# Patient Record
Sex: MALE | Race: WHITE | NOT HISPANIC OR LATINO | Employment: OTHER | URBAN - METROPOLITAN AREA
[De-identification: names, ages, dates, MRNs, and addresses within clinical notes are randomized per-mention and may not be internally consistent; named-entity substitution may affect disease eponyms.]

---

## 2020-09-28 ENCOUNTER — TRANSCRIBE ORDERS (OUTPATIENT)
Dept: ADMINISTRATIVE | Facility: HOSPITAL | Age: 75
End: 2020-09-28

## 2020-09-28 DIAGNOSIS — D64.9 ANEMIA, UNSPECIFIED: ICD-10-CM

## 2020-09-28 DIAGNOSIS — J91.0 MALIGNANT PLEURAL EFFUSION: ICD-10-CM

## 2020-09-28 DIAGNOSIS — R11.0 NAUSEA: ICD-10-CM

## 2020-09-28 DIAGNOSIS — C34.11 MALIGNANT NEOPLASM OF UPPER LOBE, RIGHT BRONCHUS OR LUNG (HCC): Primary | ICD-10-CM

## 2020-10-07 ENCOUNTER — HOSPITAL ENCOUNTER (OUTPATIENT)
Dept: RADIOLOGY | Facility: MEDICAL CENTER | Age: 75
Discharge: HOME | End: 2020-10-07
Payer: COMMERCIAL

## 2020-10-07 DIAGNOSIS — R11.0 NAUSEA: ICD-10-CM

## 2020-10-07 DIAGNOSIS — D64.9 ANEMIA, UNSPECIFIED: ICD-10-CM

## 2020-10-07 DIAGNOSIS — C34.11 MALIGNANT NEOPLASM OF UPPER LOBE, RIGHT BRONCHUS OR LUNG (HCC): ICD-10-CM

## 2020-10-07 DIAGNOSIS — J91.0 MALIGNANT PLEURAL EFFUSION: ICD-10-CM

## 2020-10-07 PROCEDURE — 74177 CT ABD & PELVIS W/CONTRAST: CPT

## 2020-10-07 PROCEDURE — 71260 CT THORAX DX C+: CPT

## 2020-10-07 PROCEDURE — G1004 CDSM NDSC: HCPCS

## 2020-10-07 RX ADMIN — IOHEXOL 100 ML: 350 INJECTION, SOLUTION INTRAVENOUS at 08:59

## 2020-10-21 ENCOUNTER — OFFICE VISIT (OUTPATIENT)
Dept: PULMONOLOGY | Facility: CLINIC | Age: 75
End: 2020-10-21
Payer: COMMERCIAL

## 2020-10-21 VITALS
DIASTOLIC BLOOD PRESSURE: 80 MMHG | TEMPERATURE: 97.8 F | HEART RATE: 110 BPM | HEIGHT: 67 IN | SYSTOLIC BLOOD PRESSURE: 128 MMHG | WEIGHT: 213.5 LBS | OXYGEN SATURATION: 93 % | BODY MASS INDEX: 33.51 KG/M2

## 2020-10-21 DIAGNOSIS — C34.91 CARCINOMA OF LUNG, RIGHT (HCC): Primary | ICD-10-CM

## 2020-10-21 DIAGNOSIS — J90 PLEURAL EFFUSION: ICD-10-CM

## 2020-10-21 DIAGNOSIS — Z86.711 HISTORY OF PULMONARY EMBOLISM: ICD-10-CM

## 2020-10-21 DIAGNOSIS — J44.9 CHRONIC OBSTRUCTIVE PULMONARY DISEASE, UNSPECIFIED COPD TYPE (HCC): ICD-10-CM

## 2020-10-21 PROBLEM — I82.432 ACUTE DEEP VEIN THROMBOSIS (DVT) OF POPLITEAL VEIN OF LEFT LOWER EXTREMITY (HCC): Status: ACTIVE | Noted: 2020-02-29

## 2020-10-21 PROCEDURE — 99214 OFFICE O/P EST MOD 30 MIN: CPT | Performed by: INTERNAL MEDICINE

## 2020-10-21 RX ORDER — LOVASTATIN 20 MG/1
20 TABLET ORAL
COMMUNITY
End: 2021-07-08 | Stop reason: HOSPADM

## 2020-10-21 RX ORDER — ONDANSETRON 4 MG/1
4 TABLET, FILM COATED ORAL EVERY 8 HOURS PRN
COMMUNITY
End: 2021-07-08 | Stop reason: HOSPADM

## 2020-10-21 RX ORDER — LANOLIN ALCOHOL/MO/W.PET/CERES
1 CREAM (GRAM) TOPICAL 2 TIMES DAILY
COMMUNITY
End: 2021-06-28 | Stop reason: HOSPADM

## 2020-10-21 RX ORDER — MELATONIN
1000 DAILY
COMMUNITY
End: 2021-07-08 | Stop reason: HOSPADM

## 2020-10-21 RX ORDER — SENNOSIDES 8.6 MG
1 TABLET ORAL 2 TIMES DAILY
COMMUNITY
End: 2021-06-13

## 2020-10-21 RX ORDER — FUROSEMIDE 40 MG/1
40 TABLET ORAL DAILY
COMMUNITY
End: 2021-07-08 | Stop reason: HOSPADM

## 2020-10-21 RX ORDER — LOSARTAN POTASSIUM 50 MG/1
50 TABLET ORAL DAILY
COMMUNITY
End: 2021-06-28 | Stop reason: HOSPADM

## 2020-10-21 RX ORDER — METOPROLOL SUCCINATE 25 MG/1
25 TABLET, EXTENDED RELEASE ORAL
Status: ON HOLD | COMMUNITY
End: 2021-06-18

## 2020-10-21 RX ORDER — MIRTAZAPINE 15 MG/1
15 TABLET, FILM COATED ORAL DAILY
COMMUNITY
Start: 2020-10-12 | End: 2021-07-08 | Stop reason: HOSPADM

## 2020-10-21 RX ORDER — PANTOPRAZOLE SODIUM 40 MG/1
40 TABLET, DELAYED RELEASE ORAL EVERY MORNING
COMMUNITY
Start: 2020-08-06 | End: 2021-07-08 | Stop reason: HOSPADM

## 2020-10-21 RX ORDER — GLIMEPIRIDE 1 MG/1
1 TABLET ORAL
COMMUNITY
End: 2021-07-08 | Stop reason: HOSPADM

## 2020-10-21 RX ORDER — ATENOLOL 25 MG/1
25 TABLET ORAL DAILY
COMMUNITY
End: 2021-06-28 | Stop reason: HOSPADM

## 2021-03-29 ENCOUNTER — TRANSCRIBE ORDERS (OUTPATIENT)
Dept: ADMINISTRATIVE | Facility: HOSPITAL | Age: 76
End: 2021-03-29

## 2021-03-29 DIAGNOSIS — C34.11 MALIGNANT NEOPLASM OF UPPER LOBE OF RIGHT LUNG (HCC): Primary | ICD-10-CM

## 2021-04-01 ENCOUNTER — HOSPITAL ENCOUNTER (OUTPATIENT)
Dept: RADIOLOGY | Facility: MEDICAL CENTER | Age: 76
Discharge: HOME | End: 2021-04-01

## 2021-04-01 DIAGNOSIS — C34.11 MALIGNANT NEOPLASM OF UPPER LOBE OF RIGHT LUNG (HCC): ICD-10-CM

## 2021-04-02 ENCOUNTER — HOSPITAL ENCOUNTER (OUTPATIENT)
Dept: RADIOLOGY | Facility: MEDICAL CENTER | Age: 76
Discharge: HOME | End: 2021-04-02
Payer: COMMERCIAL

## 2021-04-02 PROCEDURE — 74177 CT ABD & PELVIS W/CONTRAST: CPT

## 2021-04-02 PROCEDURE — 71260 CT THORAX DX C+: CPT

## 2021-04-02 RX ADMIN — IOHEXOL 100 ML: 350 INJECTION, SOLUTION INTRAVENOUS at 13:15

## 2021-04-14 ENCOUNTER — TRANSCRIBE ORDERS (OUTPATIENT)
Dept: ADMINISTRATIVE | Facility: HOSPITAL | Age: 76
End: 2021-04-14

## 2021-04-14 DIAGNOSIS — C34.11 MALIGNANT NEOPLASM OF UPPER LOBE, RIGHT BRONCHUS OR LUNG (HCC): ICD-10-CM

## 2021-04-14 DIAGNOSIS — Z80.1 FH: LUNG CANCER: Primary | ICD-10-CM

## 2021-05-04 ENCOUNTER — HOSPITAL ENCOUNTER (OUTPATIENT)
Dept: RADIOLOGY | Age: 76
Discharge: HOME | End: 2021-05-04
Payer: COMMERCIAL

## 2021-05-04 DIAGNOSIS — C34.11 MALIGNANT NEOPLASM OF UPPER LOBE OF RIGHT LUNG (HCC): ICD-10-CM

## 2021-05-04 LAB — GLUCOSE SERPL-MCNC: 138 MG/DL (ref 65–140)

## 2021-05-04 PROCEDURE — G1004 CDSM NDSC: HCPCS

## 2021-05-04 PROCEDURE — 82948 REAGENT STRIP/BLOOD GLUCOSE: CPT

## 2021-05-04 PROCEDURE — A9552 F18 FDG: HCPCS

## 2021-05-04 PROCEDURE — 78815 PET IMAGE W/CT SKULL-THIGH: CPT

## 2021-05-12 ENCOUNTER — OFFICE VISIT (OUTPATIENT)
Dept: PULMONOLOGY | Facility: CLINIC | Age: 76
End: 2021-05-12
Payer: COMMERCIAL

## 2021-05-12 VITALS
TEMPERATURE: 98.7 F | SYSTOLIC BLOOD PRESSURE: 128 MMHG | WEIGHT: 212.8 LBS | DIASTOLIC BLOOD PRESSURE: 78 MMHG | BODY MASS INDEX: 33.4 KG/M2 | HEART RATE: 106 BPM | HEIGHT: 67 IN | OXYGEN SATURATION: 96 %

## 2021-05-12 DIAGNOSIS — J44.9 CHRONIC OBSTRUCTIVE PULMONARY DISEASE, UNSPECIFIED COPD TYPE (HCC): Primary | ICD-10-CM

## 2021-05-12 DIAGNOSIS — J90 PLEURAL EFFUSION: ICD-10-CM

## 2021-05-12 DIAGNOSIS — C34.91 CARCINOMA OF LUNG, RIGHT (HCC): ICD-10-CM

## 2021-05-12 DIAGNOSIS — E11.69 TYPE 2 DIABETES MELLITUS WITH OTHER SPECIFIED COMPLICATION, WITHOUT LONG-TERM CURRENT USE OF INSULIN (HCC): ICD-10-CM

## 2021-05-12 DIAGNOSIS — I44.30 AV BLOCK: ICD-10-CM

## 2021-05-12 DIAGNOSIS — Z86.711 HISTORY OF PULMONARY EMBOLISM: ICD-10-CM

## 2021-05-12 PROBLEM — E11.9 TYPE 2 DIABETES MELLITUS (HCC): Status: ACTIVE | Noted: 2021-05-12

## 2021-05-12 PROCEDURE — 99214 OFFICE O/P EST MOD 30 MIN: CPT | Performed by: INTERNAL MEDICINE

## 2021-05-12 NOTE — PROGRESS NOTES
Assessment/Plan:    Carcinoma of lung, right ([facility] )  History of NSSLC/recurrent malignant effusion s/p pleural decortication and pleurodesis in July 2020  Most recent PET scan on 5/4/2021    1  Overall findings concerning for disease progression  2  More extensive FDG avid pleural disease at the right lung base mainly around the remaining loculated debris-filled collection here  This is suspicious for malignant pleural disease  3   More extensive FDG avid rowena disease in the mediastinum and right perihilar region suspicious for metastasis  4   New mild focus of FDG uptake within the left intraperitoneal fat would raise suspicion for intraperitoneal metastasis  5  New mild pelvic ascites with mild FDG uptake, malignant ascites is not excluded  6  New focus of FDG uptake at the right scapula superiorly suspicious for osseous metastasis  Patient reports he regained his strength slowly  Initially he lost more than 35 lb and now he gained a few lb back  Patient is following up with Dr Hawk Castellanos for chemotherapy  Pleural effusion  Status post pleural decortication and pleurodesis in July 2020    COPD (chronic obstructive pulmonary disease) ([facility] )  He is a former smoker, quit smoking 50 years ago  He reports his breathing is significantly improved with inhaler  He is running out of his inhaler for few months  Not in acute exacerbation currently, not using oxygen at home  He does have shortness of breath on exertion, his exercise tolerance is very limited he can walk only 15-20 feet without having shortness of breath  He denies any cough, wheezing  Gave Spiriva sample and refill for 90 day supply  History of pulmonary embolism  History of PE on Eliquis    AV block  Status post pacemaker    Type 2 diabetes mellitus ([facility] )    Lab Results   Component Value Date    HGBA1C 9 3 (H) 12/29/2020     Not on insulin  He reports his blood sugar level fluctuates     He reports of dizziness and disoriented on some morning when his blood sugars found to have low at 60s  Management as per primary care physician       Diagnoses and all orders for this visit:    Chronic obstructive pulmonary disease, unspecified COPD type ([facility] )  -     Discontinue: tiotropium (SPIRIVA RESPIMAT) 2 5 MCG/ACT AERS inhaler; Inhale 2 puffs daily  -     tiotropium (SPIRIVA RESPIMAT) 2 5 MCG/ACT AERS inhaler; Inhale 2 puffs daily  -     tiotropium (SPIRIVA RESPIMAT) 2 5 MCG/ACT AERS inhaler; Inhale 2 puffs daily    Carcinoma of lung, right (HCC)  -     Discontinue: tiotropium (SPIRIVA RESPIMAT) 2 5 MCG/ACT AERS inhaler; Inhale 2 puffs daily  -     tiotropium (SPIRIVA RESPIMAT) 2 5 MCG/ACT AERS inhaler; Inhale 2 puffs daily  -     tiotropium (SPIRIVA RESPIMAT) 2 5 MCG/ACT AERS inhaler; Inhale 2 puffs daily    Pleural effusion    History of pulmonary embolism    AV block    Type 2 diabetes mellitus with other specified complication, without long-term current use of insulin ([facility])          Subjective:      Patient ID: Marvin Morales is a 77 y o  male  Patient is here for follow-up for COPD, non-small-cell lung cancer on chemotherapy, malignant pleural effusion status post pleural decortication and pleurodesis, history of PE on Eliquis  He reports his breathing is improved significantly with inhalers  He can walk only 15-20 steps without having shortness of breath  He reports he lost more than 35 lb previously however he gained a few lb back  He denies any cough, chest pain, palpitation, leg swelling, loss of appetite, any GI or  complaint  He reports he sleeps more than usual   He reports of fatigue and tiredness which he attributes to chemotherapy  He has skin rash with previous chemotherapy and he reports his skin rash are improving        The following portions of the patient's history were reviewed and updated as appropriate: allergies, current medications, past family history, past medical history, past social history, past surgical history and problem list     Review of Systems   Constitutional: Positive for activity change and fatigue  Negative for appetite change, chills, fever and unexpected weight change  HENT: Negative for ear pain, sinus pressure, sinus pain and sore throat  Eyes: Negative for pain and visual disturbance  Respiratory: Positive for shortness of breath  Negative for cough, chest tightness and wheezing  Cardiovascular: Negative for chest pain, palpitations and leg swelling  Gastrointestinal: Negative for abdominal pain, constipation, diarrhea (occasional diarrhea which he attributes to chemotherapy), nausea and vomiting  Genitourinary: Negative for difficulty urinating, dysuria, flank pain, frequency and hematuria  Musculoskeletal: Negative for arthralgias, back pain, joint swelling and myalgias  Skin: Positive for rash  Negative for color change  Neurological: Positive for dizziness ( occasional)  Negative for seizures, syncope and numbness  Psychiatric/Behavioral: Negative for decreased concentration and sleep disturbance  All other systems reviewed and are negative  Objective:      /78 (BP Location: Right arm, Patient Position: Sitting, Cuff Size: Standard)   Pulse (!) 106   Temp 98 7 °F (37 1 °C) (Tympanic)   Ht 5' 7" (1 702 m)   Wt 96 5 kg (212 lb 12 8 oz)   SpO2 96%   BMI 33 33 kg/m²          Physical Exam  Vitals signs and nursing note reviewed  Constitutional:       General: He is not in acute distress  Appearance: Normal appearance  He is ill-appearing  He is not toxic-appearing or diaphoretic  HENT:      Head: Normocephalic and atraumatic  Nose: Nose normal    Eyes:      General: No scleral icterus  Extraocular Movements: Extraocular movements intact  Neck:      Musculoskeletal: Normal range of motion  Cardiovascular:      Rate and Rhythm: Normal rate and regular rhythm  Pulses: Normal pulses  Heart sounds: Normal heart sounds  No murmur   No friction rub  No gallop  Pulmonary:      Effort: Pulmonary effort is normal  No bradypnea, accessory muscle usage or respiratory distress  Breath sounds: Decreased air movement (Right lung especially lung base) present  No stridor  No wheezing, rhonchi or rales  Abdominal:      General: Abdomen is flat  Bowel sounds are normal  There is no distension  Palpations: Abdomen is soft  Tenderness: There is no abdominal tenderness  There is no guarding  Musculoskeletal: Normal range of motion  General: No swelling or tenderness  Right lower leg: No edema  Left lower leg: No edema  Skin:     General: Skin is warm and dry  Capillary Refill: Capillary refill takes less than 2 seconds  Coloration: Skin is not pale  Findings: Rash present  Neurological:      General: No focal deficit present  Mental Status: He is alert     Psychiatric:         Mood and Affect: Mood normal

## 2021-05-12 NOTE — ASSESSMENT & PLAN NOTE
History of NSSLC/recurrent malignant effusion s/p pleural decortication and pleurodesis in July 2020  Most recent PET scan on 5/4/2021    1  Overall findings concerning for disease progression  2  More extensive FDG avid pleural disease at the right lung base mainly around the remaining loculated debris-filled collection here  This is suspicious for malignant pleural disease  3   More extensive FDG avid rowena disease in the mediastinum and right perihilar region suspicious for metastasis  4   New mild focus of FDG uptake within the left intraperitoneal fat would raise suspicion for intraperitoneal metastasis  5  New mild pelvic ascites with mild FDG uptake, malignant ascites is not excluded  6  New focus of FDG uptake at the right scapula superiorly suspicious for osseous metastasis  Patient reports he regained his strength slowly  Initially he lost more than 35 lb and now he gained a few lb back  Patient is following up with Dr Mini Kincaid for chemotherapy

## 2021-05-12 NOTE — ASSESSMENT & PLAN NOTE
Lab Results   Component Value Date    HGBA1C 9 3 (H) 12/29/2020     Not on insulin  He reports his blood sugar level fluctuates  He reports of dizziness and disoriented on some morning when his blood sugars found to have low at 60s    Management as per primary care physician

## 2021-05-12 NOTE — ASSESSMENT & PLAN NOTE
He is a former smoker, quit smoking 50 years ago  He reports his breathing is significantly improved with inhaler  He is running out of his inhaler for few months  Not in acute exacerbation currently, not using oxygen at home  He does have shortness of breath on exertion, his exercise tolerance is very limited he can walk only 15-20 feet without having shortness of breath  He denies any cough, wheezing  Gave Spiriva sample and refill for 90 day supply

## 2021-06-13 ENCOUNTER — APPOINTMENT (EMERGENCY)
Dept: RADIOLOGY | Facility: HOSPITAL | Age: 76
DRG: 871 | End: 2021-06-13
Payer: COMMERCIAL

## 2021-06-13 ENCOUNTER — HOSPITAL ENCOUNTER (INPATIENT)
Facility: HOSPITAL | Age: 76
LOS: 1 days | DRG: 871 | End: 2021-06-14
Attending: EMERGENCY MEDICINE | Admitting: INTERNAL MEDICINE
Payer: COMMERCIAL

## 2021-06-13 ENCOUNTER — APPOINTMENT (EMERGENCY)
Dept: CT IMAGING | Facility: HOSPITAL | Age: 76
DRG: 871 | End: 2021-06-13
Payer: COMMERCIAL

## 2021-06-13 DIAGNOSIS — C34.90 METASTATIC PRIMARY LUNG CANCER (HCC): ICD-10-CM

## 2021-06-13 DIAGNOSIS — N17.9 AKI (ACUTE KIDNEY INJURY) (HCC): ICD-10-CM

## 2021-06-13 DIAGNOSIS — E86.0 DEHYDRATION: ICD-10-CM

## 2021-06-13 DIAGNOSIS — J18.9 HCAP (HEALTHCARE-ASSOCIATED PNEUMONIA): Primary | ICD-10-CM

## 2021-06-13 DIAGNOSIS — R09.02 HYPOXIA: ICD-10-CM

## 2021-06-13 PROBLEM — J96.01 ACUTE RESPIRATORY FAILURE WITH HYPOXIA (HCC): Status: ACTIVE | Noted: 2021-06-13

## 2021-06-13 PROBLEM — E87.1 HYPONATREMIA: Status: ACTIVE | Noted: 2021-06-13

## 2021-06-13 LAB
ALBUMIN SERPL BCP-MCNC: 2.9 G/DL (ref 3.4–4.8)
ALP SERPL-CCNC: 56.8 U/L (ref 10–129)
ALT SERPL W P-5'-P-CCNC: 7 U/L (ref 5–63)
ANION GAP SERPL CALCULATED.3IONS-SCNC: 9 MMOL/L (ref 4–13)
AST SERPL W P-5'-P-CCNC: 15 U/L (ref 15–41)
BASE EX.OXY STD BLDV CALC-SCNC: 44 % (ref 60–80)
BASE EXCESS BLDV CALC-SCNC: -4.3 MMOL/L
BASOPHILS # BLD AUTO: 0.04 THOUSANDS/ΜL (ref 0–0.1)
BASOPHILS NFR BLD AUTO: 1 % (ref 0–1)
BILIRUB SERPL-MCNC: 0.51 MG/DL (ref 0.3–1.2)
BNP SERPL-MCNC: 16.2 PG/ML (ref 1–100)
BUN SERPL-MCNC: 31 MG/DL (ref 6–20)
CALCIUM ALBUM COR SERPL-MCNC: 8.9 MG/DL (ref 8.3–10.1)
CALCIUM SERPL-MCNC: 8 MG/DL (ref 8.4–10.2)
CHLORIDE SERPL-SCNC: 96 MMOL/L (ref 96–108)
CO2 SERPL-SCNC: 24 MMOL/L (ref 22–33)
CREAT SERPL-MCNC: 1.59 MG/DL (ref 0.5–1.2)
CRP SERPL QL: 14.3 MG/L (ref 0–1)
EOSINOPHIL # BLD AUTO: 0.23 THOUSAND/ΜL (ref 0–0.61)
EOSINOPHIL NFR BLD AUTO: 4 % (ref 0–6)
ERYTHROCYTE [DISTWIDTH] IN BLOOD BY AUTOMATED COUNT: 18.1 % (ref 11.6–15.1)
GFR SERPL CREATININE-BSD FRML MDRD: 42 ML/MIN/1.73SQ M
GLUCOSE SERPL-MCNC: 140 MG/DL (ref 65–140)
GLUCOSE SERPL-MCNC: 156 MG/DL (ref 65–140)
HCO3 BLDV-SCNC: 21.7 MMOL/L (ref 24–30)
HCT VFR BLD AUTO: 34.7 % (ref 36.5–49.3)
HGB BLD-MCNC: 11.2 G/DL (ref 12–17)
IMM GRANULOCYTES # BLD AUTO: 0.01 THOUSAND/UL (ref 0–0.2)
IMM GRANULOCYTES NFR BLD AUTO: 0 % (ref 0–2)
LACTATE SERPL-SCNC: 1.4 MMOL/L (ref 0–2)
LYMPHOCYTES # BLD AUTO: 0.9 THOUSANDS/ΜL (ref 0.6–4.47)
LYMPHOCYTES NFR BLD AUTO: 14 % (ref 14–44)
MCH RBC QN AUTO: 28.4 PG (ref 26.8–34.3)
MCHC RBC AUTO-ENTMCNC: 32.3 G/DL (ref 31.4–37.4)
MCV RBC AUTO: 88 FL (ref 82–98)
MONOCYTES # BLD AUTO: 0.92 THOUSAND/ΜL (ref 0.17–1.22)
MONOCYTES NFR BLD AUTO: 15 % (ref 4–12)
NEUTROPHILS # BLD AUTO: 4.15 THOUSANDS/ΜL (ref 1.85–7.62)
NEUTS SEG NFR BLD AUTO: 66 % (ref 43–75)
O2 CT BLDV-SCNC: 6.9 ML/DL
PCO2 BLDV: 43.7 MM HG (ref 42–50)
PH BLDV: 7.31 [PH] (ref 7.3–7.4)
PLATELET # BLD AUTO: 218 THOUSANDS/UL (ref 149–390)
PMV BLD AUTO: 9.6 FL (ref 8.9–12.7)
PO2 BLDV: 29.2 MM HG (ref 35–45)
POTASSIUM SERPL-SCNC: 4.2 MMOL/L (ref 3.5–5)
PROT SERPL-MCNC: 5.4 G/DL (ref 6.4–8.3)
RBC # BLD AUTO: 3.94 MILLION/UL (ref 3.88–5.62)
SARS-COV-2 RNA RESP QL NAA+PROBE: NEGATIVE
SODIUM SERPL-SCNC: 129 MMOL/L (ref 133–145)
TROPONIN I SERPL-MCNC: <0.03 NG/ML (ref 0–0.07)
TROPONIN I SERPL-MCNC: <0.03 NG/ML (ref 0–0.07)
WBC # BLD AUTO: 6.25 THOUSAND/UL (ref 4.31–10.16)

## 2021-06-13 PROCEDURE — 71275 CT ANGIOGRAPHY CHEST: CPT

## 2021-06-13 PROCEDURE — 83605 ASSAY OF LACTIC ACID: CPT | Performed by: EMERGENCY MEDICINE

## 2021-06-13 PROCEDURE — 93005 ELECTROCARDIOGRAM TRACING: CPT

## 2021-06-13 PROCEDURE — 83880 ASSAY OF NATRIURETIC PEPTIDE: CPT | Performed by: EMERGENCY MEDICINE

## 2021-06-13 PROCEDURE — 86140 C-REACTIVE PROTEIN: CPT | Performed by: EMERGENCY MEDICINE

## 2021-06-13 PROCEDURE — 71045 X-RAY EXAM CHEST 1 VIEW: CPT

## 2021-06-13 PROCEDURE — 87635 SARS-COV-2 COVID-19 AMP PRB: CPT | Performed by: EMERGENCY MEDICINE

## 2021-06-13 PROCEDURE — 84484 ASSAY OF TROPONIN QUANT: CPT | Performed by: INTERNAL MEDICINE

## 2021-06-13 PROCEDURE — 80053 COMPREHEN METABOLIC PANEL: CPT | Performed by: EMERGENCY MEDICINE

## 2021-06-13 PROCEDURE — 82948 REAGENT STRIP/BLOOD GLUCOSE: CPT

## 2021-06-13 PROCEDURE — 99285 EMERGENCY DEPT VISIT HI MDM: CPT

## 2021-06-13 PROCEDURE — 96361 HYDRATE IV INFUSION ADD-ON: CPT

## 2021-06-13 PROCEDURE — 99223 1ST HOSP IP/OBS HIGH 75: CPT | Performed by: INTERNAL MEDICINE

## 2021-06-13 PROCEDURE — 84145 PROCALCITONIN (PCT): CPT | Performed by: INTERNAL MEDICINE

## 2021-06-13 PROCEDURE — 96365 THER/PROPH/DIAG IV INF INIT: CPT

## 2021-06-13 PROCEDURE — 82805 BLOOD GASES W/O2 SATURATION: CPT | Performed by: INTERNAL MEDICINE

## 2021-06-13 PROCEDURE — 36415 COLL VENOUS BLD VENIPUNCTURE: CPT | Performed by: EMERGENCY MEDICINE

## 2021-06-13 PROCEDURE — 87040 BLOOD CULTURE FOR BACTERIA: CPT | Performed by: EMERGENCY MEDICINE

## 2021-06-13 PROCEDURE — 87449 NOS EACH ORGANISM AG IA: CPT | Performed by: INTERNAL MEDICINE

## 2021-06-13 PROCEDURE — 99285 EMERGENCY DEPT VISIT HI MDM: CPT | Performed by: EMERGENCY MEDICINE

## 2021-06-13 PROCEDURE — 84484 ASSAY OF TROPONIN QUANT: CPT | Performed by: EMERGENCY MEDICINE

## 2021-06-13 PROCEDURE — 85025 COMPLETE CBC W/AUTO DIFF WBC: CPT | Performed by: EMERGENCY MEDICINE

## 2021-06-13 RX ORDER — PRAVASTATIN SODIUM 20 MG
10 TABLET ORAL
Status: DISCONTINUED | OUTPATIENT
Start: 2021-06-13 | End: 2021-06-14 | Stop reason: HOSPADM

## 2021-06-13 RX ORDER — DOCUSATE SODIUM 100 MG/1
100 CAPSULE, LIQUID FILLED ORAL 2 TIMES DAILY
Status: DISCONTINUED | OUTPATIENT
Start: 2021-06-13 | End: 2021-06-14 | Stop reason: HOSPADM

## 2021-06-13 RX ORDER — MIRTAZAPINE 15 MG/1
15 TABLET, FILM COATED ORAL DAILY
Status: DISCONTINUED | OUTPATIENT
Start: 2021-06-14 | End: 2021-06-14 | Stop reason: HOSPADM

## 2021-06-13 RX ORDER — LEVOFLOXACIN 5 MG/ML
250 INJECTION, SOLUTION INTRAVENOUS EVERY 24 HOURS
Status: DISCONTINUED | OUTPATIENT
Start: 2021-06-14 | End: 2021-06-14 | Stop reason: HOSPADM

## 2021-06-13 RX ORDER — LEVOFLOXACIN 5 MG/ML
750 INJECTION, SOLUTION INTRAVENOUS ONCE
Status: COMPLETED | OUTPATIENT
Start: 2021-06-13 | End: 2021-06-13

## 2021-06-13 RX ORDER — METOPROLOL SUCCINATE 25 MG/1
25 TABLET, EXTENDED RELEASE ORAL DAILY
Status: DISCONTINUED | OUTPATIENT
Start: 2021-06-14 | End: 2021-06-14 | Stop reason: HOSPADM

## 2021-06-13 RX ORDER — ACETAMINOPHEN 325 MG/1
650 TABLET ORAL EVERY 6 HOURS PRN
Status: DISCONTINUED | OUTPATIENT
Start: 2021-06-13 | End: 2021-06-14 | Stop reason: HOSPADM

## 2021-06-13 RX ORDER — CALCIUM CARBONATE 500(1250)
1 TABLET ORAL 2 TIMES DAILY WITH MEALS
Status: DISCONTINUED | OUTPATIENT
Start: 2021-06-14 | End: 2021-06-14 | Stop reason: HOSPADM

## 2021-06-13 RX ORDER — FOLIC ACID 1 MG/1
1000 TABLET ORAL DAILY
Status: DISCONTINUED | OUTPATIENT
Start: 2021-06-14 | End: 2021-06-14 | Stop reason: HOSPADM

## 2021-06-13 RX ORDER — PANTOPRAZOLE SODIUM 40 MG/1
40 TABLET, DELAYED RELEASE ORAL
Status: DISCONTINUED | OUTPATIENT
Start: 2021-06-14 | End: 2021-06-14 | Stop reason: HOSPADM

## 2021-06-13 RX ORDER — FUROSEMIDE 10 MG/ML
20 INJECTION INTRAMUSCULAR; INTRAVENOUS ONCE
Status: COMPLETED | OUTPATIENT
Start: 2021-06-13 | End: 2021-06-13

## 2021-06-13 RX ORDER — SODIUM CHLORIDE, SODIUM LACTATE, POTASSIUM CHLORIDE, CALCIUM CHLORIDE 600; 310; 30; 20 MG/100ML; MG/100ML; MG/100ML; MG/100ML
125 INJECTION, SOLUTION INTRAVENOUS CONTINUOUS
Status: DISCONTINUED | OUTPATIENT
Start: 2021-06-13 | End: 2021-06-14 | Stop reason: HOSPADM

## 2021-06-13 RX ORDER — ONDANSETRON 2 MG/ML
4 INJECTION INTRAMUSCULAR; INTRAVENOUS EVERY 6 HOURS PRN
Status: DISCONTINUED | OUTPATIENT
Start: 2021-06-13 | End: 2021-06-14 | Stop reason: HOSPADM

## 2021-06-13 RX ORDER — MAGNESIUM HYDROXIDE/ALUMINUM HYDROXICE/SIMETHICONE 120; 1200; 1200 MG/30ML; MG/30ML; MG/30ML
30 SUSPENSION ORAL EVERY 6 HOURS PRN
Status: DISCONTINUED | OUTPATIENT
Start: 2021-06-13 | End: 2021-06-14 | Stop reason: HOSPADM

## 2021-06-13 RX ORDER — SODIUM CHLORIDE 9 MG/ML
75 INJECTION, SOLUTION INTRAVENOUS CONTINUOUS
Status: DISCONTINUED | OUTPATIENT
Start: 2021-06-13 | End: 2021-06-13

## 2021-06-13 RX ORDER — LANOLIN ALCOHOL/MO/W.PET/CERES
1 CREAM (GRAM) TOPICAL 2 TIMES DAILY
Status: DISCONTINUED | OUTPATIENT
Start: 2021-06-13 | End: 2021-06-14 | Stop reason: HOSPADM

## 2021-06-13 RX ORDER — MELATONIN
1000 DAILY
Status: DISCONTINUED | OUTPATIENT
Start: 2021-06-14 | End: 2021-06-14 | Stop reason: HOSPADM

## 2021-06-13 RX ADMIN — APIXABAN 5 MG: 5 TABLET, FILM COATED ORAL at 21:00

## 2021-06-13 RX ADMIN — SODIUM CHLORIDE, SODIUM LACTATE, POTASSIUM CHLORIDE, AND CALCIUM CHLORIDE 75 ML/HR: .6; .31; .03; .02 INJECTION, SOLUTION INTRAVENOUS at 18:55

## 2021-06-13 RX ADMIN — LEVOFLOXACIN 750 MG: 5 INJECTION, SOLUTION INTRAVENOUS at 17:15

## 2021-06-13 RX ADMIN — IOHEXOL 65 ML: 350 INJECTION, SOLUTION INTRAVENOUS at 16:46

## 2021-06-13 RX ADMIN — GUAIFENESIN 200 MG: 100 SOLUTION ORAL at 21:00

## 2021-06-13 RX ADMIN — ONDANSETRON 4 MG: 2 INJECTION INTRAMUSCULAR; INTRAVENOUS at 21:55

## 2021-06-13 RX ADMIN — PRAVASTATIN SODIUM 10 MG: 20 TABLET ORAL at 20:59

## 2021-06-13 RX ADMIN — FUROSEMIDE 20 MG: 10 INJECTION, SOLUTION INTRAMUSCULAR; INTRAVENOUS at 21:00

## 2021-06-13 RX ADMIN — SODIUM CHLORIDE 1000 ML: 0.9 INJECTION, SOLUTION INTRAVENOUS at 17:18

## 2021-06-13 RX ADMIN — SODIUM CHLORIDE 1000 ML: 0.9 INJECTION, SOLUTION INTRAVENOUS at 16:17

## 2021-06-13 NOTE — ASSESSMENT & PLAN NOTE
With pneumonia and sepsis -CTA shows no evidence of PE ,Increasing opacification in the right lung base suggesting worsening atelectasis or pneumonia  Complex right-sided loculated pleural effusion, the lateral inferior component containing bubbles of air as on numerous prior studies  Patient presents with increasing generalized weakness and cough, shortness of breath with minimal exertion needing 4-5 L of oxygen by nasal cannula in the ER  Patient also noted to be tachycardic with heart rate of 120s  Patient has sepsis and given IV fluid hydration and IV antibiotic with vancomycin and Levaquin and is being admitted  In July 2020- admitted  to the hospital on 7/31/20 for elective VATS procedure with pleural decortication and pleurodesis 2/2 hx of non small cell lung CA/recurrent malignant effusions    Will give IV antibiotic with vancomycin and Levaquin and monitor QT interval h/o anaphylaxis to pcn,  Renal adjusted dose  Will consult pharmacy   Will give gentle IV hydration as patient has right-sided pleural effusion and ascites and will need IR guided pleural tap and ascites which is likely malignant effusion  Will give a dose of lasix and diurese and monitor I/O ,no recent in epic    consult pulmonology  Will give nebulizer treatment  Will check VBG    Continue oxygen supplement and monitor O2 saturation  Cardiac monitoring

## 2021-06-13 NOTE — ASSESSMENT & PLAN NOTE
Patient had a recent CT scan which showed progression of the metastatic lung cancer  Extensive rowena disease in mediastinum right perihilar region with metastatic masses which is also new pelvic ascites noted  Patient follows up oncologist dr Lanny Hdez  andplanned for  Chemotherapy tomorrow and brigatinib which will be held

## 2021-06-13 NOTE — H&P
Valery U  67   H&Deirdre Lubin 1945, 77 y o  male MRN: [id number]  Unit/Bed#: -[facility] Encounter: [id number]  Primary Care Provider: Eusebia Kearns MD   Date and time admitted to hospital: 6/13/2021  3:30 PM    Acute respiratory failure with hypoxia [facility])  Assessment & Plan  With pneumonia and sepsis -CTA shows no evidence of PE ,Increasing opacification in the right lung base suggesting worsening atelectasis or pneumonia  Complex right-sided loculated pleural effusion, the lateral inferior component containing bubbles of air as on numerous prior studies  Patient presents with increasing generalized weakness and cough, shortness of breath with minimal exertion needing 4-5 L of oxygen by nasal cannula in the ER  Patient also noted to be tachycardic with heart rate of 120s  Patient has sepsis and given IV fluid hydration and IV antibiotic with vancomycin and Levaquin and is being admitted  In July 2020- admitted  to the hospital on 7/31/20 for elective VATS procedure with pleural decortication and pleurodesis 2/2 hx of non small cell lung CA/recurrent malignant effusions    Will give IV antibiotic with vancomycin and Levaquin and monitor QT interval h/o anaphylaxis to pcn,  Renal adjusted dose  Will consult pharmacy   Will give gentle IV hydration as patient has right-sided pleural effusion and ascites and will need IR guided pleural tap and ascites which is likely malignant effusion  Will give a dose of lasix and diurese and monitor I/O ,no recent in epic    consult pulmonology  Will give nebulizer treatment  Will check VBG  Continue oxygen supplement and monitor O2 saturation  Cardiac monitoring    Type 2 diabetes mellitus (HCC)  Assessment & Plan  Lab Results   Component Value Date    HGBA1C 9 3 (H) 12/29/2020       No results for input(s): POCGLU in the last 72 hours      Blood Sugar Average: Last 72 hrs:  will hold amaryl   Cover with low dose insulin Sliding scale History of pulmonary embolism  Assessment & Plan  Cont eliquis   CTA chest shows no new PE       COPD (chronic obstructive pulmonary disease) (HCC)  Assessment & Plan  Will cont O2 supp and also neb treatment       Carcinoma of lung, right [facility])  Assessment & Plan  Patient had a recent CT scan which showed progression of the metastatic lung cancer  Extensive rowena disease in mediastinum right perihilar region with metastatic masses which is also new pelvic ascites noted  Patient follows up oncologist dr Mahesh Mejia  andplanned for  Chemotherapy tomorrow and brigatinib which will be held  prognosis was reviewed with patient and wife at bedside , understand the disease progression and pt aware of the prognosis , wife wants him to be full code status   VTE Prophylaxis: Apixaban (Eliquis)  / sequential compression device   Code Status: full code  POLST: There is no POLST form on file for this patient (pre-hospital)  Discussion with family: with pt wife     Anticipated Length of Stay:  Patient will be admitted on an Inpatient basis with an anticipated length of stay of   2 midnights  Justification for Hospital Stay: pneumonia and sepsis     Total Time for Visit, including Counseling / Coordination of Care: 60 minutes  Greater than 50% of this total time spent on direct patient counseling and coordination of care      Chief Complaint:   Sob , cough and generalized weakness    History of Present Illness:    Brady Alvarez is a 77 y o  male who presents with  multiple comorbidities with CAD diabetes mellitus type 2, hypertension, 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, DVT in the left leg on anticoagulation, morbid obesity, metastatic lung cancer, non-small cell lung cancer diagnosed 2020, March, CHF,(9/1/2019)LVEF: 78%, dilated RV   , history of carotid endarterectomy , elective VATS procedure and pleural decortication and pleurodesis in July 2020 for recurrent malignant effusions, presents with complaints of increasing shortness of breath generalized weakness and cough for the past 1 week  Patient upon arrival to ED was noted to have tachycardia with heart rate of 120s  Patient needing 4-5 L of oxygen by nasal cannula  Patient appears tachypneic and tachycardic  Patient has been having brown expectoration  Patient denies of any headache or fever or chills  COVID 19 test negative  Patient has elevated CRP with sodium of 129  Patient was noted to have a CT which showed no evidence of PE but right-sided pleural effusion with right-sided pneumonia  Low and also large ascites  Patient was given IV antibiotic with vancomycin and Levaquin in the ED and IV fluid bolus and is being admitted for further evaluation and management  Review of Systems:    Review of Systems   Constitutional: Positive for activity change, appetite change, fatigue and unexpected weight change  HENT: Negative  Eyes: Negative  Respiratory: Positive for cough and shortness of breath  Cardiovascular: Negative  Gastrointestinal: Negative  Endocrine: Negative  Genitourinary: Negative  Musculoskeletal: Negative  Skin: Negative  Allergic/Immunologic: Negative  Neurological: Negative  Hematological: Negative  Psychiatric/Behavioral: Negative  Past Medical and Surgical History:     Past Medical History:   Diagnosis Date    Cancer ([facility] )     RT Lung    Diabetes mellitus ([facility] )     Hypertension        Past Surgical History:   Procedure Laterality Date    CARDIAC PACEMAKER PLACEMENT  09/2019    CHOLECYSTECTOMY      kidney damage from gallbladder removal     FOOT SURGERY      tendon    PORTACATH PLACEMENT      PROSTATE SURGERY         Meds/Allergies:    Prior to Admission medications    Medication Sig Start Date End Date Taking?  Authorizing Provider   apixaban (Eliquis) 5 mg Take 5 mg by mouth 2 (two) times a day   Yes Historical Provider, MD   atenolol (TENORMIN) 25 mg tablet Take 25 mg by mouth   Yes Historical Provider, MD   BRIGATINIB PO Take 1 tablet by mouth   Yes Historical Provider, MD   Calcium Carbonate-Vit D-Min (CALCIUM 1200 PO) Take 1 tablet by mouth   Yes Historical Provider, MD   cholecalciferol (VITAMIN D3) 1,000 units tablet Take 1,000 Units by mouth daily   Yes Historical Provider, MD   [facility] Vitamin B-12 1000 MCG tablet Take 1,000 mcg by mouth daily 1/22/21  Yes Historical Provider, MD   folic acid (FOLVITE) 1 mg tablet Take 1,000 mcg by mouth daily 1/22/21  Yes Historical Provider, MD   furosemide (LASIX) 40 mg tablet Take 40 mg by mouth   Yes Historical Provider, MD   glimepiride (AMARYL) 1 mg tablet Take 1 mg by mouth 2 (two) times a day   Yes Historical Provider, MD   glucosamine-chondroitin 500-400 MG tablet Take 1 tablet by mouth 2 (two) times a day   Yes Historical Provider, MD   losartan (COZAAR) 50 mg tablet Take 50 mg by mouth   Yes Historical Provider, MD   lovastatin (MEVACOR) 20 mg tablet Take 20 mg by mouth daily at bedtime   Yes Historical Provider, MD   acyclovir (ZOVIRAX) 200 mg capsule Take 200 mg by mouth 2 (two) times a day 3/1/21   Historical Provider, MD   metoprolol succinate (TOPROL-XL) 25 mg 24 hr tablet Take 25 mg by mouth    Historical Provider, MD   mirtazapine (REMERON) 15 mg tablet Take 15 mg by mouth daily 10/12/20   Historical Provider, MD   ondansetron (ZOFRAN) 4 mg tablet Take 4 mg by mouth every 8 (eight) hours as needed    Historical Provider, MD   oxyCODONE-acetaminophen (PERCOCET) 5-325 mg per tablet Take 1 tablet by mouth every 6 (six) hours as needed 2/15/21   Historical Provider, MD   pantoprazole (PROTONIX) 40 mg tablet Take 40 mg by mouth every morning 8/6/20   Historical Provider, MD   Prevalite 4 g packet  3/29/21   Historical Provider, MD   metFORMIN (GLUCOPHAGE) 500 mg tablet Take 500 mg by mouth  6/13/21  Historical Provider, MD   OLANZapine (ZyPREXA) 5 mg tablet Take 5 mg by mouth daily 3/5/21 6/13/21  Historical Provider, MD   senna (SENOKOT) 8 6 mg Take 1 tablet by mouth 2 (two) times a day  6/13/21  Historical Provider, MD   tiotropium (SPIRIVA RESPIMAT) 2 5 MCG/ACT AERS inhaler Inhale 2 puffs daily 5/12/21 6/13/21  Jc Mosqueda MD   tiotropium (SPIRIVA RESPIMAT) 2 5 MCG/ACT AERS inhaler Inhale 2 puffs daily 5/12/21 6/13/21  Jc Mosqueda MD     I have reviewed home medications with patient personally  Allergies:    Allergies   Allergen Reactions    Diovan [Valsartan] Angioedema    Penicillins Anaphylaxis    Lisinopril     Oxytocin        Social History:     Marital Status: /Civil Union   Occupation: retired  Patient Pre-hospital Living Situation: independent   Patient Pre-hospital Level of Mobility: independent  Patient Pre-hospital Diet Restrictions: carb controlled  Substance Use History:   Social History     Substance and Sexual Activity   Alcohol Use Not Currently     Social History     Tobacco Use   Smoking Status Former Smoker    Packs/day: 2 00    Years: 9 00    Pack years: 18 00    Types: Cigarettes   Smokeless Tobacco Never Used     Social History     Substance and Sexual Activity   Drug Use Never       Family History:    non-contributory    Physical Exam:     Vitals:   Blood Pressure: 103/73 (06/13/21 1833)  Pulse: (!) 15 (06/13/21 1833)  Temperature: (!) 97 1 °F (36 2 °C) (06/13/21 1833)  Temp Source: Tympanic (06/13/21 1833)  Respirations: (!) 32 (06/13/21 1833)  Weight - Scale: 99 kg (218 lb 4 1 oz) (06/13/21 1532)  SpO2: 96 % (06/13/21 1833)    Physical Exam      HEENT-PERRLA, moist oral mucosa  Neck-supple, no JVD elevation   Respiratory-b/l ronchi heard , decreased air entry at bases ,more so on rt side   Cardiovascular system-S1, S2 heard, no murmur or gallops or rubs, tachy  Abdomen-soft, nontender, no guarding or rigidity, bowel sounds heard,distended  Extremities-no pedal edema  Peripheral pulses palpable  Musculoskeletal-no contractures  Central nervous system-no acute focal neurological deficit ,no sensory or motor deficit noted  Skin-no rash noted        Additional Data:     Lab Results: I have personally reviewed pertinent reports  Results from last 7 days   Lab Units 06/13/21  1540   WBC Thousand/uL 6 25   HEMOGLOBIN g/dL 11 2*   HEMATOCRIT % 34 7*   PLATELETS Thousands/uL 218   NEUTROS PCT % 66   LYMPHS PCT % 14   MONOS PCT % 15*   EOS PCT % 4     Results from last 7 days   Lab Units 06/13/21  1540   SODIUM mmol/L 129*   POTASSIUM mmol/L 4 2   CHLORIDE mmol/L 96   CO2 mmol/L 24   BUN mg/dL 31*   CREATININE mg/dL 1 59*   ANION GAP mmol/L 9   CALCIUM mg/dL 8 0*   ALBUMIN g/dL 2 9*   TOTAL BILIRUBIN mg/dL 0 51   ALK PHOS U/L 56 8   ALT U/L 7   AST U/L 15   GLUCOSE RANDOM mg/dL 156*                 Results from last 7 days   Lab Units 06/13/21  1602   LACTIC ACID mmol/L 1 4       Imaging: I have personally reviewed pertinent reports  CTA ED chest PE study   Final Result by Jerzy Sanford MD (06/13 1719)      1  No evidence of acute pulmonary embolism  Several thin linear defects within the pulmonary arterial system, may represent fibrin strands from a previous episode of pulmonary embolism  2  Increasing opacification in the right lung base suggesting worsening atelectasis or pneumonia  3  Complex right-sided loculated pleural effusion, the lateral inferior component containing bubbles of air as on numerous prior studies  4  New the lingula nodule suspicious for metastatic disease   5  Development of a large amount of ascites within the upper abdomen  This is concerning for malignant ascites   6  In conjunction with prior PET/CT, evidence of malignant mediastinal adenopathy, and right scapular metastatic lesion                       Workstation performed: [id number]         XR chest 1 view portable    (Results Pending)   IR IN-Patient Thoracentesis    (Results Pending)       EKG, Pathology, and Other Studies Reviewed on Admission:   · EKG: sinus tachy    [employer] / [address] Records Reviewed: Yes     ** Please Note: This note has been constructed using a voice recognition system   **

## 2021-06-13 NOTE — ED PROVIDER NOTES
History  Chief Complaint   Patient presents with    Shortness of Breath     Pt c/o SOB and weakness x few days  Also abd disteneded  This is a 80-year-old male with a history of COPD, DVT on eliquis, and lung cancer presenting ED for evaluation of generalized weakness, no appetite, poor eating and shortness of breath with cough ongoing for about the past 1 week  He states the cough is productive of brownish dark sputum  He does have a history of advanced lung cancer with metastases and follows with oncology Dr Natty Luna  He is due to start a new chemotherapy treatment tomorrow  He states he does not think he has a may get to the appointment  He has not been vaccinated for COVID-19  History provided by:  Patient   used: No    Shortness of Breath  Severity:  Moderate  Onset quality:  Gradual  Associated symptoms: cough        Prior to Admission Medications   Prescriptions Last Dose Informant Patient Reported? Taking?    BRIGATINIB PO  Self Yes Yes   Sig: Take 1 tablet by mouth   [employer] Vitamin B-12 1000 MCG tablet  Self Yes Yes   Sig: Take 1,000 mcg by mouth daily   Calcium Carbonate-Vit D-Min (CALCIUM 1200 PO)  Self Yes Yes   Sig: Take 1 tablet by mouth   Prevalite 4 g packet  Self Yes No   acyclovir (ZOVIRAX) 200 mg capsule  Self Yes No   Sig: Take 200 mg by mouth 2 (two) times a day   apixaban (Eliquis) 5 mg  Self Yes Yes   Sig: Take 5 mg by mouth 2 (two) times a day   atenolol (TENORMIN) 25 mg tablet  Self Yes Yes   Sig: Take 25 mg by mouth   cholecalciferol (VITAMIN D3) 1,000 units tablet  Self Yes Yes   Sig: Take 1,000 Units by mouth daily   folic acid (FOLVITE) 1 mg tablet  Self Yes Yes   Sig: Take 1,000 mcg by mouth daily   furosemide (LASIX) 40 mg tablet  Self Yes Yes   Sig: Take 40 mg by mouth   glimepiride (AMARYL) 1 mg tablet  Self Yes Yes   Sig: Take 1 mg by mouth 2 (two) times a day   glucosamine-chondroitin 500-400 MG tablet  Self Yes Yes   Sig: Take 1 tablet by mouth 2 (two) times a day   losartan (COZAAR) 50 mg tablet  Self Yes Yes   Sig: Take 50 mg by mouth   lovastatin (MEVACOR) 20 mg tablet  Self Yes Yes   Sig: Take 20 mg by mouth daily at bedtime   metoprolol succinate (TOPROL-XL) 25 mg 24 hr tablet  Self Yes No   Sig: Take 25 mg by mouth   mirtazapine (REMERON) 15 mg tablet  Self Yes No   Sig: Take 15 mg by mouth daily   ondansetron (ZOFRAN) 4 mg tablet  Self Yes No   Sig: Take 4 mg by mouth every 8 (eight) hours as needed   oxyCODONE-acetaminophen (PERCOCET) 5-325 mg per tablet  Self Yes No   Sig: Take 1 tablet by mouth every 6 (six) hours as needed   pantoprazole (PROTONIX) 40 mg tablet  Self Yes No   Sig: Take 40 mg by mouth every morning      Facility-Administered Medications: None       Past Medical History:   Diagnosis Date    Cancer ([facility] )     RT Lung    Diabetes mellitus ([facility] )     Hypertension        Past Surgical History:   Procedure Laterality Date    CARDIAC PACEMAKER PLACEMENT  09/2019    CHOLECYSTECTOMY      kidney damage from gallbladder removal     FOOT SURGERY      tendon    PORTACATH PLACEMENT      PROSTATE SURGERY         Family History   Family history unknown: Yes     I have reviewed and agree with the history as documented  E-Cigarette/Vaping    E-Cigarette Use Current Some Day User      E-Cigarette/Vaping Substances     Social History     Tobacco Use    Smoking status: Former Smoker     Packs/day: 2 00     Years: 9 00     Pack years: 18 00     Types: Cigarettes    Smokeless tobacco: Never Used   Vaping Use    Vaping Use: Some days   Substance Use Topics    Alcohol use: Not Currently    Drug use: Never       Review of Systems   Constitutional: Positive for appetite change and fatigue  Respiratory: Positive for cough and shortness of breath  All other systems reviewed and are negative  Physical Exam  Physical Exam  Vitals and nursing note reviewed  Constitutional:       General: He is not in acute distress       Appearance: Normal appearance  He is well-developed and normal weight  He is ill-appearing  HENT:      Head: Normocephalic and atraumatic  Right Ear: External ear normal       Left Ear: External ear normal       Nose: Nose normal  No congestion or rhinorrhea  Mouth/Throat:      Mouth: Mucous membranes are moist       Pharynx: Oropharynx is clear  Eyes:      General: No scleral icterus  Right eye: No discharge  Left eye: No discharge  Conjunctiva/sclera: Conjunctivae normal    Cardiovascular:      Rate and Rhythm: Regular rhythm  Tachycardia present  Pulses: Normal pulses  Heart sounds: Normal heart sounds  Pulmonary:      Effort: Pulmonary effort is normal  Tachypnea present  No accessory muscle usage or respiratory distress  Breath sounds: Examination of the right-middle field reveals decreased breath sounds  Examination of the right-lower field reveals decreased breath sounds  Decreased breath sounds and wheezing present  Abdominal:      General: Abdomen is flat  Palpations: Abdomen is soft  Tenderness: There is no abdominal tenderness  Musculoskeletal:         General: No swelling  Normal range of motion  Cervical back: Normal range of motion and neck supple  Right lower leg: No edema  Left lower leg: No edema  Skin:     General: Skin is warm and dry  Capillary Refill: Capillary refill takes less than 2 seconds  Neurological:      General: No focal deficit present  Mental Status: He is alert and oriented to person, place, and time  Mental status is at baseline     Psychiatric:         Mood and Affect: Mood normal          Behavior: Behavior normal          Vital Signs  ED Triage Vitals   Temperature Pulse Respirations Blood Pressure SpO2   06/13/21 1535 06/13/21 1535 06/13/21 1535 06/13/21 1535 06/13/21 1535   98 1 °F (36 7 °C) (!) 121 (!) 24 98/70 99 %      Temp Source Heart Rate Source Patient Position - Orthostatic VS BP Location FiO2 (%)   06/13/21 1535 06/13/21 1535 -- -- --   Axillary Monitor         Pain Score       06/13/21 1532       5           Vitals:    06/13/21 1535 06/13/21 1659 06/13/21 1738   BP: 98/70 131/76 107/75   Pulse: (!) 121 (!) 107 (!) 114         Visual Acuity      ED Medications  Medications   sodium chloride 0 9 % bolus 1,000 mL (1,000 mL Intravenous New Bag 6/13/21 1718)   vancomycin (VANCOCIN) 1500 mg in sodium chloride 0 9% 250 mL IVPB (has no administration in time range)   levofloxacin (LEVAQUIN) IVPB (premix in dextrose) 750 mg 150 mL (750 mg Intravenous New Bag 6/13/21 1715)   sodium chloride 0 9 % bolus 1,000 mL (0 mL Intravenous Stopped 6/13/21 1718)   iohexol (OMNIPAQUE) 350 MG/ML injection (SINGLE-DOSE) 65 mL (65 mL Intravenous Given 6/13/21 1646)       Diagnostic Studies  Results Reviewed     Procedure Component Value Units Date/Time    Novel Coronavirus (Covid-19),PCR [facility] - 2 Hour Stat [[id number]]  (Normal) Collected: 06/13/21 1602    Lab Status: Final result Specimen: Nares from Nasopharyngeal Swab Updated: 06/13/21 1702     SARS-CoV-2 Negative    Narrative: The specimen collection materials, transport medium, and/or testing methodology utilized in the production of these test results have been proven to be reliable in a limited validation with an abbreviated program under the Emergency Utilization Authorization provided by the FDA  Testing reported as "Presumptive positive" will be confirmed with secondary testing to ensure result accuracy  Clinical caution and judgement should be used with the interpretation of these results with consideration of the clinical impression and other laboratory testing  Testing reported as "Positive" or "Negative" has been proven to be accurate according to standard laboratory validation requirements  All testing is performed with control materials showing appropriate reactivity at standard intervals        Blood culture #2 [[id number]] Collected: 06/13/21 1621    Lab Status: In process Specimen: Blood from Arm, Right Updated: 06/13/21 1633    Lactic acid [[id number]]  (Normal) Collected: 06/13/21 1602    Lab Status: Final result Specimen: Blood from Arm, Left Updated: 06/13/21 1623     LACTIC ACID 1 4 mmol/L     Narrative:      Result may be elevated if tourniquet was used during collection  Blood culture #1 [[id number]] Collected: 06/13/21 1616    Lab Status:  In process Specimen: Blood from Arm, Right Updated: 06/13/21 1621    B-Type Natriuretic Peptide ([address]) [[id number]]  (Normal) Collected: 06/13/21 1540    Lab Status: Final result Specimen: Blood from Arm, Left Updated: 06/13/21 1615     BNP 16 2 pg/mL     C-reactive protein [[id number]]  (Abnormal) Collected: 06/13/21 1540    Lab Status: Final result Specimen: Blood from Arm, Left Updated: 06/13/21 1611     CRP 14 3 mg/L     Troponin I [[id number]]  (Normal) Collected: 06/13/21 1540    Lab Status: Final result Specimen: Blood from Arm, Left Updated: 06/13/21 1608     Troponin I <0 03 ng/mL     Comprehensive metabolic panel [[id number]]  (Abnormal) Collected: 06/13/21 1540    Lab Status: Final result Specimen: Blood from Arm, Left Updated: 06/13/21 1605     Sodium 129 mmol/L      Potassium 4 2 mmol/L      Chloride 96 mmol/L      CO2 24 mmol/L      ANION GAP 9 mmol/L      BUN 31 mg/dL      Creatinine 1 59 mg/dL      Glucose 156 mg/dL      Calcium 8 0 mg/dL      Corrected Calcium 8 9 mg/dL      AST 15 U/L      ALT 7 U/L      Alkaline Phosphatase 56 8 U/L      Total Protein 5 4 g/dL      Albumin 2 9 g/dL      Total Bilirubin 0 51 mg/dL      eGFR 42 ml/min/1 73sq m     Narrative:      [place] guidelines for Chronic Kidney Disease (CKD):     Stage 1 with normal or high GFR (GFR > 90 mL/min/1 73 square meters)    Stage 2 Mild CKD (GFR = 60-89 mL/min/1 73 square meters)    Stage 3A Moderate CKD (GFR = 45-59 mL/min/1 73 square meters)    Stage 3B Moderate CKD (GFR = 30-44 mL/min/1 73 square meters)    Stage 4 Severe CKD (GFR = 15-29 mL/min/1 73 square meters)    Stage 5 End Stage CKD (GFR <15 mL/min/1 73 square meters)  Note: GFR calculation is accurate only with a steady state creatinine    CBC and differential [[id number]]  (Abnormal) Collected: 06/13/21 1540    Lab Status: Final result Specimen: Blood from Arm, Left Updated: 06/13/21 1548     WBC 6 25 Thousand/uL      RBC 3 94 Million/uL      Hemoglobin 11 2 g/dL      Hematocrit 34 7 %      MCV 88 fL      MCH 28 4 pg      MCHC 32 3 g/dL      RDW 18 1 %      MPV 9 6 fL      Platelets 532 Thousands/uL      Neutrophils Relative 66 %      Immat GRANS % 0 %      Lymphocytes Relative 14 %      Monocytes Relative 15 %      Eosinophils Relative 4 %      Basophils Relative 1 %      Neutrophils Absolute 4 15 Thousands/µL      Immature Grans Absolute 0 01 Thousand/uL      Lymphocytes Absolute 0 90 Thousands/µL      Monocytes Absolute 0 92 Thousand/µL      Eosinophils Absolute 0 23 Thousand/µL      Basophils Absolute 0 04 Thousands/µL                  CTA ED chest PE study   Final Result by Virgilio Tripp MD (06/13 1719)      1  No evidence of acute pulmonary embolism  Several thin linear defects within the pulmonary arterial system, may represent fibrin strands from a previous episode of pulmonary embolism  2  Increasing opacification in the right lung base suggesting worsening atelectasis or pneumonia  3  Complex right-sided loculated pleural effusion, the lateral inferior component containing bubbles of air as on numerous prior studies  4  New the lingula nodule suspicious for metastatic disease   5  Development of a large amount of ascites within the upper abdomen  This is concerning for malignant ascites   6  In conjunction with prior PET/CT, evidence of malignant mediastinal adenopathy, and right scapular metastatic lesion                       Workstation performed: [id number]         XR chest 1 view portable    (Results Pending) Procedures  ECG 12 Lead Documentation Only    Date/Time: 6/13/2021 4:06 PM  Performed by: Myranda Mccormick DO  Authorized by: Myranda Mccormick DO     Indications / Diagnosis:  Sob  ECG reviewed by me, the ED Provider: yes    Patient location:  ED  Previous ECG:     Previous ECG:  Compared to current  Interpretation:     Interpretation: non-specific    Rate:     ECG rate:  121    ECG rate assessment: tachycardic    Rhythm:     Rhythm: paced    Pacing:     Capture:  Complete    Type of pacing:  Ventricular  Ectopy:     Ectopy: none    QRS:     QRS intervals: Wide  ST segments:     ST segments:  Normal  T waves:     T waves: normal               ED Course  ED Course as of Jun 13 1812   Sun Jun 13, 2021   1731 D/w dr Cierra Oliver, accepts to service  Pneumonia, hypoxia with dehydration, CONCEPCIÓN  Normal lactic acid and wbc  Broad spectrum abx given                                      Duy Adorno Criteria for PE      Most Recent Value   Taj' Criteria for PE   Clinical signs and symptoms of DVT  0 Filed at: 06/13/2021 1601   PE is primary diagnosis or equally likely  3 Filed at: 06/13/2021 1601   HR >100  1 5 Filed at: 06/13/2021 1601   Immobilization at least 3 days or Surgery in the previous 4 weeks  0 Filed at: 06/13/2021 1601   Previous, objectively diagnosed PE or DVT  0 Filed at: 06/13/2021 1601   Hemoptysis  0 Filed at: 06/13/2021 1601   Malignancy with treatment within 6 months or palliative  1 Filed at: 06/13/2021 1601   Wells' Criteria Total  5 5 Filed at: 06/13/2021 1601                MDM  Number of Diagnoses or Management Options  CONCEPCIÓN (acute kidney injury) ([facility] ): new and requires workup  Dehydration: new and requires workup  HCAP (healthcare-associated pneumonia): new and requires workup  Hypoxia: new and requires workup  Metastatic primary lung cancer ([facility] ): established and worsening  Diagnosis management comments: 71-year-old male with metastatic lung cancer, presenting with increasing cough, shortness of breath over the past week  COVID-19 test is negative, however CT shows developing pneumonia, will treat for healthcare associated pneumonia  Discussed with hospitalist agrees with admitting patient, inpatient tele  Amount and/or Complexity of Data Reviewed  Clinical lab tests: ordered and reviewed  Tests in the radiology section of CPT®: ordered and reviewed  Decide to obtain previous medical records or to obtain history from someone other than the patient: yes  Discuss the patient with other providers: yes    Risk of Complications, Morbidity, and/or Mortality  Presenting problems: high  Diagnostic procedures: high  Management options: high    Patient Progress  Patient progress: improved      Disposition  Final diagnoses:   HCAP (healthcare-associated pneumonia)   Metastatic primary lung cancer ([facility] )   Hypoxia   CONCEPCIÓN (acute kidney injury) ([place] [address] )   Dehydration     Time reflects when diagnosis was documented in both MDM as applicable and the Disposition within this note     Time User Action Codes Description Comment    6/13/2021  5:32 PM Barry Stager Add [J18 9] HCAP (healthcare-associated pneumonia)     6/13/2021  5:32 PM Earl Shire, Colletta Able Add [C34 90] Metastatic primary lung cancer ([place] [address] )     6/13/2021  5:32 PM Barry Stager Add [R09 02] Hypoxia     6/13/2021  5:32 PM Barry Stager Add [N17 9] CONCEPCIÓN (acute kidney injury) ([place] [address] )     6/13/2021  5:32 PM Barry Stager Add [E86 0] Dehydration       ED Disposition     ED Disposition Condition Date/Time Comment    Admit Stable Sun Jun 13, 2021  5:32 PM Case was discussed with Dr Kristin Bradshaw and the patient's admission status was agreed to be Admission Status: inpatient status to the service of Dr Kristin Bradshaw           Follow-up Information    None         Current Discharge Medication List      CONTINUE these medications which have NOT CHANGED    Details   apixaban (Eliquis) 5 mg Take 5 mg by mouth 2 (two) times a day      atenolol (TENORMIN) 25 mg tablet Take 25 mg by mouth BRIGATINIB PO Take 1 tablet by mouth      Calcium Carbonate-Vit D-Min (CALCIUM 1200 PO) Take 1 tablet by mouth      cholecalciferol (VITAMIN D3) 1,000 units tablet Take 1,000 Units by mouth daily      [employer] Vitamin B-12 1000 MCG tablet Take 1,000 mcg by mouth daily      folic acid (FOLVITE) 1 mg tablet Take 1,000 mcg by mouth daily      furosemide (LASIX) 40 mg tablet Take 40 mg by mouth      glimepiride (AMARYL) 1 mg tablet Take 1 mg by mouth 2 (two) times a day      glucosamine-chondroitin 500-400 MG tablet Take 1 tablet by mouth 2 (two) times a day      losartan (COZAAR) 50 mg tablet Take 50 mg by mouth      lovastatin (MEVACOR) 20 mg tablet Take 20 mg by mouth daily at bedtime      acyclovir (ZOVIRAX) 200 mg capsule Take 200 mg by mouth 2 (two) times a day      metoprolol succinate (TOPROL-XL) 25 mg 24 hr tablet Take 25 mg by mouth      mirtazapine (REMERON) 15 mg tablet Take 15 mg by mouth daily      ondansetron (ZOFRAN) 4 mg tablet Take 4 mg by mouth every 8 (eight) hours as needed      oxyCODONE-acetaminophen (PERCOCET) 5-325 mg per tablet Take 1 tablet by mouth every 6 (six) hours as needed      pantoprazole (PROTONIX) 40 mg tablet Take 40 mg by mouth every morning      Prevalite 4 g packet            No discharge procedures on file      PDMP Review     None          ED Provider  Electronically Signed by           Myranda Mccormick DO  06/13/21 5073

## 2021-06-13 NOTE — ASSESSMENT & PLAN NOTE
Lab Results   Component Value Date    HGBA1C 9 3 (H) 12/29/2020       No results for input(s): POCGLU in the last 72 hours      Blood Sugar Average: Last 72 hrs:  will hold amaryl   Cover with low dose insulin Sliding scale

## 2021-06-13 NOTE — PROGRESS NOTES
Vancomycin Assessment    Claudetta Masse is a 77 y o  male who is currently ordered vancomycin 1500 mg IV q12h for Pneumonia  Relevant clinical data and objective history reviewed:  Creatinine   Date Value Ref Range Status   06/13/2021 1 59 (H) 0 50 - 1 20 mg/dL Final     Comment:     Standardized to IDMS reference method     /73 (BP Location: Right arm)   Pulse (!) 15   Temp (!) 97 1 °F (36 2 °C) (Tympanic)   Resp (!) 32   Wt 99 kg (218 lb 4 1 oz)   SpO2 96%   BMI 34 18 kg/m²   I/O last 3 completed shifts: In: 1000 [IV Piggyback:1000]  Out: -   Lab Results   Component Value Date/Time    BUN 31 (H) 06/13/2021 03:40 PM    WBC 6 25 06/13/2021 03:40 PM    HGB 11 2 (L) 06/13/2021 03:40 PM    HCT 34 7 (L) 06/13/2021 03:40 PM    MCV 88 06/13/2021 03:40 PM     06/13/2021 03:40 PM     Temp Readings from Last 3 Encounters:   06/13/21 (!) 97 1 °F (36 2 °C) (Tympanic)   05/12/21 98 7 °F (37 1 °C) (Tympanic)   10/21/20 97 8 °F (36 6 °C) (Tympanic)     Vancomycin Days of Therapy: 1    Assessment/Plan  The patient is currently on vancomycin utilizing scheduled dosing based on adjusted body weight (due to obesity)  Baseline risks associated with therapy include: pre-existing renal impairment and advanced age  The patient is currently receiving 1500 mg IV q12h and after clinical evaluation will be changed to 750 mg IVq12h after a 1500 mg IV loading dose  Pharmacy will also follow closely for s/sx of nephrotoxicity, infusion reactions, and appropriateness of therapy  BMP and CBC will be ordered per protocol  Plan for trough as patient approaches steady state, prior to the 4th  dose at approximately 7:30 on 6/15/2021  Due to infection severity, will target a trough of 15-20 (appropriate for most indications)  Pharmacy will continue to follow the patients culture results and clinical progress daily      Agueda Ramon, Pharmacist

## 2021-06-14 ENCOUNTER — APPOINTMENT (INPATIENT)
Dept: NON INVASIVE DIAGNOSTICS | Facility: HOSPITAL | Age: 76
DRG: 180 | End: 2021-06-14
Payer: COMMERCIAL

## 2021-06-14 ENCOUNTER — HOSPITAL ENCOUNTER (INPATIENT)
Facility: HOSPITAL | Age: 76
LOS: 14 days | Discharge: HOME HEALTH | DRG: 180 | End: 2021-06-28
Attending: ANESTHESIOLOGY | Admitting: ANESTHESIOLOGY
Payer: COMMERCIAL

## 2021-06-14 VITALS
SYSTOLIC BLOOD PRESSURE: 103 MMHG | RESPIRATION RATE: 28 BRPM | TEMPERATURE: 99.6 F | HEIGHT: 67 IN | OXYGEN SATURATION: 96 % | DIASTOLIC BLOOD PRESSURE: 67 MMHG | HEART RATE: 117 BPM | BODY MASS INDEX: 34.67 KG/M2 | WEIGHT: 220.9 LBS

## 2021-06-14 DIAGNOSIS — N18.30 ACUTE RENAL FAILURE SUPERIMPOSED ON STAGE 3 CHRONIC KIDNEY DISEASE (HCC): ICD-10-CM

## 2021-06-14 DIAGNOSIS — E87.1 HYPONATREMIA: ICD-10-CM

## 2021-06-14 DIAGNOSIS — I95.9 HYPOTENSION: ICD-10-CM

## 2021-06-14 DIAGNOSIS — K59.00 CONSTIPATION: ICD-10-CM

## 2021-06-14 DIAGNOSIS — R00.0 TACHYCARDIA: ICD-10-CM

## 2021-06-14 DIAGNOSIS — C34.90 METASTATIC LUNG CANCER (METASTASIS FROM LUNG TO OTHER SITE) (HCC): ICD-10-CM

## 2021-06-14 DIAGNOSIS — N17.9 ACUTE RENAL FAILURE SUPERIMPOSED ON STAGE 3 CHRONIC KIDNEY DISEASE (HCC): ICD-10-CM

## 2021-06-14 DIAGNOSIS — R18.8 ASCITES: ICD-10-CM

## 2021-06-14 DIAGNOSIS — J44.9 CHRONIC OBSTRUCTIVE PULMONARY DISEASE, UNSPECIFIED COPD TYPE (HCC): ICD-10-CM

## 2021-06-14 DIAGNOSIS — R13.10 DYSPHAGIA: Primary | ICD-10-CM

## 2021-06-14 PROBLEM — A41.9 SEPSIS (HCC): Status: ACTIVE | Noted: 2021-06-14

## 2021-06-14 LAB
ALBUMIN FLD-MCNC: 1.5 G/DL
ANION GAP SERPL CALCULATED.3IONS-SCNC: 11 MMOL/L (ref 4–13)
ANION GAP SERPL CALCULATED.3IONS-SCNC: 9 MMOL/L (ref 4–13)
APPEARANCE FLD: NORMAL
ATRIAL RATE: 0 BPM
BUN SERPL-MCNC: 28 MG/DL (ref 6–20)
BUN SERPL-MCNC: 29 MG/DL (ref 6–20)
CALCIUM SERPL-MCNC: 7.4 MG/DL (ref 8.4–10.2)
CALCIUM SERPL-MCNC: 7.5 MG/DL (ref 8.4–10.2)
CHLORIDE SERPL-SCNC: 99 MMOL/L (ref 96–108)
CHLORIDE SERPL-SCNC: 99 MMOL/L (ref 96–108)
CO2 SERPL-SCNC: 20 MMOL/L (ref 22–33)
CO2 SERPL-SCNC: 22 MMOL/L (ref 22–33)
COLOR FLD: NORMAL
CREAT SERPL-MCNC: 1.5 MG/DL (ref 0.5–1.2)
CREAT SERPL-MCNC: 1.55 MG/DL (ref 0.5–1.2)
ERYTHROCYTE [DISTWIDTH] IN BLOOD BY AUTOMATED COUNT: 18.3 % (ref 11.6–15.1)
GFR SERPL CREATININE-BSD FRML MDRD: 43 ML/MIN/1.73SQ M
GFR SERPL CREATININE-BSD FRML MDRD: 45 ML/MIN/1.73SQ M
GLUCOSE FLD-MCNC: 107 MG/DL
GLUCOSE SERPL-MCNC: 103 MG/DL (ref 65–140)
GLUCOSE SERPL-MCNC: 110 MG/DL (ref 65–140)
GLUCOSE SERPL-MCNC: 135 MG/DL (ref 65–140)
GLUCOSE SERPL-MCNC: 135 MG/DL (ref 65–140)
GLUCOSE SERPL-MCNC: 149 MG/DL (ref 65–140)
HCT VFR BLD AUTO: 29.6 % (ref 36.5–49.3)
HGB BLD-MCNC: 9.6 G/DL (ref 12–17)
HISTIOCYTES NFR FLD: 22 %
L PNEUMO1 AG UR QL IA.RAPID: NEGATIVE
LACTATE SERPL-SCNC: 2 MMOL/L (ref 0–2)
LDH FLD L TO P-CCNC: 355 U/L
LYMPHOCYTES NFR BLD AUTO: 7 %
MCH RBC QN AUTO: 28.5 PG (ref 26.8–34.3)
MCHC RBC AUTO-ENTMCNC: 32.4 G/DL (ref 31.4–37.4)
MCV RBC AUTO: 88 FL (ref 82–98)
MONOCYTES NFR BLD AUTO: 8 %
NEUTS SEG NFR BLD AUTO: 63 %
P AXIS: 0 DEGREES
PLATELET # BLD AUTO: 179 THOUSANDS/UL (ref 149–390)
PMV BLD AUTO: 10 FL (ref 8.9–12.7)
POTASSIUM SERPL-SCNC: 4.4 MMOL/L (ref 3.5–5)
POTASSIUM SERPL-SCNC: 4.4 MMOL/L (ref 3.5–5)
PROCALCITONIN SERPL-MCNC: 0.18 NG/ML
PROCALCITONIN SERPL-MCNC: 0.36 NG/ML
PROT FLD-MCNC: 2.8 G/DL
QRS AXIS: 10 DEGREES
QRSD INTERVAL: 132 MS
QT INTERVAL: 367 MS
QTC INTERVAL: 521 MS
RBC # BLD AUTO: 3.37 MILLION/UL (ref 3.88–5.62)
S PNEUM AG UR QL: NEGATIVE
SITE: NORMAL
SODIUM SERPL-SCNC: 130 MMOL/L (ref 133–145)
SODIUM SERPL-SCNC: 130 MMOL/L (ref 133–145)
T WAVE AXIS: 185 DEGREES
TOTAL CELLS COUNTED SPEC: 100
TROPONIN I SERPL-MCNC: <0.03 NG/ML (ref 0–0.07)
VENTRICULAR RATE: 121 BPM
WBC # BLD AUTO: 4.71 THOUSAND/UL (ref 4.31–10.16)
WBC # FLD MANUAL: 3387 /UL

## 2021-06-14 PROCEDURE — 99223 1ST HOSP IP/OBS HIGH 75: CPT | Performed by: ANESTHESIOLOGY

## 2021-06-14 PROCEDURE — 84484 ASSAY OF TROPONIN QUANT: CPT | Performed by: INTERNAL MEDICINE

## 2021-06-14 PROCEDURE — 82948 REAGENT STRIP/BLOOD GLUCOSE: CPT

## 2021-06-14 PROCEDURE — 83615 LACTATE (LD) (LDH) ENZYME: CPT | Performed by: NURSE PRACTITIONER

## 2021-06-14 PROCEDURE — 88305 TISSUE EXAM BY PATHOLOGIST: CPT | Performed by: PATHOLOGY

## 2021-06-14 PROCEDURE — 80048 BASIC METABOLIC PNL TOTAL CA: CPT | Performed by: INTERNAL MEDICINE

## 2021-06-14 PROCEDURE — 82042 OTHER SOURCE ALBUMIN QUAN EA: CPT | Performed by: NURSE PRACTITIONER

## 2021-06-14 PROCEDURE — 85027 COMPLETE CBC AUTOMATED: CPT | Performed by: INTERNAL MEDICINE

## 2021-06-14 PROCEDURE — 87205 SMEAR GRAM STAIN: CPT | Performed by: NURSE PRACTITIONER

## 2021-06-14 PROCEDURE — 88112 CYTOPATH CELL ENHANCE TECH: CPT | Performed by: PATHOLOGY

## 2021-06-14 PROCEDURE — 0W9G3ZZ DRAINAGE OF PERITONEAL CAVITY, PERCUTANEOUS APPROACH: ICD-10-PCS | Performed by: INTERNAL MEDICINE

## 2021-06-14 PROCEDURE — 83605 ASSAY OF LACTIC ACID: CPT | Performed by: INTERNAL MEDICINE

## 2021-06-14 PROCEDURE — 88342 IMHCHEM/IMCYTCHM 1ST ANTB: CPT | Performed by: PATHOLOGY

## 2021-06-14 PROCEDURE — 89051 BODY FLUID CELL COUNT: CPT | Performed by: NURSE PRACTITIONER

## 2021-06-14 PROCEDURE — 87081 CULTURE SCREEN ONLY: CPT | Performed by: ANESTHESIOLOGY

## 2021-06-14 PROCEDURE — 99239 HOSP IP/OBS DSCHRG MGMT >30: CPT | Performed by: INTERNAL MEDICINE

## 2021-06-14 PROCEDURE — 87070 CULTURE OTHR SPECIMN AEROBIC: CPT | Performed by: NURSE PRACTITIONER

## 2021-06-14 PROCEDURE — 93010 ELECTROCARDIOGRAM REPORT: CPT | Performed by: INTERNAL MEDICINE

## 2021-06-14 PROCEDURE — 82945 GLUCOSE OTHER FLUID: CPT | Performed by: NURSE PRACTITIONER

## 2021-06-14 PROCEDURE — 84157 ASSAY OF PROTEIN OTHER: CPT | Performed by: NURSE PRACTITIONER

## 2021-06-14 PROCEDURE — 49083 ABD PARACENTESIS W/IMAGING: CPT

## 2021-06-14 PROCEDURE — 88341 IMHCHEM/IMCYTCHM EA ADD ANTB: CPT | Performed by: PATHOLOGY

## 2021-06-14 PROCEDURE — 84145 PROCALCITONIN (PCT): CPT | Performed by: INTERNAL MEDICINE

## 2021-06-14 PROCEDURE — NC001 PR NO CHARGE: Performed by: RADIOLOGY

## 2021-06-14 PROCEDURE — 49083 ABD PARACENTESIS W/IMAGING: CPT | Performed by: RADIOLOGY

## 2021-06-14 RX ORDER — CALCIUM CARBONATE 500(1250)
1 TABLET ORAL 2 TIMES DAILY WITH MEALS
Status: CANCELLED | OUTPATIENT
Start: 2021-06-14

## 2021-06-14 RX ORDER — MAGNESIUM HYDROXIDE/ALUMINUM HYDROXICE/SIMETHICONE 120; 1200; 1200 MG/30ML; MG/30ML; MG/30ML
30 SUSPENSION ORAL EVERY 6 HOURS PRN
Status: CANCELLED | OUTPATIENT
Start: 2021-06-14

## 2021-06-14 RX ORDER — SODIUM CHLORIDE, SODIUM LACTATE, POTASSIUM CHLORIDE, CALCIUM CHLORIDE 600; 310; 30; 20 MG/100ML; MG/100ML; MG/100ML; MG/100ML
125 INJECTION, SOLUTION INTRAVENOUS CONTINUOUS
Status: CANCELLED | OUTPATIENT
Start: 2021-06-14

## 2021-06-14 RX ORDER — DOCUSATE SODIUM 100 MG/1
100 CAPSULE, LIQUID FILLED ORAL 2 TIMES DAILY
Status: CANCELLED | OUTPATIENT
Start: 2021-06-14

## 2021-06-14 RX ORDER — ACYCLOVIR 200 MG/1
200 CAPSULE ORAL 2 TIMES DAILY
Status: DISCONTINUED | OUTPATIENT
Start: 2021-06-14 | End: 2021-06-18

## 2021-06-14 RX ORDER — OXYCODONE HYDROCHLORIDE AND ACETAMINOPHEN 5; 325 MG/1; MG/1
1 TABLET ORAL EVERY 6 HOURS PRN
Status: DISCONTINUED | OUTPATIENT
Start: 2021-06-14 | End: 2021-06-28 | Stop reason: HOSPADM

## 2021-06-14 RX ORDER — LANOLIN ALCOHOL/MO/W.PET/CERES
1 CREAM (GRAM) TOPICAL 2 TIMES DAILY
Status: DISCONTINUED | OUTPATIENT
Start: 2021-06-14 | End: 2021-06-14

## 2021-06-14 RX ORDER — PANTOPRAZOLE SODIUM 40 MG/1
40 TABLET, DELAYED RELEASE ORAL
Status: CANCELLED | OUTPATIENT
Start: 2021-06-15

## 2021-06-14 RX ORDER — MELATONIN
1000 DAILY
Status: DISCONTINUED | OUTPATIENT
Start: 2021-06-14 | End: 2021-06-28 | Stop reason: HOSPADM

## 2021-06-14 RX ORDER — ONDANSETRON 2 MG/ML
4 INJECTION INTRAMUSCULAR; INTRAVENOUS EVERY 6 HOURS PRN
Status: CANCELLED | OUTPATIENT
Start: 2021-06-14

## 2021-06-14 RX ORDER — LANOLIN ALCOHOL/MO/W.PET/CERES
1 CREAM (GRAM) TOPICAL 2 TIMES DAILY
Status: CANCELLED | OUTPATIENT
Start: 2021-06-14

## 2021-06-14 RX ORDER — PRAVASTATIN SODIUM 20 MG
10 TABLET ORAL
Status: CANCELLED | OUTPATIENT
Start: 2021-06-14

## 2021-06-14 RX ORDER — PANTOPRAZOLE SODIUM 40 MG/1
40 TABLET, DELAYED RELEASE ORAL EVERY MORNING
Status: DISCONTINUED | OUTPATIENT
Start: 2021-06-15 | End: 2021-06-28 | Stop reason: HOSPADM

## 2021-06-14 RX ORDER — METOPROLOL SUCCINATE 25 MG/1
25 TABLET, EXTENDED RELEASE ORAL DAILY
Status: DISCONTINUED | OUTPATIENT
Start: 2021-06-14 | End: 2021-06-14

## 2021-06-14 RX ORDER — PRAVASTATIN SODIUM 20 MG
20 TABLET ORAL
Status: DISCONTINUED | OUTPATIENT
Start: 2021-06-14 | End: 2021-06-28 | Stop reason: HOSPADM

## 2021-06-14 RX ORDER — CHOLESTYRAMINE LIGHT 4 G/5.7G
4 POWDER, FOR SUSPENSION ORAL 2 TIMES DAILY
Status: DISCONTINUED | OUTPATIENT
Start: 2021-06-14 | End: 2021-06-18

## 2021-06-14 RX ORDER — MELATONIN
1000 DAILY
Status: CANCELLED | OUTPATIENT
Start: 2021-06-14

## 2021-06-14 RX ORDER — LEVOFLOXACIN 5 MG/ML
250 INJECTION, SOLUTION INTRAVENOUS EVERY 24 HOURS
Status: CANCELLED | OUTPATIENT
Start: 2021-06-14

## 2021-06-14 RX ORDER — FOLIC ACID 1 MG/1
1000 TABLET ORAL DAILY
Status: CANCELLED | OUTPATIENT
Start: 2021-06-14

## 2021-06-14 RX ORDER — SENNA PLUS 8.6 MG/1
1 TABLET ORAL 2 TIMES DAILY
Status: ON HOLD | COMMUNITY
End: 2021-06-28 | Stop reason: SDUPTHER

## 2021-06-14 RX ORDER — MIRTAZAPINE 15 MG/1
15 TABLET, FILM COATED ORAL DAILY
Status: DISCONTINUED | OUTPATIENT
Start: 2021-06-14 | End: 2021-06-28 | Stop reason: HOSPADM

## 2021-06-14 RX ORDER — B-COMPLEX WITH VITAMIN C
1 TABLET ORAL
Status: DISCONTINUED | OUTPATIENT
Start: 2021-06-15 | End: 2021-06-28 | Stop reason: HOSPADM

## 2021-06-14 RX ORDER — MIRTAZAPINE 15 MG/1
15 TABLET, FILM COATED ORAL DAILY
Status: CANCELLED | OUTPATIENT
Start: 2021-06-14

## 2021-06-14 RX ORDER — METOPROLOL SUCCINATE 25 MG/1
25 TABLET, EXTENDED RELEASE ORAL DAILY
Status: CANCELLED | OUTPATIENT
Start: 2021-06-14

## 2021-06-14 RX ORDER — ATENOLOL 25 MG/1
25 TABLET ORAL DAILY
Status: DISCONTINUED | OUTPATIENT
Start: 2021-06-14 | End: 2021-06-20

## 2021-06-14 RX ORDER — LEVOFLOXACIN 5 MG/ML
750 INJECTION, SOLUTION INTRAVENOUS
Status: DISCONTINUED | OUTPATIENT
Start: 2021-06-14 | End: 2021-06-15

## 2021-06-14 RX ORDER — ALBUMIN (HUMAN) 12.5 G/50ML
50 SOLUTION INTRAVENOUS ONCE
Status: COMPLETED | OUTPATIENT
Start: 2021-06-14 | End: 2021-06-15

## 2021-06-14 RX ORDER — LIDOCAINE WITH 8.4% SOD BICARB 0.9%(10ML)
SYRINGE (ML) INJECTION CODE/TRAUMA/SEDATION MEDICATION
Status: COMPLETED | OUTPATIENT
Start: 2021-06-14 | End: 2021-06-14

## 2021-06-14 RX ORDER — ACETAMINOPHEN 325 MG/1
650 TABLET ORAL EVERY 6 HOURS PRN
Status: CANCELLED | OUTPATIENT
Start: 2021-06-14

## 2021-06-14 RX ORDER — FOLIC ACID 1 MG/1
1000 TABLET ORAL DAILY
Status: DISCONTINUED | OUTPATIENT
Start: 2021-06-14 | End: 2021-06-28 | Stop reason: HOSPADM

## 2021-06-14 RX ADMIN — VANCOMYCIN HYDROCHLORIDE 750 MG: 750 INJECTION, SOLUTION INTRAVENOUS at 07:57

## 2021-06-14 RX ADMIN — LEVOFLOXACIN 750 MG: 5 INJECTION, SOLUTION INTRAVENOUS at 15:17

## 2021-06-14 RX ADMIN — Medication 10 ML: at 15:08

## 2021-06-14 RX ADMIN — ACYCLOVIR 200 MG: 200 CAPSULE ORAL at 20:56

## 2021-06-14 RX ADMIN — ALBUMIN (HUMAN) 50 G: 0.25 INJECTION, SOLUTION INTRAVENOUS at 18:42

## 2021-06-14 RX ADMIN — SODIUM CHLORIDE 250 ML: 0.9 INJECTION, SOLUTION INTRAVENOUS at 08:04

## 2021-06-14 NOTE — PROGRESS NOTES
Evaluated patient for both thoracentesis and paracentesis  Patient has new ascites, unsure of the volume since only the upper abdomen was imaged on the PE study    The patient's loculated right pleural effusion has been present since his vats in the summer of 2020  It has had multiple air bubbles in it for the last 6 months  It also is slowly getting smaller  This is not the cause of his respiratory issues, but more likely his new lung consolidation  Plan to perform paracentesis later today  This was discussed with Dr Pablo Staff of critical care medicine

## 2021-06-14 NOTE — PROGRESS NOTES
Pastoral Care Progress Note    2021  Patient: Gabbi Garcia : 1945  Admission Date & Time: 2021 1031  MRN: [id number] [facility]: [id number]                     Chaplaincy Interventions Utilized:   Chaplaincy Outcomes Achieved:  Declined  support       21 1300   Clinical Encounter Type   Visited With Patient and family together   Routine Visit Introduction   Crisis Visit Critical Care   Referral To    Consult   (census/rounds)

## 2021-06-14 NOTE — ASSESSMENT & PLAN NOTE
Patient meets SIRS/sepsis criteria on initial presentation to [facility] ED with tachypnea, tachycradia and pulmonary findings, however he is afebrile and without leukocytosis  Initial procalcitonin was within normal limits  Received IV fluid hydration and IV antibiotics initiated in ED  Still tachycardia and tachypenic on initial ICU assessment     - Continue levaquin   - Follow-up repeat procalcitonin  - Follow-up blood cultures   - S  pneumoniae and Legionella spp  urinary antigens were negative

## 2021-06-14 NOTE — PLAN OF CARE
Problem: METABOLIC, FLUID AND ELECTROLYTES - ADULT  Goal: Electrolytes maintained within normal limits  Description: INTERVENTIONS:  - Monitor labs and assess patient for signs and symptoms of electrolyte imbalances  - Administer electrolyte replacement as ordered  - Monitor response to electrolyte replacements, including repeat lab results as appropriate  - Instruct patient on fluid and nutrition as appropriate  6/14/2021 1110 by Teresa Merida RN  Outcome: Progressing  6/14/2021 1108 by Teresa Merida RN  Outcome: Progressing  Goal: Fluid balance maintained  Description: INTERVENTIONS:  - Monitor labs   - Monitor I/O and WT  - Instruct patient on fluid and nutrition as appropriate  - Assess for signs & symptoms of volume excess or deficit  6/14/2021 1110 by Teresa Merida RN  Outcome: Progressing  6/14/2021 1108 by Teresa Merida RN  Outcome: Progressing  Goal: Glucose maintained within target range  Description: INTERVENTIONS:  - Monitor Blood Glucose as ordered  - Assess for signs and symptoms of hyperglycemia and hypoglycemia  - Administer ordered medications to maintain glucose within target range  - Assess nutritional intake and initiate nutrition service referral as needed  6/14/2021 1110 by Teresa Merida RN  Outcome: Progressing  6/14/2021 1108 by Teresa Merida RN  Outcome: Progressing     Problem: RESPIRATORY - ADULT  Goal: Achieves optimal ventilation and oxygenation  Description: INTERVENTIONS:  - Assess for changes in respiratory status  - Assess for changes in mentation and behavior  - Position to facilitate oxygenation and minimize respiratory effort  - Oxygen administered by appropriate delivery if ordered  - Initiate smoking cessation education as indicated  - Encourage broncho-pulmonary hygiene including cough, deep breathe, Incentive Spirometry  - Assess the need for suctioning and aspirate as needed  - Assess and instruct to report SOB or any respiratory difficulty  - Respiratory Therapy support as indicated  6/14/2021 1110 by Gianluca Mohamud RN  Outcome: Progressing  6/14/2021 1108 by Gianluca Mohamud RN  Outcome: Progressing     Problem: CARDIOVASCULAR - ADULT  Goal: Maintains optimal cardiac output and hemodynamic stability  Description: INTERVENTIONS:  - Monitor I/O, vital signs and rhythm  - Monitor for S/S and trends of decreased cardiac output  - Administer and titrate ordered vasoactive medications to optimize hemodynamic stability  - Assess quality of pulses, skin color and temperature  - Assess for signs of decreased coronary artery perfusion  - Instruct patient to report change in severity of symptoms  6/14/2021 1110 by Gianluca Mohamud RN  Outcome: Progressing  6/14/2021 1108 by Gianluca Mohamud RN  Outcome: Progressing     Problem: Potential for Falls  Goal: Patient will remain free of falls  Description: INTERVENTIONS:  - Educate patient/family on patient safety including physical limitations  - Instruct patient to call for assistance with activity   - Consult OT/PT to assist with strengthening/mobility   - Keep Call bell within reach  - Keep bed low and locked with side rails adjusted as appropriate  - Keep care items and personal belongings within reach  - Initiate and maintain comfort rounds  - Make Fall Risk Sign visible to staff  - Offer Toileting every 2 Hours, in advance of need  - Initiate/Maintain bed/chair alarm  - Obtain necessary fall risk management equipment: chair alarm  - Apply yellow socks and bracelet for high fall risk patients  - Consider moving patient to room near nurses station  6/14/2021 1110 by Gianluca Mohamud RN  Outcome: Progressing  6/14/2021 1108 by Gianluca Mohamud RN  Outcome: Progressing     Problem: MOBILITY - ADULT  Goal: Maintain or return to baseline ADL function  Description: INTERVENTIONS:  -  Assess patient's ability to carry out ADLs; assess patient's baseline for ADL function and identify physical deficits which impact ability to perform ADLs (bathing, care of mouth/teeth, toileting, grooming, dressing, etc )  - Assess/evaluate cause of self-care deficits   - Assess range of motion  - Assess patient's mobility; develop plan if impaired  - Assess patient's need for assistive devices and provide as appropriate  - Encourage maximum independence but intervene and supervise when necessary  - Involve family in performance of ADLs  - Assess for home care needs following discharge   - Consider OT consult to assist with ADL evaluation and planning for discharge  - Provide patient education as appropriate  6/14/2021 1110 by Millicent Wagner RN  Outcome: Progressing  6/14/2021 1108 by Millicent Wagner RN  Outcome: Progressing  Goal: Maintains/Returns to pre admission functional level  Description: INTERVENTIONS:  - Perform BMAT or MOVE assessment daily    - Set and communicate daily mobility goal to care team and patient/family/caregiver  - Collaborate with rehabilitation services on mobility goals if consulted  - Perform Range of Motion 4-6 times a day  - Reposition patient every 2 hours    - Dangle patient 2 times a day  - Stand patient 2 times a day  - Ambulate patient 2 times a day  - Out of bed to chair 2 times a day   - Out of bed for meals 2 times a day  - Out of bed for toileting  - Record patient progress and toleration of activity level   6/14/2021 1110 by Millicent Wagner RN  Outcome: Progressing  6/14/2021 1108 by Millicent Wagner RN  Outcome: Progressing     Problem: Prexisting or High Potential for Compromised Skin Integrity  Goal: Skin integrity is maintained or improved  Description: INTERVENTIONS:  - Identify patients at risk for skin breakdown  - Assess and monitor skin integrity  - Assess and monitor nutrition and hydration status  - Monitor labs   - Assess for incontinence   - Turn and reposition patient  - Assist with mobility/ambulation  - Relieve pressure over bony prominences  - Avoid friction and shearing  - Provide appropriate hygiene as needed including keeping skin clean and dry  - Evaluate need for skin moisturizer/barrier cream  - Collaborate with interdisciplinary team   - Patient/family teaching  - Consider wound care consult   6/14/2021 1110 by Natanael Bishop RN  Outcome: Progressing  6/14/2021 1108 by Natanael Bishop RN  Outcome: Progressing

## 2021-06-14 NOTE — ASSESSMENT & PLAN NOTE
History of DVT in 9/2020 and history of PE and is on eliquis as outpatient  No acute PE noted on CTA  - Will hold eliquis due to plan to undergo IR procedure

## 2021-06-14 NOTE — ASSESSMENT & PLAN NOTE
History of NSCLC diagnosed in 12/2019  Underwent VATS with pleural decortication and pleurodesis in 7/2020  Currently managed with Brigatinib  CT- PE study conducted at [facility] just prior to his ICU admission, suggested possible advancement of metastatic disease, with involvement of the lingula, in addition to right-sided pleural effusion and some ascites  - Will obtain diagnostic thoracentesis and paracentesis; follow-up cytology and other investigative studies  - Will hold Brigatinib

## 2021-06-14 NOTE — ASSESSMENT & PLAN NOTE
Likely secondary to a combination of NSCLC, right-sided pleural effusion and extrathoracic reduction in diaphragmatic excursion secondary to pleural effusion  Currently receiving 2-3 L/min of supplemental oxygen with no pre-admission oxygen requirements  Appears in no acute respiratory distress  - Continue to titrate supplemental oxygen to maintain a saturation of greater than 92%  - May derive some functional improvement in respiratory dynamics status post thoracentesis and paracentesis

## 2021-06-14 NOTE — ASSESSMENT & PLAN NOTE
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

## 2021-06-14 NOTE — ASSESSMENT & PLAN NOTE
Lab Results   Component Value Date    HGBA1C 9 3 (H) 12/29/2020       Recent Labs     06/13/21  2146 06/14/21  0643   POCGLU 140 135       Blood Sugar Average: Last 72 hrs:  (P) 137 5will hold amaryl   Cover with low dose insulin Sliding scale

## 2021-06-14 NOTE — SEDATION DOCUMENTATION
Procedure ended, pt tolerated without incident  2000 ml yellow fluid removed, sent to lab  Vss  Wife at bedside with patient  bandaid to para site clean dry and intact

## 2021-06-14 NOTE — ASSESSMENT & PLAN NOTE
Likely prerenal with sepsis  Will give IV fluid hydration  Patient also hypotensive and will give bolus fluids  Patient received a dose of Lasix yesterday  Urine output of 550 mL overnight  Monitor renal function

## 2021-06-14 NOTE — ASSESSMENT & PLAN NOTE
With pneumonia and sepsis -CTA shows no evidence of PE ,Increasing opacification in the right lung base suggesting worsening atelectasis or pneumonia  Complex right-sided loculated pleural effusion, the lateral inferior component containing bubbles of air as on numerous prior studies  Patient presents with increasing generalized weakness and cough, shortness of breath with minimal exertion needing 4-5 L of oxygen by nasal cannula in the ER  Patient also noted to be tachycardic with heart rate of 120s  Patient has sepsis and given IV fluid hydration and IV antibiotic with vancomycin and Levaquin and is being admitted  In July 2020- admitted  to the hospital on 7/31/20 for elective VATS procedure with pleural decortication and pleurodesis 2/2 hx of non small cell lung CA/recurrent malignant effusions    Will give IV antibiotic with vancomycin and Levaquin and monitor QT interval h/o anaphylaxis to pcn,  Renal adjusted dose  Will consult pharmacy   Will give gentle IV hydration as patient has right-sided pleural effusion and ascites and will need IR guided pleural tap and ascites which is likely malignant effusion  Will give a dose of lasix and diurese and monitor I/O ,no recent in epic    consult pulmonology  Will give nebulizer treatment  Will check VBG  Continue oxygen supplement and monitor O2 saturation  Cardiac monitoring    6/14-patient still appears in respiratory distress tachypneic, tachycardic and needing 3-4 L of oxygen by nasal cannula and restless  Patient able to maintain airway  Patient will need right-sided IR guided pleural tap    Patient will be transferred to level 1 step down at [facility] and plan was discussed with ICU attending Dr Adenike Villalobos

## 2021-06-14 NOTE — ASSESSMENT & PLAN NOTE
Patient has a history of complete heart block and has a ventricular pacemaker     - Continue telemetry

## 2021-06-14 NOTE — ASSESSMENT & PLAN NOTE
Lab Results   Component Value Date    EGFR 43 06/14/2021    EGFR 45 06/14/2021    EGFR 42 06/13/2021    CREATININE 1 55 (H) 06/14/2021    CREATININE 1 50 (H) 06/14/2021    CREATININE 1 59 (H) 06/13/2021     Baseline creatinine seems to be around 0 9-1  20  Presents with a creatinine of 1 55  Patient did receive contrast on 6/13 and is was IV diuresed for evidence of pulmonary infiltrates  He does use lasix PO as outpatient  - Hold diuretics and losartan  - Will avoid aggressive IV hydration given hyponatremia, presumably in the setting of SIADH  - Avoid nephrotoxic agents  - Trend creatinine

## 2021-06-14 NOTE — UTILIZATION REVIEW
Initial Clinical Review    Admission: Date/Time/Statement:   Admission Orders (From admission, onward)     Ordered        06/13/21 1733  Inpatient Admission  Once                   Orders Placed This Encounter   Procedures    Inpatient Admission     Standing Status:   Standing     Number of Occurrences:   1     Order Specific Question:   Level of Care     Answer:   Med Surg [16]     Order Specific Question:   Bed request comments     Answer:   tele     Order Specific Question:   Estimated length of stay     Answer:   More than 2 Midnights     Order Specific Question:   Certification     Answer:   I certify that inpatient services are medically necessary for this patient for a duration of greater than two midnights  See H&P and MD Progress Notes for additional information about the patient's course of treatment  ED Arrival Information     Expected Arrival Acuity    - 6/13/2021 15:30 Urgent         Means of arrival Escorted by Service Admission type    [facility]         Arrival complaint    sob        Chief Complaint   Patient presents with    Shortness of Breath     Pt c/o SOB and weakness x few days  Also abd disteneded  Initial Presentation: 77 y o  male who presents with  multiple comorbidities with CAD diabetes mellitus type 2, hypertension, 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, DVT in the left leg on anticoagulation, morbid obesity, metastatic lung cancer, non-small cell lung cancer diagnosed 2020, March, CHF,(9/1/2019)LVEF: 78%, dilated RV,history of carotid endarterectomy , elective VATS procedure and pleural decortication and pleurodesis in July 2020 for recurrent malignant effusions, presents to ED via EMS from home with complaints of increasing shortness of breath generalized weakness and cough for the past 1 week  On exam, pt tachycardic, tachypneic with brown expectoration  Pt requiruing 4-5 L O2 in ED Labs show elevated CRP, sodium =129  CT shows no PE, pt has right-sided pleural effusion with right-sided pneumonia, New the lingula nodule suspicious for metastatic disease and large amt ascites suspicious for malignant ascites  Pt given IVF and IV abx in RD  Pt admitted as Inpatient to telemetry with acute respiratory failure with hypoxia, pneumonia and sepsis  Plan -telemetry, IV abx-vanco and Levaquin, monitor QT interval, gentle IVF, IR consult for pleural tap and ascites, IV Lasix, consult pulmonology, neb tx, check VBG, continue Os ,       Date: 6/14   Day 2:   Patient still appears in respiratory distress tachypneic, tachycardic and needing 3-4 L of oxygen by nasal cannula and restless  Patient able to maintain airway  Has decreased air entry bilaterally  Patient will need right-sided IR guided pleural tap  Patient will be transferred to level 1 step down at [facility] for higher level of care- and plan was discussed with ICU attending   Pt has CONCEPCIÓN and is hypotensive, will continue IVF, continue to monitor renal function, JHO=353 ml overnight after IV lasix yesterday  Continue to monitor sodium levels , get urine studies has SIADH   Eliquis on Hold for IR guided thoracentesis  ED Triage Vitals   Temperature Pulse Respirations Blood Pressure SpO2   06/13/21 1535 06/13/21 1535 06/13/21 1535 06/13/21 1535 06/13/21 1535   98 1 °F (36 7 °C) (!) 121 (!) 24 98/70 99 %      Temp Source Heart Rate Source Patient Position - Orthostatic VS BP Location FiO2 (%)   06/13/21 1535 06/13/21 1535 06/13/21 1833 06/13/21 1833 --   Axillary Monitor Sitting Right arm       Pain Score       06/13/21 1532       5          Wt Readings from Last 1 Encounters:   06/13/21 100 kg (220 lb 14 4 oz)     Additional Vital Signs:   Date/Time  Temp  Pulse  Resp  BP  MAP (mmHg)  SpO2  Calculated FIO2 (%) - Nasal Cannula  Nasal Cannula O2 Flow Rate (L/min)  O2 Device    06/14/21 0800  --  117Abnormal   --  103/67  --  96 %  32  3 L/min  Nasal cannula    06/14/21 0733  --  109Abnormal   28Abnormal   --  --  --  --  --  --    06/14/21 0722  99 6 °F (37 6 °C)  98  20  88/57Abnormal   --  95 %  --  --  --    06/14/21 0548  --  120Abnormal   27Abnormal   101/66  76  --  --  --  --    06/14/21 0513  99 5 °F (37 5 °C)  119Abnormal   25Abnormal   83/49Abnormal    61  96 %  32  3 L/min  Nasal cannula    06/14/21 0300  98 7 °F (37 1 °C)  69  27Abnormal   92/39Abnormal   65  97 %  32  3 L/min  --    06/13/21 2354  100 2 °F (37 9 °C)  70  36Abnormal    95/52  70  97 %  32  3 L/min  Nasal cannula    06/13/21 2203  --  86  32Abnormal   --  --  --  --  --  --    06/13/21 2036  98 8 °F (37 1 °C)  120Abnormal   28Abnormal   107/71  89  100 %  32  3 L/min  Nasal cannula    06/13/21 1833  97 1 °F (36 2 °C)Abnormal   125Abnormal   32Abnormal   103/73  92  96 %  36  4 L/min  Nasal cannula    06/13/21 1738  --  114Abnormal   24Abnormal   107/75  --  100 %  36  4 L/min  Nasal cannula    06/13/21 1659  --  107Abnormal   24Abnormal   131/76  --  100 %  36  4 L/min  Nasal cannula        Pertinent Labs/Diagnostic Test Results:   6/13   CXR-Moderate to large loculated right pleural effusion with underlying opacification of the right lung, suggestive of atelectasis or consolidation  CTA chest PE study  No evidence of acute pulmonary embolism  Several thin linear defects within the pulmonary arterial system, may represent fibrin strands from a previous episode of pulmonary embolism  2  Increasing opacification in the right lung base suggesting worsening atelectasis or pneumonia  3  Complex right-sided loculated pleural effusion, the lateral inferior component containing bubbles of air as on numerous prior studies  4  New the lingula nodule suspicious for metastatic disease  5  Development of a large amount of ascites within the upper abdomen  This is concerning for malignant ascites  6   In conjunction with prior PET/CT, evidence of malignant mediastinal adenopathy, and right scapular metastatic lesion     GQK-Eefshdgnxvs-vzmli rhythm      Results from last 7 days   Lab Units 06/13/21  1602   SARS-COV-2  Negative     Results from last 7 days   Lab Units 06/14/21  0547 06/13/21  1540   WBC Thousand/uL 4 71 6 25   HEMOGLOBIN g/dL 9 6* 11 2*   HEMATOCRIT % 29 6* 34 7*   PLATELETS Thousands/uL 179 218   NEUTROS ABS Thousands/µL  --  4 15         Results from last 7 days   Lab Units 06/14/21  0547 06/14/21  0009 06/13/21  1540   SODIUM mmol/L 130* 130* 129*   POTASSIUM mmol/L 4 4 4 4 4 2   CHLORIDE mmol/L 99 99 96   CO2 mmol/L 22 20* 24   ANION GAP mmol/L 9 11 9   BUN mg/dL 28* 29* 31*   CREATININE mg/dL 1 55* 1 50* 1 59*   EGFR ml/min/1 73sq m 43 45 42   CALCIUM mg/dL 7 4* 7 5* 8 0*     Results from last 7 days   Lab Units 06/13/21  1540   AST U/L 15   ALT U/L 7   ALK PHOS U/L 56 8   TOTAL PROTEIN g/dL 5 4*   ALBUMIN g/dL 2 9*   TOTAL BILIRUBIN mg/dL 0 51     Results from last 7 days   Lab Units 06/14/21  0643 06/13/21  2146   POC GLUCOSE mg/dl 135 140     Results from last 7 days   Lab Units 06/14/21  0547 06/14/21  0009 06/13/21  1540   GLUCOSE RANDOM mg/dL 135 149* 156*             No results found for: BETA-HYDROXYBUTYRATE       Results from last 7 days   Lab Units 06/13/21  1907   PH VIJAY  7 314   PCO2 VIJAY mm Hg 43 7   PO2 VIJAY mm Hg 29 2*   HCO3 VIJAY mmol/L 21 7*   BASE EXC VIJAY mmol/L -4 3   O2 CONTENT VIJAY ml/dL 6 9   O2 HGB, VENOUS % 44 0*             Results from last 7 days   Lab Units 06/14/21  0009 06/13/21  1907 06/13/21  1540   TROPONIN I ng/mL <0 03 <0 03 <0 03                 Results from last 7 days   Lab Units 06/13/21  1907   PROCALCITONIN ng/ml 0 18     Results from last 7 days   Lab Units 06/14/21  0009 06/13/21  1602   LACTIC ACID mmol/L 2 0 1 4             Results from last 7 days   Lab Units 06/13/21  1540   BNP pg/mL 16 2                 Results from last 7 days   Lab Units 06/13/21  1540   CRP mg/L 14 3*             Results from last 7 days   Lab Units 06/13/21  1916 STREP PNEUMONIAE ANTIGEN, URINE  Negative   LEGIONELLA URINARY ANTIGEN  Negative               Results from last 7 days   Lab Units 06/13/21  1616   BLOOD CULTURE  Received in Microbiology Lab  Culture in Progress                 ED Treatment:   Medication Administration from 06/13/2021 1530 to 06/13/2021 1807       Date/Time Order Dose Route Action     06/13/2021 1718 sodium chloride 0 9 % bolus 1,000 mL 1,000 mL Intravenous New Bag     06/13/2021 1617 sodium chloride 0 9 % bolus 1,000 mL 1,000 mL Intravenous New Bag     06/13/2021 1646 iohexol (OMNIPAQUE) 350 MG/ML injection (SINGLE-DOSE) 65 mL 65 mL Intravenous Given     06/13/2021 1715 levofloxacin (LEVAQUIN) IVPB (premix in dextrose) 750 mg 150 mL 750 mg Intravenous New Bag        Past Medical History:   Diagnosis Date    Cancer ([facility] )     RT Lung    Diabetes mellitus ([place] [address] )     Hypertension      Present on Admission:   Carcinoma of lung, right ([place] [address] )   COPD (chronic obstructive pulmonary disease) (HCC)   History of pulmonary embolism   Type 2 diabetes mellitus (HCC)      Admitting Diagnosis: Dehydration [E86 0]  SOB (shortness of breath) [R06 02]  Metastatic primary lung cancer (HCC) [C34 90]  Hypoxia [R09 02]  CONCEPCIÓN (acute kidney injury) ([facility] ) [N17 9]  HCAP (healthcare-associated pneumonia) [J18 9]  Age/Sex: 77 y o  male  Admission Orders:  Scheduled Medications:  calcium carbonate, 1 tablet, Oral, BID With Meals  cholecalciferol, 1,000 Units, Oral, Daily  cyanocobalamin, 1,000 mcg, Oral, Daily  docusate sodium, 100 mg, Oral, BID  folic acid, 7,026 mcg, Oral, Daily  glucosamine-chondroitin, 1 tablet, Oral, BID  insulin lispro, 1-5 Units, Subcutaneous, TID AC  insulin lispro, 1-5 Units, Subcutaneous, HS  levofloxacin, 250 mg, Intravenous, Q24H  metoprolol succinate, 25 mg, Oral, Daily  mirtazapine, 15 mg, Oral, Daily  pantoprazole, 40 mg, Oral, Early Morning  pravastatin, 10 mg, Oral, Daily With Dinner  sodium chloride, 500 mL, Intravenous, Once  vancomycin, 10 mg/kg (Adjusted), Intravenous, Q12H  furosemide (LASIX) injection 20 mg   Dose: 20 mg  Freq: Once Route: IV x1 6/13  sodium chloride 0 9 % bolus 500 mL   Dose: 500 mL  Freq: Once Route: IV  Last Dose: 250 mL (06/14/21 0804) x1 6/14  vancomycin (VANCOCIN) 1500 mg in sodium chloride 0 9% 250 mL IVPB   Dose: 15 mg/kg  Weight Dosing Info: 99 kg  Freq: Once Route: IV x1 6/13    Continuous IV Infusions:  lactated ringers, 125 mL/hr, Intravenous, Continuous      PRN Meds:  acetaminophen, 650 mg, Oral, Q6H PRN  aluminum-magnesium hydroxide-simethicone, 30 mL, Oral, Q6H PRN  guaiFENesin, 200 mg, Oral, Q4H PRN x1 6/13  ondansetron, 4 mg, Intravenous, Q6H PRN x1 6/13    poct glucose TID   Telemetry   urinary cath care   aspiration monitoring, HOB elevated 30 degrees or >  SCD's  Mechanical soft diet, nectar thick liquid    IP CONSULT TO PULMONOLOGY  IP CONSULT TO PHARMACY    Network Utilization Review Department  ATTENTION: Please call with any questions or concerns to [phone] and carefully listen to the prompts so that you are directed to the right person  All voicemails are confidential   Harlene Pair all requests for admission clinical reviews, approved or denied determinations and any other requests to dedicated fax number below belonging to the campus where the patient is receiving treatment   List of dedicated fax numbers for the Facilities:  [address] DENIALS (Administrative/Medical Necessity) [phone]   [address] (Maternity/NICU/Pediatrics) 261 [facility] Demetria Angeles [address] [id number] [address] [facility] Ronald Steen 1481 [address] [phone]

## 2021-06-14 NOTE — CASE MANAGEMENT
Patient transferring to W  CMN in chart  Transport at [facility] with [facility]

## 2021-06-14 NOTE — ASSESSMENT & PLAN NOTE
Lab Results   Component Value Date    HGBA1C 9 3 (H) 12/29/2020       Recent Labs     06/13/21  2146 06/14/21  0643   POCGLU 140 135       Blood Sugar Average: Last 72 hrs:     Suboptimal glycemic control given HbA1c of 9 3     - Will hold pre-admission oral glycemic agents  - Will initiate insulin regimen and sliding scale t i d  AC   - Patient will be NPO until post IR procedure  Will initiate diabetic diet once complete  - Maintain hypoglycemia protocol

## 2021-06-14 NOTE — DISCHARGE SUMMARY
Valery U  67   Discharge- Neavitt Cue 1945, 77 y o  male MRN: [id number]  Unit/Bed#: -01 Encounter: [id number]  Primary Care Provider: Damien Noble MD   Date and time admitted to hospital: 6/13/2021  3:30 PM    * Acute respiratory failure with hypoxia [facility])  Assessment & Plan  With pneumonia and sepsis -CTA shows no evidence of PE ,Increasing opacification in the right lung base suggesting worsening atelectasis or pneumonia  Complex right-sided loculated pleural effusion, the lateral inferior component containing bubbles of air as on numerous prior studies  Patient presents with increasing generalized weakness and cough, shortness of breath with minimal exertion needing 4-5 L of oxygen by nasal cannula in the ER  Patient also noted to be tachycardic with heart rate of 120s  Patient has sepsis and given IV fluid hydration and IV antibiotic with vancomycin and Levaquin and is being admitted  In July 2020- admitted  to the hospital on 7/31/20 for elective VATS procedure with pleural decortication and pleurodesis 2/2 hx of non small cell lung CA/recurrent malignant effusions    Will give IV antibiotic with vancomycin and Levaquin and monitor QT interval h/o anaphylaxis to pcn,  Renal adjusted dose  Will consult pharmacy   Will give gentle IV hydration as patient has right-sided pleural effusion and ascites and will need IR guided pleural tap and ascites which is likely malignant effusion  Will give a dose of lasix and diurese and monitor I/O ,no recent in epic    consult pulmonology  Will give nebulizer treatment  Will check VBG  Continue oxygen supplement and monitor O2 saturation  Cardiac monitoring    6/14-patient still appears in respiratory distress tachypneic, tachycardic and needing 3-4 L of oxygen by nasal cannula and restless  Patient able to maintain airway  Patient will need right-sided IR guided pleural tap    Patient will be transferred to level 1 step down at [facility] and plan was discussed with ICU attending Dr Jamir Hernandez    CONCEPCIÓN (acute kidney injury) [facility])  Assessment & Plan  Likely prerenal with sepsis  Will give IV fluid hydration  Patient also hypotensive and will give bolus fluids  Patient received a dose of Lasix yesterday  Urine output of 550 mL overnight  Monitor renal function  Hyponatremia  Assessment & Plan  With metastatic lung ca , with SIADH -will monitor sodium lvel , and get urine studies    Metastatic lung cancer (metastasis from lung to other site) [facility])  Assessment & Plan  Plan as above    Type 2 diabetes mellitus [facility])  Assessment & Plan  Lab Results   Component Value Date    HGBA1C 9 3 (H) 12/29/2020       Recent Labs     06/13/21  2146 06/14/21  0643   POCGLU 140 135       Blood Sugar Average: Last 72 hrs:  (P) 137 5will hold amaryl   Cover with low dose insulin Sliding scale     History of pulmonary embolism  Assessment & Plan  Hold Eliquis as patient will be going to IR guided thoracentesis  CTA chest shows no new PE       COPD (chronic obstructive pulmonary disease) (HCC)  Assessment & Plan  Will cont O2 supp and also neb treatment       Carcinoma of lung, right [facility])  Assessment & Plan  Patient had a recent CT scan which showed progression of the metastatic lung cancer  Extensive rowena disease in mediastinum right perihilar region with metastatic masses which is also new pelvic ascites noted  Patient follows up oncologist dr Nazario Lim  andplanned for  Chemotherapy tomorrow and brigatinib which will be held                    Medical Problems     Resolved Problems  Date Reviewed: 5/12/2021    None                Admission Date:   Admission Orders (From admission, onward)     Ordered        06/13/21 1733  Inpatient Admission  Once                     Admitting Diagnosis: Dehydration [E86 0]  SOB (shortness of breath) [R06 02]  Metastatic primary lung cancer ([address] ) [C34 90]  Hypoxia [R09 02]  CONCEPCÓIN (acute kidney injury) ([address] ) [N17 9]  HCAP (healthcare-associated pneumonia) [J18 9]    HPI:   Shaun Otoole is a 77 y o  male who presents with  multiple comorbidities with CAD diabetes mellitus type 2, hypertension, 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, DVT in the left leg on anticoagulation, morbid obesity, metastatic lung cancer, non-small cell lung cancer diagnosed 2020, March, CHF,(9/1/2019)LVEF: 78%, dilated RV   , history of carotid endarterectomy , elective VATS procedure and pleural decortication and pleurodesis in July 2020 for recurrent malignant effusions, presents with complaints of increasing shortness of breath generalized weakness and cough for the past 1 week  Patient upon arrival to ED was noted to have tachycardia with heart rate of 120s  Patient needing 4-5 L of oxygen by nasal cannula  Patient appears tachypneic and tachycardic  Patient has been having brown expectoration  Patient denies of any headache or fever or chills  COVID 19 test negative  Patient has elevated CRP with sodium of 129  Patient was noted to have a CT which showed no evidence of PE but right-sided pleural effusion with right-sided pneumonia  Low and also large ascites  Patient was given IV antibiotic with vancomycin and Levaquin in the ED and IV fluid bolus and is being admitted for further evaluation and management  Procedures Performed:   Orders Placed This Encounter   Procedures    ED ECG Documentation Only       Summary of Hospital Course:  Overall prognosis is guarded  Patient is a 78-year-old male with multiple comorbidities with stage IV lung cancer with malignant effusion presents with complaints of shortness of breath and generalized weakness for the past 1 week increasing for the past 3 days  Patient complained of cough and generalized weakness    In the ED was hypoxic needing 5 L of oxygen by nasal cannula tachypneic and tachycardic noted to have sepsis with pneumonia right-sided, with loculated pleural effusion and no evidence of PE  Also noted to have large amount of ascites  Patient received IV antibiotic with vancomycin and Levaquin and IV fluid bolus as per sepsis protocol  Patient has been hypotensive and tachypneic and tachycardic through the night and will need to be transferred to higher level of care for urgent IR guided thoracentesis and possible paracentesis  Plan was discussed in length with the patient and also ICU attending Dr Minal Mcdonnell and family/wife  was called and left message  Eliquis on hold for IR guided pleural tap  HEENT-PERRLA,dry oral mucosa  Neck-supple, no JVD elevation   Respiratory-decreased air entry bilaterally,  Cardiovascular system-S1, S2 heard, tachycardic  Abdomen-soft, nontender, no guarding or rigidity, bowel sounds heard  Extremities- pedal edema present  Peripheral pulses palpable  Musculoskeletal-no contractures  Central nervous system-no acute focal neurological deficit ,no sensory or motor deficit noted  Skin-no rash noted        Significant Findings, Care, Treatment and Services Provided: ct chest , iv fluid , iv abx , O2 supp    Complications: nil    Condition at Discharge: poor         Discharge instructions/Information to patient and family:   See after visit summary for information provided to patient and family  Provisions for Follow-Up Care:  See after visit summary for information related to follow-up care and any pertinent home health orders  PCP: Irina Rodríguez MD    Disposition: See After Visit Summary for discharge disposition information  Planned Readmission: No    Discharge Statement   I spent 45 minutes discharging the patient  This time was spent on the day of discharge  I had direct contact with the patient on the day of discharge  Additional documentation is required if more than 30 minutes were spent on discharge       Discharge Medications:  See after visit summary for reconciled discharge medications provided to patient and family

## 2021-06-14 NOTE — ASSESSMENT & PLAN NOTE
Patient had a recent CT scan which showed progression of the metastatic lung cancer  Extensive rowena disease in mediastinum right perihilar region with metastatic masses which is also new pelvic ascites noted  Patient follows up oncologist dr Andrew Villanueva  andplanned for  Chemotherapy tomorrow and brigatinib which will be held

## 2021-06-14 NOTE — H&P
Sadia 46  H&PBelen Guyton 1945, 77 y o  male MRN: [id number]  Unit/Bed#: ICU 03 Encounter: [id number]  Primary Care Provider: Kay Williamson MD   Date and time admitted to hospital: 6/14/2021 10:31 AM    * Acute respiratory failure with hypoxia [facility])  Assessment & Plan  Likely secondary to a combination of NSCLC, right-sided pleural effusion and extrathoracic reduction in diaphragmatic excursion secondary to pleural effusion  Currently receiving 2-3 L/min of supplemental oxygen with no pre-admission oxygen requirements  Appears in no acute respiratory distress  - Continue to titrate supplemental oxygen to maintain a saturation of greater than 92%  - May derive some functional improvement in respiratory dynamics status post thoracentesis and paracentesis  Sepsis [facility])  Assessment & Plan  Patient meets SIRS/sepsis criteria on initial presentation to [facility] ED with tachypnea, tachycradia and pulmonary findings, however he is afebrile and without leukocytosis  Initial procalcitonin was within normal limits  Received IV fluid hydration and IV antibiotics initiated in ED  Still tachycardia and tachypenic on initial ICU assessment     - Continue levaquin   - Follow-up repeat procalcitonin  - Follow-up blood cultures   - S  pneumoniae and Legionella spp  urinary antigens were negative  Carcinoma of lung, right [facility])  Assessment & Plan  History of NSCLC diagnosed in 12/2019  Underwent VATS with pleural decortication and pleurodesis in 7/2020  Currently managed with Brigatinib  CT- PE study conducted at [facility] just prior to his ICU admission, suggested possible advancement of metastatic disease, with involvement of the lingula, in addition to right-sided pleural effusion and some ascites  - Will obtain diagnostic thoracentesis and paracentesis; follow-up cytology and other investigative studies  - Will hold Brigatinib            Pleural effusion  Assessment & Plan  See assessment and plan for "carcinoma of right lung"  - Will obtain diagnostic thoracentesis  Ascites  Assessment & Plan  CT reported for "large amount of ascites in upper abdomen suspicious for malignant ascites" at left upper abdomen  - Will follow-up IR recommendations for paracentesis  History of pulmonary embolism  Assessment & Plan  History of DVT in 9/2020 and history of PE and is on eliquis as outpatient  No acute PE noted on CTA  - Will hold eliquis due to plan to undergo IR procedure  Type 2 diabetes mellitus [facility])  Assessment & Plan  Lab Results   Component Value Date    HGBA1C 9 3 (H) 12/29/2020       Recent Labs     06/13/21  2146 06/14/21  0643   POCGLU 140 135       Blood Sugar Average: Last 72 hrs:     Suboptimal glycemic control given HbA1c of 9 3     - Will hold pre-admission oral glycemic agents  - Will initiate insulin regimen and sliding scale t i d  AC   - Patient will be NPO until post IR procedure  Will initiate diabetic diet once complete  - Maintain hypoglycemia protocol  AV block  Assessment & Plan  Patient has a history of complete heart block and has a ventricular pacemaker     - Continue telemetry  Hyponatremia  Assessment & Plan  Possibly secondary to SIADH in the setting of in NSCLC  Presents with a [Na+] of 130  - Will implement fluid restriction   - Monitor [Na+]  Acute renal failure superimposed on stage 3 chronic kidney disease [facility])  Assessment & Plan  Lab Results   Component Value Date    EGFR 43 06/14/2021    EGFR 45 06/14/2021    EGFR 42 06/13/2021    CREATININE 1 55 (H) 06/14/2021    CREATININE 1 50 (H) 06/14/2021    CREATININE 1 59 (H) 06/13/2021     Baseline creatinine seems to be around 0 9-1  20  Presents with a creatinine of 1 55  Patient did receive contrast on 6/13 and is was IV diuresed for evidence of pulmonary infiltrates  He does use lasix PO as outpatient  - Hold diuretics and losartan    - Will avoid aggressive IV hydration given hyponatremia, presumably in the setting of SIADH  - Avoid nephrotoxic agents  - Trend creatinine     -------------------------------------------------------------------------------------------------------------  Chief Complaint: Shortness of breath  History of Present Illness   HX and PE limited by: No limitations  Claude Smith is a 77 y o  male who presents with acute respiratory failure with hypoxia  Patient has a history significant for, but not limited to, non-small cell lung cancer, with previous VATS, pleural decortication and pleurodesis in (7/2020) and initially presented to [place] ED with history of generalized weakness, anorexia and shortness of breath with a cough productive of brownish dark sputum  At the time of his presentation to [facility] ED, patient was requiring up to 4-5 L/min of supplemental oxygen  Sepsis criteria was met as patient was tachycardic and tachypeneic, subsequent to which he was given IV fluid hydration and antibiotics initiated with vancomycin and levaquin, as per sepsis protocol  Patient also underwent a CTA-PE study as part of initial diagnostic work-up, which was negative for acute PE, but did reveal evidence of potential metastatic progression with involvement of the lingula, in addition to large amounts of ascitic fluid in the upper abdomen  However, after patient was admitted to [facility], he continued to experience hypotension, tachypnea and tachycardia, and it was determined that patient would benefit from thoracentesis/paracentesis and a higher level of care, resulting in his transfer to our services for ICU admission with  Level 1 stepdown care  History obtained from chart review    -------------------------------------------------------------------------------------------------------------  Dispo: Continue Critical Care     Code Status: Level 2 - DNAR: but accepts endotracheal intubation  --------------------------------------------------------------------------------------------------------------  Review of Systems   Constitutional: Positive for appetite change  Negative for chills and fever  HENT: Negative for ear pain and sore throat  Eyes: Negative for pain and visual disturbance  Respiratory: Positive for cough and shortness of breath  Cardiovascular: Negative for chest pain and palpitations  Gastrointestinal: Negative for abdominal pain and vomiting  Genitourinary: Negative for dysuria and hematuria  Musculoskeletal: Negative for arthralgias and back pain  Skin: Negative for color change and rash  Neurological: Positive for weakness  Negative for seizures and syncope  Psychiatric/Behavioral: Negative for agitation and behavioral problems  All other systems reviewed and are negative  A 12-point, complete review of systems was reviewed and negative except as stated above     Physical Exam  Vitals and nursing note reviewed  Constitutional:       Appearance: He is well-developed  Interventions: Nasal cannula in place  HENT:      Head: Normocephalic and atraumatic  Mouth/Throat:      Mouth: Mucous membranes are moist    Eyes:      Conjunctiva/sclera: Conjunctivae normal    Cardiovascular:      Rate and Rhythm: Normal rate and regular rhythm  Heart sounds: S1 normal and S2 normal  No murmur heard  Pulmonary:      Effort: Pulmonary effort is normal  No respiratory distress  Breath sounds: Normal breath sounds  Abdominal:      Palpations: Abdomen is soft  Tenderness: There is no abdominal tenderness  Musculoskeletal:      Cervical back: Neck supple  Right lower leg: No edema  Left lower leg: No edema  Skin:     General: Skin is warm and dry  Neurological:      Mental Status: He is alert and oriented to person, place, and time     Psychiatric:         Mood and Affect: Mood normal          Behavior: Behavior normal        --------------------------------------------------------------------------------------------------------------  Vitals:   Vitals:    06/14/21 1043 06/14/21 1100 06/14/21 1200 06/14/21 1300   BP: 109/56 104/66 99/64 110/69   BP Location: Right arm      Pulse: (!) 111 (!) 108 104 105   Resp: (!) 34 (!) 27 18 (!) 31   Temp: (!) 97 °F (36 1 °C) 99 4 °F (37 4 °C)     TempSrc: Temporal Temporal     SpO2: 98% 98% 100% 99%   Weight: 99 4 kg (219 lb 2 2 oz)      Height: 5' 7" (1 702 m)        Temp  Min: 97 °F (36 1 °C)  Max: 100 2 °F (37 9 °C)  IBW (Ideal Body Weight): 66 1 kg  Height: 5' 7" (170 2 cm)  Body mass index is 34 32 kg/m²      Laboratory and Diagnostics:  Results from last 7 days   Lab Units 06/14/21  0547 06/13/21  1540   WBC Thousand/uL 4 71 6 25   HEMOGLOBIN g/dL 9 6* 11 2*   HEMATOCRIT % 29 6* 34 7*   PLATELETS Thousands/uL 179 218   NEUTROS PCT %  --  66   MONOS PCT %  --  15*     Results from last 7 days   Lab Units 06/14/21  0547 06/14/21  0009 06/13/21  1540   SODIUM mmol/L 130* 130* 129*   POTASSIUM mmol/L 4 4 4 4 4 2   CHLORIDE mmol/L 99 99 96   CO2 mmol/L 22 20* 24   ANION GAP mmol/L 9 11 9   BUN mg/dL 28* 29* 31*   CREATININE mg/dL 1 55* 1 50* 1 59*   CALCIUM mg/dL 7 4* 7 5* 8 0*   GLUCOSE RANDOM mg/dL 135 149* 156*   ALT U/L  --   --  7   AST U/L  --   --  15   ALK PHOS U/L  --   --  56 8   ALBUMIN g/dL  --   --  2 9*   TOTAL BILIRUBIN mg/dL  --   --  0 51               Results from last 7 days   Lab Units 06/14/21  0009 06/13/21  1907 06/13/21  1540   TROPONIN I ng/mL <0 03 <0 03 <0 03     Results from last 7 days   Lab Units 06/14/21  0009 06/13/21  1602   LACTIC ACID mmol/L 2 0 1 4     ABG:    VBG:  Results from last 7 days   Lab Units 06/13/21 1907   PH VIJAY  7 314   PCO2 VIJAY mm Hg 43 7   PO2 VIJAY mm Hg 29 2*   HCO3 VIJAY mmol/L 21 7*   BASE EXC VJIAY mmol/L -4 3     Results from last 7 days   Lab Units 06/13/21  1907   PROCALCITONIN ng/ml 0 18       Micro:  Results from last 7 days   Lab Units 06/13/21  1916 06/13/21  1616   BLOOD CULTURE   --  Received in Microbiology Lab  Culture in Progress  LEGIONELLA URINARY ANTIGEN  Negative  --    STREP PNEUMONIAE ANTIGEN, URINE  Negative  --        EKG: Ventricular-paced rhythm  Imaging: I have personally reviewed pertinent reports          Historical Information   Past Medical History:   Diagnosis Date    Cancer ([facility] )     RT Lung    Diabetes mellitus ([facility] )     Hypertension      Past Surgical History:   Procedure Laterality Date    CARDIAC PACEMAKER PLACEMENT  09/2019    CHOLECYSTECTOMY      kidney damage from gallbladder removal     FOOT SURGERY      tendon    PORTACATH PLACEMENT      PROSTATE SURGERY       Social History   Social History     Substance and Sexual Activity   Alcohol Use Not Currently     Social History     Substance and Sexual Activity   Drug Use Never     Social History     Tobacco Use   Smoking Status Former Smoker    Packs/day: 2 00    Years: 9 00    Pack years: 18 00    Types: Cigarettes   Smokeless Tobacco Never Used     Exercise History:   Family History:   Family History   Family history unknown: Yes     I have reviewed this patient's family history and commented on sigificant items within the HPI      Medications:  Current Facility-Administered Medications   Medication Dose Route Frequency    acyclovir (ZOVIRAX) capsule 200 mg  200 mg Oral BID    atenolol (TENORMIN) tablet 25 mg  25 mg Oral Daily    [START ON 6/15/2021] calcium carbonate-vitamin D (OSCAL-D) 500 mg-200 units per tablet 1 tablet  1 tablet Oral Daily With Breakfast    cholecalciferol (VITAMIN D3) tablet 1,000 Units  1,000 Units Oral Daily    cholestyramine sugar free (QUESTRAN LIGHT) packet 4 g  4 g Oral BID    cyanocobalamin (VITAMIN B-12) tablet 1,000 mcg  1,000 mcg Oral Daily    folic acid (FOLVITE) tablet 1,000 mcg  1,000 mcg Oral Daily    insulin lispro (HumaLOG) 100 units/mL subcutaneous injection 1-6 Units  1-6 Units Subcutaneous TID AC    metoprolol succinate (TOPROL-XL) 24 hr tablet 25 mg  25 mg Oral Daily    mirtazapine (REMERON) tablet 15 mg  15 mg Oral Daily    oxyCODONE-acetaminophen (PERCOCET) 5-325 mg per tablet 1 tablet  1 tablet Oral Q6H PRN    [START ON 6/15/2021] pantoprazole (PROTONIX) EC tablet 40 mg  40 mg Oral QAM    pravastatin (PRAVACHOL) tablet 20 mg  20 mg Oral Daily With Dinner     Home medications:  Prior to Admission Medications   Prescriptions Last Dose Informant Patient Reported? Taking?    BRIGATINIB PO  Self Yes No   Sig: Take 1 tablet by mouth   [employer] Vitamin B-12 1000 MCG tablet  Self Yes No   Sig: Take 1,000 mcg by mouth daily   Calcium Carbonate-Vit D-Min (CALCIUM 1200 PO)  Self Yes No   Sig: Take 1 tablet by mouth   Prevalite 4 g packet  Self Yes No   acyclovir (ZOVIRAX) 200 mg capsule  Self Yes No   Sig: Take 200 mg by mouth 2 (two) times a day   apixaban (Eliquis) 5 mg  Self Yes No   Sig: Take 5 mg by mouth 2 (two) times a day   atenolol (TENORMIN) 25 mg tablet  Self Yes No   Sig: Take 25 mg by mouth   cholecalciferol (VITAMIN D3) 1,000 units tablet  Self Yes No   Sig: Take 1,000 Units by mouth daily   folic acid (FOLVITE) 1 mg tablet  Self Yes No   Sig: Take 1,000 mcg by mouth daily   furosemide (LASIX) 40 mg tablet  Self Yes No   Sig: Take 40 mg by mouth   glimepiride (AMARYL) 1 mg tablet  Self Yes No   Sig: Take 1 mg by mouth 2 (two) times a day   glucosamine-chondroitin 500-400 MG tablet  Self Yes No   Sig: Take 1 tablet by mouth 2 (two) times a day   losartan (COZAAR) 50 mg tablet  Self Yes No   Sig: Take 50 mg by mouth   lovastatin (MEVACOR) 20 mg tablet  Self Yes No   Sig: Take 20 mg by mouth daily at bedtime   metoprolol succinate (TOPROL-XL) 25 mg 24 hr tablet  Self Yes No   Sig: Take 25 mg by mouth   mirtazapine (REMERON) 15 mg tablet  Self Yes No   Sig: Take 15 mg by mouth daily   ondansetron (ZOFRAN) 4 mg tablet  Self Yes No   Sig: Take 4 mg by mouth every 8 (eight) hours as needed oxyCODONE-acetaminophen (PERCOCET) 5-325 mg per tablet  Self Yes No   Sig: Take 1 tablet by mouth every 6 (six) hours as needed   pantoprazole (PROTONIX) 40 mg tablet  Self Yes No   Sig: Take 40 mg by mouth every morning      Facility-Administered Medications: None     Allergies: Allergies   Allergen Reactions    Diovan [Valsartan] Angioedema    Penicillins Anaphylaxis    Lisinopril     Oxytocin        ------------------------------------------------------------------------------------------------------------  Advance Directive and Living Will:      Power of :    POLST:    ------------------------------------------------------------------------------------------------------------  Anticipated Length of Stay is > 2 midnights    Care Time Delivered:   Upon my evaluation, this patient had a high probability of imminent or life-threatening deterioration due to acute respiratory failure with hypoxia  , which required my direct attention, intervention, and personal management  I have personally provided 45 minutes (11:00 to 11:45) of critical care time, exclusive of procedures, teaching, family meetings, and any prior time recorded by providers other than myself  Maren Peña MD        Portions of the record may have been created with voice recognition software  Occasional wrong word or "sound a like" substitutions may have occurred due to the inherent limitations of voice recognition software    Read the chart carefully and recognize, using context, where substitutions have occurred

## 2021-06-14 NOTE — ASSESSMENT & PLAN NOTE
CT reported for "large amount of ascites in upper abdomen suspicious for malignant ascites" at left upper abdomen  - Will follow-up IR recommendations for paracentesis

## 2021-06-15 ENCOUNTER — APPOINTMENT (INPATIENT)
Dept: NON INVASIVE DIAGNOSTICS | Facility: HOSPITAL | Age: 76
DRG: 180 | End: 2021-06-15
Payer: COMMERCIAL

## 2021-06-15 PROBLEM — R26.2 AMBULATORY DYSFUNCTION: Status: ACTIVE | Noted: 2021-06-15

## 2021-06-15 LAB
ANION GAP SERPL CALCULATED.3IONS-SCNC: 11 MMOL/L (ref 4–13)
BASOPHILS # BLD AUTO: 0.03 THOUSANDS/ΜL (ref 0–0.1)
BASOPHILS NFR BLD AUTO: 1 % (ref 0–1)
BUN SERPL-MCNC: 27 MG/DL (ref 5–25)
CALCIUM SERPL-MCNC: 7.7 MG/DL (ref 8.3–10.1)
CHLORIDE SERPL-SCNC: 100 MMOL/L (ref 100–108)
CO2 SERPL-SCNC: 23 MMOL/L (ref 21–32)
CREAT SERPL-MCNC: 1.55 MG/DL (ref 0.6–1.3)
EOSINOPHIL # BLD AUTO: 0.26 THOUSAND/ΜL (ref 0–0.61)
EOSINOPHIL NFR BLD AUTO: 5 % (ref 0–6)
ERYTHROCYTE [DISTWIDTH] IN BLOOD BY AUTOMATED COUNT: 18.3 % (ref 11.6–15.1)
GFR SERPL CREATININE-BSD FRML MDRD: 43 ML/MIN/1.73SQ M
GLUCOSE SERPL-MCNC: 77 MG/DL (ref 65–140)
GLUCOSE SERPL-MCNC: 83 MG/DL (ref 65–140)
GLUCOSE SERPL-MCNC: 84 MG/DL (ref 65–140)
GLUCOSE SERPL-MCNC: 85 MG/DL (ref 65–140)
GLUCOSE SERPL-MCNC: 88 MG/DL (ref 65–140)
HCT VFR BLD AUTO: 30 % (ref 36.5–49.3)
HGB BLD-MCNC: 9.1 G/DL (ref 12–17)
IMM GRANULOCYTES # BLD AUTO: 0.02 THOUSAND/UL (ref 0–0.2)
IMM GRANULOCYTES NFR BLD AUTO: 0 % (ref 0–2)
LYMPHOCYTES # BLD AUTO: 0.59 THOUSANDS/ΜL (ref 0.6–4.47)
LYMPHOCYTES NFR BLD AUTO: 12 % (ref 14–44)
MCH RBC QN AUTO: 27.6 PG (ref 26.8–34.3)
MCHC RBC AUTO-ENTMCNC: 30.3 G/DL (ref 31.4–37.4)
MCV RBC AUTO: 91 FL (ref 82–98)
MONOCYTES # BLD AUTO: 0.63 THOUSAND/ΜL (ref 0.17–1.22)
MONOCYTES NFR BLD AUTO: 13 % (ref 4–12)
MRSA NOSE QL CULT: NORMAL
NEUTROPHILS # BLD AUTO: 3.3 THOUSANDS/ΜL (ref 1.85–7.62)
NEUTS SEG NFR BLD AUTO: 69 % (ref 43–75)
NRBC BLD AUTO-RTO: 0 /100 WBCS
PLATELET # BLD AUTO: 164 THOUSANDS/UL (ref 149–390)
PMV BLD AUTO: 9.4 FL (ref 8.9–12.7)
POTASSIUM SERPL-SCNC: 4.2 MMOL/L (ref 3.5–5.3)
RBC # BLD AUTO: 3.3 MILLION/UL (ref 3.88–5.62)
SODIUM SERPL-SCNC: 134 MMOL/L (ref 136–145)
WBC # BLD AUTO: 4.83 THOUSAND/UL (ref 4.31–10.16)

## 2021-06-15 PROCEDURE — 93306 TTE W/DOPPLER COMPLETE: CPT | Performed by: INTERNAL MEDICINE

## 2021-06-15 PROCEDURE — 80048 BASIC METABOLIC PNL TOTAL CA: CPT | Performed by: INTERNAL MEDICINE

## 2021-06-15 PROCEDURE — 85025 COMPLETE CBC W/AUTO DIFF WBC: CPT | Performed by: INTERNAL MEDICINE

## 2021-06-15 PROCEDURE — 93306 TTE W/DOPPLER COMPLETE: CPT

## 2021-06-15 PROCEDURE — 82948 REAGENT STRIP/BLOOD GLUCOSE: CPT

## 2021-06-15 PROCEDURE — 97163 PT EVAL HIGH COMPLEX 45 MIN: CPT

## 2021-06-15 PROCEDURE — 99232 SBSQ HOSP IP/OBS MODERATE 35: CPT | Performed by: INTERNAL MEDICINE

## 2021-06-15 RX ORDER — LANOLIN ALCOHOL/MO/W.PET/CERES
3 CREAM (GRAM) TOPICAL
Status: DISCONTINUED | OUTPATIENT
Start: 2021-06-15 | End: 2021-06-28 | Stop reason: HOSPADM

## 2021-06-15 RX ADMIN — ACYCLOVIR 200 MG: 200 CAPSULE ORAL at 20:59

## 2021-06-15 RX ADMIN — ACYCLOVIR 200 MG: 200 CAPSULE ORAL at 10:41

## 2021-06-15 RX ADMIN — APIXABAN 5 MG: 5 TABLET, FILM COATED ORAL at 16:34

## 2021-06-15 RX ADMIN — PANTOPRAZOLE SODIUM 40 MG: 40 TABLET, DELAYED RELEASE ORAL at 05:24

## 2021-06-15 RX ADMIN — Medication 3 MG: at 21:40

## 2021-06-15 RX ADMIN — MIRTAZAPINE 15 MG: 15 TABLET, FILM COATED ORAL at 08:27

## 2021-06-15 RX ADMIN — APIXABAN 5 MG: 5 TABLET, FILM COATED ORAL at 08:27

## 2021-06-15 NOTE — ASSESSMENT & PLAN NOTE
Patient has exudative ascites suspected malignant ascites  However cell count showed polymorphous predominance but culture data negative  Patient did not have any abdominal pain also  Patient is status post IR drainage of ascites fluid which was 2 L  And received albumin IV as well after that

## 2021-06-15 NOTE — PLAN OF CARE
Problem: Potential for Falls  Goal: Patient will remain free of falls  Description: INTERVENTIONS:  - Educate patient/family on patient safety including physical limitations  - Instruct patient to call for assistance with activity   - Consult OT/PT to assist with strengthening/mobility   - Keep Call bell within reach  - Keep bed low and locked with side rails adjusted as appropriate  - Keep care items and personal belongings within reach  - Initiate and maintain comfort rounds  - Make Fall Risk Sign visible to staff  - Offer Toileting every 2 Hours, in advance of need  - Initiate/Maintain bed/chair alarm  - Obtain necessary fall risk management equipment: chair alarm  - Apply yellow socks and bracelet for high fall risk patients  - Consider moving patient to room near nurses station  Outcome: Progressing     Problem: MOBILITY - ADULT  Goal: Maintain or return to baseline ADL function  Description: INTERVENTIONS:  -  Assess patient's ability to carry out ADLs; assess patient's baseline for ADL function and identify physical deficits which impact ability to perform ADLs (bathing, care of mouth/teeth, toileting, grooming, dressing, etc )  - Assess/evaluate cause of self-care deficits   - Assess range of motion  - Assess patient's mobility; develop plan if impaired  - Assess patient's need for assistive devices and provide as appropriate  - Encourage maximum independence but intervene and supervise when necessary  - Involve family in performance of ADLs  - Assess for home care needs following discharge   - Consider OT consult to assist with ADL evaluation and planning for discharge  - Provide patient education as appropriate  Outcome: Progressing     Problem: RESPIRATORY - ADULT  Goal: Achieves optimal ventilation and oxygenation  Description: INTERVENTIONS:  - Assess for changes in respiratory status  - Assess for changes in mentation and behavior  - Position to facilitate oxygenation and minimize respiratory effort  - Oxygen administered by appropriate delivery if ordered  - Initiate smoking cessation education as indicated  - Encourage broncho-pulmonary hygiene including cough, deep breathe, Incentive Spirometry  - Assess the need for suctioning and aspirate as needed  - Assess and instruct to report SOB or any respiratory difficulty  - Respiratory Therapy support as indicated  Outcome: Progressing     Problem: METABOLIC, FLUID AND ELECTROLYTES - ADULT  Goal: Electrolytes maintained within normal limits  Description: INTERVENTIONS:  - Monitor labs and assess patient for signs and symptoms of electrolyte imbalances  - Administer electrolyte replacement as ordered  - Monitor response to electrolyte replacements, including repeat lab results as appropriate  - Instruct patient on fluid and nutrition as appropriate  Outcome: Progressing     Problem: METABOLIC, FLUID AND ELECTROLYTES - ADULT  Goal: Glucose maintained within target range  Description: INTERVENTIONS:  - Monitor Blood Glucose as ordered  - Assess for signs and symptoms of hyperglycemia and hypoglycemia  - Administer ordered medications to maintain glucose within target range  - Assess nutritional intake and initiate nutrition service referral as needed  Outcome: Progressing

## 2021-06-15 NOTE — ASSESSMENT & PLAN NOTE
Patient would benefit from short-term rehab  PT OT evaluated the patient and recommended short-term rehab  Will discuss with  regarding the same

## 2021-06-15 NOTE — QUICK NOTE
QUICK NOTE - Deterioration Index  Autumn Kim 77 y o  male MRN: [id number]  Unit/Bed#: [address] [place] [address] Encounter: [id number]      Time Paged: [address] #: 80  Primary RN: Artemio Obregon RN  Deterioration index score at time of page: 58    PROBLEMS:    Acute hypoxic respiratory failure    Right pleural effusion   Ascites   SIRS   CONCEPCIÓN   Type 2 diabetic   Stage IV non-small cell cancer   COPD    PLAN:     Plan as advised by primary care team    HPI Statement (Background): Autumn Kim is a 77y o  year old male who presents with past medical history of metastatic non-small cell cancer with recurrent pleural effusion status post VATS and right-sided decortication and pleurodesis  Initially he presented with shortness of breath to [facility]  He was transferred on 6/14 for interventional radiology/thoracentesis and was admitted to the step-down unit  As per IR, patient noted to have a loculated right effusion present since last summer  However IR did perform a paracentesis with 2 L of fluid removal   Patient was transferred out of step-down yesterday afternoon to the medical-surgical floor  His DI score was high this a m  Secondary to a blood pressure of 80/53  Patient was asymptomatic with no changes in mental status  As per nursing, patient's blood pressure improved on its own without any intervention  Spoke with Artemio Obregon RN who does not have any concerns at this time        Historical Information   Past Medical History:   Diagnosis Date    Cancer ([facility] [address] )     RT Lung    Diabetes mellitus ([facility] [address] )     Hypertension      Past Surgical History:   Procedure Laterality Date    CARDIAC PACEMAKER PLACEMENT  09/2019    CHOLECYSTECTOMY      kidney damage from gallbladder removal     FOOT SURGERY      tendon    IR PARACENTESIS  6/14/2021    PORTACATH PLACEMENT      PROSTATE SURGERY         Vitals:   Vitals:    06/15/21 0516 06/15/21 0532 06/15/21 0820 06/15/21 1010   BP: 96/66  (!) 80/53 120/81   BP Location: Left arm    Pulse: (!) 109  104 (!) 112   Resp:   (!) 24    Temp:   97 7 °F (36 5 °C)    TempSrc:   Oral    SpO2: 94%  97% 99%   Weight:  92 4 kg (203 lb 11 3 oz)     Height:           Temperature: Temp (24hrs), Av °F (36 7 °C), Min:97 °F (36 1 °C), Max:99 5 °F (37 5 °C)  Current: Temperature: 97 7 °F (36 5 °C)    DIAGNOSTIC DATA:    Labs:   Results from last 7 days   Lab Units 06/15/21  0521  0547 21  1540   WBC Thousand/uL 4 83 4 71 6 25   HEMOGLOBIN g/dL 9 1* 9 6* 11 2*   HEMATOCRIT % 30 0* 29 6* 34 7*   PLATELETS Thousands/uL 164 179 218   NEUTROS PCT % 69  --  66   MONOS PCT % 13*  --  15*     Results from last 7 days   Lab Units 06/15/21  0521  0547 21  1540   SODIUM mmol/L 134* 130* 130* 129*   POTASSIUM mmol/L 4 2 4 4 4 4 4 2   CHLORIDE mmol/L 100 99 99 96   CO2 mmol/L 23 22 20* 24   BUN mg/dL 27* 28* 29* 31*   CREATININE mg/dL 1 55* 1 55* 1 50* 1 59*   CALCIUM mg/dL 7 7* 7 4* 7 5* 8 0*   ALK PHOS U/L  --   --   --  56 8   ALT U/L  --   --   --  7   AST U/L  --   --   --  15                  Results from last 7 days   Lab Units 21  0009 21  1602   LACTIC ACID mmol/L 2 0 1 4     Results from last 7 days   Lab Units 21  0009 21  1907 21  1540   TROPONIN I ng/mL <0 03 <0 03 <0 03         Results from last 7 days   Lab Units 21  0547 21  190   PROCALCITONIN ng/ml 0 36* 0 18     No results found for: [facility] [place] [place]             Applicable Imaging Studies: IR INPATIENT Paracentesis    Result Date: 2021  Impression: Impression: Successful ultrasound-guided paracentesis yielding 2000 mL clear yellow ascites    Workstation performed: [id number]     Code Status: Level 2 - DNAR: but accepts endotracheal intubation

## 2021-06-15 NOTE — ASSESSMENT & PLAN NOTE
Multifactorial most likely secondary to significant ascites causing hypoventilation, right-sided loculated pleural effusion, and lung cancer along with suspected pneumonia  Initially patient was hypoxic requiring 2-4 L of oxygen via nasal cannula which worsened to 5 L and hence required to be in the ICU  Currently patient is saturating 92-94% on room air at rest   Patient possibly desaturates on exertion however has not exerted too much she had  Continue patient on oxygen and nebulizers

## 2021-06-15 NOTE — ASSESSMENT & PLAN NOTE
Lab Results   Component Value Date    HGBA1C 9 3 (H) 12/29/2020       Recent Labs     06/14/21  1629 06/15/21  0731 06/15/21  1107 06/15/21  1601   POCGLU 103 77 85 88       Blood Sugar Average: Last 72 hrs:  (P) 92 6     Continue patient on low-dose sliding scale insulin with Accu-Chek q i d     Check hemoglobin A1c in the morning

## 2021-06-15 NOTE — ASSESSMENT & PLAN NOTE
Possible secondary to pneumonia  Patient is on Levaquin, continue the same  Continue patient on nebulizers and oxygen as needed  Gradually resolving    Will finish a 5-7 day course of oral antibiotic

## 2021-06-15 NOTE — PROGRESS NOTES
Sadia 46  Progress Note Amaury Lino 1945, 77 y o  male MRN: [id number]  Unit/Bed#: 2 [place] [address] Encounter: [id number]  Primary Care Provider: Marcus Laboy MD   Date and time admitted to hospital: 6/14/2021 10:31 AM    * Acute respiratory failure with hypoxia [facility])  Assessment & Plan  Multifactorial most likely secondary to significant ascites causing hypoventilation, right-sided loculated pleural effusion, and lung cancer along with suspected pneumonia  Initially patient was hypoxic requiring 2-4 L of oxygen via nasal cannula which worsened to 5 L and hence required to be in the ICU  Currently patient is saturating 92-94% on room air at rest   Patient possibly desaturates on exertion however has not exerted too much she had  Continue patient on oxygen and nebulizers  Sepsis [facility])  Assessment & Plan  Possible secondary to pneumonia  Patient is on Levaquin, continue the same  Continue patient on nebulizers and oxygen as needed  Gradually resolving  Will finish a 5-7 day course of oral antibiotic      Ascites  Assessment & Plan  Patient has exudative ascites suspected malignant ascites  However cell count showed polymorphous predominance but culture data negative  Patient did not have any abdominal pain also  Patient is status post IR drainage of ascites fluid which was 2 L  And received albumin IV as well after that  Acute renal failure superimposed on stage 3 chronic kidney disease [facility])  Assessment & Plan  Lab Results   Component Value Date    EGFR 43 06/15/2021    EGFR 43 06/14/2021    EGFR 45 06/14/2021    CREATININE 1 55 (H) 06/15/2021    CREATININE 1 55 (H) 06/14/2021    CREATININE 1 50 (H) 06/14/2021     Monitor BMP  Patient is on Lasix 40 mg daily at home, currently on hold  Will consider resuming this, once patient's appetite is improved  Currently patient had a poor appetite      Carcinoma of lung, right [facility])  Assessment & Plan  Patient follows up with Dr Jayna Banda as an outpatient  Pleural effusion  Assessment & Plan  Loculated right-sided pleural effusion, IR evaluated the patient and not enough fluid for drainage  Continue supportive care  Ambulatory dysfunction  Assessment & Plan  Patient would benefit from short-term rehab  PT OT evaluated the patient and recommended short-term rehab  Will discuss with  regarding the same  Hyponatremia  Assessment & Plan  Combination of SIADH and pre renal   Currently sodium stable  Continue to monitor BMP    Type 2 diabetes mellitus [facility])  Assessment & Plan  Lab Results   Component Value Date    HGBA1C 9 3 (H) 2020       Recent Labs     21  1629 06/15/21  0731 06/15/21  1107 06/15/21  1601   POCGLU 103 77 85 88       Blood Sugar Average: Last 72 hrs:  (P) 92 6     Continue patient on low-dose sliding scale insulin with Accu-Chek q i d     Check hemoglobin A1c in the morning  AV block  Assessment & Plan  Continue on atenolol with holding parameters  Patient has tachycardia which is more likely secondary to cancer related and reactive  In May 12th 2021 with office was with Pulmonary patient already had a heart rate of 106 at that time    History of pulmonary embolism  Assessment & Plan  Continue patient on Eliquis 5 mg b i d  Subjective:   I have seen and Examined the patient at the bedside  No CP or Sob  No fevers or chills, No nausea or vomiting  Overnight events reviewed with the RN  No Other complains  Patient denies any orthopnea PND, no chest pain, no fevers or chills, no abdominal pain  No nausea or vomiting  But has poor appetite, also complained of some fatigue and weakness generalized  Review of System:   Denies any CP or SOB  Denies any Cough or Cold  Denies any Fevers or chills  Denies any focal tingling numbness or weakness in any extremities  Denies any abdominal pain, Nausea or vomiting       Objective:   Temp (24hrs), Av 2 °F (36 8 °C), Min:97 1 °F (36 2 °C), Max:99 5 °F (37 5 °C)    Temp:  [97 1 °F (36 2 °C)-99 5 °F (37 5 °C)] 98 5 °F (36 9 °C)  HR:  [102-116] 114  Resp:  [16-26] 22  BP: ()/(53-81) 120/81  SpO2:  [93 %-99 %] 93 %  Body mass index is 31 9 kg/m²  Input and Output Summary (last 24 hours): Intake/Output Summary (Last 24 hours) at 6/15/2021 1633  Last data filed at 6/15/2021 1001  Gross per 24 hour   Intake 520 ml   Output 525 ml   Net -5 ml     I/O       06/13 0701 - 06/14 0700 06/14 0701 - 06/15 0700 06/15 0701 - 06/16 0700    P  O   240 120    I V  (mL/kg)   10 (0 1)    IV Piggyback  150     Total Intake(mL/kg)  390 (4 2) 130 (1 4)    Urine (mL/kg/hr)  500 200 (0 2)    Other  2000     Stool  0     Total Output  2500 200    Net  -2110 -70           Unmeasured Stool Occurrence  4 x         Physical Exam:   General : Alert, Awake and oriented x 2-3, NAD  Neck : Supple  Eyes:  DEBORA, EOMI  CVS : S1, S2, RRR    R/S : Clear to auscultate anteriorly  Decreased breath sounds at the right bases, and no rhonchi or wheezes appreciated  Bilateral breath sounds coarse though  Abd: Soft, NT, mildly distended  Bs+ve  Extremity: Trace pedal edema noted  Skin: No acute Rash noted  CNS: No acute FND       Additional Data:     Labs & Imaging Data Reviewed :    Results from last 7 days   Lab Units 06/15/21  0521   WBC Thousand/uL 4 83   HEMOGLOBIN g/dL 9 1*   HEMATOCRIT % 30 0*   PLATELETS Thousands/uL 164   NEUTROS PCT % 69   LYMPHS PCT % 12*   MONOS PCT % 13*   EOS PCT % 5     Results from last 7 days   Lab Units 06/15/21  0521 06/13/21  1540   POTASSIUM mmol/L 4 2 4 2   CHLORIDE mmol/L 100 96   CO2 mmol/L 23 24   BUN mg/dL 27* 31*   CREATININE mg/dL 1 55* 1 59*   CALCIUM mg/dL 7 7* 8 0*   ALK PHOS U/L  --  56 8   ALT U/L  --  7   AST U/L  --  15         Lab Results   Component Value Date    HGBA1C 9 3 (H) 12/29/2020      IR INPATIENT Paracentesis   Final Result by Rene Rahman MD (06/14 2703)   Impression:   Successful ultrasound-guided paracentesis yielding 2000 mL clear yellow ascites   Workstation performed: [id number]         IR IN-Patient Thoracentesis    (Results Pending)       Cultures:   Blood Culture:   Lab Results   Component Value Date    BLOODCX No Growth at 24 hrs  06/13/2021    BLOODCX No Growth at 24 hrs  06/13/2021     Urine Culture: No results found for: URINECX  Sputum Culture: No components found for: SPUTUMCX  Wound Culture: No results found for: WOUNDCULT    Last 24 Hours Medication List:   Current Facility-Administered Medications   Medication Dose Route Frequency Provider Last Rate    acyclovir  200 mg Oral BID Patrick Molina MD      apixaban  5 mg Oral BID Patrick Molina MD      atenolol  25 mg Oral Daily Patrick Molina MD      calcium carbonate-vitamin D  1 tablet Oral Daily With Breakfast Patrick Molina MD      cholecalciferol  1,000 Units Oral Daily Patrick Molina MD      cholestyramine sugar free  4 g Oral BID Patrick Molina MD      cyanocobalamin  1,000 mcg Oral Daily Patrick Molina MD      folic acid  2,008 mcg Oral Daily Patrick Molina MD      insulin lispro  1-6 Units Subcutaneous TID AC Patrick Molina MD      [START ON 6/16/2021] levofloxacin  750 mg Oral Q48H Patrick Molina MD      mirtazapine  15 mg Oral Daily Patrick Molina MD      oxyCODONE-acetaminophen  1 tablet Oral Q6H PRN Patrick Molina MD      pantoprazole  40 mg Oral QAM Patrick Molina MD      pravastatin  20 mg Oral Daily With Phil Carrillo MD         Patient is at moderate risk for morbidity and mortality due to above mentioned illness and comorbidities

## 2021-06-15 NOTE — PHYSICAL THERAPY NOTE
PT EVALUATION       06/15/21 1545   PT Last Visit   PT Visit Date 06/15/21   Note Type   Note type Evaluation   Pain Assessment   Pain Assessment Tool Pain Assessment not indicated - pt denies pain   Home Living   Type of [address] Two level;Bed/bath upstairs;Stairs to enter with rails;1/2 bath on main level  (3-4 GARCIA)   Home Equipment   (No DME per pt)   Additional Comments Pt has a workshop in his basement   Prior Function   Level of [place] Independent with ADLs and functional mobility   Lives With Spouse   Receives Help From Family   ADL Assistance Independent   Comments Pt ambulates without an assistive device prior to admission pt does report that at times he hold on to his wife for support inside and outside   Restrictions/Precautions   Other Precautions Fall Risk;Bed Alarm; Chair Alarm   General   Additional Pertinent History Pt admitted with increased shortness of breath, generalized weakness, and a cough x1 week  Family/Caregiver Present Yes  (Patient's spouse)   Cognition   Overall Cognitive Status WFL   Arousal/Participation Cooperative   Orientation Level Oriented X4   Following Commands Follows all commands and directions without difficulty   RLE Assessment   RLE Assessment WFL  (hip 3-/5, knee and ankle 3- to 3/5)   LLE Assessment   LLE Assessment WFL  (hip 3-/5, knee and ankle 3- to 3/5)   Bed Mobility   Supine to Sit 4  Minimal assistance   Additional items Assist x 1;Verbal cues; Bedrails; Increased time required   Sit to Supine 4  Minimal assistance   Additional items Assist x 1;Verbal cues; Bedrails; Increased time required   Transfers   Sit to Stand 3  Moderate assistance   Additional items Assist x 1;Verbal cues   Stand to Sit 3  Moderate assistance   Additional items Assist x 1;Verbal cues   Additional Comments Pt stood with moderate handhold assist at the edge of bed x1 minute  Pt then ambulated 2-3 steps toward the head of bed    Pt very fatigued after transfers, standing and few step ambulation to the head of bed  Balance   Static Sitting Fair   Static Standing Fair -   Dynamic Standing Poor +   Activity Tolerance   Activity Tolerance Patient limited by fatigue;Treatment limited secondary to medical complications (Comment)  (Weakness and deconditioning)   Assessment   Problem List Decreased strength;Decreased range of motion;Decreased endurance; Impaired balance;Decreased mobility; Decreased coordination;Decreased safety awareness   Assessment Patient seen for Physical Therapy evaluation  Patient admitted with Acute respiratory failure with hypoxia ([facility] [address] )  Comorbidities affecting patient's physical performance include:  Carcinoma right lung, pleural effusion, history of PE, dm 2, hyponatremia, ascites, sepsis, ambulatory dysfunction, COPD, metastatic lung cancer  Personal factors affecting patient at time of initial evaluation include: lives in two story house, stairs to enter home, inability to ambulate household distances, inability to navigate community distances, inability to navigate level surfaces without external assistance and inability to perform dynamic tasks in community  Prior to admission, patient was independent with functional mobility without assistive device, independent with ADLS, living with spouse in a two level home with 3-4 steps to enter and ambulating household distance  Please find objective findings from Physical Therapy assessment regarding body systems outlined above with impairments and limitations including weakness, decreased ROM, impaired balance, decreased endurance, impaired coordination, gait deviations, decreased activity tolerance, decreased functional mobility tolerance, decreased safety awareness, fall risk and SOB upon exertion  The Barthel Index was used as a functional outcome tool presenting with a score of 40 today indicating marked limitations of functional mobility and ADLS    Patient's clinical presentation is currently unstable/unpredictable as seen in patient's presentation of vital sign response, increased fall risk, new onset of impairment of functional mobility, decreased endurance and new onset of weakness  Pt would benefit from continued Physical Therapy treatment to address deficits as defined above and maximize level of functional mobility  As demonstrated by objective findings, the assigned level of complexity for this evaluation is high  The patient's [facility] Basic Mobility Inpatient Short Form Raw Score is 13, Standardized Score is 33 99  A standardized score less than 42 9 suggests the patient may benefit from discharge to post-acute rehabilitation services  Please also refer to the recommendation of the Physical Therapist for safe discharge planning  Goals   Patient Goals Be able to eat and get stronger   STG Expiration Date 06/22/21   Short Term Goal #1 Bed mobility-S; transfers-Min assist   Short Term Goal #2 Pt will ambulate with a RW functional household distances-Min assist   LTG Expiration Date 06/29/21   Long Term Goal #1 Bed mobility and transfers-I; pt will ambulate up/down 3-4 steps with a rail so he can enter/exit his home-S   Long Term Goal #2 Pt will ambulate with a RW functional household distances-I; balance with a RW will be F+/good for safe gait and mobility and to decrease fall risk   Plan   Treatment/Interventions ADL retraining;Functional transfer training;LE strengthening/ROM; Elevations; Therapeutic exercise; Endurance training;Patient/family training;Equipment eval/education; Bed mobility;Gait training; Compensatory technique education   PT Frequency 5x/wk   Recommendation   PT Discharge Recommendation Post acute rehabilitation services   [facility] Basic Mobility Inpatient   Turning in Bed Without Bedrails 3   Lying on Back to Sitting on Edge of Flat Bed 3   Moving Bed to Chair 2   Standing Up From Chair 2   Walk in Room 2   Climb 3-5 Stairs 1   Basic Mobility Inpatient Raw Score 13   Basic Mobility Standardized Score 33 99   Barthel Index   Feeding 5   Bathing 0   Grooming Score 0   Dressing Score 5   Bladder Score 10   Bowels Score 10   Toilet Use Score 5   Transfers (Bed/Chair) Score 5   Mobility (Level Surface) Score 0   Stairs Score 0   Barthel Index Score 36   Licensure   [place] License Number  Reece MARISOL [id number]

## 2021-06-15 NOTE — UTILIZATION REVIEW
Initial Clinical Review    Admission: Date/Time/Statement:   Admission Orders (From admission, onward)     Ordered        06/14/21 1115  Inpatient Admission  Once                   Orders Placed This Encounter   Procedures    Inpatient Admission     Standing Status:   Standing     Number of Occurrences:   1     Order Specific Question:   Level of Care     Answer:   Level 1 Stepdown [13]     Order Specific Question:   Estimated length of stay     Answer:   More than 2 Midnights     Order Specific Question:   Certification     Answer:   I certify that inpatient services are medically necessary for this patient for a duration of greater than two midnights  See H&P and MD Progress Notes for additional information about the patient's course of treatment  Initial Presentation:   76 y/o M with metastatic NSCL cancer with h/o recurrent malignant pleural effusion s/p VATS and right sided decortication and pleurodesis, T2 DM, COPD, and h/o CHB s/p PPM was admitted to [facility] yesterday with ~1 week history of SOB  Initial workup at [facility] included a CTA PE scan which revealed right sided loculated pleural effusion with right lung opacification concerning for atelectasis vs PNA; no PE; and large amount of ascites in the upper abdomen  Patient was initially on 4-5 LPM NC at [facility] and weaned to 3 LPM NC this morning prior to transfer  This morning, patient had episode of hypotension requiring IVF bolus and was subsequently transferred to [facility] as level 1 stepdown for IR evaluation of pleural effusion and ascites      On exam, patient is awake, alert, oriented  RRR  Lungs are clear but diminished at the bases (R>L)  Abd is distended, nontender to palpation, +BS  B/l lower extremity  edema is present  Acute hypoxic respiratory failure secondary to right sided pleural effusion with compressive atelectasis vs PNA    Patient has a penicillin allergy of which he is unclear of the reaction; patient does not remember if he had an anaphylaxis-like reaction as a child  Patient was initiated on Levaquin and IV vancomycin at [facility]  Can continue Levaquin for now  Initial procalcitonin was negative  If repeat procalcitonin remains negative, may consider discontinuing abx  IR consulted to evaluate for right thoracentesis and paracentesis  Loculated pleural effusion is not significantly changed compared to prior imaging - unclear if the effusion can be drained  Continue 2 LPM NC  Titrate to keep SpO2 >90-92%  BPs have remained stable since transfer to [facility]  Continue home atenolol with hold parameters  Eliquis on hold for now in anticipation of possible IR procedure      IR brief op note Procedure: The patient's loculated right pleural effusion has been present since his vats in the summer of 2020  It has had multiple air bubbles in it for the last 6 months  It also is slowly getting smaller  This is not the cause of his respiratory issues, but more likely his new lung consolidation  IR PARACENTESIS   Findings:  2000 mL clear yellow ascites removed from left lower quadrant under ultrasound guidance    Date: 6/15/21   Day 2:   Transferred out of ICU to [facility]  Pt  has poor appetite, also complained of some fatigue and weakness generalized  Acute respiratory failure with hypoxia mutilfactorial ascites hypoventilation ,lung cancer with suspected pneumonia  Currently saturating 92-94 on ra at rest  desaturates on exertion,continue oxygen nebulizers  Sepsis continue abx 5-7 days  Ascites s/p IR drainage 2 L and iv albumin afterwards  CONCEPCIÓN monitor bmp on lasix at home qd currently on hold   Consider resuming once pt appetite improved   Carcinoma lung right f/u oncology outpatient    ED Triage Vitals [06/14/21 1043]   Temperature Pulse Respirations Blood Pressure SpO2   (!) 97 °F (36 1 °C) (!) 111 (!) 34 109/56 98 %      Temp Source Heart Rate Source Patient Position - Orthostatic VS BP Location FiO2 (%)   Temporal Monitor Lying Right arm --      Pain Score       No Pain          Wt Readings from Last 1 Encounters:   06/15/21 92 4 kg (203 lb 11 3 oz)     Additional Vital Signs:   06/15/21 08:20:17  97 7 °F (36 5 °C)  104  24Abnormal   80/53Abnormal   62  97 %  30  2 5 L/min  Nasal cannula  Lying   06/15/21 05:16:02  --  109Abnormal   --  96/66  76  94 %  --  --  --  --   06/15/21 03:12:40  97 1 °F (36 2 °C)Abnormal   110Abnormal   20  91/63  --  95 %  --  --  --  Lying   06/15/21 01:44:46  --  116Abnormal   --  93/60  71  96 %  --  --  --  --   06/15/21 00:34:27  --  112Abnormal   --  94/60  71  96 %  --  --  --  --   06/14/21 22:55:23  --  106Abnormal   --  89/57Abnormal   68  97 %  --  --  --  --   06/14/21 22:48:57  99 5 °F (37 5 °C)  109Abnormal   16  --  --  98 %  --  --  --  --   06/14/21 20:11:39  --  102  --  98/63  75  99 %  --  --  --  --   06/14/21 19:05:29  98 2 °F (36 8 °C)  114Abnormal   24Abnormal   90/61  71  97 %  32  3 L/min  Nasal cannula  Lying   06/14/21 17:35:45  98 1 °F (36 7 °C)  111Abnormal   26Abnormal   95/64  74  98 %  32  3 L/min         Pertinent Labs/Diagnostic Test Results:   6/15 echo  LEFT VENTRICLE:  Normal left ventricular chamber size  Mild left ventricular hypertrophy  Ejection fraction is estimated at 55-60%  No definite regional wall motion abnormality seen although it cannot be absolutely excluded on the basis of this study  Indeterminate diastolic function  9/10 IR Successful ultrasound-guided paracentesis yielding 2000 mL clear yellow ascites   6/13 CTA PE study 1  No evidence of acute pulmonary embolism  Several thin linear defects within the pulmonary arterial system, may represent fibrin strands from a previous episode of pulmonary embolism  2  Increasing opacification in the right lung base suggesting worsening atelectasis or pneumonia  3  Complex right-sided loculated pleural effusion, the lateral inferior component containing bubbles of air as on numerous prior studies     4  New the lingula nodule suspicious for metastatic disease   5  Development of a large amount of ascites within the upper abdomen  This is concerning for malignant ascites   6  In conjunction with prior PET/CT, evidence of malignant mediastinal adenopathy, and right scapular metastatic lesion     Results from last 7 days   Lab Units 06/13/21  1602   SARS-COV-2  Negative     Results from last 7 days   Lab Units 06/15/21  0521 06/14/21  0547 06/13/21  1540   WBC Thousand/uL 4 83 4 71 6 25   HEMOGLOBIN g/dL 9 1* 9 6* 11 2*   HEMATOCRIT % 30 0* 29 6* 34 7*   PLATELETS Thousands/uL 164 179 218   NEUTROS ABS Thousands/µL 3 30  --  4 15     Results from last 7 days   Lab Units 06/15/21  0521 06/14/21  0547 06/14/21  0009 06/13/21  1540   SODIUM mmol/L 134* 130* 130* 129*   POTASSIUM mmol/L 4 2 4 4 4 4 4 2   CHLORIDE mmol/L 100 99 99 96   CO2 mmol/L 23 22 20* 24   ANION GAP mmol/L 11 9 11 9   BUN mg/dL 27* 28* 29* 31*   CREATININE mg/dL 1 55* 1 55* 1 50* 1 59*   EGFR ml/min/1 73sq m 43 43 45 42   CALCIUM mg/dL 7 7* 7 4* 7 5* 8 0*     Results from last 7 days   Lab Units 06/13/21  1540   AST U/L 15   ALT U/L 7   ALK PHOS U/L 56 8   TOTAL PROTEIN g/dL 5 4*   ALBUMIN g/dL 2 9*   TOTAL BILIRUBIN mg/dL 0 51     Results from last 7 days   Lab Units 06/15/21  0731 06/14/21  1629 06/14/21  1341 06/14/21  0643 06/13/21  2146   POC GLUCOSE mg/dl 77 103 110 135 140     Results from last 7 days   Lab Units 06/15/21  0521 06/14/21  0547 06/14/21  0009 06/13/21  1540   GLUCOSE RANDOM mg/dL 84 135 149* 156*     Results from last 7 days   Lab Units 06/13/21  1907   PH VIJAY  7 314   PCO2 VIJAY mm Hg 43 7   PO2 VIJAY mm Hg 29 2*   HCO3 VIJAY mmol/L 21 7*   BASE EXC VIJAY mmol/L -4 3   O2 CONTENT VIJAY ml/dL 6 9   O2 HGB, VENOUS % 44 0*     Results from last 7 days   Lab Units 06/14/21  0009 06/13/21  1907 06/13/21  1540   TROPONIN I ng/mL <0 03 <0 03 <0 03     Results from last 7 days   Lab Units 06/14/21  0547 06/13/21  1907   PROCALCITONIN ng/ml 0 36* 0 18     Results from last 7 days   Lab Units 06/14/21  0009 06/13/21  1602   LACTIC ACID mmol/L 2 0 1 4     Results from last 7 days   Lab Units 06/13/21  1540   BNP pg/mL 16 2     Results from last 7 days   Lab Units 06/13/21  1540   CRP mg/L 14 3*     Results from last 7 days   Lab Units 06/13/21  1916   STREP PNEUMONIAE ANTIGEN, URINE  Negative   LEGIONELLA URINARY ANTIGEN  Negative     Results from last 7 days   Lab Units 06/14/21  1516 06/13/21  1621 06/13/21  1616   BLOOD CULTURE   --  Received in Microbiology Lab  Culture in Progress  No Growth at 24 hrs  GRAM STAIN RESULT  Rare Polys  No organisms seen  --   --      Results from last 7 days   Lab Units 06/14/21  1517   TOTAL COUNTED  100   WBC FLUID /ul 3,387       Past Medical History:   Diagnosis Date    Cancer ([facility] )     RT Lung    Diabetes mellitus ([facility] )     Hypertension      Present on Admission:   Pleural effusion   Acute respiratory failure with hypoxia (HCC)   Carcinoma of lung, right (HCC)   History of pulmonary embolism   AV block   Type 2 diabetes mellitus (HCC)   Hyponatremia      Admitting Diagnosis: HCAP (healthcare-associated pneumonia) [J18 9]  Age/Sex: 77 y o  male  Admission Orders:  Echo  IR thoracentesis  Daily weight I/o  hgb A1C w eag estimation  Non gyn cytology from thoracentesis  Scheduled Medications:  acyclovir, 200 mg, Oral, BID  apixaban, 5 mg, Oral, BID  atenolol, 25 mg, Oral, Daily  calcium carbonate-vitamin D, 1 tablet, Oral, Daily With Breakfast  cholecalciferol, 1,000 Units, Oral, Daily  cholestyramine sugar free, 4 g, Oral, BID  cyanocobalamin, 1,000 mcg, Oral, Daily  folic acid, 9,514 mcg, Oral, Daily  insulin lispro, 1-6 Units, Subcutaneous, TID AC  levofloxacin, 750 mg, Intravenous, Q48H  mirtazapine, 15 mg, Oral, Daily  pantoprazole, 40 mg, Oral, QAM  pravastatin, 20 mg, Oral, Daily With Dinner    albumin human (FLEXBUMIN) 25 % injection 50 g   Dose: 50 g  Freq:  Once Route: IV  Start: 06/14/21 1815 End: 06/14/21 1842  Continuous IV Infusions:    lactated ringers infusion   Rate: 125 mL/hr Dose: 125 mL/hr  Freq: Continuous Route: IV  Indications of Use: IV Hydration  Last Dose: 125 mL/hr (06/14/21 0323)  Start: 06/13/21 1845 End: 06/14/21 1031  sodium chloride 0 9 % infusion   Rate: 75 mL/hr Dose: 75 mL/hr  Freq: Continuous Route: IV  Indications of Use: IV Hydration  Start: 06/13/21 1830 End: 06/13/21 1834  PRN Meds:  oxyCODONE-acetaminophen, 1 tablet, Oral, Q6H PRN        IP CONSULT TO CASE MANAGEMENT    Network Utilization Review Department  ATTENTION: Please call with any questions or concerns to [phone] and carefully listen to the prompts so that you are directed to the right person  All voicemails are confidential   [place] Drop all requests for admission clinical reviews, approved or denied determinations and any other requests to dedicated fax number below belonging to the campus where the patient is receiving treatment   List of dedicated fax numbers for the Facilities:  [address] DENIALS (Administrative/Medical Necessity) [phone]   [address] (Maternity/NICU/Pediatrics) [phone]   [address] [place] [address] [address] [place] [address] [address] [facility] [facility] [phone]

## 2021-06-15 NOTE — ASSESSMENT & PLAN NOTE
Lab Results   Component Value Date    EGFR 43 06/15/2021    EGFR 43 06/14/2021    EGFR 45 06/14/2021    CREATININE 1 55 (H) 06/15/2021    CREATININE 1 55 (H) 06/14/2021    CREATININE 1 50 (H) 06/14/2021     Monitor BMP  Patient is on Lasix 40 mg daily at home, currently on hold  Will consider resuming this, once patient's appetite is improved  Currently patient had a poor appetite

## 2021-06-15 NOTE — ASSESSMENT & PLAN NOTE
Loculated right-sided pleural effusion, IR evaluated the patient and not enough fluid for drainage  Continue supportive care

## 2021-06-15 NOTE — ASSESSMENT & PLAN NOTE
Continue on atenolol with holding parameters  Patient has tachycardia which is more likely secondary to cancer related and reactive    In May 12th 2021 with office was with Pulmonary patient already had a heart rate of 106 at that time

## 2021-06-16 LAB
ATRIAL RATE: 0 BPM
EST. AVERAGE GLUCOSE BLD GHB EST-MCNC: 137 MG/DL
GLUCOSE SERPL-MCNC: 104 MG/DL (ref 65–140)
GLUCOSE SERPL-MCNC: 95 MG/DL (ref 65–140)
HBA1C MFR BLD: 6.4 %
P AXIS: 0 DEGREES
QRS AXIS: 10 DEGREES
QRSD INTERVAL: 132 MS
QT INTERVAL: 367 MS
QTC INTERVAL: 521 MS
T WAVE AXIS: 185 DEGREES
VENTRICULAR RATE: 121 BPM

## 2021-06-16 PROCEDURE — 83036 HEMOGLOBIN GLYCOSYLATED A1C: CPT | Performed by: INTERNAL MEDICINE

## 2021-06-16 PROCEDURE — 82948 REAGENT STRIP/BLOOD GLUCOSE: CPT

## 2021-06-16 PROCEDURE — 97535 SELF CARE MNGMENT TRAINING: CPT

## 2021-06-16 PROCEDURE — 93010 ELECTROCARDIOGRAM REPORT: CPT | Performed by: INTERNAL MEDICINE

## 2021-06-16 PROCEDURE — 99232 SBSQ HOSP IP/OBS MODERATE 35: CPT | Performed by: INTERNAL MEDICINE

## 2021-06-16 PROCEDURE — 97167 OT EVAL HIGH COMPLEX 60 MIN: CPT

## 2021-06-16 RX ADMIN — LEVOFLOXACIN 750 MG: 500 TABLET, FILM COATED ORAL at 15:57

## 2021-06-16 RX ADMIN — MIRTAZAPINE 15 MG: 15 TABLET, FILM COATED ORAL at 08:52

## 2021-06-16 RX ADMIN — OXYCODONE HYDROCHLORIDE AND ACETAMINOPHEN 1 TABLET: 5; 325 TABLET ORAL at 20:28

## 2021-06-16 RX ADMIN — FOLIC ACID 1000 MCG: 1 TABLET ORAL at 08:52

## 2021-06-16 RX ADMIN — Medication 1000 UNITS: at 08:52

## 2021-06-16 RX ADMIN — CHOLESTYRAMINE 4 G: 4 POWDER, FOR SUSPENSION ORAL at 17:39

## 2021-06-16 RX ADMIN — CALCIUM CARBONATE 500 MG (1,250 MG)-VITAMIN D3 200 UNIT TABLET 1 TABLET: at 08:54

## 2021-06-16 RX ADMIN — APIXABAN 5 MG: 5 TABLET, FILM COATED ORAL at 08:52

## 2021-06-16 RX ADMIN — CHOLESTYRAMINE 4 G: 4 POWDER, FOR SUSPENSION ORAL at 08:52

## 2021-06-16 RX ADMIN — ACYCLOVIR 200 MG: 200 CAPSULE ORAL at 08:52

## 2021-06-16 RX ADMIN — PRAVASTATIN SODIUM 20 MG: 20 TABLET ORAL at 17:39

## 2021-06-16 RX ADMIN — CYANOCOBALAMIN TAB 500 MCG 1000 MCG: 500 TAB at 08:52

## 2021-06-16 RX ADMIN — ACYCLOVIR 200 MG: 200 CAPSULE ORAL at 20:28

## 2021-06-16 RX ADMIN — APIXABAN 5 MG: 5 TABLET, FILM COATED ORAL at 17:39

## 2021-06-16 RX ADMIN — PANTOPRAZOLE SODIUM 40 MG: 40 TABLET, DELAYED RELEASE ORAL at 05:54

## 2021-06-16 NOTE — PLAN OF CARE
Problem: Prexisting or High Potential for Compromised Skin Integrity  Goal: Skin integrity is maintained or improved  Description: INTERVENTIONS:  - Identify patients at risk for skin breakdown  - Assess and monitor skin integrity  - Assess and monitor nutrition and hydration status  - Monitor labs   - Assess for incontinence   - Turn and reposition patient  - Assist with mobility/ambulation  - Relieve pressure over bony prominences  - Avoid friction and shearing  - Provide appropriate hygiene as needed including keeping skin clean and dry  - Evaluate need for skin moisturizer/barrier cream  - Collaborate with interdisciplinary team   - Patient/family teaching  - Consider wound care consult   Outcome: Progressing     Problem: MOBILITY - ADULT  Goal: Maintain or return to baseline ADL function  Description: INTERVENTIONS:  -  Assess patient's ability to carry out ADLs; assess patient's baseline for ADL function and identify physical deficits which impact ability to perform ADLs (bathing, care of mouth/teeth, toileting, grooming, dressing, etc )  - Assess/evaluate cause of self-care deficits   - Assess range of motion  - Assess patient's mobility; develop plan if impaired  - Assess patient's need for assistive devices and provide as appropriate  - Encourage maximum independence but intervene and supervise when necessary  - Involve family in performance of ADLs  - Assess for home care needs following discharge   - Consider OT consult to assist with ADL evaluation and planning for discharge  - Provide patient education as appropriate  Outcome: Progressing  Goal: Maintains/Returns to pre admission functional level  Description: INTERVENTIONS:  - Perform BMAT or MOVE assessment daily    - Set and communicate daily mobility goal to care team and patient/family/caregiver  - Collaborate with rehabilitation services on mobility goals if consulted  - Perform Range of Motion 4-6 times a day    - Reposition patient every 2 hours   - Dangle patient 2 times a day  - Stand patient 2 times a day  - Ambulate patient 2 times a day  - Out of bed to chair 2 times a day   - Out of bed for meals 2 times a day  - Out of bed for toileting  - Record patient progress and toleration of activity level   Outcome: Progressing     Problem: Potential for Falls  Goal: Patient will remain free of falls  Description: INTERVENTIONS:  - Educate patient/family on patient safety including physical limitations  - Instruct patient to call for assistance with activity   - Consult OT/PT to assist with strengthening/mobility   - Keep Call bell within reach  - Keep bed low and locked with side rails adjusted as appropriate  - Keep care items and personal belongings within reach  - Initiate and maintain comfort rounds  - Make Fall Risk Sign visible to staff  - Offer Toileting every 2 Hours, in advance of need  - Initiate/Maintain bed/chair alarm  - Obtain necessary fall risk management equipment: chair alarm  - Apply yellow socks and bracelet for high fall risk patients  - Consider moving patient to room near nurses station  Outcome: Progressing     Problem: CARDIOVASCULAR - ADULT  Goal: Maintains optimal cardiac output and hemodynamic stability  Description: INTERVENTIONS:  - Monitor I/O, vital signs and rhythm  - Monitor for S/S and trends of decreased cardiac output  - Administer and titrate ordered vasoactive medications to optimize hemodynamic stability  - Assess quality of pulses, skin color and temperature  - Assess for signs of decreased coronary artery perfusion  - Instruct patient to report change in severity of symptoms  Outcome: Progressing     Problem: RESPIRATORY - ADULT  Goal: Achieves optimal ventilation and oxygenation  Description: INTERVENTIONS:  - Assess for changes in respiratory status  - Assess for changes in mentation and behavior  - Position to facilitate oxygenation and minimize respiratory effort  - Oxygen administered by appropriate delivery if ordered  - Initiate smoking cessation education as indicated  - Encourage broncho-pulmonary hygiene including cough, deep breathe, Incentive Spirometry  - Assess the need for suctioning and aspirate as needed  - Assess and instruct to report SOB or any respiratory difficulty  - Respiratory Therapy support as indicated  Outcome: Progressing     Problem: METABOLIC, FLUID AND ELECTROLYTES - ADULT  Goal: Electrolytes maintained within normal limits  Description: INTERVENTIONS:  - Monitor labs and assess patient for signs and symptoms of electrolyte imbalances  - Administer electrolyte replacement as ordered  - Monitor response to electrolyte replacements, including repeat lab results as appropriate  - Instruct patient on fluid and nutrition as appropriate  Outcome: Progressing  Goal: Fluid balance maintained  Description: INTERVENTIONS:  - Monitor labs   - Monitor I/O and WT  - Instruct patient on fluid and nutrition as appropriate  - Assess for signs & symptoms of volume excess or deficit  Outcome: Progressing  Goal: Glucose maintained within target range  Description: INTERVENTIONS:  - Monitor Blood Glucose as ordered  - Assess for signs and symptoms of hyperglycemia and hypoglycemia  - Administer ordered medications to maintain glucose within target range  - Assess nutritional intake and initiate nutrition service referral as needed  Outcome: Progressing     Problem: Nutrition/Hydration-ADULT  Goal: Nutrient/Hydration intake appropriate for improving, restoring or maintaining nutritional needs  Description: Monitor and assess patient's nutrition/hydration status for malnutrition  Collaborate with interdisciplinary team and initiate plan and interventions as ordered  Monitor patient's weight and dietary intake as ordered or per policy  Utilize nutrition screening tool and intervene as necessary  Determine patient's food preferences and provide high-protein, high-caloric foods as appropriate  INTERVENTIONS:  - Monitor oral intake, urinary output, labs, and treatment plans  - Assess nutrition and hydration status and recommend course of action  - Evaluate amount of meals eaten  - Assist patient with eating if necessary   - Allow adequate time for meals  - Recommend/ encourage appropriate diets, oral nutritional supplements, and vitamin/mineral supplements  - Order, calculate, and assess calorie counts as needed  - Recommend, monitor, and adjust tube feedings and TPN/PPN based on assessed needs  - Assess need for intravenous fluids  - Provide specific nutrition/hydration education as appropriate  - Include patient/family/caregiver in decisions related to nutrition  Outcome: Progressing

## 2021-06-16 NOTE — CASE MANAGEMENT
CM met with patient at bedside to follow up on the list of rehab facilities provided  Patient states that he has not yet made any selections and wants to talk it over with his wife  LYNNE will continue to follow

## 2021-06-16 NOTE — CASE MANAGEMENT
Patient was admitted to [facility] on 6/13/2021 for acute respiratory failure with hypoxia - with pneumonia and sepsis  Per chart review, on 6/14-patient still appeared in respiratory distress tachypneic, tachycardic and needing 3-4 L of oxygen by nasal cannula and restless, Patient was transferred to level 1 step down at [facility] for right sided IR-guided pleural tap  At [facility], O2 needs increased and patient was transferred to ICU, on IV abx, has pleural effusion  Patient is status post IR drainage of 2L ascites fluid per chart review  Chronic conditions include DM, lung cancer and COPD  CM met with patient at bedside to discuss discharge planning  CM name and role introduced  Patient identifies his wife Casimiro England as his   Patient reports the following: he lives with his wife in a 3 story home and was previously independent with his ADLs, was able to navigate the stairs, no use of DME, drives himself to appointments and to chemo, or he has a friend/neighbor who assists with transportation as needed  Spouse does not drive  Income source is social security  PCP is Dr Ihsan Orozco, also follows with Dr Minda Osler for lung cancer; patient reports that he was receiving chemotherapy through his port but previous plan is on hold  Denies history of mental health issues, denies D&A, denies history of STR, denies history of home healthcare  CM discussed the care team's recommendation for post-acute rehab and patient is in agreement  CM also spoke with patient's wife Casimiro England over the telephone regarding rehab, and wife requests facilities within the [place] area  Patient requests facilities within a 10 mile radius of his home in [place]  CM provided patient with a list of SNF rehab facilities within 10 miles of his home  CM will follow up with patient once he has had a chance to review the list and make choices       CM reviewed discharge planning process including the following: identifying caregivers at home, preference for d/c planning needs, availability of treatment team to discuss questions or concerns patient and/or family may have regarding diagnosis, plan of care, old or new medications and discharge planning   CM will continue to follow for care coordination and update assessment as necessary

## 2021-06-16 NOTE — OCCUPATIONAL THERAPY NOTE
Occupational Therapy Evaluation       06/16/21 0949   Note Type   Note type Evaluation   Restrictions/Precautions   Other Precautions Chair Alarm; Bed Alarm; Fall Risk   Pain Assessment   Pain Assessment Tool 0-10   Pain Score 2   Pain Location/Orientation Orientation: Right  (flank)   Home Living   Type of Home House   Home Layout Two level;1/2 bath on main level;Bed/bath upstairs;Stairs to enter with rails   Bathroom Shower/Tub Walk-in shower   Bathroom Toilet Standard   Bathroom Equipment Other (Comment)  (None)   Home Equipment Other (Comment)  (None)   Additional Comments Patient presented to hospital with SOB x several days, dx HCAP   Prior Function   Level of [place] Independent with ADLs and functional mobility   Lives With Spouse   Receives Help From Family;Friend(s)   ADL Assistance Independent   IADLs Needs assistance   Comments Patient reports PTA ambulatory without AD  Able to ambulate approx  120 yards before getting fatigued  Pt reports PTA independent in all ADLs, shares some iADLs with wife, also get assist from friends for grocery shopping and other iADLs   ADL   Eating Assistance 5  Supervision/Setup   Grooming Assistance 5  Supervision/Setup   UB Bathing Assistance 2  Maximal Assistance   LB Bathing Assistance 2  Maximal Assistance    [address] 2  Maximal Assistance    [facility] [address] 2  Maximal [address]  2  Maximal Assistance   Additional Comments ADLs limited by significant fatigue and general deconditioning   Bed Mobility   Supine to Sit 4  Minimal assistance   Additional items Assist x 1; Increased time required   Transfers   Sit to Stand 3  Moderate assistance   Additional items Assist x 1   Stand to Sit 3  Moderate assistance   Additional items Assist x 1   Additional Comments STS from EOB to RW for support   Functional Mobility   Functional Mobility 4  Minimal assistance   Additional Comments Pt able to take 1 step forward/backward with RW; required rest break after minimal activity due to increased fatigue and SOB   Balance   Static Sitting Fair   Dynamic Sitting Fair -   Static Standing Fair   Dynamic Standing Fair -   Activity Tolerance   Activity Tolerance Patient limited by fatigue   RUE Assessment   RUE Assessment WFL   RUE Strength   RUE Overall Strength Deficits  (Grossly 4-/5 throughout, generalized weakness)   LUE Assessment   LUE Assessment WFL   LUE Strength   LUE Overall Strength Deficits  (Grossly 4-/5 throughout, generalized weakness)   Cognition   Overall Cognitive Status WFL   Arousal/Participation Alert; Cooperative   Attention Within functional limits   Orientation Level Oriented X4   Following Commands Follows all commands and directions without difficulty   Assessment   Limitation Decreased ADL status; Decreased UE strength;Decreased Safe judgement during ADL;Decreased cognition;Decreased endurance;Decreased self-care trans;Decreased high-level ADLs   Prognosis Good   Assessment Patient evaluated by Occupational Therapy  Patient admitted with Acute respiratory failure with hypoxia ([facility] [address] )  The patients occupational profile, medical and therapy history includes a extensive additional review of physical, cognitive, or psychosocial history related to current functional performance  Comorbidities affecting functional mobility and ADLS include: cancer, DM, HTN  Prior to admission, patient was independent with functional mobility without assistive device, independent with ADLS and requiring assist for IADLS  The evaluation identifies the following performance deficits: weakness, impaired balance, decreased endurance, increased fall risk, new onset of impairment of functional mobility, decreased ADLS, decreased IADLS, decreased activity tolerance, decreased safety awareness, impaired judgement, SOB upon exertion and decreased strength, that result in activity limitations and/or participation restrictions   This evaluation requires clinical decision making of high complexity, because the patient presents with comorbidites that affect occupational performance and required significant modification of tasks or assistance with consideration of multiple treatment options  The Barthel Index was used as a functional outcome tool presenting with a score of 40, indicating marked limitations of functional mobility and ADLS  The patient's raw score on the -PAC Daily Activity inpatient short form is 14, standardized score is 33 39, less than 39 4  Patients at this level are likely to benefit from DC to post-acute rehabilitation services  Please refer to the recommendation of the Occupational Therapist for safe DC planning  Patient will benefit from skilled Occupational Therapy services to address above deficits and facilitate a safe return to prior level of function  Goals   Patient Goals "to get stronger and go home"   STG Time Frame   (1-7 days)   Short Term Goal  Goals established to promote patient goal of getting stronger and going home:  Patient will increase standing tolerance to 5 minutes during ADL task to decrease assistance level and decrease fall risk; Patient will increase bed mobility to supervision in preparation for ADLS and transfers;  Patient will increase functional mobility to and from bedside commode with rolling walker with min assist to increase performance with ADLS and to use a toilet; Patient will tolerate 5 minutes of UE ROM/strengthening to increase general activity tolerance and performance in ADLS/IADLS; Patient will improve functional activity tolerance to 5 minutes of sustained functional tasks to increase participation in basic self-care and decrease assistance level;  Patient will be able to to verbalize understanding and perform energy conservation/proper body mechanics during ADLS and functional mobility at least 75% of the time with minimal cueing to decrease signs of fatigue and increase stamina to return to prior level of function; Patient will increase dynamic sitting balance to fair to improve the ability to sit at edge of bed or on a chair for ADLS;  Patient will increase dynamic standing balance to fair to improve postural stability and decrease fall risk during standing ADLS and transfers  LTG Time Frame   (8-14 days)   Long Term Goal Patient will increase standing tolerance to 10 minutes during ADL task to decrease assistance level and decrease fall risk; Patient will increase bed mobility to independent in preparation for ADLS and transfers; Patient will increase functional mobility to and from bathroom with rolling walker with supervision to increase performance with ADLS and to use a toilet; Patient will tolerate 10 minutes of UE ROM/strengthening to increase general activity tolerance and performance in ADLS/IADLS; Patient will improve functional activity tolerance to 10 minutes of sustained functional tasks to increase participation in basic self-care and decrease assistance level;  Patient will be able to to verbalize understanding and perform energy conservation/proper body mechanics during ADLS and functional mobility at least 90% of the time with no cueing to decrease signs of fatigue and increase stamina to return to prior level of function; Patient will increase static/dynamic sitting balance to fair+ to improve the ability to sit at edge of bed or on a chair for ADLS;  Patient will increase static/dynamic standing balance to fair+ to improve postural stability and decrease fall risk during standing ADLS and transfers  Pt will score >/= 18/24 on AM-PAC Daily Activity Inpatient scale to promote safe independence with ADLs and functional mobility; Pt will score >/= 70/100 on Barthel Index in order to decrease caregiver assistance needed and increase ability to perform ADLs and functional mobility     Functional Transfer Goals   Pt Will Perform All Functional Transfers   (STG min assist, LTG supervision)   ADL Goals   Pt Will Perform Eating   (LTG independent)   Pt Will Perform Grooming   (LTG independent)   Pt Will Perform Bathing   (STG mod assist, LTG min assist)   Pt Will Perform UE Dressing   (STG mod assist, LTG min assist)   Pt Will Perform LE Dressing   (STG mod assist, LTG min assist)   Pt Will Perform Toileting   (STG mod assist, LTG min assist)   Plan   Treatment Interventions ADL retraining;Functional transfer training;UE strengthening/ROM; Endurance training;Patient/family training;Equipment evaluation/education; Compensatory technique education;Continued evaluation; Energy conservation; Activityengagement   Goal Expiration Date 06/30/21   OT Frequency 3-5x/wk   Additional Treatment Session   Start Time 0935   End Time 1093   Treatment Assessment S: "I need to get back into a routine so I get can stronger" O: Patient performed sit to stand from EOB x 2 trials with mod A, RW for support  Significant rest break between trials  Patient able to take 2 sidesteps along EOB with RW and min assist, unable to tolerate further mobility due to fatigue  Patient transferred sit to supine with min assist  Once back in bed, performed grooming activity in supported supine position (HOB raised approx  75*)  Pt performed teeth brushing task with set up assist, increased time to complete due to fatigue  Patient then educated on several BUE ROM exercises to complete while supine (bicep curls, shoulder flexion, gross finger flexion/extension)  Pt verbalized understanding, requesting to rest and attempt there-ex later  At end of session patient supine in bed with all needs met  A: Patient is currently limited in ADLs and functional mobility by generalized weakness, deconditioning, and SOB  SpO2 on RA 92-93% with minimal activity however patient with increased WOB  At rest, SpO2 95-96%  Patient is highly motivated to participate in therapy and to improve functional independence   P: Recommend STR at discharge Recommendation   OT Discharge Recommendation Post acute rehabilitation services   AM-PAC Daily Activity Inpatient   Lower Body Dressing 2   Bathing 2   Toileting 2   Upper Body Dressing 2   Grooming 3   Eating 3   Daily Activity Raw Score 14   Daily Activity Standardized Score (Calc for Raw Score >=11) 33 39   AM-PAC Applied Cognition Inpatient   Following a Speech/Presentation 4   Understanding Ordinary Conversation 4   Taking Medications 4   Remembering Where Things Are Placed or Put Away 4   Remembering List of 4-5 Errands 4   Taking Care of Complicated Tasks 4   Applied Cognition Raw Score 24   Applied Cognition Standardized Score 62 21   Barthel Index   Feeding 5   Bathing 0   Grooming Score 0   Dressing Score 5   Bladder Score 10   Bowels Score 10   Toilet Use Score 5   Transfers (Bed/Chair) Score 5   Mobility (Level Surface) Score 0   Stairs Score 0   Barthel Index Score 36   Licensure   [place] License Number  SAM Mcdonald/CRISTOFER [id number]

## 2021-06-16 NOTE — UTILIZATION REVIEW
Notification of Discharge   This is a Notification of Discharge from our facility [address]  Please be advised that this patient has been discharge from our facility  Below you will find the admission and discharge date and time including the patients disposition  UTILIZATION REVIEW CONTACT:  Bryan Flowers  Utilization   Network Utilization Review Department  Phone: [phone] x carefully listen to the prompts  All voicemails are confidential   Email: [email or web address]     PHYSICIAN ADVISORY SERVICES:  FOR [id number] REVIEW - MEDICAL NECESSITY DENIAL  Phone: [phone]  Fax: [phone]  Email: [email or web address]     PRESENTATION DATE: 6/13/2021  3:30 PM  OBERVATION ADMISSION DATE:   INPATIENT ADMISSION DATE: 6/13/21  5:33 PM   DISCHARGE DATE: 6/14/2021 10:10 AM  DISPOSITION: [address] [facility]      IMPORTANT INFORMATION:  Send all requests for admission clinical reviews, approved or denied determinations and any other requests to dedicated fax number below belonging to the campus where the patient is receiving treatment   List of dedicated fax numbers:  [address] [place] [address] DENIALS (Administrative/Medical Necessity) [phone]   [address] (Maternity/NICU/Pediatrics) [phone] [address] [facility] [phone]   Jamas Piggs [phone]   Jennifer Dress [phone]   [facility] [address] [facility] [phone]   [facility]  [phone]   [address] [facility]  [address] [facility] [address] [facility] [phone]

## 2021-06-17 ENCOUNTER — APPOINTMENT (INPATIENT)
Dept: RADIOLOGY | Facility: HOSPITAL | Age: 76
DRG: 180 | End: 2021-06-17
Payer: COMMERCIAL

## 2021-06-17 PROBLEM — R13.10 DYSPHAGIA: Status: ACTIVE | Noted: 2021-06-17

## 2021-06-17 LAB
ALBUMIN SERPL BCP-MCNC: 2.2 G/DL (ref 3.5–5)
ALP SERPL-CCNC: 47 U/L (ref 46–116)
ALT SERPL W P-5'-P-CCNC: 16 U/L (ref 12–78)
ANION GAP SERPL CALCULATED.3IONS-SCNC: 13 MMOL/L (ref 4–13)
AST SERPL W P-5'-P-CCNC: 18 U/L (ref 5–45)
BACTERIA SPEC BFLD CULT: NO GROWTH
BASOPHILS # BLD AUTO: 0.06 THOUSANDS/ΜL (ref 0–0.1)
BASOPHILS NFR BLD AUTO: 1 % (ref 0–1)
BILIRUB SERPL-MCNC: 0.71 MG/DL (ref 0.2–1)
BUN SERPL-MCNC: 29 MG/DL (ref 5–25)
CALCIUM ALBUM COR SERPL-MCNC: 9.3 MG/DL (ref 8.3–10.1)
CALCIUM SERPL-MCNC: 7.9 MG/DL (ref 8.3–10.1)
CHLORIDE SERPL-SCNC: 99 MMOL/L (ref 100–108)
CO2 SERPL-SCNC: 20 MMOL/L (ref 21–32)
CREAT SERPL-MCNC: 1.6 MG/DL (ref 0.6–1.3)
EOSINOPHIL # BLD AUTO: 0.27 THOUSAND/ΜL (ref 0–0.61)
EOSINOPHIL NFR BLD AUTO: 4 % (ref 0–6)
ERYTHROCYTE [DISTWIDTH] IN BLOOD BY AUTOMATED COUNT: 17.9 % (ref 11.6–15.1)
GFR SERPL CREATININE-BSD FRML MDRD: 42 ML/MIN/1.73SQ M
GLUCOSE SERPL-MCNC: 104 MG/DL (ref 65–140)
GLUCOSE SERPL-MCNC: 113 MG/DL (ref 65–140)
GLUCOSE SERPL-MCNC: 121 MG/DL (ref 65–140)
GLUCOSE SERPL-MCNC: 162 MG/DL (ref 65–140)
GLUCOSE SERPL-MCNC: 94 MG/DL (ref 65–140)
GRAM STN SPEC: NORMAL
GRAM STN SPEC: NORMAL
HCT VFR BLD AUTO: 34.2 % (ref 36.5–49.3)
HGB BLD-MCNC: 10.5 G/DL (ref 12–17)
IMM GRANULOCYTES # BLD AUTO: 0.04 THOUSAND/UL (ref 0–0.2)
IMM GRANULOCYTES NFR BLD AUTO: 1 % (ref 0–2)
LYMPHOCYTES # BLD AUTO: 0.7 THOUSANDS/ΜL (ref 0.6–4.47)
LYMPHOCYTES NFR BLD AUTO: 11 % (ref 14–44)
MAGNESIUM SERPL-MCNC: 1.8 MG/DL (ref 1.6–2.6)
MCH RBC QN AUTO: 27.6 PG (ref 26.8–34.3)
MCHC RBC AUTO-ENTMCNC: 30.7 G/DL (ref 31.4–37.4)
MCV RBC AUTO: 90 FL (ref 82–98)
MONOCYTES # BLD AUTO: 0.92 THOUSAND/ΜL (ref 0.17–1.22)
MONOCYTES NFR BLD AUTO: 14 % (ref 4–12)
NEUTROPHILS # BLD AUTO: 4.51 THOUSANDS/ΜL (ref 1.85–7.62)
NEUTS SEG NFR BLD AUTO: 69 % (ref 43–75)
NRBC BLD AUTO-RTO: 0 /100 WBCS
PLATELET # BLD AUTO: 237 THOUSANDS/UL (ref 149–390)
PMV BLD AUTO: 9.2 FL (ref 8.9–12.7)
POTASSIUM SERPL-SCNC: 4.7 MMOL/L (ref 3.5–5.3)
PROT SERPL-MCNC: 5.2 G/DL (ref 6.4–8.2)
RBC # BLD AUTO: 3.81 MILLION/UL (ref 3.88–5.62)
SODIUM SERPL-SCNC: 132 MMOL/L (ref 136–145)
WBC # BLD AUTO: 6.5 THOUSAND/UL (ref 4.31–10.16)

## 2021-06-17 PROCEDURE — 99223 1ST HOSP IP/OBS HIGH 75: CPT | Performed by: INTERNAL MEDICINE

## 2021-06-17 PROCEDURE — 82948 REAGENT STRIP/BLOOD GLUCOSE: CPT

## 2021-06-17 PROCEDURE — 92610 EVALUATE SWALLOWING FUNCTION: CPT

## 2021-06-17 PROCEDURE — 76705 ECHO EXAM OF ABDOMEN: CPT

## 2021-06-17 PROCEDURE — 80053 COMPREHEN METABOLIC PANEL: CPT | Performed by: NURSE PRACTITIONER

## 2021-06-17 PROCEDURE — 85025 COMPLETE CBC W/AUTO DIFF WBC: CPT | Performed by: NURSE PRACTITIONER

## 2021-06-17 PROCEDURE — 99232 SBSQ HOSP IP/OBS MODERATE 35: CPT | Performed by: NURSE PRACTITIONER

## 2021-06-17 PROCEDURE — 83735 ASSAY OF MAGNESIUM: CPT | Performed by: NURSE PRACTITIONER

## 2021-06-17 RX ORDER — SACCHAROMYCES BOULARDII 250 MG
250 CAPSULE ORAL 2 TIMES DAILY
Status: DISCONTINUED | OUTPATIENT
Start: 2021-06-17 | End: 2021-06-28 | Stop reason: HOSPADM

## 2021-06-17 RX ORDER — MAGNESIUM SULFATE 1 G/100ML
1 INJECTION INTRAVENOUS ONCE
Status: COMPLETED | OUTPATIENT
Start: 2021-06-17 | End: 2021-06-18

## 2021-06-17 RX ORDER — SODIUM CHLORIDE 9 MG/ML
50 INJECTION, SOLUTION INTRAVENOUS CONTINUOUS
Status: DISCONTINUED | OUTPATIENT
Start: 2021-06-17 | End: 2021-06-18

## 2021-06-17 RX ADMIN — ACYCLOVIR 200 MG: 200 CAPSULE ORAL at 21:42

## 2021-06-17 RX ADMIN — Medication 250 MG: at 17:23

## 2021-06-17 RX ADMIN — PANTOPRAZOLE SODIUM 40 MG: 40 TABLET, DELAYED RELEASE ORAL at 05:29

## 2021-06-17 RX ADMIN — APIXABAN 5 MG: 5 TABLET, FILM COATED ORAL at 17:23

## 2021-06-17 RX ADMIN — Medication 250 MG: at 12:27

## 2021-06-17 RX ADMIN — Medication 1000 UNITS: at 09:40

## 2021-06-17 RX ADMIN — MIRTAZAPINE 15 MG: 15 TABLET, FILM COATED ORAL at 09:40

## 2021-06-17 RX ADMIN — CYANOCOBALAMIN TAB 500 MCG 1000 MCG: 500 TAB at 09:39

## 2021-06-17 RX ADMIN — CHOLESTYRAMINE 4 G: 4 POWDER, FOR SUSPENSION ORAL at 17:23

## 2021-06-17 RX ADMIN — PRAVASTATIN SODIUM 20 MG: 20 TABLET ORAL at 17:23

## 2021-06-17 RX ADMIN — FOLIC ACID 1000 MCG: 1 TABLET ORAL at 09:39

## 2021-06-17 RX ADMIN — CALCIUM CARBONATE 500 MG (1,250 MG)-VITAMIN D3 200 UNIT TABLET 1 TABLET: at 09:44

## 2021-06-17 RX ADMIN — SODIUM CHLORIDE 50 ML/HR: 0.9 INJECTION, SOLUTION INTRAVENOUS at 17:24

## 2021-06-17 RX ADMIN — MAGNESIUM SULFATE HEPTAHYDRATE 1 G: 1 INJECTION, SOLUTION INTRAVENOUS at 17:23

## 2021-06-17 RX ADMIN — APIXABAN 5 MG: 5 TABLET, FILM COATED ORAL at 09:40

## 2021-06-17 RX ADMIN — CHOLESTYRAMINE 4 G: 4 POWDER, FOR SUSPENSION ORAL at 09:40

## 2021-06-17 RX ADMIN — ACYCLOVIR 200 MG: 200 CAPSULE ORAL at 09:39

## 2021-06-17 NOTE — ASSESSMENT & PLAN NOTE
CTA showed - Increasing opacification in the right lung base suggesting worsening atelectasis or pneumonia  Patient is on Levaquin, continue the same  Gradually resolving  Will finish a 7 day course of  Levaquin  Currently due to renal Function Levaquin dose is 750 mg q48 hrs  Patient afebrile, denies cough SOB  Blood cultures negative to date  Procalcitonin mild elevated to 0 36, repeat in a shell [place]     Results from last 7 days   Lab Units 06/14/21  0547 06/14/21  0009 06/13/21  1907 06/13/21  1602   LACTIC ACID mmol/L  --  2 0  --  1 4   PROCALCITONIN ng/ml 0 36*  --  0 18  --      Results from last 7 days   Lab Units 06/17/21  0954 06/15/21  0521 06/14/21  0547 06/13/21  1540   WBC Thousand/uL 6 50 4 83 4 71 6 25

## 2021-06-17 NOTE — CONSULTS
Physician Requesting Consult: Laura Matias MD    Reason for Consult / Principal Problem:  Dysphagia     Assessment/Plan:  1  Difficulty swallowing food  This is a 18-year-old male with a past medical history of N/C Cl cancer with history of recent current malignant pleural effusion, vats, right-sided decortication and pleurodesis, diabetes mellitus type 2, COPD, and complete heart block status post pacemaker insertion  Patient reports that when he eats solid food he will get the solid food to the back of his mouth but then is unable to swallow  Patient denies any difficulty with swallowing liquids or pills  Patient denies feeling that food is stuck in his esophagus  Patient denies any other GI symptoms  Denies nausea, vomiting, acid reflux, heartburn, epigastric or abdominal pain  Abdomen is slightly distended, patient is suspected of having malignant ascites  No history of CVA are underlying neurological disorder  Difficulty swallowing may be related to oral pharyngeal difficulty or secondary to underlying diagnosis metastatic cancer   -Continue pantoprazole 40 mg daily  -Continue supportive care  -Speech therapy to evaluate and treat  -Patient will need further evaluation with EGD this can be done as outpatient    -Patient would like any further testing done as an outpatient,  patient would like to go home  We will follow  Thank you for the consult  Case discussed with Dr Mica Suarez  HPI: Silva Alcantara is a 77 y o  male  Acute respiratory failure with hypoxia ([address] )  This is a 18-year-old male who was transferred to [facility] from [facility] on 06/14/2021  Patient has a past medical history of NSCL cancer with history of recurrent malignant pleural effusion, status post VATS and right-sided decortication and pleurodesis, diabetes mellitus type 2, COPD, complete heart block status post ppm who presented to [facility] with 1 week history of shortness of breath  Initial workup at [place] included a CT a PE scan which revealed right-sided loculated pleural effusion with right lung opacification concerning for atelectasis versus pneumonia, no PE, and large amount of ascites in upper abdomen  Patient was initially placed on O2 4-5 L in wean down to 3 L nasal cannula  Patient experience an episode of hypotension requiring IV fluid bolus and was transferred to [facility] as level 1 step down for IR evaluation of pleural effusion and ascites  GI was consulted for dysphagia  Patient denies any dysphagia  Patient reports he can swallow liquids and take his pills without difficulty patient reports that when he chews food it goes to the back of his throat and he cannot swallow it  Patient denies nausea, vomiting, acid reflux, and heartburn  Patient denies any feeling that food is stuck in his esophagus  Patient denies pain with swallowing  Per patient no unintentional weight loss  Bowel patterns are regular  No diarrhea or constipation  Patient denies blood in stool, blood from rectal area, or black tarry stool  Speech therapy has not seen or evaluated patient  Patient reports he would like to go home and would like no further procedures done as an inpatient but would like him done on an outpatient basis  Patient reports he had a colonoscopy over 12 years ago  Patient has never had an EGD  Patient does not overuse NSAIDs  Patient is a former smoker  Patient denies any history of alcohol or drug abuse  No family history of gastric or colon cancer  Patient is on anticoagulation therapy Eliquis 5 mg 2 times daily  Patient is currently taking pantoprazole 40 mg daily  Allergies:    Allergies   Allergen Reactions    Diovan [Valsartan] Angioedema    Penicillins Anaphylaxis    Lisinopril     Oxytocin        Medications:  Current Facility-Administered Medications:     acyclovir (ZOVIRAX) capsule 200 mg, 200 mg, Oral, BID, Long Christian MD, 200 mg at 06/17/21 0939    apixaban (ELIQUIS) tablet 5 mg, 5 mg, Oral, BID, Aniyah Esteves MD, 5 mg at 06/17/21 0940    atenolol (TENORMIN) tablet 25 mg, 25 mg, Oral, Daily, Aniyah Esteves MD    calcium carbonate-vitamin D (OSCAL-D) 500 mg-200 units per tablet 1 tablet, 1 tablet, Oral, Daily With Breakfast, Aniyah Esteves MD, 1 tablet at 06/17/21 0944    cholecalciferol (VITAMIN D3) tablet 1,000 Units, 1,000 Units, Oral, Daily, Aniyah Esteves MD, 1,000 Units at 06/17/21 0940    cholestyramine sugar free (QUESTRAN LIGHT) packet 4 g, 4 g, Oral, BID, Aniyah Esteves MD, 4 g at 06/17/21 0940    cyanocobalamin (VITAMIN B-12) tablet 1,000 mcg, 1,000 mcg, Oral, Daily, Aniyah Esteves MD, 1,000 mcg at 18/35/51 9441    folic acid (FOLVITE) tablet 1,000 mcg, 1,000 mcg, Oral, Daily, Aniyah Esteves MD, 1,000 mcg at 06/17/21 0939    insulin lispro (HumaLOG) 100 units/mL subcutaneous injection 1-6 Units, 1-6 Units, Subcutaneous, TID AC **AND** Fingerstick Glucose (POCT), , , TID AC, Aniyah Esteves MD    levofloxacin (LEVAQUIN) tablet 750 mg, 750 mg, Oral, Q48H, Aniyah Esteves MD, 750 mg at 06/16/21 1557    melatonin tablet 3 mg, 3 mg, Oral, HS PRN, Migel Ca PA-C, 3 mg at 06/15/21 2140    mirtazapine (REMERON) tablet 15 mg, 15 mg, Oral, Daily, Aniayh Esteves MD, 15 mg at 06/17/21 0940    oxyCODONE-acetaminophen (PERCOCET) 5-325 mg per tablet 1 tablet, 1 tablet, Oral, Q6H PRN, Aniyah Esteves MD, 1 tablet at 06/16/21 2028    pantoprazole (PROTONIX) EC tablet 40 mg, 40 mg, Oral, QAM, Aniyah Esteves MD, 40 mg at 06/17/21 0529    pravastatin (PRAVACHOL) tablet 20 mg, 20 mg, Oral, Daily With Stephen Lantigua MD, 20 mg at 06/16/21 1739    saccharomyces boulardii (FLORASTOR) capsule 250 mg, 250 mg, Oral, BID, NERISSA Mancini    Past Medical history:  Past Medical History:   Diagnosis Date    [facility]     RT Lung    Diabetes mellitus ([address] )     Hypertension        Past Surgical History:   Past Surgical History: Procedure Laterality Date    CARDIAC PACEMAKER PLACEMENT  09/2019    CHOLECYSTECTOMY      kidney damage from gallbladder removal     FOOT SURGERY      tendon    IR PARACENTESIS  6/14/2021    PORTACATH PLACEMENT      PROSTATE SURGERY         Social history:   Social History     Tobacco Use    Smoking status: Former Smoker     Packs/day: 2 00     Years: 9 00     Pack years: 18 00     Types: Cigarettes    Smokeless tobacco: Never Used   Vaping Use    Vaping Use: Never used   Substance Use Topics    Alcohol use: Not Currently    Drug use: Never       Family history:   Family History   Family history unknown: Yes        Review of Systems: Review of Systems   Constitutional: Positive for appetite change  Respiratory: Positive for cough and shortness of breath  Neurological: Positive for weakness  All other systems reviewed and are negative  Physical Exam: Physical Exam  Vitals and nursing note reviewed  Constitutional:       General: He is not in acute distress  HENT:      Head: Normocephalic and atraumatic  Cardiovascular:      Rate and Rhythm: Normal rate and regular rhythm  Pulses: Normal pulses  Heart sounds: Normal heart sounds  Pulmonary:      Effort: Pulmonary effort is normal       Breath sounds: Normal breath sounds  No stridor  No wheezing, rhonchi or rales  Abdominal:      General: Bowel sounds are normal  There is distension  Palpations: Abdomen is soft  There is no mass  Tenderness: There is no abdominal tenderness  There is no guarding or rebound  Hernia: No hernia is present  Musculoskeletal:      Cervical back: Normal range of motion and neck supple  Right lower leg: No edema  Left lower leg: No edema  Skin:     General: Skin is warm and dry  Capillary Refill: Capillary refill takes less than 2 seconds  Coloration: Skin is not jaundiced or pale  Neurological:      Mental Status: He is oriented to person, place, and time  Psychiatric:         Mood and Affect: Mood normal            Lab Results:   Recent Results (from the past 24 hour(s))   Fingerstick Glucose (POCT)    Collection Time: 06/16/21  4:11 PM   Result Value Ref Range    POC Glucose 95 65 - 140 mg/dl   Fingerstick Glucose (POCT)    Collection Time: 06/16/21  9:21 PM   Result Value Ref Range    POC Glucose 95 65 - 140 mg/dl   Fingerstick Glucose (POCT)    Collection Time: 06/17/21  7:17 AM   Result Value Ref Range    POC Glucose 94 65 - 140 mg/dl   Comprehensive metabolic panel    Collection Time: 06/17/21  9:54 AM   Result Value Ref Range    Sodium 132 (L) 136 - 145 mmol/L    Potassium 4 7 3 5 - 5 3 mmol/L    Chloride 99 (L) 100 - 108 mmol/L    CO2 20 (L) 21 - 32 mmol/L    ANION GAP 13 4 - 13 mmol/L    BUN 29 (H) 5 - 25 mg/dL    Creatinine 1 60 (H) 0 60 - 1 30 mg/dL    Glucose 104 65 - 140 mg/dL    Calcium 7 9 (L) 8 3 - 10 1 mg/dL    Corrected Calcium 9 3 8 3 - 10 1 mg/dL    AST 18 5 - 45 U/L    ALT 16 12 - 78 U/L    Alkaline Phosphatase 47 46 - 116 U/L    Total Protein 5 2 (L) 6 4 - 8 2 g/dL    Albumin 2 2 (L) 3 5 - 5 0 g/dL    Total Bilirubin 0 71 0 20 - 1 00 mg/dL    eGFR 42 ml/min/1 73sq m   Magnesium    Collection Time: 06/17/21  9:54 AM   Result Value Ref Range    Magnesium 1 8 1 6 - 2 6 mg/dL   CBC and differential    Collection Time: 06/17/21  9:54 AM   Result Value Ref Range    WBC 6 50 4 31 - 10 16 Thousand/uL    RBC 3 81 (L) 3 88 - 5 62 Million/uL    Hemoglobin 10 5 (L) 12 0 - 17 0 g/dL    Hematocrit 34 2 (L) 36 5 - 49 3 %    MCV 90 82 - 98 fL    MCH 27 6 26 8 - 34 3 pg    MCHC 30 7 (L) 31 4 - 37 4 g/dL    RDW 17 9 (H) 11 6 - 15 1 %    MPV 9 2 8 9 - 12 7 fL    Platelets 621 036 - 825 Thousands/uL    nRBC 0 /100 WBCs    Neutrophils Relative 69 43 - 75 %    Immat GRANS % 1 0 - 2 %    Lymphocytes Relative 11 (L) 14 - 44 %    Monocytes Relative 14 (H) 4 - 12 %    Eosinophils Relative 4 0 - 6 %    Basophils Relative 1 0 - 1 %    Neutrophils Absolute 4 51 1 85 - 7 62 Thousands/µL    Immature Grans Absolute 0 04 0 00 - 0 20 Thousand/uL    Lymphocytes Absolute 0 70 0 60 - 4 47 Thousands/µL    Monocytes Absolute 0 92 0 17 - 1 22 Thousand/µL    Eosinophils Absolute 0 27 0 00 - 0 61 Thousand/µL    Basophils Absolute 0 06 0 00 - 0 10 Thousands/µL   Fingerstick Glucose (POCT)    Collection Time: 21 11:17 AM   Result Value Ref Range    POC Glucose 121 65 - 140 mg/dl           Imaging Studies: Echo complete with contrast if indicated    Result Date: 6/15/2021  Narrative: [id number] [facility] [phone] Transthoracic Echocardiogram 2D, M-mode, Doppler, and Color Doppler Study date:  15-Bishop-2021 Patient: Татьяна Portillo MR number: [id number] Account number: [de-identified] : 1945 Age: 77 years Gender: Male Status: Inpatient Location: Bedside Height: 67 in Weight: 202 6 lb BP: 96/ 66 mmHg Indications: Heart Failure Diagnoses: I50 9 - Heart failure, unspecified Sonographer:  JUNG Jha Referring Physician:  Lilian Castillo MD Group:  [facility] Interpreting Physician:  Honey Yeung MD SUMMARY PROCEDURE INFORMATION: Acoustic windows were suboptimal and the patient was tachycardic during the study LEFT VENTRICLE: Normal left ventricular chamber size Mild left ventricular hypertrophy Ejection fraction is estimated at 55-60% No definite regional wall motion abnormality seen although it cannot be absolutely excluded on the basis of this study Indeterminate diastolic function VENTRICULAR SEPTUM: There was paradoxical motion  These changes are consistent with right ventricular pacing  RIGHT VENTRICLE: Normal right ventricular size and systolic function TAPSE is 2 0 cm Pacemaker lead visualized MITRAL VALVE: Mildly thickened mitral valve leaflets with moderate annular calcification   Leaflets show adequate excursion There is no evidence of significant mitral stenosis or regurgitation AORTIC VALVE: Aortic valve is probably trileaflet Leaflets display normal thickness and cuspal separation There is no evidence of aortic stenosis or aortic regurgitation TRICUSPID VALVE: There was trace regurgitation  Normal RV systolic pressure PULMONIC VALVE: There was trace regurgitation  AORTA: Upper normal aortic root size HISTORY: PRIOR HISTORY: Cancer,Diabetes,HTN,pacemaker,Cholecystectomy,prostate surgery, former smoker  PROCEDURE: The procedure was performed at the bedside  This was a routine study  Acoustic windows were suboptimal and the patient was tachycardic during the study The transthoracic approach was used  The study included complete 2D imaging, M-mode, complete spectral Doppler, and color Doppler  The heart rate was 107 bpm, at the start of the study  Images were obtained from the parasternal, apical, subcostal, and suprasternal notch acoustic windows  Image quality was adequate  LEFT VENTRICLE: Normal left ventricular chamber size Mild left ventricular hypertrophy Ejection fraction is estimated at 55-60% No definite regional wall motion abnormality seen although it cannot be absolutely excluded on the basis of this study Indeterminate diastolic function VENTRICULAR SEPTUM: There was paradoxical motion  These changes are consistent with right ventricular pacing  RIGHT VENTRICLE: Normal right ventricular size and systolic function TAPSE is 2 0 cm Pacemaker lead visualized LEFT ATRIUM: Size was normal  RIGHT ATRIUM: Size was normal  Pacemaker lead visualized MITRAL VALVE: Mildly thickened mitral valve leaflets with moderate annular calcification  Leaflets show adequate excursion There is no evidence of significant mitral stenosis or regurgitation AORTIC VALVE: Aortic valve is probably trileaflet Leaflets display normal thickness and cuspal separation There is no evidence of aortic stenosis or aortic regurgitation TRICUSPID VALVE: The valve structure was normal  There was normal leaflet separation   DOPPLER: The transtricuspid velocity was within the normal range  There was no evidence for stenosis  There was trace regurgitation  Normal RV systolic pressure PULMONIC VALVE: Leaflets exhibited normal thickness, no calcification, and normal cuspal separation  DOPPLER: The transpulmonic velocity was within the normal range  There was trace regurgitation  PERICARDIUM: No pericardial effusion seen AORTA: Upper normal aortic root size SYSTEMIC VEINS: IVC: The inferior vena cava was normal in size and course  Respirophasic changes were normal  SYSTEM MEASUREMENT TABLES 2D EF (Teich): 54 6 % %FS: 27 16 % Ao Diam: 3 68 cm EDV(Teich): 32 48 ml ESV(Teich): 14 75 ml IVSd: 1 04 cm LA Diam: 2 48 cm LVEDV MOD A4C: 96 47 ml LVEF MOD A4C: 49 8 % LVESV MOD A4C: 48 43 ml LVIDd: 2 91 cm LVIDs: 2 12 cm LVLd A4C: 7 38 cm LVLs A4C: 6 82 cm LVOT Diam: 2 03 cm LVPWd: 0 97 cm SV (Teich): 17 73 ml SV MOD A4C: 48 04 ml CW AV Vmax: 1 37 m/s AV maxP 54 mmHg MM TAPSE: 2 02 cm PW MENA Vmax, Pt: 2 18 cm2 E' Lat: 0 11 m/s E' Sept: 0 09 m/s LVOT Vmax: 0 93 m/s LVOT maxPG: 3 43 mmHg [facility] [place] Commission Accredited Echocardiography Laboratory Prepared and electronically signed by Saira Cardozo MD Signed 15-Bishop-2021 14:40:32     XR chest 1 view portable    Result Date: 2021  Narrative: CHEST INDICATION:   SOB  COMPARISON:  Multiple priors most recently CT chest abdomen pelvis 2021 EXAM PERFORMED/VIEWS:  XR CHEST PORTABLE FINDINGS:  Left chest wall pacer leads overlying the right atrium and right ventricle  Right chest wall MediPort tip overlies the right atrium  Cardiomediastinal silhouette appears unremarkable  Loculated right pleural effusion, with underlying opacification the right lung, for which pneumonia is not excluded  No pneumothorax  Known osseous metastatic lesions are poorly characterized       Impression: Moderate to large loculated right pleural effusion with underlying opacification of the right lung, suggestive of atelectasis or consolidation  Workstation performed: [id number]     IR INPATIENT Paracentesis    Result Date: 6/14/2021  Narrative: Ultrasound-guided paracentesis Clinical History: Patient with a history of lung cancer presenting with respiratory failure and new ascites  Technique: Patient was brought to the interventional radiology area and placed supine on the stretcher  After a brief ultrasound examination was performed to localize a pocket of fluid,an area on the skin of the left lower quadrant was prepped, and draped in the usual sterile fashion  Lidocaine was administered to the skin and a small skin incision was made  A 5 Western Ariela Yueh needle was advanced into the fluid and 2000 mL clear yellow ascites was aspirated  Specimens were sent to the lab as requested  After the procedure, the catheter was removed and a bandage applied to the site  The patient tolerated the procedure well and suffered no complications  Impression: Impression: Successful ultrasound-guided paracentesis yielding 2000 mL clear yellow ascites   Workstation performed: [id number]     CTA ED chest PE study    Result Date: 6/13/2021  Narrative: CTA - CHEST WITH IV CONTRAST - PULMONARY ANGIOGRAM INDICATION:   Shortness of breath  Cough  Brown sputum production  COMPARISON: Prior CT 4/2/2021  PET/CT 5/4/2021 TECHNIQUE: CTA examination of the chest was performed using angiographic technique according to a protocol specifically tailored to evaluate for pulmonary embolism  Axial, sagittal, and coronal 2D reformatted images were created from the source data and  submitted for interpretation  In addition, coronal 3D MIP postprocessing was performed on the acquisition scanner  Radiation dose length product (DLP) for this visit:  373 mGy-cm     This examination, like all CT scans performed in the [facility], was performed utilizing techniques to minimize radiation dose exposure, including the use of iterative reconstruction and automated exposure control  IV Contrast:  65 mL of iohexol (OMNIPAQUE)  FINDINGS: PULMONARY ARTERIAL TREE:  There is no evidence of acute pulmonary embolism  There are several thin linear filling defects within the right pulmonary artery system for example image 42/86 which may represent fibrin strands related to a previous episode of  embolism  LUNGS:  Subpleural lingular 4 mm nodule image 4 5/62 not present earlier  Right lung volume loss, coarse linear scarring, and compression due to pleural disease  There is likely an increase in right basilar airspace disease, either atelectasis or infiltrate PLEURA:  There is development of a new small left effusion  Loculated complex right-sided pleural effusion  Radiopaque material along the margins of the disease may represent prior talc pleurodesis  Lateral inferior thecal shape collection demonstrates foci of air as on numerous previous studies such as 10/7/2020  The amount of air appears somewhat diminished  HEART/GREAT VESSELS:  Transvenous pacemaker  No pericardial effusion  MEDIASTINUM AND SHON:  Correlating with recent PET/CT findings, mild right paratracheal and subcarinal adenopathy, which was glucose avid on prior study most suggestive of malignant adenopathy CHEST WALL AND LOWER NECK:   Unremarkable  VISUALIZED STRUCTURES IN THE UPPER ABDOMEN:  Significant increase in the amount of ascites since earlier PET/CT  Partial visualization of liver demonstrates somewhat heterogeneous density  Partial visualization of left kidney upper pole cyst  OSSEOUS STRUCTURES:  Subtle abnormal bone mineral density in the right scapula which corresponds to the site of glucose avid change on prior PET/CT concerning for osseous metastasis     Impression: 1  No evidence of acute pulmonary embolism  Several thin linear defects within the pulmonary arterial system, may represent fibrin strands from a previous episode of pulmonary embolism   2  Increasing opacification in the right lung base suggesting worsening atelectasis or pneumonia  3  Complex right-sided loculated pleural effusion, the lateral inferior component containing bubbles of air as on numerous prior studies  4  New the lingula nodule suspicious for metastatic disease 5  Development of a large amount of ascites within the upper abdomen  This is concerning for malignant ascites 6  In conjunction with prior PET/CT, evidence of malignant mediastinal adenopathy, and right scapular metastatic lesion   Workstation performed: [id number]       Problem List:   Patient Active Problem List   Diagnosis    Carcinoma of lung, right ([address] )    Pleural effusion    COPD (chronic obstructive pulmonary disease) ([address] )    History of pulmonary embolism    AV block    Type 2 diabetes mellitus ([address] )    Acute respiratory failure with hypoxia ([address] )    Metastatic lung cancer (metastasis from lung to other site) ([address] )    Hyponatremia    Acute renal failure superimposed on stage 3 chronic kidney disease ([address] )    Ascites    Sepsis ([address] )    Ambulatory dysfunction    Dysphagia         NERISSA Johnson

## 2021-06-17 NOTE — ASSESSMENT & PLAN NOTE
Status post pacemaker insertion  Continue on atenolol with holding parameters  Patient has tachycardia which is more likely secondary to cancer related and reactive    In May 12th 2021 with office was with Pulmonary patient already had a heart rate of 106 at that time  Telemetry  Optimize electrolytes  Labs pending today

## 2021-06-17 NOTE — ASSESSMENT & PLAN NOTE
Lab Results   Component Value Date    HGBA1C 6 4 (H) 06/16/2021       Recent Labs     06/16/21  1104 06/16/21  1611 06/16/21 2121 06/17/21  0717   POCGLU 104 95 95 94       Blood Sugar Average: Last 72 hrs:  (P) 82 [id number]   Well controlled type 2 diabetes  Continue patient on low-dose sliding scale insulin with Accu-Chek q i d  Greta Hameed

## 2021-06-17 NOTE — ASSESSMENT & PLAN NOTE
Multifactorial most likely secondary to significant ascites causing hypoventilation, right-sided loculated pleural effusion, and lung cancer along with suspected pneumonia  Initially patient was hypoxic requiring 2-4 L of oxygen via nasal cannula which worsened to 5 L and hence required to be in the ICU  Currently patient is saturating 92% on room air at rest   Patient possibly desaturates on exertion however has not exerted too much

## 2021-06-17 NOTE — ASSESSMENT & PLAN NOTE
Multifactorial most likely secondary to significant ascites causing hypoventilation, right-sided loculated pleural effusion, and lung cancer along with suspected pneumonia  Initially patient was hypoxic requiring 2-4 L of oxygen via nasal cannula which worsened to 5 L and hence required to be in the ICU  Currently patient is saturating 92-94% on room air at rest   Patient possibly desaturates on exertion however has not exerted too much  Continue patient on oxygen and nebulizers  6/16/21: Patient is Saturating well on RA   No Need of O2 at rest

## 2021-06-17 NOTE — ASSESSMENT & PLAN NOTE
History of DVT and PE  On Eliquis as outpatient  No acute PE on CTA  Continue patient on Eliquis 5 mg b i d

## 2021-06-17 NOTE — ASSESSMENT & PLAN NOTE
Lab Results   Component Value Date    HGBA1C 6 4 (H) 06/16/2021       Recent Labs     06/16/21  1104 06/16/21  1611 06/16/21 2121 06/17/21  0717   POCGLU 104 95 95 94       Blood Sugar Average: Last 72 hrs:  (P) 10 [id number]     Continue patient on low-dose sliding scale insulin with Accu-Chek q i d     Check hemoglobin A1c in the morning

## 2021-06-17 NOTE — ASSESSMENT & PLAN NOTE
Patient reports having trouble swallowing solid food,thus not eating much  Denies nausea vomiting chest pain     Change diet to full liquid  Consult GI

## 2021-06-17 NOTE — PROGRESS NOTES
Saida 46  Progress Note Connie Doctor 1945, 77 y o  male MRN: [id number]  Unit/Bed#: [address] [place] [address] Encounter: [id number]  Primary Care Provider: Zachary Samuels MD   Date and time admitted to hospital: 6/14/2021 10:31 AM    * Acute respiratory failure with hypoxia [facility])  Assessment & Plan  Multifactorial most likely secondary to significant ascites causing hypoventilation, right-sided loculated pleural effusion, and lung cancer along with suspected pneumonia  Initially patient was hypoxic requiring 2-4 L of oxygen via nasal cannula which worsened to 5 L and hence required to be in the ICU  Currently patient is saturating 92-94% on room air at rest   Patient possibly desaturates on exertion however has not exerted too much  Continue patient on oxygen and nebulizers  6/16/21: Patient is Saturating well on RA  No Need of O2 at rest      Sepsis [facility])  Assessment & Plan  Possible secondary to pneumonia  Patient is on Levaquin, continue the same  Continue patient on nebulizers and oxygen as needed  Gradually resolving  Will finish a 7 day course of  Levaquin  Currently due to renal Function Levaquin dose is 750 mg q48 hrs  Ascites  Assessment & Plan  Patient has exudative ascites suspected malignant ascites  However cell count showed polymorphous predominance but culture data negative  Patient did not have any abdominal pain also  Patient is status post IR drainage of ascites fluid which was 2 L  And received albumin IV as well after that  Acute renal failure superimposed on stage 3 chronic kidney disease [facility])  Assessment & Plan  Lab Results   Component Value Date    EGFR 43 06/15/2021    EGFR 43 06/14/2021    EGFR 45 06/14/2021    CREATININE 1 55 (H) 06/15/2021    CREATININE 1 55 (H) 06/14/2021    CREATININE 1 50 (H) 06/14/2021     Monitor BMP  Patient is on Lasix 40 mg daily at home, currently on hold    Will consider resuming this, once patient's appetite is improved  Currently patient had a poor appetite  Also patient is running Hypotensive and will need to hold it on d/c     Carcinoma of lung, right [facility])  Assessment & Plan  Patient follows up with Dr Aneesh Lambert as an outpatient  Pleural effusion  Assessment & Plan  Loculated right-sided pleural effusion, IR evaluated the patient and not enough fluid for drainage  Continue supportive care  Ambulatory dysfunction  Assessment & Plan  Patient would benefit from short-term rehab  PT OT evaluated the patient and recommended short-term rehab  Will discuss with  regarding the same  Type 2 diabetes mellitus [facility])  Assessment & Plan  Lab Results   Component Value Date    HGBA1C 6 4 (H) 06/16/2021       Recent Labs     06/16/21  1104 06/16/21  1611 06/16/21  2121 06/17/21  0717   POCGLU 104 95 95 94       Blood Sugar Average: Last 72 hrs:  (P) 57 [id number]     Continue patient on low-dose sliding scale insulin with Accu-Chek q i d     Check hemoglobin A1c in the morning  History of pulmonary embolism  Assessment & Plan  Continue patient on Eliquis 5 mg b i d  Subjective:   I have seen and Examined the patient at the bedside  No CP or Sob  No fevers or chills, No nausea or vomiting  Overnight events reviewed with the RN  No Other complains  Patient still feels generalized weakness  Denies any Orthopnea or PND, No Abd pain, Nx or vomiting  Tolerating diet well  But poor appetite  Review of System:   Denies any CP or SOB  Denies any Cough or Cold  Denies any Fevers or chills  Denies any focal tingling numbness or weakness in any extremities  Denies any abdominal pain, Nausea or vomiting  Objective:   Temp 97 2, , RR 19, BP 96/57, 97% RA  Physical Exam:   General : Alert, Awake and oriented x 2-3, NAD  Neck : Supple  Eyes:  DEBORA, EOMI  CVS : S1, S2, RRR    R/S : Clear to auscultate anteriorly  Decreased on the right side, and Left Side clear to auscultate  Abd: Soft, NT, Distended  Bs+ve  Extremity: Trace pedal edema noted  Skin: No acute Rash noted  CNS: No acute FND  Additional Data:     Labs & Imaging Data Reviewed :    Results from last 7 days   Lab Units 06/15/21  0521   WBC Thousand/uL 4 83   HEMOGLOBIN g/dL 9 1*   HEMATOCRIT % 30 0*   PLATELETS Thousands/uL 164   NEUTROS PCT % 69   LYMPHS PCT % 12*   MONOS PCT % 13*   EOS PCT % 5     Results from last 7 days   Lab Units 06/15/21  0521 06/13/21  1540   POTASSIUM mmol/L 4 2 4 2   CHLORIDE mmol/L 100 96   CO2 mmol/L 23 24   BUN mg/dL 27* 31*   CREATININE mg/dL 1 55* 1 59*   CALCIUM mg/dL 7 7* 8 0*   ALK PHOS U/L  --  56 8   ALT U/L  --  7   AST U/L  --  15         Lab Results   Component Value Date    HGBA1C 6 4 (H) 06/16/2021      IR INPATIENT Paracentesis   Final Result by Lexie Izaguirre MD (06/14 1743)   Impression:   Successful ultrasound-guided paracentesis yielding 2000 mL clear yellow ascites                     Workstation performed: [id number]         IR IN-Patient Thoracentesis    (Results Pending)       Cultures:   Blood Culture:   Lab Results   Component Value Date    BLOODCX No Growth at 48 hrs  06/13/2021    BLOODCX No Growth at 72 hrs  06/13/2021     Urine Culture: No results found for: URINECX  Sputum Culture: No components found for: SPUTUMCX  Wound Culture: No results found for: WOUNDCULT    Last 24 Hours Medication List:   Current Facility-Administered Medications   Medication Dose Route Frequency Provider Last Rate    acyclovir  200 mg Oral BID Imelda Sarkar MD      apixaban  5 mg Oral BID Imelda Sarkar MD      atenolol  25 mg Oral Daily Imelda Sarkar MD      calcium carbonate-vitamin D  1 tablet Oral Daily With Breakfast Imelda Sarkar MD      cholecalciferol  1,000 Units Oral Daily Imelda Sarkar MD      cholestyramine sugar free  4 g Oral BID Imelda Sarkar MD      cyanocobalamin  1,000 mcg Oral Daily Imelda Sarkar MD      folic acid  9,273 mcg Oral Daily Amilcar FRITZ Calin Andrade MD      insulin lispro  1-6 Units Subcutaneous TID AC Amilcar Vann MD      levofloxacin  750 mg Oral Q48H Francis Madrigal MD      melatonin  3 mg Oral HS PRN Herson Liu PA-C      mirtazapine  15 mg Oral Daily Francis Madrigal MD      oxyCODONE-acetaminophen  1 tablet Oral Q6H PRN Francis Madrigal MD      pantoprazole  40 mg Oral QAM Francis Madrigal MD      pravastatin  20 mg Oral Daily With Maryanne Magana MD         Patient is at moderate risk for morbidity and mortality due to above mentioned illness and comorbidities

## 2021-06-17 NOTE — ASSESSMENT & PLAN NOTE
Lab Results   Component Value Date    EGFR 43 06/15/2021    EGFR 43 06/14/2021    EGFR 45 06/14/2021    CREATININE 1 55 (H) 06/15/2021    CREATININE 1 55 (H) 06/14/2021    CREATININE 1 50 (H) 06/14/2021     Baseline creatinine appears to be around 0 9-1 2  Creatinine 1 55 on admission  Patient is on Lasix 40 mg daily at home, currently on hold  Will consider resuming this, once patient's appetite is improved  Currently patient had a poor appetite    Also patient is running Hypotensive and will need to hold it on d/c

## 2021-06-17 NOTE — CASE MANAGEMENT
CM met with patient and his wife Kendall Phelps at bedside  Discussed dc planning and PT's evaluation and recommendation for ST SNF at dc  They both are agreeable and feel patient will benefit  They were provided with choice and they requested a referral be sent to [facility] (first choice) and [facility]  Referrals were sent through [place] CM to follow

## 2021-06-17 NOTE — PROGRESS NOTES
Sadia 46  Progress Note Dwayne Aquino 1945, 77 y o  male MRN: [id number]  Unit/Bed#: [address] [place] [address] Encounter: [id number]  Primary Care Provider: Mary Castro MD   Date and time admitted to hospital: 6/14/2021 10:31 AM    * Acute respiratory failure with hypoxia [facility])  Assessment & Plan  Multifactorial most likely secondary to significant ascites causing hypoventilation, right-sided loculated pleural effusion, and lung cancer along with suspected pneumonia  Initially patient was hypoxic requiring 2-4 L of oxygen via nasal cannula which worsened to 5 L and hence required to be in the ICU  Currently patient is saturating 92% on room air at rest   Patient possibly desaturates on exertion however has not exerted too much  Sepsis [facility])  Assessment & Plan  CTA showed - Increasing opacification in the right lung base suggesting worsening atelectasis or pneumonia  Patient is on Levaquin, continue the same  Gradually resolving  Will finish a 7 day course of  Levaquin  Currently due to renal Function Levaquin dose is 750 mg q48 hrs  Patient afebrile, denies cough SOB  Blood cultures negative to date  Procalcitonin mild elevated to 0 36, repeat in a m  [place] Results from last 7 days   Lab Units 06/14/21  0547 06/14/21  0009 06/13/21  1907 06/13/21  1602   LACTIC ACID mmol/L  --  2 0  --  1 4   PROCALCITONIN ng/ml 0 36*  --  0 18  --      Results from last 7 days   Lab Units 06/17/21  0954 06/15/21  0521 06/14/21  0547 06/13/21  1540   WBC Thousand/uL 6 50 4 83 4 71 6 25             Pleural effusion  Assessment & Plan  Loculated right-sided pleural effusion, IR evaluated the patient and not enough fluid for drainage  Continue supportive care        Acute renal failure superimposed on stage 3 chronic kidney disease [facility])  Assessment & Plan  Lab Results   Component Value Date    EGFR 43 06/15/2021    EGFR 43 06/14/2021    EGFR 45 06/14/2021    CREATININE 1 55 (H) 06/15/2021 CREATININE 1 55 (H) 06/14/2021    CREATININE 1 50 (H) 06/14/2021     Baseline creatinine appears to be around 0 9-1 2  Creatinine 1 55 on admission  Patient is on Lasix 40 mg daily at home, currently on hold  Will consider resuming this, once patient's appetite is improved  Currently patient had a poor appetite  Also patient is running Hypotensive and will need to hold it on d/c     Dysphagia  Assessment & Plan  Patient reports having trouble swallowing solid food,thus not eating much  Denies nausea vomiting chest pain  Change diet to full liquid  Consult GI    Ambulatory dysfunction  Assessment & Plan  Patient would benefit from short-term rehab  PT OT evaluated the patient and recommended short-term rehab  Will discuss with  regarding the same  Ascites  Assessment & Plan  Patient has exudative ascites suspected malignant ascites  However cell count showed polymorphous predominance but culture data negative  Patient did not have any abdominal pain also  Patient is status post IR drainage of ascites fluid which was 2 L  And received albumin IV as well after that  Abdomen is huge on exam  Will order limited ultrasound today  Hyponatremia  Assessment & Plan  Combination of SIADH and pre renal   Currently sodium stable  Continue to monitor BMP    Type 2 diabetes mellitus [facility])  Assessment & Plan  Lab Results   Component Value Date    HGBA1C 6 4 (H) 06/16/2021       Recent Labs     06/16/21  1104 06/16/21  1611 06/16/21  2121 06/17/21  0717   POCGLU 104 95 95 94       Blood Sugar Average: Last 72 hrs:  (P) 48 [id number]   Well controlled type 2 diabetes  Continue patient on low-dose sliding scale insulin with Accu-Chek q i d        AV block  Assessment & Plan  Status post pacemaker insertion  Continue on atenolol with holding parameters  Patient has tachycardia which is more likely secondary to cancer related and reactive    In May 12th 2021 with office was with Pulmonary patient already had a heart rate of 106 at that time  Telemetry  Optimize electrolytes  Labs pending today    History of pulmonary embolism  Assessment & Plan  History of DVT and PE  On Eliquis as outpatient  No acute PE on CTA  Continue patient on Eliquis 5 mg b i d  Carcinoma of lung, right [facility])  Assessment & Plan  History of non-small-cell lung cancer diagnosed in 2019  Patient underwent VATS with pleural decortication and pleurodesis in 2020  Patient is on Brigatinib at home  CT suggested possible advancement of metastatic disease with involvement of the lingula  Patient follows up with Dr Leyla Colmenares as an outpatient  VTE Pharmacologic Prophylaxis:   Pharmacologic: Apixaban (Eliquis)  Mechanical VTE Prophylaxis in Place:no    Patient Centered Rounds: I have performed bedside rounds with nursing staff today  Discussions with Specialists or Other Care Team Provider:  Yes    Education and Discussions with Family / Patient:  Yes  Time Spent for Care: 20 minutes  More than 50% of total time spent on counseling and coordination of care as described above  Current Length of Stay: 3 day(s)    Current Patient Status: Inpatient   Certification Statement: The patient will continue to require additional inpatient hospital stay due to Dysphagia, ascites    Discharge Plan:  Short-term rehab once medically cleared    Code Status: Level 2 - DNAR: but accepts endotracheal intubation      Subjective:   Patient reports trouble swallowing solid food, not eating much  Denies nausea vomiting abdominal pain, diarrhea, constipation  Denies chest pain, SOB, cough, headache, dizziness, fever, chills  Objective:     Vitals:   Temp (24hrs), Av 2 °F (36 8 °C), Min:97 9 °F (36 6 °C), Max:98 3 °F (36 8 °C)    Temp:  [97 9 °F (36 6 °C)-98 3 °F (36 8 °C)] 97 9 °F (36 6 °C)  HR:  [116-119] 118  Resp:  [16-18] 18  BP: ()/(64-68) 103/66  SpO2:  [91 %-97 %] 93 %  Body mass index is 31 84 kg/m²       Input and Output Summary (last 24 hours): Intake/Output Summary (Last 24 hours) at 6/17/2021 1027  Last data filed at 6/17/2021 4499  Gross per 24 hour   Intake 240 ml   Output 475 ml   Net -235 ml       Physical Exam:     Physical Exam  Vitals and nursing note reviewed  Constitutional:       Appearance: He is well-developed  He is obese  HENT:      Head: Normocephalic and atraumatic  Mouth/Throat:      Comments: Dry mucous membrane  Neck:      Thyroid: No thyromegaly  Vascular: No JVD  Trachea: No tracheal deviation  Cardiovascular:      Rate and Rhythm: Regular rhythm  Tachycardia present  Heart sounds: Normal heart sounds  Pulmonary:      Effort: Pulmonary effort is normal  No respiratory distress  Breath sounds: No wheezing or rales  Comments: Diminished breath sounds on the right lower lobe, on room air, satting 92%  Abdominal:      General: Bowel sounds are normal  There is distension  Palpations: Abdomen is soft  Tenderness: There is no abdominal tenderness  There is no guarding  Musculoskeletal:         General: No swelling or deformity  Normal range of motion  Cervical back: Neck supple  Right lower leg: No edema  Left lower leg: No edema  Skin:     General: Skin is warm and dry  Neurological:      General: No focal deficit present  Mental Status: He is alert and oriented to person, place, and time     Psychiatric:         Mood and Affect: Mood normal          Judgment: Judgment normal          Additional Data:     Labs:    Results from last 7 days   Lab Units 06/17/21  0954   WBC Thousand/uL 6 50   HEMOGLOBIN g/dL 10 5*   HEMATOCRIT % 34 2*   PLATELETS Thousands/uL 237   NEUTROS PCT % 69   LYMPHS PCT % 11*   MONOS PCT % 14*   EOS PCT % 4     Results from last 7 days   Lab Units 06/17/21  0954   POTASSIUM mmol/L 4 7   CHLORIDE mmol/L 99*   CO2 mmol/L 20*   BUN mg/dL 29*   CREATININE mg/dL 1 60*   CALCIUM mg/dL 7 9*   ALK PHOS U/L 47   ALT U/L 16   AST U/L 18           * I Have Reviewed All Lab Data Listed Above  * Additional Pertinent Lab Tests Reviewed: Blas 67 Admission Reviewed    Imaging:    Imaging Reports Reviewed Today Include:  CT abdomen pelvis  Imaging Personally Reviewed by Myself Includes:  None    Recent Cultures (last 7 days):     Results from last 7 days   Lab Units 06/14/21  1516 06/13/21  1916 06/13/21  1621 06/13/21  1616   BLOOD CULTURE   --   --  No Growth at 48 hrs  No Growth at 72 hrs  GRAM STAIN RESULT  Rare Polys  No organisms seen  --   --   --    BODY FLUID CULTURE, STERILE  No growth  --   --   --    LEGIONELLA URINARY ANTIGEN   --  Negative  --   --        Last 24 Hours Medication List:   Current Facility-Administered Medications   Medication Dose Route Frequency Provider Last Rate    acyclovir  200 mg Oral BID Augustus Grant MD      apixaban  5 mg Oral BID Augustus Grant MD      atenolol  25 mg Oral Daily Augustus Grant MD      calcium carbonate-vitamin D  1 tablet Oral Daily With Breakfast Augustus Grant MD      cholecalciferol  1,000 Units Oral Daily Augustus Grant MD      cholestyramine sugar free  4 g Oral BID Augustus Grant MD      cyanocobalamin  1,000 mcg Oral Daily Augustus Grant MD      folic acid  5,579 mcg Oral Daily Augustus Grant MD      insulin lispro  1-6 Units Subcutaneous TID AC Augustus Grant MD      levofloxacin  750 mg Oral Q48H Augustus Grant MD      melatonin  3 mg Oral HS PRN Breana Donald PA-C      mirtazapine  15 mg Oral Daily Augustus Grant MD      oxyCODONE-acetaminophen  1 tablet Oral Q6H PRN Augustus Grant MD      pantoprazole  40 mg Oral QAM Augustus Grant MD      pravastatin  20 mg Oral Daily With Lennie Ureña MD          Today, Patient Was Seen By: NERISSA Perla    ** Please Note: Dragon 360 Dictation voice to text software may have been used in the creation of this document   **

## 2021-06-17 NOTE — ASSESSMENT & PLAN NOTE
History of non-small-cell lung cancer diagnosed in December 2019  Patient underwent VATS with pleural decortication and pleurodesis in 7/2020  Patient is on Brigatinib at home  CT suggested possible advancement of metastatic disease with involvement of the lingula  Patient follows up with Dr Laquita Mallory as an outpatient

## 2021-06-17 NOTE — ASSESSMENT & PLAN NOTE
Possible secondary to pneumonia  Patient is on Levaquin, continue the same  Continue patient on nebulizers and oxygen as needed  Gradually resolving  Will finish a 7 day course of  Levaquin  Currently due to renal Function Levaquin dose is 750 mg q48 hrs

## 2021-06-17 NOTE — H&P (VIEW-ONLY)
Physician Requesting Consult: Maria Victoria Berry MD    Reason for Consult / Principal Problem:  Dysphagia     Assessment/Plan:  1  Difficulty swallowing food  This is a 52-year-old male with a past medical history of N/C Cl cancer with history of recent current malignant pleural effusion, vats, right-sided decortication and pleurodesis, diabetes mellitus type 2, COPD, and complete heart block status post pacemaker insertion  Patient reports that when he eats solid food he will get the solid food to the back of his mouth but then is unable to swallow  Patient denies any difficulty with swallowing liquids or pills  Patient denies feeling that food is stuck in his esophagus  Patient denies any other GI symptoms  Denies nausea, vomiting, acid reflux, heartburn, epigastric or abdominal pain  Abdomen is slightly distended, patient is suspected of having malignant ascites  No history of CVA are underlying neurological disorder  Difficulty swallowing may be related to oral pharyngeal difficulty or secondary to underlying diagnosis metastatic cancer   -Continue pantoprazole 40 mg daily  -Continue supportive care  -Speech therapy to evaluate and treat  -Patient will need further evaluation with EGD this can be done as outpatient    -Patient would like any further testing done as an outpatient,  patient would like to go home  We will follow  Thank you for the consult  Case discussed with Dr Donna Driver  HPI: Alex Wing is a 77 y o  male  Acute respiratory failure with hypoxia ([address] )  This is a 52-year-old male who was transferred to [facility] from [facility] on 06/14/2021  Patient has a past medical history of NSCL cancer with history of recurrent malignant pleural effusion, status post VATS and right-sided decortication and pleurodesis, diabetes mellitus type 2, COPD, complete heart block status post ppm who presented to [facility] with 1 week history of shortness of breath  Initial workup at [facility] included a CT a PE scan which revealed right-sided loculated pleural effusion with right lung opacification concerning for atelectasis versus pneumonia, no PE, and large amount of ascites in upper abdomen  Patient was initially placed on O2 4-5 L in wean down to 3 L nasal cannula  Patient experience an episode of hypotension requiring IV fluid bolus and was transferred to [facility] as level 1 step down for IR evaluation of pleural effusion and ascites  GI was consulted for dysphagia  Patient denies any dysphagia  Patient reports he can swallow liquids and take his pills without difficulty patient reports that when he chews food it goes to the back of his throat and he cannot swallow it  Patient denies nausea, vomiting, acid reflux, and heartburn  Patient denies any feeling that food is stuck in his esophagus  Patient denies pain with swallowing  Per patient no unintentional weight loss  Bowel patterns are regular  No diarrhea or constipation  Patient denies blood in stool, blood from rectal area, or black tarry stool  Speech therapy has not seen or evaluated patient  Patient reports he would like to go home and would like no further procedures done as an inpatient but would like him done on an outpatient basis  Patient reports he had a colonoscopy over 12 years ago  Patient has never had an EGD  Patient does not overuse NSAIDs  Patient is a former smoker  Patient denies any history of alcohol or drug abuse  No family history of gastric or colon cancer  Patient is on anticoagulation therapy Eliquis 5 mg 2 times daily  Patient is currently taking pantoprazole 40 mg daily  Allergies:    Allergies   Allergen Reactions    Diovan [Valsartan] Angioedema    Penicillins Anaphylaxis    Lisinopril     Oxytocin        Medications:  Current Facility-Administered Medications:     acyclovir (ZOVIRAX) capsule 200 mg, 200 mg, Oral, BID, Frank Bhandari MD, 200 mg at 06/17/21 0939    apixaban (ELIQUIS) tablet 5 mg, 5 mg, Oral, BID, Sharonda Mercer MD, 5 mg at 06/17/21 0940    atenolol (TENORMIN) tablet 25 mg, 25 mg, Oral, Daily, Sharonda Mercer MD    calcium carbonate-vitamin D (OSCAL-D) 500 mg-200 units per tablet 1 tablet, 1 tablet, Oral, Daily With Breakfast, Sharonda Mercer MD, 1 tablet at 06/17/21 0944    cholecalciferol (VITAMIN D3) tablet 1,000 Units, 1,000 Units, Oral, Daily, Sharonda Mercer MD, 1,000 Units at 06/17/21 0940    cholestyramine sugar free (QUESTRAN LIGHT) packet 4 g, 4 g, Oral, BID, Sharonda Mercer MD, 4 g at 06/17/21 0940    cyanocobalamin (VITAMIN B-12) tablet 1,000 mcg, 1,000 mcg, Oral, Daily, Sharonda Mercer MD, 1,000 mcg at 06/34/78 8216    folic acid (FOLVITE) tablet 1,000 mcg, 1,000 mcg, Oral, Daily, Sharonda Mercer MD, 1,000 mcg at 06/17/21 0939    insulin lispro (HumaLOG) 100 units/mL subcutaneous injection 1-6 Units, 1-6 Units, Subcutaneous, TID AC **AND** Fingerstick Glucose (POCT), , , TID AC, Sharonda Mercer MD    levofloxacin (LEVAQUIN) tablet 750 mg, 750 mg, Oral, Q48H, Sharonda Mercer MD, 750 mg at 06/16/21 1557    melatonin tablet 3 mg, 3 mg, Oral, HS PRN, Kylie Dowell PA-C, 3 mg at 06/15/21 2140    mirtazapine (REMERON) tablet 15 mg, 15 mg, Oral, Daily, Sharonda Mercer MD, 15 mg at 06/17/21 0940    oxyCODONE-acetaminophen (PERCOCET) 5-325 mg per tablet 1 tablet, 1 tablet, Oral, Q6H PRN, Sharonda Mercer MD, 1 tablet at 06/16/21 2028    pantoprazole (PROTONIX) EC tablet 40 mg, 40 mg, Oral, QAM, Sharonda Mercer MD, 40 mg at 06/17/21 0529    pravastatin (PRAVACHOL) tablet 20 mg, 20 mg, Oral, Daily With Kerri Dill MD, 20 mg at 06/16/21 1739    saccharomyces boulardii (FLORASTOR) capsule 250 mg, 250 mg, Oral, BID, NERISSA Hernandes    Past Medical history:  Past Medical History:   Diagnosis Date    Cancer ([facility] [address] )     RT Lung    [facility]     Hypertension        Past Surgical History:   Past Surgical History: Procedure Laterality Date    CARDIAC PACEMAKER PLACEMENT  09/2019    CHOLECYSTECTOMY      kidney damage from gallbladder removal     FOOT SURGERY      tendon    IR PARACENTESIS  6/14/2021    PORTACATH PLACEMENT      PROSTATE SURGERY         Social history:   Social History     Tobacco Use    Smoking status: Former Smoker     Packs/day: 2 00     Years: 9 00     Pack years: 18 00     Types: Cigarettes    Smokeless tobacco: Never Used   Vaping Use    Vaping Use: Never used   Substance Use Topics    Alcohol use: Not Currently    Drug use: Never       Family history:   Family History   Family history unknown: Yes        Review of Systems: Review of Systems   Constitutional: Positive for appetite change  Respiratory: Positive for cough and shortness of breath  Neurological: Positive for weakness  All other systems reviewed and are negative  Physical Exam: Physical Exam  Vitals and nursing note reviewed  Constitutional:       General: He is not in acute distress  HENT:      Head: Normocephalic and atraumatic  Cardiovascular:      Rate and Rhythm: Normal rate and regular rhythm  Pulses: Normal pulses  Heart sounds: Normal heart sounds  Pulmonary:      Effort: Pulmonary effort is normal       Breath sounds: Normal breath sounds  No stridor  No wheezing, rhonchi or rales  Abdominal:      General: Bowel sounds are normal  There is distension  Palpations: Abdomen is soft  There is no mass  Tenderness: There is no abdominal tenderness  There is no guarding or rebound  Hernia: No hernia is present  Musculoskeletal:      Cervical back: Normal range of motion and neck supple  Right lower leg: No edema  Left lower leg: No edema  Skin:     General: Skin is warm and dry  Capillary Refill: Capillary refill takes less than 2 seconds  Coloration: Skin is not jaundiced or pale  Neurological:      Mental Status: He is oriented to person, place, and time  Psychiatric:         Mood and Affect: Mood normal            Lab Results:   Recent Results (from the past 24 hour(s))   Fingerstick Glucose (POCT)    Collection Time: 06/16/21  4:11 PM   Result Value Ref Range    POC Glucose 95 65 - 140 mg/dl   Fingerstick Glucose (POCT)    Collection Time: 06/16/21  9:21 PM   Result Value Ref Range    POC Glucose 95 65 - 140 mg/dl   Fingerstick Glucose (POCT)    Collection Time: 06/17/21  7:17 AM   Result Value Ref Range    POC Glucose 94 65 - 140 mg/dl   Comprehensive metabolic panel    Collection Time: 06/17/21  9:54 AM   Result Value Ref Range    Sodium 132 (L) 136 - 145 mmol/L    Potassium 4 7 3 5 - 5 3 mmol/L    Chloride 99 (L) 100 - 108 mmol/L    CO2 20 (L) 21 - 32 mmol/L    ANION GAP 13 4 - 13 mmol/L    BUN 29 (H) 5 - 25 mg/dL    Creatinine 1 60 (H) 0 60 - 1 30 mg/dL    Glucose 104 65 - 140 mg/dL    Calcium 7 9 (L) 8 3 - 10 1 mg/dL    Corrected Calcium 9 3 8 3 - 10 1 mg/dL    AST 18 5 - 45 U/L    ALT 16 12 - 78 U/L    Alkaline Phosphatase 47 46 - 116 U/L    Total Protein 5 2 (L) 6 4 - 8 2 g/dL    Albumin 2 2 (L) 3 5 - 5 0 g/dL    Total Bilirubin 0 71 0 20 - 1 00 mg/dL    eGFR 42 ml/min/1 73sq m   Magnesium    Collection Time: 06/17/21  9:54 AM   Result Value Ref Range    Magnesium 1 8 1 6 - 2 6 mg/dL   CBC and differential    Collection Time: 06/17/21  9:54 AM   Result Value Ref Range    WBC 6 50 4 31 - 10 16 Thousand/uL    RBC 3 81 (L) 3 88 - 5 62 Million/uL    Hemoglobin 10 5 (L) 12 0 - 17 0 g/dL    Hematocrit 34 2 (L) 36 5 - 49 3 %    MCV 90 82 - 98 fL    MCH 27 6 26 8 - 34 3 pg    MCHC 30 7 (L) 31 4 - 37 4 g/dL    RDW 17 9 (H) 11 6 - 15 1 %    MPV 9 2 8 9 - 12 7 fL    Platelets 417 643 - 025 Thousands/uL    nRBC 0 /100 WBCs    Neutrophils Relative 69 43 - 75 %    Immat GRANS % 1 0 - 2 %    Lymphocytes Relative 11 (L) 14 - 44 %    Monocytes Relative 14 (H) 4 - 12 %    Eosinophils Relative 4 0 - 6 %    Basophils Relative 1 0 - 1 %    Neutrophils Absolute 4 51 1 85 - 7 62 Thousands/µL    Immature Grans Absolute 0 04 0 00 - 0 20 Thousand/uL    Lymphocytes Absolute 0 70 0 60 - 4 47 Thousands/µL    Monocytes Absolute 0 92 0 17 - 1 22 Thousand/µL    Eosinophils Absolute 0 27 0 00 - 0 61 Thousand/µL    Basophils Absolute 0 06 0 00 - 0 10 Thousands/µL   Fingerstick Glucose (POCT)    Collection Time: 21 11:17 AM   Result Value Ref Range    POC Glucose 121 65 - 140 mg/dl           Imaging Studies: Echo complete with contrast if indicated    Result Date: 6/15/2021  Narrative: [id number] [facility] [phone] Transthoracic Echocardiogram 2D, M-mode, Doppler, and Color Doppler Study date:  15-Bishop-2021 Patient: Autumn Orr MR number: [id number] Account number: [de-identified] : 1945 Age: 77 years Gender: Male Status: Inpatient Location: Bedside Height: 67 in Weight: 202 6 lb BP: 96/ 66 mmHg Indications: Heart Failure Diagnoses: I50 9 - Heart failure, unspecified Sonographer:  Davee Osler, RCS Referring Physician:  Gene Zhou MD Group:  [facility] Interpreting Physician:  Estrellita Ewing MD SUMMARY PROCEDURE INFORMATION: Acoustic windows were suboptimal and the patient was tachycardic during the study LEFT VENTRICLE: Normal left ventricular chamber size Mild left ventricular hypertrophy Ejection fraction is estimated at 55-60% No definite regional wall motion abnormality seen although it cannot be absolutely excluded on the basis of this study Indeterminate diastolic function VENTRICULAR SEPTUM: There was paradoxical motion  These changes are consistent with right ventricular pacing  RIGHT VENTRICLE: Normal right ventricular size and systolic function TAPSE is 2 0 cm Pacemaker lead visualized MITRAL VALVE: Mildly thickened mitral valve leaflets with moderate annular calcification   Leaflets show adequate excursion There is no evidence of significant mitral stenosis or regurgitation AORTIC VALVE: Aortic valve is probably trileaflet Leaflets display normal thickness and cuspal separation There is no evidence of aortic stenosis or aortic regurgitation TRICUSPID VALVE: There was trace regurgitation  Normal RV systolic pressure PULMONIC VALVE: There was trace regurgitation  AORTA: Upper normal aortic root size HISTORY: PRIOR HISTORY: Cancer,Diabetes,HTN,pacemaker,Cholecystectomy,prostate surgery, former smoker  PROCEDURE: The procedure was performed at the bedside  This was a routine study  Acoustic windows were suboptimal and the patient was tachycardic during the study The transthoracic approach was used  The study included complete 2D imaging, M-mode, complete spectral Doppler, and color Doppler  The heart rate was 107 bpm, at the start of the study  Images were obtained from the parasternal, apical, subcostal, and suprasternal notch acoustic windows  Image quality was adequate  LEFT VENTRICLE: Normal left ventricular chamber size Mild left ventricular hypertrophy Ejection fraction is estimated at 55-60% No definite regional wall motion abnormality seen although it cannot be absolutely excluded on the basis of this study Indeterminate diastolic function VENTRICULAR SEPTUM: There was paradoxical motion  These changes are consistent with right ventricular pacing  RIGHT VENTRICLE: Normal right ventricular size and systolic function TAPSE is 2 0 cm Pacemaker lead visualized LEFT ATRIUM: Size was normal  RIGHT ATRIUM: Size was normal  Pacemaker lead visualized MITRAL VALVE: Mildly thickened mitral valve leaflets with moderate annular calcification  Leaflets show adequate excursion There is no evidence of significant mitral stenosis or regurgitation AORTIC VALVE: Aortic valve is probably trileaflet Leaflets display normal thickness and cuspal separation There is no evidence of aortic stenosis or aortic regurgitation TRICUSPID VALVE: The valve structure was normal  There was normal leaflet separation   DOPPLER: The transtricuspid velocity was within the normal range  There was no evidence for stenosis  There was trace regurgitation  Normal RV systolic pressure PULMONIC VALVE: Leaflets exhibited normal thickness, no calcification, and normal cuspal separation  DOPPLER: The transpulmonic velocity was within the normal range  There was trace regurgitation  PERICARDIUM: No pericardial effusion seen AORTA: Upper normal aortic root size SYSTEMIC VEINS: IVC: The inferior vena cava was normal in size and course  Respirophasic changes were normal  SYSTEM MEASUREMENT TABLES 2D EF (Teich): 54 6 % %FS: 27 16 % Ao Diam: 3 68 cm EDV(Teich): 32 48 ml ESV(Teich): 14 75 ml IVSd: 1 04 cm LA Diam: 2 48 cm LVEDV MOD A4C: 96 47 ml LVEF MOD A4C: 49 8 % LVESV MOD A4C: 48 43 ml LVIDd: 2 91 cm LVIDs: 2 12 cm LVLd A4C: 7 38 cm LVLs A4C: 6 82 cm LVOT Diam: 2 03 cm LVPWd: 0 97 cm SV (Teich): 17 73 ml SV MOD A4C: 48 04 ml CW AV Vmax: 1 37 m/s AV maxP 54 mmHg MM TAPSE: 2 02 cm PW MENA Vmax, Pt: 2 18 cm2 E' Lat: 0 11 m/s E' Sept: 0 09 m/s LVOT Vmax: 0 93 m/s LVOT maxPG: 3 43 [facility] Accredited [facility]ch[facility] Prepared and electronically signed by Army Presley MD Signed 15-Bishop-2021 14:40:32     XR chest 1 view portable    Result Date: 2021  Narrative: CHEST INDICATION:   SOB  COMPARISON:  Multiple priors most recently CT chest abdomen pelvis 2021 EXAM PERFORMED/VIEWS:  XR CHEST PORTABLE FINDINGS:  Left chest wall pacer leads overlying the right atrium and right ventricle  Right chest wall MediPort tip overlies the right atrium  Cardiomediastinal silhouette appears unremarkable  Loculated right pleural effusion, with underlying opacification the right lung, for which pneumonia is not excluded  No pneumothorax  Known osseous metastatic lesions are poorly characterized       Impression: Moderate to large loculated right pleural effusion with underlying opacification of the right lung, suggestive of atelectasis or consolidation  Workstation performed: [id number]     IR INPATIENT Paracentesis    Result Date: 6/14/2021  Narrative: Ultrasound-guided paracentesis Clinical History: Patient with a history of lung cancer presenting with respiratory failure and new ascites  Technique: Patient was brought to the interventional radiology area and placed supine on the stretcher  After a brief ultrasound examination was performed to localize a pocket of fluid,an area on the skin of the left lower quadrant was prepped, and draped in the usual sterile fashion  Lidocaine was administered to the skin and a small skin incision was made  A 5 Western Raiela Yueh needle was advanced into the fluid and 2000 mL clear yellow ascites was aspirated  Specimens were sent to the lab as requested  After the procedure, the catheter was removed and a bandage applied to the site  The patient tolerated the procedure well and suffered no complications  Impression: Impression: Successful ultrasound-guided paracentesis yielding 2000 mL clear yellow ascites   Workstation performed: [id number]     CTA ED chest PE study    Result Date: 6/13/2021  Narrative: CTA - CHEST WITH IV CONTRAST - PULMONARY ANGIOGRAM INDICATION:   Shortness of breath  Cough  Brown sputum production  COMPARISON: Prior CT 4/2/2021  PET/CT 5/4/2021 TECHNIQUE: CTA examination of the chest was performed using angiographic technique according to a protocol specifically tailored to evaluate for pulmonary embolism  Axial, sagittal, and coronal 2D reformatted images were created from the source data and  submitted for interpretation  In addition, coronal 3D MIP postprocessing was performed on the acquisition scanner  Radiation dose length product (DLP) for this visit:  373 mGy-cm     This examination, like all CT scans performed in the [facility], was performed utilizing techniques to minimize radiation dose exposure, including the use of iterative reconstruction and automated exposure control  IV Contrast:  65 mL of iohexol (OMNIPAQUE)  FINDINGS: PULMONARY ARTERIAL TREE:  There is no evidence of acute pulmonary embolism  There are several thin linear filling defects within the right pulmonary artery system for example image 42/86 which may represent fibrin strands related to a previous episode of  embolism  LUNGS:  Subpleural lingular 4 mm nodule image 4 5/62 not present earlier  Right lung volume loss, coarse linear scarring, and compression due to pleural disease  There is likely an increase in right basilar airspace disease, either atelectasis or infiltrate PLEURA:  There is development of a new small left effusion  Loculated complex right-sided pleural effusion  Radiopaque material along the margins of the disease may represent prior talc pleurodesis  Lateral inferior thecal shape collection demonstrates foci of air as on numerous previous studies such as 10/7/2020  The amount of air appears somewhat diminished  HEART/GREAT VESSELS:  Transvenous pacemaker  No pericardial effusion  MEDIASTINUM AND SHON:  Correlating with recent PET/CT findings, mild right paratracheal and subcarinal adenopathy, which was glucose avid on prior study most suggestive of malignant adenopathy CHEST WALL AND LOWER NECK:   Unremarkable  VISUALIZED STRUCTURES IN THE UPPER ABDOMEN:  Significant increase in the amount of ascites since earlier PET/CT  Partial visualization of liver demonstrates somewhat heterogeneous density  Partial visualization of left kidney upper pole cyst  OSSEOUS STRUCTURES:  Subtle abnormal bone mineral density in the right scapula which corresponds to the site of glucose avid change on prior PET/CT concerning for osseous metastasis     Impression: 1  No evidence of acute pulmonary embolism  Several thin linear defects within the pulmonary arterial system, may represent fibrin strands from a previous episode of pulmonary embolism   2  Increasing opacification in the right lung base suggesting worsening atelectasis or pneumonia  3  Complex right-sided loculated pleural effusion, the lateral inferior component containing bubbles of air as on numerous prior studies  4  New the lingula nodule suspicious for metastatic disease 5  Development of a large amount of ascites within the upper abdomen  This is concerning for malignant ascites 6  In conjunction with prior PET/CT, evidence of malignant mediastinal adenopathy, and right scapular metastatic lesion   Workstation performed: [id number]       Problem List:   Patient Active Problem List   Diagnosis    Carcinoma of lung, right ([address] )    Pleural effusion    COPD (chronic obstructive pulmonary disease) ([address] )    History of pulmonary embolism    AV block    Type 2 diabetes mellitus ([address] )    Acute respiratory failure with hypoxia ([address] )    Metastatic lung cancer (metastasis from lung to other site) ([address] )    Hyponatremia    Acute renal failure superimposed on stage 3 chronic kidney disease ([address] )    Ascites    Sepsis ([address] )    Ambulatory dysfunction    Dysphagia         NERISSA Fisher

## 2021-06-17 NOTE — ASSESSMENT & PLAN NOTE
Patient has exudative ascites suspected malignant ascites  However cell count showed polymorphous predominance but culture data negative  Patient did not have any abdominal pain also  Patient is status post IR drainage of ascites fluid which was 2 L  And received albumin IV as well after that  Abdomen is huge on exam  Will order limited ultrasound today

## 2021-06-17 NOTE — ASSESSMENT & PLAN NOTE
Lab Results   Component Value Date    EGFR 43 06/15/2021    EGFR 43 06/14/2021    EGFR 45 06/14/2021    CREATININE 1 55 (H) 06/15/2021    CREATININE 1 55 (H) 06/14/2021    CREATININE 1 50 (H) 06/14/2021     Monitor BMP  Patient is on Lasix 40 mg daily at home, currently on hold  Will consider resuming this, once patient's appetite is improved  Currently patient had a poor appetite    Also patient is running Hypotensive and will need to hold it on d/c

## 2021-06-18 ENCOUNTER — ANESTHESIA (INPATIENT)
Dept: PERIOP | Facility: HOSPITAL | Age: 76
DRG: 180 | End: 2021-06-18
Payer: COMMERCIAL

## 2021-06-18 ENCOUNTER — APPOINTMENT (INPATIENT)
Dept: PERIOP | Facility: HOSPITAL | Age: 76
DRG: 180 | End: 2021-06-18
Payer: COMMERCIAL

## 2021-06-18 ENCOUNTER — APPOINTMENT (INPATIENT)
Dept: NON INVASIVE DIAGNOSTICS | Facility: HOSPITAL | Age: 76
DRG: 180 | End: 2021-06-18
Payer: COMMERCIAL

## 2021-06-18 ENCOUNTER — ANESTHESIA EVENT (INPATIENT)
Dept: PERIOP | Facility: HOSPITAL | Age: 76
DRG: 180 | End: 2021-06-18
Payer: COMMERCIAL

## 2021-06-18 LAB
ANION GAP SERPL CALCULATED.3IONS-SCNC: 14 MMOL/L (ref 4–13)
BUN SERPL-MCNC: 31 MG/DL (ref 5–25)
CALCIUM SERPL-MCNC: 7.2 MG/DL (ref 8.3–10.1)
CHLORIDE SERPL-SCNC: 100 MMOL/L (ref 100–108)
CO2 SERPL-SCNC: 16 MMOL/L (ref 21–32)
CREAT SERPL-MCNC: 1.59 MG/DL (ref 0.6–1.3)
GFR SERPL CREATININE-BSD FRML MDRD: 42 ML/MIN/1.73SQ M
GLUCOSE SERPL-MCNC: 103 MG/DL (ref 65–140)
GLUCOSE SERPL-MCNC: 112 MG/DL (ref 65–140)
GLUCOSE SERPL-MCNC: 114 MG/DL (ref 65–140)
GLUCOSE SERPL-MCNC: 115 MG/DL (ref 65–140)
GLUCOSE SERPL-MCNC: 91 MG/DL (ref 65–140)
MAGNESIUM SERPL-MCNC: 2.1 MG/DL (ref 1.6–2.6)
POTASSIUM SERPL-SCNC: 4.2 MMOL/L (ref 3.5–5.3)
PROCALCITONIN SERPL-MCNC: 0.29 NG/ML
SODIUM SERPL-SCNC: 130 MMOL/L (ref 136–145)

## 2021-06-18 PROCEDURE — 84145 PROCALCITONIN (PCT): CPT | Performed by: NURSE PRACTITIONER

## 2021-06-18 PROCEDURE — 88305 TISSUE EXAM BY PATHOLOGIST: CPT | Performed by: PATHOLOGY

## 2021-06-18 PROCEDURE — 99222 1ST HOSP IP/OBS MODERATE 55: CPT | Performed by: INTERNAL MEDICINE

## 2021-06-18 PROCEDURE — 82948 REAGENT STRIP/BLOOD GLUCOSE: CPT

## 2021-06-18 PROCEDURE — 0W9G3ZZ DRAINAGE OF PERITONEAL CAVITY, PERCUTANEOUS APPROACH: ICD-10-PCS | Performed by: RADIOLOGY

## 2021-06-18 PROCEDURE — 49083 ABD PARACENTESIS W/IMAGING: CPT | Performed by: RADIOLOGY

## 2021-06-18 PROCEDURE — 80048 BASIC METABOLIC PNL TOTAL CA: CPT | Performed by: NURSE PRACTITIONER

## 2021-06-18 PROCEDURE — 49083 ABD PARACENTESIS W/IMAGING: CPT

## 2021-06-18 PROCEDURE — 83735 ASSAY OF MAGNESIUM: CPT | Performed by: NURSE PRACTITIONER

## 2021-06-18 PROCEDURE — 43239 EGD BIOPSY SINGLE/MULTIPLE: CPT | Performed by: INTERNAL MEDICINE

## 2021-06-18 PROCEDURE — 99232 SBSQ HOSP IP/OBS MODERATE 35: CPT | Performed by: NURSE PRACTITIONER

## 2021-06-18 PROCEDURE — 0DB68ZX EXCISION OF STOMACH, VIA NATURAL OR ARTIFICIAL OPENING ENDOSCOPIC, DIAGNOSTIC: ICD-10-PCS | Performed by: INTERNAL MEDICINE

## 2021-06-18 RX ORDER — LIDOCAINE WITH 8.4% SOD BICARB 0.9%(10ML)
SYRINGE (ML) INJECTION CODE/TRAUMA/SEDATION MEDICATION
Status: COMPLETED | OUTPATIENT
Start: 2021-06-18 | End: 2021-06-18

## 2021-06-18 RX ORDER — ALBUMIN (HUMAN) 12.5 G/50ML
12.5 SOLUTION INTRAVENOUS EVERY 8 HOURS
Status: COMPLETED | OUTPATIENT
Start: 2021-06-18 | End: 2021-06-19

## 2021-06-18 RX ORDER — PROPOFOL 10 MG/ML
INJECTION, EMULSION INTRAVENOUS AS NEEDED
Status: DISCONTINUED | OUTPATIENT
Start: 2021-06-18 | End: 2021-06-18

## 2021-06-18 RX ORDER — ALBUMIN (HUMAN) 12.5 G/50ML
25 SOLUTION INTRAVENOUS ONCE
Status: COMPLETED | OUTPATIENT
Start: 2021-06-18 | End: 2021-06-18

## 2021-06-18 RX ORDER — LIDOCAINE HYDROCHLORIDE 10 MG/ML
INJECTION, SOLUTION EPIDURAL; INFILTRATION; INTRACAUDAL; PERINEURAL AS NEEDED
Status: DISCONTINUED | OUTPATIENT
Start: 2021-06-18 | End: 2021-06-18

## 2021-06-18 RX ORDER — SODIUM CHLORIDE, SODIUM LACTATE, POTASSIUM CHLORIDE, CALCIUM CHLORIDE 600; 310; 30; 20 MG/100ML; MG/100ML; MG/100ML; MG/100ML
INJECTION, SOLUTION INTRAVENOUS CONTINUOUS PRN
Status: DISCONTINUED | OUTPATIENT
Start: 2021-06-18 | End: 2021-06-18

## 2021-06-18 RX ORDER — MIDODRINE HYDROCHLORIDE 5 MG/1
5 TABLET ORAL
Status: DISCONTINUED | OUTPATIENT
Start: 2021-06-18 | End: 2021-06-19

## 2021-06-18 RX ADMIN — PROPOFOL 50 MG: 10 INJECTION, EMULSION INTRAVENOUS at 14:45

## 2021-06-18 RX ADMIN — CALCIUM CARBONATE 500 MG (1,250 MG)-VITAMIN D3 200 UNIT TABLET 1 TABLET: at 15:47

## 2021-06-18 RX ADMIN — ACYCLOVIR 200 MG: 200 CAPSULE ORAL at 08:28

## 2021-06-18 RX ADMIN — Medication 8 ML: at 09:05

## 2021-06-18 RX ADMIN — ALBUMIN (HUMAN) 12.5 G: 0.25 INJECTION, SOLUTION INTRAVENOUS at 18:25

## 2021-06-18 RX ADMIN — APIXABAN 5 MG: 5 TABLET, FILM COATED ORAL at 08:28

## 2021-06-18 RX ADMIN — Medication 1000 UNITS: at 15:47

## 2021-06-18 RX ADMIN — CYANOCOBALAMIN TAB 500 MCG 1000 MCG: 500 TAB at 15:45

## 2021-06-18 RX ADMIN — APIXABAN 5 MG: 5 TABLET, FILM COATED ORAL at 18:24

## 2021-06-18 RX ADMIN — PRAVASTATIN SODIUM 20 MG: 20 TABLET ORAL at 16:07

## 2021-06-18 RX ADMIN — MIRTAZAPINE 15 MG: 15 TABLET, FILM COATED ORAL at 08:28

## 2021-06-18 RX ADMIN — Medication 250 MG: at 18:24

## 2021-06-18 RX ADMIN — MIDODRINE HYDROCHLORIDE 5 MG: 5 TABLET ORAL at 13:23

## 2021-06-18 RX ADMIN — FOLIC ACID 1000 MCG: 1 TABLET ORAL at 15:47

## 2021-06-18 RX ADMIN — ALBUMIN (HUMAN) 25 G: 0.25 INJECTION, SOLUTION INTRAVENOUS at 10:54

## 2021-06-18 RX ADMIN — LIDOCAINE HYDROCHLORIDE 50 MG: 10 INJECTION, SOLUTION EPIDURAL; INFILTRATION; INTRACAUDAL; PERINEURAL at 14:45

## 2021-06-18 RX ADMIN — MIDODRINE HYDROCHLORIDE 5 MG: 5 TABLET ORAL at 15:47

## 2021-06-18 RX ADMIN — Medication 250 MG: at 08:29

## 2021-06-18 RX ADMIN — SODIUM CHLORIDE, SODIUM LACTATE, POTASSIUM CHLORIDE, AND CALCIUM CHLORIDE: .6; .31; .03; .02 INJECTION, SOLUTION INTRAVENOUS at 14:41

## 2021-06-18 RX ADMIN — LEVOFLOXACIN 750 MG: 500 TABLET, FILM COATED ORAL at 15:46

## 2021-06-18 RX ADMIN — PANTOPRAZOLE SODIUM 40 MG: 40 TABLET, DELAYED RELEASE ORAL at 05:13

## 2021-06-18 NOTE — BRIEF OP NOTE (RAD/CATH)
INTERVENTIONAL RADIOLOGY PROCEDURE NOTE    Date: 6/18/2021    Procedure: Procedure name not found  Preoperative diagnosis:   1  Dysphagia    2  Metastatic lung cancer (metastasis from lung to other site) ([facility] [address] )    3  Ascites    4  Acute renal failure superimposed on stage 3 chronic kidney disease ([facility] [address] )    5  Hyponatremia         Postoperative diagnosis: Same  Surgeon: Jaqueline Foster MD     Assistant: None  No qualified resident was available  Blood loss:  Minimal    Specimens:  None     Findings:  Paracentesis    Complications: None immediate      Anesthesia: local

## 2021-06-18 NOTE — PROGRESS NOTES
Sadia 46  Progress Note Su Olsen 1945, 77 y o  male MRN: [id number]  Unit/Bed#: [address] [place] [address] Encounter: [id number]  Primary Care Provider: Marvin Merrill MD   Date and time admitted to hospital: 6/14/2021 10:31 AM    * Ascites  Assessment & Plan  Patient has exudative ascites secondary to malignancy  Cell count showed polymorphous predominance but culture data negative  Patient did not have any abdominal pain also  IR paracentesis on 6/14/2021 with removal of 2 L clear yellow ascites  Received albumin IV after that  IR paracentesis today with removal of 3 9 L of clear yellow ascites  Fluid cytology on 6/14 positive for malignancy, compatible with lung primary  Started patient on IV Albumin post paracentesis today  Diuretics not indicated for malignant ascites per discussion with GI  Daily weight and I&Os  Will consult oncology        Acute renal failure superimposed on stage 3 chronic kidney disease [facility])  Assessment & Plan  Lab Results   Component Value Date    EGFR 42 06/18/2021    EGFR 42 06/17/2021    EGFR 43 06/15/2021    CREATININE 1 59 (H) 06/18/2021    CREATININE 1 60 (H) 06/17/2021    CREATININE 1 55 (H) 06/15/2021     Baseline creatinine appears to be around 0 9-1 2  Creatinine 1 55 on admission  Patient is on Lasix 40 mg daily at home, currently on hold  Also patient is running Hypotensive and poor oral intake due to dysphagia  Patient appears intravascularly depleted  Received gentle IV hydration since yesterday evening  Creatinine today 1 59  Started IV albumin today  Avoid nephrotoxins and hypotension  Consult nephrology     Dysphagia  Assessment & Plan  Patient reports having trouble swallowing solid food,thus not eating much  Denies nausea vomiting chest pain  Speech swallow eval yesterday was limited due to prolonged retching during assessment  Consulted GI, appreciate input    For EGD today    Acute respiratory failure with hypoxia [facility])  Assessment & Plan  Multifactorial most likely secondary to significant ascites causing hypoventilation, right-sided loculated pleural effusion, and lung cancer along with suspected pneumonia  Initially patient was hypoxic requiring 2-4 L of oxygen via nasal cannula which worsened to 5 L and hence required to be in the ICU  Patient was on room air yesterday, satting 92%  O2 2 L applied overnight  Patient satting high 90s  Will titrate off oxygen as tolerated to maintain sats > 92%  Patient possibly desaturates on exertion however has not exerted too much  Sepsis [facility])  Assessment & Plan  CTA showed - Increasing opacification in the right lung base suggesting worsening atelectasis or pneumonia  Patient is on Levaquin, continue the same  Gradually resolving  Will finish a 7 day course of  Levaquin,last dose today  Currently due to renal Function Levaquin dose is 750 mg q48 hrs  Patient afebrile, denies cough SOB  Blood cultures negative to date  Procalcitonin mild elevated to 0 36, repeat today 0 29    Results from last 7 days   Lab Units 06/18/21  0531 06/14/21  0547 06/14/21  0009 06/13/21  1907 06/13/21  1602   LACTIC ACID mmol/L  --   --  2 0  --  1 4   PROCALCITONIN ng/ml 0 29* 0 36*  --  0 18  --      Results from last 7 days   Lab Units 06/17/21  0954 06/15/21  0521 06/14/21  0547 06/13/21  1540   WBC Thousand/uL 6 50 4 83 4 71 6 25             Pleural effusion  Assessment & Plan  Loculated right-sided pleural effusion, IR evaluated the patient and not enough fluid for drainage  Continue supportive care  Hyponatremia  Assessment & Plan  Combination of SIADH and pre renal   Na 130 today  132 yesterday  Discontinue IV normal saline  Monitor BMP  Consult renal      Ambulatory dysfunction  Assessment & Plan  Patient would benefit from short-term rehab  PT OT evaluated the patient and recommended short-term rehab    Case management following    Type 2 diabetes mellitus [facility])  Assessment & Plan  Lab Results   Component Value Date    HGBA1C 6 4 (H) 06/16/2021       Recent Labs     06/17/21  1611 06/17/21  2105 06/18/21  0725 06/18/21  1117   POCGLU 113 162* 115 112       Blood Sugar Average: Last 72 hrs:  (P) 739 [id number]   Well controlled type 2 diabetes  Continue patient on low-dose sliding scale insulin with Accu-Chek q i d        AV block  Assessment & Plan  Status post pacemaker insertion  Continue on atenolol with holding parameters  Patient has tachycardia which is more likely secondary to cancer related and reactive  In May 12th 2021 with office was with Pulmonary patient already had a heart rate of 106 at that time  Telemetry showed ST,HR low 100's  Discontinued  Optimize electrolytes      History of pulmonary embolism  Assessment & Plan  History of DVT and PE  On Eliquis as outpatient  No acute PE on CTA  Continue patient on Eliquis 5 mg b i d  Carcinoma of lung, right [facility])  Assessment & Plan  History of non-small-cell lung cancer diagnosed in December 2019  Patient underwent VATS with pleural decortication and pleurodesis in 7/2020  Patient was on Brigatinib, now on chemotherapy every 3 weeks and sees Dr Vanessa Mejia every Monday per patient  Not sure exactly what treatment he is on   CT suggested possible advancement of metastatic disease with involvement of the lingula  Consult oncology          VTE Pharmacologic Prophylaxis:   Pharmacologic: Apixaban (Eliquis)  Mechanical VTE Prophylaxis in Place: Yes    Patient Centered Rounds: I have performed bedside rounds with nursing staff today  Discussions with Specialists or Other Care Team Provider: yes    Education and Discussions with Family / Patient: yes    Time Spent for Care: 20 minutes  More than 50% of total time spent on counseling and coordination of care as described above      Current Length of Stay: 4 day(s)    Current Patient Status: Inpatient   Certification Statement: The patient will continue to require additional inpatient hospital stay due to recurrent ascites,CONCEPCIÓN on CKD    Discharge Plan: STR once medically clears    Code Status: Level 2 - DNAR: but accepts endotracheal intubation      Subjective:   Patient denies N/V/D,constipation  Reports SOB at times  Denies chest pain, headache, dizziness, lightheadedness, fever, chills  Objective:     Vitals:   Temp (24hrs), Av 5 °F (36 4 °C), Min:97 2 °F (36 2 °C), Max:97 8 °F (36 6 °C)    Temp:  [97 2 °F (36 2 °C)-97 8 °F (36 6 °C)] 97 5 °F (36 4 °C)  HR:  [102-118] 105  Resp:  [16-20] 20  BP: ()/(58-92) 92/69  SpO2:  [92 %-99 %] 99 %  Body mass index is 31 8 kg/m²  Input and Output Summary (last 24 hours): Intake/Output Summary (Last 24 hours) at 2021 1227  Last data filed at 2021 0932  Gross per 24 hour   Intake 240 ml   Output 4300 ml   Net -4060 ml       Physical Exam:     Physical Exam  Vitals and nursing note reviewed  Constitutional:       Appearance: He is well-developed  HENT:      Head: Normocephalic and atraumatic  Mouth/Throat:      Comments: Dry mucous membrane  Neck:      Thyroid: No thyromegaly  Vascular: No JVD  Trachea: No tracheal deviation  Cardiovascular:      Rate and Rhythm: Tachycardia present  Heart sounds: Normal heart sounds  No murmur heard  Comments: Mild tachycardia,HR low 100's  Tele ST,low 100's  Pulmonary:      Effort: Pulmonary effort is normal  No respiratory distress  Breath sounds: Normal breath sounds  No wheezing or rales  Abdominal:      General: Bowel sounds are normal  There is no distension  Palpations: Abdomen is soft  Tenderness: There is no abdominal tenderness  There is no guarding  Comments: Abdomen soft nontender, status post paracentesis   Musculoskeletal:         General: No swelling or deformity  Cervical back: Neck supple  Right lower leg: No edema  Left lower leg: No edema     Skin:     General: Skin is warm and dry  Neurological:      General: No focal deficit present  Mental Status: He is alert and oriented to person, place, and time  Psychiatric:         Mood and Affect: Mood normal          Judgment: Judgment normal          Additional Data:     Labs:    Results from last 7 days   Lab Units 06/17/21  0954   WBC Thousand/uL 6 50   HEMOGLOBIN g/dL 10 5*   HEMATOCRIT % 34 2*   PLATELETS Thousands/uL 237   NEUTROS PCT % 69   LYMPHS PCT % 11*   MONOS PCT % 14*   EOS PCT % 4     Results from last 7 days   Lab Units 06/18/21  0531 06/17/21  0954   POTASSIUM mmol/L 4 2 4 7   CHLORIDE mmol/L 100 99*   CO2 mmol/L 16* 20*   BUN mg/dL 31* 29*   CREATININE mg/dL 1 59* 1 60*   CALCIUM mg/dL 7 2* 7 9*   ALK PHOS U/L  --  47   ALT U/L  --  16   AST U/L  --  18           * I Have Reviewed All Lab Data Listed Above  * Additional Pertinent Lab Tests Reviewed: Blas 67 Admission Reviewed    Imaging:    Imaging Reports Reviewed Today Include:  Abdominal ultrasound  Imaging Personally Reviewed by Myself Includes:  None    Recent Cultures (last 7 days):     Results from last 7 days   Lab Units 06/14/21  1516 06/13/21  1916 06/13/21  1621 06/13/21  1616   BLOOD CULTURE   --   --  No Growth at 72 hrs  No Growth After 4 Days     GRAM STAIN RESULT  Rare Polys  No organisms seen  --   --   --    BODY FLUID CULTURE, STERILE  No growth  --   --   --    LEGIONELLA URINARY ANTIGEN   --  Negative  --   --        Last 24 Hours Medication List:   Current Facility-Administered Medications   Medication Dose Route Frequency Provider Last Rate    albumin human  12 5 g Intravenous Q8H NERISSA Hagan      apixaban  5 mg Oral BID Aniyah Esteves MD      atenolol  25 mg Oral Daily Aniyah Esteves MD      calcium carbonate-vitamin D  1 tablet Oral Daily With Breakfast Aniyah Esteves MD      cholecalciferol  1,000 Units Oral Daily Aniyah Esteves MD      cyanocobalamin  1,000 mcg Oral Daily Aniyah Esteves MD  folic acid  2,551 mcg Oral Daily Arsalan Elaine MD      insulin lispro  1-6 Units Subcutaneous TID AC Ciarrakin Mayra Sethi MD      levofloxacin  750 mg Oral Q48H Arsalan Elaine MD      melatonin  3 mg Oral HS PRN Nithya Scruggs PA-C      mirtazapine  15 mg Oral Daily Arsalan Elaine MD      oxyCODONE-acetaminophen  1 tablet Oral Q6H PRN Arsalan Elaine MD      pantoprazole  40 mg Oral QAM Arsalan Elaine MD      pravastatin  20 mg Oral Daily With Fawn Osullivan MD      saccharomyces boulardii  250 mg Oral BID NERISSA Mott          Today, Patient Was Seen By: NERISSA Mott    ** Please Note: Dragon 360 Dictation voice to text software may have been used in the creation of this document   **

## 2021-06-18 NOTE — ASSESSMENT & PLAN NOTE
Multifactorial most likely secondary to significant ascites causing hypoventilation, right-sided loculated pleural effusion, and lung cancer along with suspected pneumonia  Initially patient was hypoxic requiring 2-4 L of oxygen via nasal cannula which worsened to 5 L and hence required to be in the ICU  Patient was on room air yesterday, satting 92%  O2 2 L applied overnight  Patient satting high 90s  Will titrate off oxygen as tolerated to maintain sats > 92%  Patient possibly desaturates on exertion however has not exerted too much

## 2021-06-18 NOTE — INTERVAL H&P NOTE
H&P reviewed  After examining the patient I find no changes in the patients condition since the H&P had been written      Vitals:    06/18/21 1415   BP: 91/53   Pulse: 102   Resp: 18   Temp: (!) 97 °F (36 1 °C)   SpO2: 98%

## 2021-06-18 NOTE — CONSULTS
nEid Domínguez  1945    HEMATOLOGY/ONCOLOGY CONSULTATION REPORT    DISCUSSION/SUMMARY:    32-year-old male with a history of metastatic non-small cell lung carcinoma  Issues:    Ascites  Recent paracentesis demonstrated TTF1 positive adenocarcinoma in the ascitic fluid  If the ascitic fluid reaccumulates persistently/significantly, patient will need to be evaluated for catheter placement  Additional information is needed  Progressive ascites would be consistent with disease progression - possibly patient's present regimen needs to be amended  Medical oncology will reach out to Dr Aaron Lujan for additional information including the most recent treatment plan  There are other options  The above is for general information purposes only  There may be other clinical data not presently available/known that would preclude use of the outlined options above  Dysphagia  Etiology unclear  Patient has been seen by GI  Upper endoscopy is pending  Need to rule out infection, compression, other etc     Concern for sepsis  CTA demonstrated opacification of right lung base; concern for pneumonia  Sepsis workup is in process; patient is on antibiotics  Right-sided pleural effusion  Patient seems to be relatively stable from a respiratory standpoint  Pleural effusions is loculated  History PE  Patient is on Eliquis  Obviously patient's risk for thrombosis is high - metastatic disease, immobility, prior thrombotic episode  Comorbidities  Patient has diabetes, av block, chronic kidney disease - this may limit future treatment options  Will defer to Dr Aaron Lujan after patient has been discharged  The above was discussed with the patient and wife; all questions were answered  Please do not hesitate to contact me if you have any questions or need additional information    Thank you for this consult     _________________________________________________________________________________    Chief complaint:  Dysphagia, history of metastatic non-small cell lung carcinoma    History of Present Illness:  25-year-old male with history of metastatic bronchogenic carcinoma, malignant pleural effusions, diabetes, COPD, heart block with pacemaker insertion recently admitted to the hospital with trouble swallowing  Patient is followed by Dr Ruth Nesbitt  Specific information is not presently available but patient was previously diagnosed with 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 (treated with brigatinib previously)  Patient states that he last received this treatment approximately 5 months ago  More recently, Mr Uvaldo Manzo has been on "IV treatments" (NOS)  Presently patient states feeling okay, better than before  Swelling is better than before  Fatigue is the same, activities are very limited  No shortness of breath at rest, + dyspnea on exertion but no different than before  No bruising or bleeding  No nausea or vomiting but appetite is +/-  Review of Systems   Constitutional: Positive for fever  HENT: Negative  Eyes: Negative  Respiratory: Negative  Cardiovascular: Negative  Gastrointestinal: Negative  Endocrine: Negative  Genitourinary: Negative  Musculoskeletal: Negative  Skin: Negative  Allergic/Immunologic: Negative  Neurological: Negative  Hematological: Negative  Psychiatric/Behavioral: Negative  All other systems reviewed and are negative      Patient Active Problem List   Diagnosis    Carcinoma of lung, right [facility]    Pleural effusion    COPD (chronic obstructive pulmonary disease) ([facility]    History of pulmonary embolism    AV block    Type 2 diabetes mellitus ([facility]    Acute respiratory failure with hypoxia (HCC)    Metastatic lung cancer (metastasis from lung to other site) ([facility]    Hyponatremia    Acute renal failure superimposed on stage 3 chronic kidney disease (HCC)    Ascites    Sepsis ([facility]    Ambulatory dysfunction  Dysphagia     Past Medical History:   Diagnosis Date    Cancer ([facility] )     RT Lung    Diabetes mellitus ([facility] )     Hypertension      Past Surgical History:   Procedure Laterality Date    CARDIAC PACEMAKER PLACEMENT  2019    CHOLECYSTECTOMY      kidney damage from gallbladder removal     FOOT SURGERY      tendon    IR PARACENTESIS  2021    IR PARACENTESIS  2021    PORTACATH PLACEMENT      PROSTATE SURGERY       Family History   Family history unknown: Yes     Social History     Socioeconomic History    Marital status: /Civil Union     Spouse name: Not on file    Number of children: Not on file    Years of education: Not on file    Highest education level: Not on file   Occupational History    Not on file   Tobacco Use    Smoking status: Former Smoker     Packs/day: 2 00     Years: 9 00     Pack years: 18 00     Types: Cigarettes    Smokeless tobacco: Never Used   Vaping Use    Vaping Use: Never used   Substance and Sexual Activity    Alcohol use: Not Currently    Drug use: Never    Sexual activity: Not on file   Other Topics Concern    Not on file   Social History Narrative    · Most recent tobacco use screenin2020      · Live alone or with others:   with others      · Asbestos exposure:   No      · TB exposure:   No      · Environmental exposure:   No      · Animal exposure:   No     - [facility]      Social Determinants of Health     Financial Resource Strain:     Difficulty of Paying Living Expenses:    Food Insecurity:     Worried About Running Out of Food in the Last Year:     Ran Out of Food in the Last Year:    Transportation Needs:     Lack of Transportation (Medical):      Lack of Transportation (Non-Medical):    Physical Activity:     Days of Exercise per Week:     Minutes of Exercise per Session:    Stress:     Feeling of Stress :    Social Connections:     Frequency of Communication with Friends and Family:     Frequency of Social Gatherings with Friends and Family:     Attends Yazidism Services:     Active Member of Clubs or Organizations:     Attends Club or Organization Meetings:     Marital Status:    Intimate Partner Violence:     Fear of Current or Ex-Partner:     Emotionally Abused:     Physically Abused:     Sexually Abused:        Current Facility-Administered Medications:     albumin human (FLEXBUMIN) 25 % injection 12 5 g, 12 5 g, Intravenous, Q8H, NERISSA Hagan    apixaban (ELIQUIS) tablet 5 mg, 5 mg, Oral, BID, Francis Madrigal MD, 5 mg at 06/18/21 6852    atenolol (TENORMIN) tablet 25 mg, 25 mg, Oral, Daily, Francis Madrigal MD    calcium carbonate-vitamin D (OSCAL-D) 500 mg-200 units per tablet 1 tablet, 1 tablet, Oral, Daily With Breakfast, Francis Madrigal MD, 1 tablet at 06/17/21 0944    cholecalciferol (VITAMIN D3) tablet 1,000 Units, 1,000 Units, Oral, Daily, Francis Madrigal MD, 1,000 Units at 06/17/21 0940    cyanocobalamin (VITAMIN B-12) tablet 1,000 mcg, 1,000 mcg, Oral, Daily, Francis Madrigal MD, 1,000 mcg at 42/17/43 9416    folic acid (FOLVITE) tablet 1,000 mcg, 1,000 mcg, Oral, Daily, Francis Madrigal MD, 1,000 mcg at 06/17/21 0939    insulin lispro (HumaLOG) 100 units/mL subcutaneous injection 1-6 Units, 1-6 Units, Subcutaneous, TID AC **AND** Fingerstick Glucose (POCT), , , TID AC, Francis Madrigal MD    levofloxacin (LEVAQUIN) tablet 750 mg, 750 mg, Oral, Q48H, NERISSA Hagan    melatonin tablet 3 mg, 3 mg, Oral, HS PRN, Herson Liu PA-C, 3 mg at 06/15/21 2140    midodrine (PROAMATINE) tablet 5 mg, 5 mg, Oral, TID AC, NERISSA Hagan, 5 mg at 06/18/21 1323    mirtazapine (REMERON) tablet 15 mg, 15 mg, Oral, Daily, Francis Madrigal MD, 15 mg at 06/18/21 0828    oxyCODONE-acetaminophen (PERCOCET) 5-325 mg per tablet 1 tablet, 1 tablet, Oral, Q6H PRN, Francis Madrigal MD, 1 tablet at 06/16/21 2028    pantoprazole (PROTONIX) EC tablet 40 mg, 40 mg, Oral, QAM, Francis Madrigal MD, 40 mg at 06/18/21 8455    pravastatin (PRAVACHOL) tablet 20 mg, 20 mg, Oral, Daily With Anne Elena MD, 20 mg at 06/17/21 1723    saccharomyces boulardii (FLORASTOR) capsule 250 mg, 250 mg, Oral, BID, NERISSA Hagan, 250 mg at 06/18/21 4527    Allergies   Allergen Reactions    Diovan [Valsartan] Angioedema    Penicillins Anaphylaxis    Lisinopril     Oxytocin        Vitals:    06/18/21 1415   BP: 91/53   Pulse: 102   Resp: 18   Temp: (!) 97 °F (36 1 °C)   SpO2: 98%     Physical Exam  Constitutional:       Appearance: He is well-developed  Comments: Well-nourished male, no respiratory distress, no signs of pain   HENT:      Head: Normocephalic and atraumatic  Right Ear: External ear normal       Left Ear: External ear normal    Eyes:      Conjunctiva/sclera: Conjunctivae normal       Pupils: Pupils are equal, round, and reactive to light  Cardiovascular:      Rate and Rhythm: Normal rate and regular rhythm  Heart sounds: Normal heart sounds  Pulmonary:      Comments: Decreased breath sounds on right, rhonchi bilaterally, rales on right  Abdominal:      General: Bowel sounds are normal       Palpations: Abdomen is soft  Comments: Soft, nontender, no guarding, +bowel sounds, cannot palpate liver or spleen   Musculoskeletal:         General: Normal range of motion  Cervical back: Normal range of motion and neck supple  Skin:     General: Skin is warm  Comments: Slightly pale, warm, moist, no petechiae or ecchymoses   Neurological:      Mental Status: He is alert and oriented to person, place, and time  Deep Tendon Reflexes: Reflexes are normal and symmetric  Psychiatric:         Behavior: Behavior normal          Thought Content:  Thought content normal          Judgment: Judgment normal      Extremities:  0-1 +bilateral lower extremity edema, no cords, pulses are 1+  Lymphatics:  No adenopathy in the neck, supraclavicular region, axilla and groin bilaterally    Labs    Results for Sofy Blandon (MRN [id number]) as of 6/18/2021 14:40   Ref  Range 6/17/2021 09:54   WBC Latest Ref Range: 4 31 - 10 16 Thousand/uL 6 50   Red Blood Cell Count Latest Ref Range: 3 88 - 5 62 Million/uL 3 81 (L)   Hemoglobin Latest Ref Range: 12 0 - 17 0 g/dL 10 5 (L)   HCT Latest Ref Range: 36 5 - 49 3 % 34 2 (L)   MCV Latest Ref Range: 82 - 98 fL 90   MCH Latest Ref Range: 26 8 - 34 3 pg 27 6   MCHC Latest Ref Range: 31 4 - 37 4 g/dL 30 7 (L)   RDW Latest Ref Range: 11 6 - 15 1 % 17 9 (H)   Platelet Count Latest Ref Range: 149 - 390 Thousands/uL 237   MPV Latest Ref Range: 8 9 - 12 7 fL 9 2   nRBC Latest Units: /100 WBCs 0   Neutrophils % Latest Ref Range: 43 - 75 % 69   Immat GRANS % Latest Ref Range: 0 - 2 % 1   Lymphocytes Relative Latest Ref Range: 14 - 44 % 11 (L)   Monocytes Relative Latest Ref Range: 4 - 12 % 14 (H)     Results for Sofy Blandon (MRN [id number]) as of 6/18/2021 14:40   Ref  Range 6/17/2021 09:54   Sodium Latest Ref Range: 136 - 145 mmol/L 132 (L)   Potassium Latest Ref Range: 3 5 - 5 3 mmol/L 4 7   Chloride Latest Ref Range: 100 - 108 mmol/L 99 (L)   CO2 Latest Ref Range: 21 - 32 mmol/L 20 (L)   Anion Gap Latest Ref Range: 4 - 13 mmol/L 13   BUN Latest Ref Range: 5 - 25 mg/dL 29 (H)   Creatinine Latest Ref Range: 0 60 - 1 30 mg/dL 1 60 (H)   Glucose, Random Latest Ref Range: 65 - 140 mg/dL 104   Calcium Latest Ref Range: 8 3 - 10 1 mg/dL 7 9 (L)   CORRECTED CALCIUM Latest Ref Range: 8 3 - 10 1 mg/dL 9 3   AST Latest Ref Range: 5 - 45 U/L 18   ALT Latest Ref Range: 12 - 78 U/L 16   Alkaline Phosphatase Latest Ref Range: 46 - 116 U/L 47   Total Protein Latest Ref Range: 6 4 - 8 2 g/dL 5 2 (L)   Albumin Latest Ref Range: 3 5 - 5 0 g/dL 2 2 (L)   TOTAL BILIRUBIN Latest Ref Range: 0 20 - 1 00 mg/dL 0 71   eGFR Latest Units: ml/min/1 73sq m 42   Magnesium Latest Ref Range: 1 6 - 2 6 mg/dL 1 8       Imaging    06/13/2021 CTA chest PE study    1   No evidence of acute pulmonary embolism  Several thin linear defects within the pulmonary arterial system, may represent fibrin strands from a previous episode of pulmonary embolism  2  Increasing opacification in the right lung base suggesting worsening atelectasis or pneumonia  3  Complex right-sided loculated pleural effusion, the lateral inferior component containing bubbles of air as on numerous prior studies  4  New the lingula nodule suspicious for metastatic disease  5  Development of a large amount of ascites within the upper abdomen  This is concerning for malignant ascites  6  In conjunction with prior PET/CT, evidence of malignant mediastinal adenopathy, and right scapular metastatic lesion        Pathology    Case Report   Non-gynecologic Cytology                          Case: [id number]                                   Authorizing Provider: NERISSA Starkey          Collected:           06/14/2021 1516               Ordering Location:     [address] [place] [address] Received:            06/14/2021 1532                                      Intensive Care  Unit                                                          Pathologist:           Venus Alvarez MD                                                         Specimens:   A) - Ascitic Fluid                                                                                   B) - Ascitic Fluid, cell block                                                             Final Diagnosis   A -B  Ascitic Fluid (Thin-prep and cell block preparations):    Positive for malignancy  - Adenocarcinoma compatible with a lung primary  See note      Satisfactory for evaluation      Note: Tumor is positive with MOC31, partially positive with TTF 1 and Napsin A negative with CDX2, supporting the above interpretation  Prior history of metastatic lung carcinoma is noted  This case was reviewed at the intradepartmental  conference      Electronically signed by David Christopher MD on 6/17/2021 at 10:27 AM   Comments: This is an appended report  These results have been appended to a previously preliminary verified report  Preliminary Diagnosis   A -B  Ascitic Fluid (Thin-prep and cell block preparations):    Positive for malignancy, favor carcinoma  Confirmatory stains are pending      Satisfactory for evaluation     Preliminary result electronically signed by David Christopher MD on 6/16/2021 at 12:47 PM

## 2021-06-18 NOTE — UTILIZATION REVIEW
Continued Stay Review    Date:  6/18/21                         Current Patient Class: inaptient    Current Level of Care: acute          Assessment/Plan:   Per GI Consult  Patient has metastatic cancer and malignant pleural effusion  Patient seems to have difficulty in initiation of swallowing  Differential diagnosis includes infectious process such as yeast or viral illnesses of the esophagus  Cannot exclude myopathy of the throat muscles causing difficulty in swallowing which could be secondary to platinum based chemotherapy agents  Upper endoscopy will be done for evaluation of any obstructive lesion or inflammatory lesion of the esophagus or pharynx  Further recommendations will follow  ATTENDING   Acute Respiratory failure with hypoxia , patient had stay in ICU now gmf oxygen 2L nc desaturation with little exertion  Sepsis possible atelectasis or pneumonia continue Levaquin  Pleural effusion  Loculated right-sided pleural effusion, IR evaluated the patient and not enough fluid for drainage  CONCEPCIÓN continue hold lasix for now and pt also hypotensive   Abdomen large , US abdomen done yesterday showing Moderate quantity of diffuse abdominal ascites  For paracentesis today 6/18/21  Carcinoma of lung Right CT suggested possible advancement of metastatic disease with involvement of the lingula  Patient follows up with Dr Claudia Batres as an outpatient  6/14 cytology  positive for malignancy, compatible with lung primary  Consulting Oncology  IR PARACENTESIS : Paracentesis completed  3900 ml clear yellow fluid removed  Bandaid to site  Patient tolerated well       6/18 EGD:       Vital Signs:   /18/21 07:33:46  97 5 °F (36 4 °C)  103  18  90/69  76  98 %  28  2 L/min  Nasal cannula  Lying   06/18/21 0315  97 6 °F (36 4 °C)  109Abnormal   18  110/62  90  94 %             Pertinent Labs/Diagnostic Results:   6/17 US abdomen  Moderate quantity of diffuse abdominal ascites  Results from last 7 days   Lab Units 06/13/21  1602   SARS-COV-2  Negative     Results from last 7 days   Lab Units 06/17/21  0954 06/15/21  0521 06/14/21  0547 06/13/21  1540   WBC Thousand/uL 6 50 4 83 4 71 6 25   HEMOGLOBIN g/dL 10 5* 9 1* 9 6* 11 2*   HEMATOCRIT % 34 2* 30 0* 29 6* 34 7*   PLATELETS Thousands/uL 237 164 179 218   NEUTROS ABS Thousands/µL 4 51 3 30  --  4 15     Results from last 7 days   Lab Units 06/18/21  0531 06/17/21  0954 06/15/21  0521 06/14/21  0547 06/14/21  0009   SODIUM mmol/L 130* 132* 134* 130* 130*   POTASSIUM mmol/L 4 2 4 7 4 2 4 4 4 4   CHLORIDE mmol/L 100 99* 100 99 99   CO2 mmol/L 16* 20* 23 22 20*   ANION GAP mmol/L 14* 13 11 9 11   BUN mg/dL 31* 29* 27* 28* 29*   CREATININE mg/dL 1 59* 1 60* 1 55* 1 55* 1 50*   EGFR ml/min/1 73sq m 42 42 43 43 45   CALCIUM mg/dL 7 2* 7 9* 7 7* 7 4* 7 5*   MAGNESIUM mg/dL 2 1 1 8  --   --   --      Results from last 7 days   Lab Units 06/17/21  0954 06/13/21  1540   AST U/L 18 15   ALT U/L 16 7   ALK PHOS U/L 47 56 8   TOTAL PROTEIN g/dL 5 2* 5 4*   ALBUMIN g/dL 2 2* 2 9*   TOTAL BILIRUBIN mg/dL 0 71 0 51     Results from last 7 days   Lab Units 06/18/21  0725 06/17/21  2105 06/17/21  1611 06/17/21  1117 06/17/21  0717 06/16/21 2121 06/16/21  1611 06/16/21  1104 06/16/21  0746 06/15/21  2119 06/15/21  1601 06/15/21  1107   POC GLUCOSE mg/dl 115 162* 113 121 94 95 95 104 95 83 88 85     Results from last 7 days   Lab Units 06/18/21  0531 06/17/21  0954 06/15/21  0521 06/14/21  0547 06/14/21  0009 06/13/21  1540   GLUCOSE RANDOM mg/dL 114 104 84 135 149* 156*     Results from last 7 days   Lab Units 06/16/21  0436   HEMOGLOBIN A1C % 6 4*   EAG mg/dl 137     Results from last 7 days   Lab Units 06/13/21  1907   PH VIJAY  7 314   PCO2 VIJAY mm Hg 43 7   PO2 VIJAY mm Hg 29 2*   HCO3 VIJAY mmol/L 21 7*   BASE EXC VIJAY mmol/L -4 3   O2 CONTENT VIJAY ml/dL 6 9   O2 HGB, VENOUS % 44 0*     Results from last 7 days   Lab Units 06/14/21  0009 06/13/21  1907 06/13/21  1540   TROPONIN I ng/mL <0 03 <0 03 <0 03     Results from last 7 days   Lab Units 06/18/21  0531 06/14/21  0547 06/13/21  1907   PROCALCITONIN ng/ml 0 29* 0 36* 0 18     Results from last 7 days   Lab Units 06/14/21  0009 06/13/21  1602   LACTIC ACID mmol/L 2 0 1 4     Results from last 7 days   Lab Units 06/13/21  1540   BNP pg/mL 16 2     Results from last 7 days   Lab Units 06/13/21  1540   CRP mg/L 14 3*     Results from last 7 days   Lab Units 06/13/21  1916   STREP PNEUMONIAE ANTIGEN, URINE  Negative   LEGIONELLA URINARY ANTIGEN  Negative     Results from last 7 days   Lab Units 06/14/21  1516 06/13/21  1621 06/13/21  1616   BLOOD CULTURE   --  No Growth at 72 hrs  No Growth After 4 Days  GRAM STAIN RESULT  Rare Polys  No organisms seen  --   --    BODY FLUID CULTURE, STERILE  No growth  --   --      Results from last 7 days   Lab Units 06/14/21  1517   TOTAL COUNTED  100   WBC FLUID /ul 3,387       telemetry  NPO  IR Paracentesis   EGD  Daily weight I/o  Medications:   Scheduled Medications:  acyclovir, 200 mg, Oral, BID  apixaban, 5 mg, Oral, BID  atenolol, 25 mg, Oral, Daily  calcium carbonate-vitamin D, 1 tablet, Oral, Daily With Breakfast  cholecalciferol, 1,000 Units, Oral, Daily  cholestyramine sugar free, 4 g, Oral, BID  cyanocobalamin, 1,000 mcg, Oral, Daily  folic acid, 7,362 mcg, Oral, Daily  insulin lispro, 1-6 Units, Subcutaneous, TID AC  levofloxacin, 750 mg, Oral, Q48H  mirtazapine, 15 mg, Oral, Daily  pantoprazole, 40 mg, Oral, QAM  pravastatin, 20 mg, Oral, Daily With Dinner  saccharomyces boulardii, 250 mg, Oral, BID      Continuous IV Infusions:  sodium chloride, 50 mL/hr, Intravenous, Continuous      PRN Meds:  melatonin, 3 mg, Oral, HS PRN  oxyCODONE-acetaminophen, 1 tablet, Oral, Q6H PRN        Discharge Plan: tbd    Network Utilization Review Department  ATTENTION: Please call with any questions or concerns to [phone] and carefully listen to the prompts so that you are directed to the right person   All voicemails are confidential   Asenath Drop all requests for admission clinical reviews, approved or denied determinations and any other requests to dedicated fax number below belonging to the campus where the patient is receiving treatment   List of dedicated fax numbers for the Facilities:  [address] DENIALS (Administrative/Medical Necessity) [phone]   [address] (Maternity/NICU/Pediatrics) [phone]   [address] [place] [address] [facility] [id number] [place] [address] [address] [facility] [facility] [phone]

## 2021-06-18 NOTE — ASSESSMENT & PLAN NOTE
Patient would benefit from short-term rehab  PT OT evaluated the patient and recommended short-term rehab    Case management following

## 2021-06-18 NOTE — ASSESSMENT & PLAN NOTE
Patient has exudative ascites secondary to malignancy  Cell count showed polymorphous predominance but culture data negative  Patient did not have any abdominal pain also  IR paracentesis on 6/14/2021 with removal of 2 L clear yellow ascites  Received albumin IV after that  IR paracentesis today with removal of 3 9 L of clear yellow ascites  Fluid cytology on 6/14 positive for malignancy, compatible with lung primary    Started patient on IV Albumin post paracentesis today  Diuretics not indicated for malignant ascites per discussion with GI  Daily weight and I&Os  Will consult oncology

## 2021-06-18 NOTE — ASSESSMENT & PLAN NOTE
Status post pacemaker insertion  Continue on atenolol with holding parameters  Patient has tachycardia which is more likely secondary to cancer related and reactive  In May 12th 2021 with office was with Pulmonary patient already had a heart rate of 106 at that time  Telemetry showed ST,HR low 100's  Discontinued    Optimize electrolytes

## 2021-06-18 NOTE — ANESTHESIA PREPROCEDURE EVALUATION
Procedure:  EGD    Relevant Problems   CARDIO   (+) AV block      ENDO   (+) Type 2 diabetes mellitus (HCC)      GI/HEPATIC   (+) Dysphagia      /RENAL   (+) Acute renal failure superimposed on stage 3 chronic kidney disease (HCC)      NEURO/PSYCH   (+) History of pulmonary embolism      PULMONARY   (+) Acute respiratory failure with hypoxia (HCC)   (+) COPD (chronic obstructive pulmonary disease) (HCC)   (+) Pleural effusion        Physical Exam    Airway    Mallampati score: II  TM Distance: >3 FB  Neck ROM: full     Dental   No notable dental hx     Cardiovascular  Cardiovascular exam normal    Pulmonary  Pulmonary exam normal     Other Findings        Anesthesia Plan  ASA Score- 3     Anesthesia Type- IV sedation with anesthesia with ASA Monitors  Additional Monitors:   Airway Plan:           Plan Factors-Exercise tolerance (METS): <4 METS  Chart reviewed  Imaging results reviewed  Existing labs reviewed  Patient summary reviewed  Induction-     Postoperative Plan-     Informed Consent- Anesthetic plan and risks discussed with patient  I personally reviewed this patient with the CRNA  Discussed and agreed on the Anesthesia Plan with the CRNA  Vandana Wellington

## 2021-06-18 NOTE — ASSESSMENT & PLAN NOTE
Lab Results   Component Value Date    HGBA1C 6 4 (H) 06/16/2021       Recent Labs     06/17/21  1611 06/17/21  2105 06/18/21  0725 06/18/21  1117   POCGLU 113 162* 115 112       Blood Sugar Average: Last 72 hrs:  (P) 103 [id number]   Well controlled type 2 diabetes  Continue patient on low-dose sliding scale insulin with Accu-Chek q i d  Deejay Fam

## 2021-06-18 NOTE — ASSESSMENT & PLAN NOTE
Combination of SIADH and pre renal   Na 130 today  132 yesterday    Discontinue IV normal saline  Monitor BMP  Consult renal

## 2021-06-18 NOTE — CASE MANAGEMENT
CM made Vianney with [facility] aware patient would like to come to them at dc  She states she will submit for authorization  Patient has not had his COVID vaccine yet and will need a COVID test prior to dc  CM to follow

## 2021-06-18 NOTE — OCCUPATIONAL THERAPY NOTE
Occupational Therapy Cancel Note       06/18/21 1409   Note Type   Note Type Treatment   Cancel Reasons Patient off floor/test  (Pt just left floor for EGD per nursing)   Licensure   [place] License Number  Mickie Marie OTR/L [id number]

## 2021-06-18 NOTE — ASSESSMENT & PLAN NOTE
Lab Results   Component Value Date    EGFR 42 06/18/2021    EGFR 42 06/17/2021    EGFR 43 06/15/2021    CREATININE 1 59 (H) 06/18/2021    CREATININE 1 60 (H) 06/17/2021    CREATININE 1 55 (H) 06/15/2021     Baseline creatinine appears to be around 0 9-1 2  Creatinine 1 55 on admission  Patient is on Lasix 40 mg daily at home, currently on hold  Also patient is running Hypotensive and poor oral intake due to dysphagia  Patient appears intravascularly depleted  Received gentle IV hydration since yesterday evening  Creatinine today 1 59  Started IV albumin today    Avoid nephrotoxins and hypotension  Consult nephrology

## 2021-06-18 NOTE — ASSESSMENT & PLAN NOTE
CTA showed - Increasing opacification in the right lung base suggesting worsening atelectasis or pneumonia  Patient is on Levaquin, continue the same  Gradually resolving  Will finish a 7 day course of  Levaquin,last dose today  Currently due to renal Function Levaquin dose is 750 mg q48 hrs     Patient afebrile, denies cough SOB  Blood cultures negative to date  Procalcitonin mild elevated to 0 36, repeat today 0 29    Results from last 7 days   Lab Units 06/18/21  0531 06/14/21  0547 06/14/21  0009 06/13/21  1907 06/13/21  1602   LACTIC ACID mmol/L  --   --  2 0  --  1 4   PROCALCITONIN ng/ml 0 29* 0 36*  --  0 18  --      Results from last 7 days   Lab Units 06/17/21  0954 06/15/21  0521 06/14/21  0547 06/13/21  1540   WBC Thousand/uL 6 50 4 83 4 71 6 25

## 2021-06-18 NOTE — CASE MANAGEMENT
LYNNE spoke with James Medina at [facility] and she cannot confirm if they will have a bed this weekend  LYNNE then spoke with Vianney with [facility] and she states she will have a bed this weekend and can start auth  CM met with patient and discussed dc planning and SNF choices and the above  He states he would like to go ro [place] if they have a bed  IMM was discussed and patient states he already signed a copy  He verbally states understanding and signed again  A copy was provided to patient and a copy placed in bin to be scanned  CM to follow

## 2021-06-18 NOTE — SEDATION DOCUMENTATION
Paracentesis completed  3900 ml clear yellow fluid removed  Bandaid to site  Patient tolerated well

## 2021-06-18 NOTE — PHYSICAL THERAPY NOTE
06/18/21 1437   Note Type   Note Type Treatment   Cancel Reasons Patient off floor/test  (EGD, will re-attempt at a later time)   [employer] License Number  [facility] [id number]

## 2021-06-18 NOTE — ASSESSMENT & PLAN NOTE
Patient reports having trouble swallowing solid food,thus not eating much  Denies nausea vomiting chest pain  Speech swallow eval yesterday was limited due to prolonged retching during assessment  Consulted GI, appreciate input    For EGD today

## 2021-06-18 NOTE — ASSESSMENT & PLAN NOTE
History of non-small-cell lung cancer diagnosed in December 2019  Patient underwent VATS with pleural decortication and pleurodesis in 7/2020  Patient was on Brigatinib, now on chemotherapy every 3 weeks and sees Dr Lawrence Gutiérrez every Monday per patient  Not sure exactly what treatment he is on   CT suggested possible advancement of metastatic disease with involvement of the lingula     Consult oncology

## 2021-06-18 NOTE — PERIOPERATIVE NURSING NOTE
Patient transferred back to his room 211 via stretcher X2 RNs, Primary nurse, Raine Salgado at bedside and wife also  Patient transferred to bed safely  Bed in lowest position, call bell within reach, safety precautions maintained  O2 via NC at 2L/M maintained, fluids removed  Patient denies any pain at this time

## 2021-06-19 LAB
ANION GAP SERPL CALCULATED.3IONS-SCNC: 13 MMOL/L (ref 4–13)
BACTERIA BLD CULT: NORMAL
BACTERIA BLD CULT: NORMAL
BUN SERPL-MCNC: 34 MG/DL (ref 5–25)
CALCIUM SERPL-MCNC: 7.7 MG/DL (ref 8.3–10.1)
CHLORIDE SERPL-SCNC: 97 MMOL/L (ref 100–108)
CO2 SERPL-SCNC: 20 MMOL/L (ref 21–32)
CREAT SERPL-MCNC: 1.78 MG/DL (ref 0.6–1.3)
ERYTHROCYTE [DISTWIDTH] IN BLOOD BY AUTOMATED COUNT: 18.2 % (ref 11.6–15.1)
GFR SERPL CREATININE-BSD FRML MDRD: 37 ML/MIN/1.73SQ M
GLUCOSE SERPL-MCNC: 123 MG/DL (ref 65–140)
GLUCOSE SERPL-MCNC: 88 MG/DL (ref 65–140)
GLUCOSE SERPL-MCNC: 91 MG/DL (ref 65–140)
HCT VFR BLD AUTO: 31.3 % (ref 36.5–49.3)
HGB BLD-MCNC: 9.6 G/DL (ref 12–17)
MAGNESIUM SERPL-MCNC: 2.1 MG/DL (ref 1.6–2.6)
MCH RBC QN AUTO: 27.7 PG (ref 26.8–34.3)
MCHC RBC AUTO-ENTMCNC: 30.7 G/DL (ref 31.4–37.4)
MCV RBC AUTO: 91 FL (ref 82–98)
PLATELET # BLD AUTO: 139 THOUSANDS/UL (ref 149–390)
PMV BLD AUTO: 8.9 FL (ref 8.9–12.7)
POTASSIUM SERPL-SCNC: 4.1 MMOL/L (ref 3.5–5.3)
PROCALCITONIN SERPL-MCNC: 0.3 NG/ML
RBC # BLD AUTO: 3.46 MILLION/UL (ref 3.88–5.62)
SODIUM SERPL-SCNC: 130 MMOL/L (ref 136–145)
WBC # BLD AUTO: 3.79 THOUSAND/UL (ref 4.31–10.16)

## 2021-06-19 PROCEDURE — 99223 1ST HOSP IP/OBS HIGH 75: CPT | Performed by: INTERNAL MEDICINE

## 2021-06-19 PROCEDURE — 94760 N-INVAS EAR/PLS OXIMETRY 1: CPT

## 2021-06-19 PROCEDURE — 97535 SELF CARE MNGMENT TRAINING: CPT

## 2021-06-19 PROCEDURE — 80048 BASIC METABOLIC PNL TOTAL CA: CPT | Performed by: INTERNAL MEDICINE

## 2021-06-19 PROCEDURE — 83735 ASSAY OF MAGNESIUM: CPT | Performed by: INTERNAL MEDICINE

## 2021-06-19 PROCEDURE — 85027 COMPLETE CBC AUTOMATED: CPT | Performed by: INTERNAL MEDICINE

## 2021-06-19 PROCEDURE — 82948 REAGENT STRIP/BLOOD GLUCOSE: CPT

## 2021-06-19 PROCEDURE — 97110 THERAPEUTIC EXERCISES: CPT

## 2021-06-19 PROCEDURE — 99232 SBSQ HOSP IP/OBS MODERATE 35: CPT | Performed by: NURSE PRACTITIONER

## 2021-06-19 PROCEDURE — 84145 PROCALCITONIN (PCT): CPT | Performed by: INTERNAL MEDICINE

## 2021-06-19 PROCEDURE — 99232 SBSQ HOSP IP/OBS MODERATE 35: CPT | Performed by: INTERNAL MEDICINE

## 2021-06-19 RX ORDER — LEVOFLOXACIN 250 MG/1
250 TABLET ORAL EVERY 24 HOURS
Status: COMPLETED | OUTPATIENT
Start: 2021-06-19 | End: 2021-06-21

## 2021-06-19 RX ORDER — ONDANSETRON 2 MG/ML
4 INJECTION INTRAMUSCULAR; INTRAVENOUS EVERY 6 HOURS PRN
Status: DISCONTINUED | OUTPATIENT
Start: 2021-06-19 | End: 2021-06-28 | Stop reason: HOSPADM

## 2021-06-19 RX ORDER — ALBUMIN (HUMAN) 12.5 G/50ML
25 SOLUTION INTRAVENOUS EVERY 6 HOURS
Status: COMPLETED | OUTPATIENT
Start: 2021-06-19 | End: 2021-06-20

## 2021-06-19 RX ORDER — ALBUMIN (HUMAN) 12.5 G/50ML
25 SOLUTION INTRAVENOUS ONCE
Status: COMPLETED | OUTPATIENT
Start: 2021-06-19 | End: 2021-06-19

## 2021-06-19 RX ORDER — POLYETHYLENE GLYCOL 3350 17 G/17G
17 POWDER, FOR SOLUTION ORAL DAILY
Status: DISCONTINUED | OUTPATIENT
Start: 2021-06-19 | End: 2021-06-28 | Stop reason: HOSPADM

## 2021-06-19 RX ORDER — MIDODRINE HYDROCHLORIDE 5 MG/1
10 TABLET ORAL
Status: DISCONTINUED | OUTPATIENT
Start: 2021-06-19 | End: 2021-06-28 | Stop reason: HOSPADM

## 2021-06-19 RX ADMIN — CYANOCOBALAMIN TAB 500 MCG 1000 MCG: 500 TAB at 09:45

## 2021-06-19 RX ADMIN — MIDODRINE HYDROCHLORIDE 10 MG: 5 TABLET ORAL at 15:30

## 2021-06-19 RX ADMIN — ALBUMIN (HUMAN) 25 G: 0.25 INJECTION, SOLUTION INTRAVENOUS at 23:48

## 2021-06-19 RX ADMIN — Medication 250 MG: at 09:44

## 2021-06-19 RX ADMIN — ALBUMIN (HUMAN) 12.5 G: 0.25 INJECTION, SOLUTION INTRAVENOUS at 01:38

## 2021-06-19 RX ADMIN — ATENOLOL 25 MG: 25 TABLET ORAL at 09:44

## 2021-06-19 RX ADMIN — PRAVASTATIN SODIUM 20 MG: 20 TABLET ORAL at 15:30

## 2021-06-19 RX ADMIN — ALBUMIN (HUMAN) 25 G: 0.25 INJECTION, SOLUTION INTRAVENOUS at 04:26

## 2021-06-19 RX ADMIN — LEVOFLOXACIN 250 MG: 250 TABLET, FILM COATED ORAL at 10:54

## 2021-06-19 RX ADMIN — MIDODRINE HYDROCHLORIDE 5 MG: 5 TABLET ORAL at 10:54

## 2021-06-19 RX ADMIN — Medication 250 MG: at 18:14

## 2021-06-19 RX ADMIN — POLYETHYLENE GLYCOL 3350 17 G: 17 POWDER, FOR SOLUTION ORAL at 15:30

## 2021-06-19 RX ADMIN — APIXABAN 5 MG: 5 TABLET, FILM COATED ORAL at 09:44

## 2021-06-19 RX ADMIN — MIDODRINE HYDROCHLORIDE 5 MG: 5 TABLET ORAL at 07:58

## 2021-06-19 RX ADMIN — PANTOPRAZOLE SODIUM 40 MG: 40 TABLET, DELAYED RELEASE ORAL at 06:18

## 2021-06-19 RX ADMIN — ALBUMIN (HUMAN) 12.5 G: 0.25 INJECTION, SOLUTION INTRAVENOUS at 09:45

## 2021-06-19 RX ADMIN — Medication 1000 UNITS: at 09:44

## 2021-06-19 RX ADMIN — CALCIUM CARBONATE 500 MG (1,250 MG)-VITAMIN D3 200 UNIT TABLET 1 TABLET: at 07:58

## 2021-06-19 RX ADMIN — ALBUMIN (HUMAN) 25 G: 0.25 INJECTION, SOLUTION INTRAVENOUS at 12:30

## 2021-06-19 RX ADMIN — FOLIC ACID 1000 MCG: 1 TABLET ORAL at 09:47

## 2021-06-19 RX ADMIN — SODIUM CHLORIDE 500 ML: 0.9 INJECTION, SOLUTION INTRAVENOUS at 05:15

## 2021-06-19 RX ADMIN — APIXABAN 5 MG: 5 TABLET, FILM COATED ORAL at 17:39

## 2021-06-19 RX ADMIN — MIRTAZAPINE 15 MG: 15 TABLET, FILM COATED ORAL at 09:45

## 2021-06-19 RX ADMIN — ALBUMIN (HUMAN) 25 G: 0.25 INJECTION, SOLUTION INTRAVENOUS at 17:39

## 2021-06-19 NOTE — PROGRESS NOTES
Progress Note- Estelle Villalta 77 y o  male MRN: [id number]    Unit/Bed#: [address] [place] [address] Encounter: [id number]      Assessment and Plan:    1) Dysphagia   Patient reports that when he eats solid food he will get the solid food to the back of his mouth but then is unable to swallow  Denied any difficulty with swallowing liquids or pills  EGD done yesterday showed normal esophagus, unrevealing for cause of dysphagia  Patient is tolerating his diet today, denies any dysphagia, coughing or choking  Etiology of dysphagia may be myopathy of throat muscles from chemotherapy   -Continue pantoprazole 40 mg daily  Patient had mild gastritis on EGD, await biopsy result  -Continue supportive care  -Diet per speech  -Will order MiraLax daily for constipation  -Patient would like any further testing done as an outpatient,  patient would like to go home       ______________________________________________________________________    Subjective:     Patient lying in bed, he reports he tolerated modified diet this morning without choking, coughing or difficulty swallowing      Medication Administration - last 24 hours from 06/18/2021 1514 to 06/19/2021 1514       Date/Time Order Dose Route Action Action by     06/19/2021 0618 pantoprazole (PROTONIX) EC tablet 40 mg 40 mg Oral Given Dayanara Morgan RN     06/19/2021 0944 atenolol (TENORMIN) tablet 25 mg 25 mg Oral Given Helen Aquino RN     06/19/2021 0758 calcium carbonate-vitamin D (OSCAL-D) 500 mg-200 units per tablet 1 tablet 1 tablet Oral Given Helen Aquino RN     06/18/2021 1547 calcium carbonate-vitamin D (OSCAL-D) 500 mg-200 units per tablet 1 tablet 1 tablet Oral Given Keisha Cortes RN     06/19/2021 0945 mirtazapine (REMERON) tablet 15 mg 15 mg Oral Given Helen Aquino RN     06/18/2021 1607 pravastatin (PRAVACHOL) tablet 20 mg 20 mg Oral Given Keisha Cortes RN     06/19/2021 0944 cholecalciferol (VITAMIN D3) tablet 1,000 Units 1,000 Units Oral Given [facility] Shaji Lorenzo, RN     06/18/2021 1547 cholecalciferol (VITAMIN D3) tablet 1,000 Units 1,000 Units Oral Given Yohan Raines, RN     94/68/4003 2921 folic acid (FOLVITE) tablet 1,000 mcg 1,000 mcg Oral Given Lisadon Eastona, RN     04/74/8974 0126 folic acid (FOLVITE) tablet 1,000 mcg 1,000 mcg Oral Given Yohan Raines, RN     06/19/2021 0945 cyanocobalamin (VITAMIN B-12) tablet 1,000 mcg 1,000 mcg Oral Given Seldon Panda, RN     06/18/2021 1545 cyanocobalamin (VITAMIN B-12) tablet 1,000 mcg 1,000 mcg Oral Given Yohan Raines, RN     06/19/2021 1156 insulin lispro (HumaLOG) 100 units/mL subcutaneous injection 1-6 Units 1 Units Subcutaneous Not Given Seldon Panda, RN     06/19/2021 0757 insulin lispro (HumaLOG) 100 units/mL subcutaneous injection 1-6 Units 1 Units Subcutaneous Not Given Seldon Eastona, RN     06/18/2021 1634 insulin lispro (HumaLOG) 100 units/mL subcutaneous injection 1-6 Units 1 Units Subcutaneous Not Given Yohan Raines, RN     06/19/2021 0944 apixaban (ELIQUIS) tablet 5 mg 5 mg Oral Given Seldon Panda, RN     06/18/2021 1824 apixaban (ELIQUIS) tablet 5 mg 5 mg Oral Given Seldon Panda, RN     06/19/2021 7690 saccharomyces boulardii (FLORASTOR) capsule 250 mg 250 mg Oral Given Seldon Panda, RN     06/18/2021 1824 saccharomyces boulardii (FLORASTOR) capsule 250 mg 250 mg Oral Given Seldon Panda, RN     06/19/2021 1114 albumin human (FLEXBUMIN) 25 % injection 12 5 g 0 g Intravenous Stopped Yohan Raines RN     06/19/2021 0945 albumin human (FLEXBUMIN) 25 % injection 12 5 g 12 5 g Intravenous [address] Akiko Sanders, RN     06/19/2021 0138 albumin human (FLEXBUMIN) 25 % injection 12 5 g 12 5 g Intravenous [facility]     06/18/2021 1825 albumin human (FLEXBUMIN) 25 % injection 12 5 g 12 5 g Intravenous [address] Akiko Sanders RN     06/18/2021 1546 levofloxacin (LEVAQUIN) tablet 750 mg 750 mg Oral Given Yohan Raines RN     06/19/2021 1054 midodrine (PROAMATINE) tablet 5 mg 5 mg Oral Given Giovani Donna, RN     06/19/2021 0758 midodrine (PROAMATINE) tablet 5 mg 5 mg Oral Given Marcela Winslow RN     06/18/2021 1547 midodrine (PROAMATINE) tablet 5 mg 5 mg Oral Given Giovanijulia Thompson, RN     06/19/2021 1053 albumin human (FLEXBUMIN) 25 % injection 25 g 0 g Intravenous Stopped Giovani Donna, RN     06/19/2021 0426 albumin human (FLEXBUMIN) 25 % injection 25 g 25 g Intravenous [facility] [facility]     06/19/2021 1053 sodium chloride 0 9 % bolus 500 mL 0 mL Intravenous Stopped Giovanijulia Thompson, RN     06/19/2021 0515 sodium chloride 0 9 % bolus 500 mL 500 mL Intravenous 1000 [facility] Becka Hernandez, SUSAN     06/19/2021 1054 levofloxacin (LEVAQUIN) tablet 250 mg 250 mg Oral Given Giovani Donna RN     06/19/2021 1452 albumin human (FLEXBUMIN) 25 % injection 25 g 0 g Intravenous Stopped Marcela Winslow RN     06/19/2021 1230 albumin human (FLEXBUMIN) 25 % injection 25 g 25 g Intravenous New Bag Marcela Winslow RN          Objective:     Vitals: Blood pressure (!) 81/52, pulse 82, temperature (!) 97 2 °F (36 2 °C), temperature source Axillary, resp  rate 18, height 5' 7" (1 702 m), weight 92 1 kg (203 lb), SpO2 96 %  ,Body mass index is 31 79 kg/m²        Intake/Output Summary (Last 24 hours) at 6/19/2021 1514  Last data filed at 6/19/2021 1347  Gross per 24 hour   Intake --   Output 260 ml   Net -260 ml       Physical Exam:   General Appearance: Awake and alert, in no acute distress  Abdomen: Soft, non-tender, non-distended; bowel sounds normal; no masses or no organomegaly    Invasive Devices     Central Venous Catheter Line            Port A Cath Right Subclavian -- days                Lab Results:  Admission on 06/14/2021   Component Date Value    MRSA Culture Only 06/14/2021 No Methicillin Resistant Staphlyococcus aureus (MRSA) isolated     POC Glucose 06/14/2021 110     Site 06/14/2021 paracentesis     Color, Fluid 06/14/2021 Light Yellow     Clarity, Fluid 06/14/2021 Slightly Cloudy     WBC, Fluid 06/14/2021 3,387     Body Fluid Culture, Ster* 06/14/2021 No growth     Gram Stain Result 06/14/2021 Rare Polys     Gram Stain Result 06/14/2021 No organisms seen     Protein, Fluid 06/14/2021 2 8     Case Report 06/14/2021                      Value:Non-gynecologic Cytology                          Case: [id number]                                  Authorizing Provider:  NERISSA Wiggins          Collected:           06/14/2021 1516              Ordering Location:     [address] [place] [address] Received:            06/14/2021 1532                                     Intensive Care  Unit                                                         Pathologist:           Loyd Van MD                                                        Specimens:   A) - Ascitic Fluid                                                                                  B) - Ascitic Fluid, cell block                                                             Final Diagnosis 06/14/2021                      Value: This result contains rich text formatting which cannot be displayed here   Preliminary Diagnosis 06/14/2021                      Value: This result contains rich text formatting which cannot be displayed here  [facility] Gross Description 06/14/2021                      Value: This result contains rich text formatting which cannot be displayed here   Additional Information 06/14/2021                      Value: This result contains rich text formatting which cannot be displayed here      Glucose, Fluid 06/14/2021 107     Albumin, Fluid 06/14/2021 1 5     LD, Fluid 06/14/2021 355     Total Counted 06/14/2021 100     Neutrophils % (Fluid) 06/14/2021 63     Lymphs % (Fluid) 06/14/2021 7     Histiocyte % (Fluid) 06/14/2021 22     Monocytes % (Fluid) 06/14/2021 8     POC Glucose 06/14/2021 103     WBC 06/15/2021 4 83     RBC 06/15/2021 3 30*    Hemoglobin 06/15/2021 9 1*    Hematocrit 06/15/2021 30 0*    MCV 06/15/2021 91     MCH 06/15/2021 27 6     MCHC 06/15/2021 30 3*    RDW 06/15/2021 18 3*    MPV 06/15/2021 9 4     Platelets 60/64/8237 164     nRBC 06/15/2021 0     Neutrophils Relative 06/15/2021 69     Immat GRANS % 06/15/2021 0     Lymphocytes Relative 06/15/2021 12*    Monocytes Relative 06/15/2021 13*    Eosinophils Relative 06/15/2021 5     Basophils Relative 06/15/2021 1     Neutrophils Absolute 06/15/2021 3 30     Immature Grans Absolute 06/15/2021 0 02     Lymphocytes Absolute 06/15/2021 0 59*    Monocytes Absolute 06/15/2021 0 63     Eosinophils Absolute 06/15/2021 0 26     Basophils Absolute 06/15/2021 0 03     Sodium 06/15/2021 134*    Potassium 06/15/2021 4 2     Chloride 06/15/2021 100     CO2 06/15/2021 23     ANION GAP 06/15/2021 11     BUN 06/15/2021 27*    Creatinine 06/15/2021 1 55*    Glucose 06/15/2021 84     Calcium 06/15/2021 7 7*    eGFR 06/15/2021 43     POC Glucose 06/15/2021 77     POC Glucose 06/15/2021 85     POC Glucose 06/15/2021 88     POC Glucose 06/15/2021 83     Hemoglobin A1C 06/16/2021 6 4*    EAG 06/16/2021 137     POC Glucose 06/16/2021 95     POC Glucose 06/16/2021 104     POC Glucose 06/16/2021 95     POC Glucose 06/16/2021 95     POC Glucose 06/17/2021 94     Sodium 06/17/2021 132*    Potassium 06/17/2021 4 7     Chloride 06/17/2021 99*    CO2 06/17/2021 20*    ANION GAP 06/17/2021 13     BUN 06/17/2021 29*    Creatinine 06/17/2021 1 60*    Glucose 06/17/2021 104     Calcium 06/17/2021 7 9*    Corrected Calcium 06/17/2021 9 3     AST 06/17/2021 18     ALT 06/17/2021 16     Alkaline Phosphatase 06/17/2021 47     Total Protein 06/17/2021 5 2*    Albumin 06/17/2021 2 2*    Total Bilirubin 06/17/2021 0 71     eGFR 06/17/2021 42     Magnesium 06/17/2021 1 8     WBC 06/17/2021 6 50     RBC 06/17/2021 3 81*    Hemoglobin 06/17/2021 10 5*    Hematocrit 06/17/2021 34 2*    MCV 06/17/2021 90     MCH 06/17/2021 27 6     MCHC 06/17/2021 30 7*    RDW 06/17/2021 17 9*    MPV 06/17/2021 9 2     Platelets 89/90/7799 237     nRBC 06/17/2021 0     Neutrophils Relative 06/17/2021 69     Immat GRANS % 06/17/2021 1     Lymphocytes Relative 06/17/2021 11*    Monocytes Relative 06/17/2021 14*    Eosinophils Relative 06/17/2021 4     Basophils Relative 06/17/2021 1     Neutrophils Absolute 06/17/2021 4 51     Immature Grans Absolute 06/17/2021 0 04     Lymphocytes Absolute 06/17/2021 0 70     Monocytes Absolute 06/17/2021 0 92     Eosinophils Absolute 06/17/2021 0 27     Basophils Absolute 06/17/2021 0 06     POC Glucose 06/17/2021 121     POC Glucose 06/17/2021 113     POC Glucose 06/17/2021 162*    Sodium 06/18/2021 130*    Potassium 06/18/2021 4 2     Chloride 06/18/2021 100     CO2 06/18/2021 16*    ANION GAP 06/18/2021 14*    BUN 06/18/2021 31*    Creatinine 06/18/2021 1 59*    Glucose 06/18/2021 114     Calcium 06/18/2021 7 2*    eGFR 06/18/2021 42     Magnesium 06/18/2021 2 1     Procalcitonin 06/18/2021 0 29*    POC Glucose 06/18/2021 115     POC Glucose 06/18/2021 112     POC Glucose 06/18/2021 103     POC Glucose 06/18/2021 91     Sodium 06/19/2021 130*    Potassium 06/19/2021 4 1     Chloride 06/19/2021 97*    CO2 06/19/2021 20*    ANION GAP 06/19/2021 13     BUN 06/19/2021 34*    Creatinine 06/19/2021 1 78*    Glucose 06/19/2021 91     Calcium 06/19/2021 7 7*    eGFR 06/19/2021 37     Magnesium 06/19/2021 2 1     WBC 06/19/2021 3 79*    RBC 06/19/2021 3 46*    Hemoglobin 06/19/2021 9 6*    Hematocrit 06/19/2021 31 3*    MCV 06/19/2021 91     MCH 06/19/2021 27 7     MCHC 06/19/2021 30 7*    RDW 06/19/2021 18 2*    Platelets 67/05/0152 139*    MPV 06/19/2021 8 9     POC Glucose 06/19/2021 88     POC Glucose 06/19/2021 123        Imaging Studies: I have personally reviewed pertinent imaging studies

## 2021-06-19 NOTE — ASSESSMENT & PLAN NOTE
Patient has exudative ascites secondary to malignancy  Cell count showed polymorphous predominance but culture data negative  Patient did not have any abdominal pain also  IR paracentesis on 6/14/2021 with removal of 2 L clear yellow ascites  Received albumin IV after that  IR paracentesis on 6/18/2021 with removal of 3 9 L of clear yellow ascites  Fluid cytology on 6/14 positive for malignancy, compatible with lung primary    Started patient on IV Albumin post paracentesis   Diuretics not indicated for malignant ascites per discussion with GI  Daily weight and I&Os  Appreciate oncology and nephrology input

## 2021-06-19 NOTE — ASSESSMENT & PLAN NOTE
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

## 2021-06-19 NOTE — ASSESSMENT & PLAN NOTE
Lab Results   Component Value Date    EGFR 37 06/19/2021    EGFR 42 06/18/2021    EGFR 42 06/17/2021    CREATININE 1 78 (H) 06/19/2021    CREATININE 1 59 (H) 06/18/2021    CREATININE 1 60 (H) 06/17/2021     Baseline creatinine appears to be around 0 9-1 2  Creatinine 1 55 on admission  Patient is on Lasix 40 mg daily at home, currently on hold  Also patient is running hypotensive with poor oral intake due to dysphagia  Patient appears intravascularly depleted    Continue and increase amount of IV albumin today per nephrology   Avoid nephrotoxins and hypotension  Monitor Cr in am

## 2021-06-19 NOTE — OCCUPATIONAL THERAPY NOTE
Occupational Therapy Treatment Note       06/19/21 0915   Note Type   Note Type Treatment for insurance authorization   Restrictions/Precautions   Other Precautions Chair Alarm; Bed Alarm; Fall Risk   Pain Assessment   Pain Assessment Tool 0-10   Pain Score No Pain   ADL   Eating Assistance 5  Supervision/Setup   Grooming Assistance 5  Supervision/Setup   Grooming Deficit Wash/dry hands; Wash/dry face; Teeth care   Grooming Comments Hand hygiene, oral care, face washing while seated in bedside chair; increased time to complete with rest breaks inbetween due to increased fatigue/generalized weakness   UB Bathing Assistance 2  Maximal Assistance   LB Bathing Assistance 2  Maximal Assistance   UB Dressing Assistance 2  Maximal Assistance   LB Dressing Assistance 2  Maximal Assistance   LB Dressing Deficit Don/doff R sock; Don/doff L sock   Toileting Assistance  3  Moderate Assistance   Bed Mobility   Supine to Sit 4  Minimal assistance   Additional items Assist x 1; Increased time required   Transfers   Sit to Stand 3  Moderate assistance   Additional items Assist x 1; Increased time required   Stand to Sit 3  Moderate assistance   Additional items Assist x 1; Increased time required   Stand pivot 3  Moderate assistance   Additional items Assist x 1; Increased time required  (RW)   Additional Comments Sit <-> stand x 4 trials throughout session, mod assist 3/4 trials, min assist 1/4 trials; patient with increased faituge, weakness, increased WOB with activity   Functional Mobility   Functional Mobility 3  Moderate assistance   Additional Comments Few steps bed to chair with RW   Toilet Transfers   Toilet Transfer Type To and from   Toilet Transfer to Standard bedside commode   Toilet Transfer Technique Ambulating   Toilet Transfers Moderate assistance   Toilet Transfers Comments With RW, increased cues for safety   Cognition   Overall Cognitive Status [facility]   Arousal/Participation Alert; Cooperative   Attention Within functional limits   Orientation Level Oriented X4   Memory Within functional limits   Following Commands Follows all commands and directions without difficulty   Activity Tolerance   Activity Tolerance Patient limited by fatigue   Assessment   Assessment Patient seen for OT treatment this AM  Patient is pleasant and highly motivated to participate  Patient currently requires over max assist for ADLs, min-mod assist with RW for functional mobility and transfers with RW  Patient is limited by generalized weakness, fatigue, increased WOB with activity  SpO2 on RA throughout activity 94-96% however patient with increased WOB with each activity, required several minute rest break for recovery and to transition to next activity  At end of session patient seated in bedside chair with all needs met, chair alarm set  The patient's raw score on the AM-PAC Daily Activity inpatient short form is 14, standardized score is 33 39, less than 39 4  Patients at this level are likely to benefit from discharge to post-acute rehabilitation services  Please refer to the recommendation of the Occupational Therapist for safe discharge planning  Continue OT per POC  Plan   Treatment Interventions ADL retraining;Functional transfer training;UE strengthening/ROM; Endurance training;Patient/family training;Equipment evaluation/education; Compensatory technique education;Continued evaluation; Energy conservation; Activityengagement   OT Frequency 3-5x/wk   Recommendation   OT Discharge Recommendation Post acute rehabilitation services   [facility] Daily Activity Inpatient   Lower Body Dressing 2   Bathing 2   Toileting 2   Upper Body Dressing 2   Grooming 3   Eating 3   Daily Activity Raw Score 14   Daily Activity Standardized Score (Calc for Raw Score >=11) 33 39   AM-[facility] Applied Cognition Inpatient   Following a Speech/Presentation 4   Understanding Ordinary Conversation 4   Taking Medications 4   Remembering Where Things Are Placed or Put Away 4 Remembering List of 4-5 Errands 4   Taking Care of Complicated Tasks 4   Applied Cognition Raw Score 24   Applied Cognition Standardized Score 66 35   Licensure   [place] License Number  [place], [place] [id number]

## 2021-06-19 NOTE — ASSESSMENT & PLAN NOTE
History of DVT and PE  On Eliquis as outpatient    No acute PE on CTA  Continue patient on Eliquis 5 mg BID

## 2021-06-19 NOTE — ASSESSMENT & PLAN NOTE
Patient reports having trouble swallowing solid food, thus not eating much  Denies nausea, vomiting, chest pain  Speech swallow eval was limited due to prolonged retching during assessment  Consulted GI, appreciate input    Underwent EGD: mild gastritis, gastric polyp   Trial mechanical soft diet, follow-up biopsies

## 2021-06-19 NOTE — ASSESSMENT & PLAN NOTE
Status post pacemaker insertion  Continue on atenolol with holding parameters  Patient has tachycardia which is more likely secondary to cancer related and reactive  In May 12th 2021 with Pulmonary patient already had a heart rate of 106 at that time  Telemetry showed ST,HR low 100's  Discontinued    Optimize electrolytes

## 2021-06-19 NOTE — PLAN OF CARE
Problem: OCCUPATIONAL THERAPY ADULT  Goal: Performs self-care activities at highest level of function for planned discharge setting  See evaluation for individualized goals  Description: Treatment Interventions: ADL retraining, Functional transfer training, UE strengthening/ROM, Endurance training, Patient/family training, Equipment evaluation/education, Compensatory technique education, Continued evaluation, Energy conservation, Activityengagement          See flowsheet documentation for full assessment, interventions and recommendations  Outcome: Progressing  Note: Limitation: Decreased ADL status, Decreased UE strength, Decreased Safe judgement during ADL, Decreased cognition, Decreased endurance, Decreased self-care trans, Decreased high-level ADLs  Prognosis: Good  Assessment: Patient seen for OT treatment this AM  Patient is pleasant and highly motivated to participate  Patient currently requires over max assist for ADLs, min-mod assist with RW for functional mobility and transfers with RW  Patient is limited by generalized weakness, fatigue, increased WOB with activity  SpO2 on RA throughout activity 94-96% however patient with increased WOB with each activity, required several minute rest break for recovery and to transition to next activity  At end of session patient seated in bedside chair with all needs met, chair alarm set  The patient's raw score on the AM-PAC Daily Activity inpatient short form is 14, standardized score is 33 39, less than 39 4  Patients at this level are likely to benefit from discharge to post-acute rehabilitation services  Please refer to the recommendation of the Occupational Therapist for safe discharge planning  Continue OT per POC       OT Discharge Recommendation: Post acute rehabilitation services

## 2021-06-19 NOTE — CONSULTS
[address] [facility] 77 y o  male MRN: [id number]  Unit/Bed#: 2 [place] [address] Encounter: [id number]    ASSESSMENT and PLAN:  1  Acute kidney injury (POA):  · Admission creatinine of 1 59 on June 13, 2021  · Baseline creatinine is around 0 9-1 2     · Suspect that CONCEPCIÓN on admission was due to prerenal azotemia as he has had poor oral intake for 1 5 months prior to admission  · However, his creatinine has stayed between 1 5 and 1 6 over the course of the hospital stay which I believe is due to ATN from contrast nephropathy  · His creatinine is up to 1 78 today which is probably due to his low blood pressure from yesterday  · Check UA and PVR  · If renal function worsens, then would check renal US  · For now, he continues to have intermittently low BP readings - see below for management of this  2  Hypotension:  · BP has been intermittently low  · Echo 6/15/21 showed EF of 55 to 60% without pericardial effusion  · Check cortisol level  · Give Albumin 25 gm IV q6h x 4 doses  · Increase Midodrine to 10 mg TID  3  Respiratory failure:  · Improved  4  Hyponatremia:  · Sodium stable at 130  · Check urine Na and urine Osmolality  · Check serum osmolality, uric acid, TSH  · 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  · Place on fluid restriction  5  R pleural effusion  6  Malignant ascites  7  Non small cell lung cancer  SUMMARY OF RECOMMENDATIONS:  · Restart Albumin 25 gm IV q6h x 4 doses  · Increase Midodrine to 10 mg TID  · Check UA  · Check PVR  · Check urine osm, urine Na, serum Osm, uric acid, TSH  · Fluid restriction 1800 cc/24 hours  · Trend creatinine  The above plan was discussed with SLIM  Previous records were personally reviewed by me to obtain a baseline creatinine     The images (CXR) were personally reviewed by me in PACS    HISTORY OF PRESENT ILLNESS:  Requesting Physician: Beti Szymanski MD  Reason for Consult: CONCEPCIÓN    Alex Wing is a 77 y o  male who was admitted to [facility] on 6/14/21 after being transferred from [facility] where he presented with SOB  A renal consultation is requested today for assistance in the management of CONCEPCIÓN  Mr Yajaira Shirley has a history of non-small cell lung cancer s/p VATS, hypertension, diabetes mellitus, GERD and CHB s/p PPM   Based on my review of the records, I note that his baseline serum creatinine is around 0 9 to 1 2 based on records from [facility] from 2020  He now presents to [facility] after presenting with shortness of breath  He had a CTA on admission which did not reveal any pulmonary embolus but showed right-sided effusion in addition to large ascites  He was initially admitted to [facility] but was transferred to [facility] after he developed hypotension and required a higher level of care  He is being treated for a presumed pneumonia  On presentation, his creatinine was noted to be 1 55  Throughout the hospital stay, his creatinine remained between 1 55 and 1 60  However, today it had risen to 1 78 prompting consultation  On further questioning, he reports that his oral intake has been poor for 1 5 months now due to the presence of dysphagia  I reviewed his MAR and do not note any nephrotoxic medications given  Of note, his blood pressure has been low the past 24 hours with readings as low as 76/48  He was started on midodrine yesterday on our advise and is currently on albumin  He has had 2 paracentesis since being admitted, 1st on June 14 which yielded 2L of ascitic fluid and on June 18 which yielded 3 9 L of ascites  He feels weak all over but is able to stand with assistance  (+) arthralgia  No current CP, SOB, abdominal pain, leg swelling       PAST MEDICAL HISTORY:  Past Medical History:   Diagnosis Date    Cancer ([facility] [address] )     RT Lung    Diabetes mellitus ([facility] [address] )     Hypertension      PAST SURGICAL HISTORY:  Past Surgical History:   Procedure Laterality Date    CARDIAC PACEMAKER PLACEMENT  09/2019    CHOLECYSTECTOMY      kidney damage from gallbladder removal     FOOT SURGERY      tendon    IR PARACENTESIS  6/14/2021    IR PARACENTESIS  6/18/2021    PORTACATH PLACEMENT      PROSTATE SURGERY       ALLERGIES:  Allergies   Allergen Reactions    Diovan [Valsartan] Angioedema    Penicillins Anaphylaxis    Lisinopril     Oxytocin      SOCIAL HISTORY:  Social History     Substance and Sexual Activity   Alcohol Use Not Currently     Social History     Substance and Sexual Activity   Drug Use Never     Social History     Tobacco Use   Smoking Status Former Smoker    Packs/day: 2 00    Years: 9 00    Pack years: 18 00    Types: Cigarettes   Smokeless Tobacco Never Used     FAMILY HISTORY:  Family History   Family history unknown: Yes     MEDICATIONS:    Current Facility-Administered Medications:     albumin human (FLEXBUMIN) 25 % injection 12 5 g, 12 5 g, Intravenous, Q8H, NERISSA Hagan, 12 5 g at 06/19/21 0138    apixaban (ELIQUIS) tablet 5 mg, 5 mg, Oral, BID, Edson Muro MD, 5 mg at 06/18/21 1824    atenolol (TENORMIN) tablet 25 mg, 25 mg, Oral, Daily, Edson Muro MD    calcium carbonate-vitamin D (OSCAL-D) 500 mg-200 units per tablet 1 tablet, 1 tablet, Oral, Daily With Breakfast, Edson Muro MD, 1 tablet at 06/19/21 0758    cholecalciferol (VITAMIN D3) tablet 1,000 Units, 1,000 Units, Oral, Daily, Esdon Muro MD, 1,000 Units at 06/18/21 1547    cyanocobalamin (VITAMIN B-12) tablet 1,000 mcg, 1,000 mcg, Oral, Daily, Edson Muro MD, 1,000 mcg at 29/88/68 8703    folic acid (FOLVITE) tablet 1,000 mcg, 1,000 mcg, Oral, Daily, Edson Muro MD, 1,000 mcg at 06/18/21 1547    insulin lispro (HumaLOG) 100 units/mL subcutaneous injection 1-6 Units, 1-6 Units, Subcutaneous, TID AC **AND** Fingerstick Glucose (POCT), , , TID AC, Edson Muro MD    melatonin tablet 3 mg, 3 mg, Oral, HS PRN, Edson Muro MD, 3 mg at 06/15/21 2140    midodrine (PROAMATINE) tablet 5 mg, 5 mg, Oral, TID AC, Jennifer Calabrese MD, 5 mg at 06/19/21 0758    mirtazapine (REMERON) tablet 15 mg, 15 mg, Oral, Daily, Jennifer Calabrese MD, 15 mg at 06/18/21 0828    oxyCODONE-acetaminophen (PERCOCET) 5-325 mg per tablet 1 tablet, 1 tablet, Oral, Q6H PRN, Jennifer Calabrese MD, 1 tablet at 06/16/21 2028    pantoprazole (PROTONIX) EC tablet 40 mg, 40 mg, Oral, QAM, Jennifer Calabrese MD, 40 mg at 06/19/21 0618    pravastatin (PRAVACHOL) tablet 20 mg, 20 mg, Oral, Daily With Sybil Paget, MD, 20 mg at 06/18/21 1607    saccharomyces boulardii (FLORASTOR) capsule 250 mg, 250 mg, Oral, BID, Jennifer Calabrese MD, 250 mg at 06/18/21 1824    REVIEW OF SYSTEMS:  All the systems were reviewed and were negative except as documented on the HPI  PHYSICAL EXAM:  Current Weight: Weight - Scale: 92 1 kg (203 lb)  First Weight: Weight - Scale: 99 4 kg (219 lb 2 2 oz)  Vitals:    06/19/21 0352 06/19/21 0517 06/19/21 0621 06/19/21 0706   BP: (!) 76/48 94/62 95/63 92/62   BP Location:    Right arm   Pulse: 104 100 103 102   Resp:    16   Temp: (!) 96 9 °F (36 1 °C)   (!) 97 2 °F (36 2 °C)   TempSrc: Axillary   Axillary   SpO2: 96% 96% 95% 96%   Weight:       Height:           Intake/Output Summary (Last 24 hours) at 6/19/2021 0939  Last data filed at 6/18/2021 1501  Gross per 24 hour   Intake 100 ml   Output 200 ml   Net -100 ml     Physical Exam  General: conscious, coherent, cooperative, not in distress  Skin: warm, dry, senile turgor  Eyes: pink conjunctivae, no scleral icterus  ENT: dry lips and mucous membranes  Neck: supple, no JVD  Chest/Lungs: clear breath sounds but decreased in bases  CVS: distinct heart sounds, tachy, regular rhythm, no rub  Abdomen: soft, non-tender, distended, normoactive bowel sounds  Extremities: no edema  : no palencia catheter  Neuro: awake, alert, oriented to time, place and person  Psych: appropriate affect         Lab Results: Results from last 7 days   Lab Units 06/19/21  0508 06/18/21  0531 06/17/21  0954 06/15/21  0521 06/14/21  0009 06/13/21  1540   WBC Thousand/uL 3 79*  --  6 50 4 83   < > 6 25   HEMOGLOBIN g/dL 9 6*  --  10 5* 9 1*   < > 11 2*   HEMATOCRIT % 31 3*  --  34 2* 30 0*   < > 34 7*   PLATELETS Thousands/uL 139*  --  237 164   < > 218   POTASSIUM mmol/L 4 1 4 2 4 7 4 2  --  4 2   CHLORIDE mmol/L 97* 100 99* 100  --  96   CO2 mmol/L 20* 16* 20* 23  --  24   BUN mg/dL 34* 31* 29* 27*  --  31*   CREATININE mg/dL 1 78* 1 59* 1 60* 1 55*  --  1 59*   CALCIUM mg/dL 7 7* 7 2* 7 9* 7 7*  --  8 0*   MAGNESIUM mg/dL 2 1 2 1 1 8  --   --   --    ALK PHOS U/L  --   --  47  --   --  56 8   ALT U/L  --   --  16  --   --  7   AST U/L  --   --  18  --   --  15    < > = values in this interval not displayed  Other Studies:   Chest x-ray personally reviewed by me in PACS - (+) right pleural effusion

## 2021-06-19 NOTE — ASSESSMENT & PLAN NOTE
Combination of SIADH and pre renal   Na 132 -> 130 -> 130   Discontinued IV normal saline  Consulted nephrology  Will increase Albumin and monitor

## 2021-06-19 NOTE — UTILIZATION REVIEW
Continued Stay Review  Date:  6/18/21 Thursday                      Current Patient Class: Inpatient    Current Level of Care: Acute Med Surg    Assessment/Plan:   Per GI Consult  Patient has metastatic cancer and malignant pleural effusion  Patient seems to have difficulty in initiation of swallowing  Differential diagnosis includes infectious process such as yeast or viral illnesses of the esophagus  Cannot exclude myopathy of the throat muscles causing difficulty in swallowing which could be secondary to platinum based chemotherapy agents  Upper endoscopy will be done for evaluation of any obstructive lesion or inflammatory lesion of the esophagus or pharynx  Further recommendations will follow  ATTENDING   Acute Respiratory failure with hypoxia , patient had stay in ICU now gmf oxygen 2L nc desaturation with little exertion  Sepsis possible atelectasis or pneumonia continue Levaquin  Pleural effusion  Loculated right-sided pleural effusion, IR evaluated the patient and not enough fluid for drainage  CONCEPCIÓN continue hold lasix for now and pt also hypotensive   Abdomen large , US abdomen done yesterday showing Moderate quantity of diffuse abdominal ascites  For paracentesis today 6/18/21  Carcinoma of lung Right CT suggested possible advancement of metastatic disease with involvement of the lingula  Patient follows up with Dr Jayna Banda as an outpatient  6/14 cytology  positive for malignancy, compatible with lung primary  Consulting Oncology  IR PARACENTESIS : Paracentesis completed  3900 ml clear yellow fluid removed  Bandaid to site  Patient tolerated well       6/18 EGD WITH BIOPSIES:   INDICATION:  Dysphagia  ANESTHESIA INFORMATION:  ASA: III  Anesthesia Type: IV Sedation with Anesthesia      FINDINGS:  · Mild erythematous mucosa in the antrum; performed cold forceps biopsy to rule out H  pylori  · The upper third of the esophagus, middle third of the esophagus, lower third of the esophagus and GE junction appeared normal   · The duodenal bulb, 1st part of the duodenum and 2nd part of the duodenum appeared normal      IMPRESSION:  1  Mild gastritis  2  Gastric polyp  3  Normal esophagus  4  Normal duodenum     RECOMMENDATION:  1  Mechanical soft diet  2  Follow-up biopsies  3   Continue current medication     Vital Signs:   6/18/21 07:33:46  97 5 °F (36 4 °C)  103  18  90/69  76  98 %  28  2 L/min  Nasal cannula  Lying   06/18/21 0315  97 6 °F (36 4 °C)  109Abnormal   18  110/62  90  94 %             Pertinent Labs/Diagnostic Results:   6/17 US abdomen  Moderate quantity of diffuse abdominal ascites  Results from last 7 days   Lab Units 06/13/21  1602   SARS-COV-2  Negative     Results from last 7 days   Lab Units 06/19/21  0508 06/17/21  0954 06/15/21  0521 06/14/21  0547 06/13/21  1540   WBC Thousand/uL 3 79* 6 50 4 83 4 71 6 25   HEMOGLOBIN g/dL 9 6* 10 5* 9 1* 9 6* 11 2*   HEMATOCRIT % 31 3* 34 2* 30 0* 29 6* 34 7*   PLATELETS Thousands/uL 139* 237 164 179 218   NEUTROS ABS Thousands/µL  --  4 51 3 30  --  4 15     Results from last 7 days   Lab Units 06/19/21  0508 06/18/21  0531 06/17/21  0954 06/15/21  0521 06/14/21  0547   SODIUM mmol/L 130* 130* 132* 134* 130*   POTASSIUM mmol/L 4 1 4 2 4 7 4 2 4 4   CHLORIDE mmol/L 97* 100 99* 100 99   CO2 mmol/L 20* 16* 20* 23 22   ANION GAP mmol/L 13 14* 13 11 9   BUN mg/dL 34* 31* 29* 27* 28*   CREATININE mg/dL 1 78* 1 59* 1 60* 1 55* 1 55*   EGFR ml/min/1 73sq m 37 42 42 43 43   CALCIUM mg/dL 7 7* 7 2* 7 9* 7 7* 7 4*   MAGNESIUM mg/dL 2 1 2 1 1 8  --   --      Results from last 7 days   Lab Units 06/17/21  0954 06/13/21  1540   AST U/L 18 15   ALT U/L 16 7   ALK PHOS U/L 47 56 8   TOTAL PROTEIN g/dL 5 2* 5 4*   ALBUMIN g/dL 2 2* 2 9*   TOTAL BILIRUBIN mg/dL 0 71 0 51     Results from last 7 days   Lab Units 06/19/21  1605 06/19/21  1133 06/19/21  0708 06/18/21  2119 06/18/21  1603 06/18/21  1117 06/18/21  0725 06/17/21  2105 06/17/21  1611 06/17/21  1117 06/17/21  2223 06/16/21  2121   POC GLUCOSE mg/dl 91 123 88 91 103 112 115 162* 113 121 94 95     Results from last 7 days   Lab Units 06/19/21  0508 06/18/21  0531 06/17/21  0954 06/15/21  0521 06/14/21  0547 06/14/21  0009 06/13/21  1540   GLUCOSE RANDOM mg/dL 91 114 104 84 135 149* 156*     Results from last 7 days   Lab Units 06/16/21  0436   HEMOGLOBIN A1C % 6 4*   EAG mg/dl 137     Results from last 7 days   Lab Units 06/13/21  1907   PH VIJAY  7 314   PCO2 VIJAY mm Hg 43 7   PO2 VIJAY mm Hg 29 2*   HCO3 VIJAY mmol/L 21 7*   BASE EXC VIJAY mmol/L -4 3   O2 CONTENT VIJAY ml/dL 6 9   O2 HGB, VENOUS % 44 0*     Results from last 7 days   Lab Units 06/14/21  0009 06/13/21  1907 06/13/21  1540   TROPONIN I ng/mL <0 03 <0 03 <0 03     Results from last 7 days   Lab Units 06/19/21  0508 06/18/21  0531 06/14/21  0547 06/13/21  1907   PROCALCITONIN ng/ml 0 30* 0 29* 0 36* 0 18     Results from last 7 days   Lab Units 06/14/21  0009 06/13/21  1602   LACTIC ACID mmol/L 2 0 1 4     Results from last 7 days   Lab Units 06/13/21  1540   BNP pg/mL 16 2     Results from last 7 days   Lab Units 06/13/21  1540   CRP mg/L 14 3*     Results from last 7 days   Lab Units 06/13/21  1916   STREP PNEUMONIAE ANTIGEN, URINE  Negative   LEGIONELLA URINARY ANTIGEN  Negative     Results from last 7 days   Lab Units 06/14/21  1516 06/13/21  1621 06/13/21  1616   BLOOD CULTURE   --  No Growth After 5 Days  No Growth After 5 Days     GRAM STAIN RESULT  Rare Polys  No organisms seen  --   --    BODY FLUID CULTURE, STERILE  No growth  --   --      Results from last 7 days   Lab Units 06/14/21  1517   TOTAL COUNTED  100   WBC FLUID /ul 3,387       telemetry  NPO  IR Paracentesis   EGD  Daily weight I/o  Medications:   Scheduled Medications:  albumin human, 25 g, Intravenous, Q6H  apixaban, 5 mg, Oral, BID  atenolol, 25 mg, Oral, Daily  calcium carbonate-vitamin D, 1 tablet, Oral, Daily With Breakfast  cholecalciferol, 1,000 Units, Oral, Daily  cyanocobalamin, 1,000 mcg, Oral, Daily  folic acid, 1,593 mcg, Oral, Daily  insulin lispro, 1-6 Units, Subcutaneous, TID AC  levofloxacin, 250 mg, Oral, Q24H  midodrine, 10 mg, Oral, TID AC  mirtazapine, 15 mg, Oral, Daily  pantoprazole, 40 mg, Oral, QAM  polyethylene glycol, 17 g, Oral, Daily  pravastatin, 20 mg, Oral, Daily With Dinner  saccharomyces boulardii, 250 mg, Oral, BID      Continuous IV Infusions:     PRN Meds:  melatonin, 3 mg, Oral, HS PRN  ondansetron, 4 mg, Intravenous, Q6H PRN  oxyCODONE-acetaminophen, 1 tablet, Oral, Q6H PRN        Discharge Plan: tbd    Network Utilization Review Department  ATTENTION: Please call with any questions or concerns to [phone] and carefully listen to the prompts so that you are directed to the right person  All voicemails are confidential   [place] Drop all requests for admission clinical reviews, approved or denied determinations and any other requests to dedicated fax number below belonging to the campus where the patient is receiving treatment   List of dedicated fax numbers for the Facilities:  [address] DENIALS (Administrative/Medical Necessity) [phone]   [address] (Maternity/NICU/Pediatrics) [phone]   [address] Demetria Angeles [address] [address] [place] [address] [address] Steen [address] [facility] [facility] [phone]

## 2021-06-19 NOTE — ASSESSMENT & PLAN NOTE
Lab Results   Component Value Date    HGBA1C 6 4 (H) 06/16/2021       Recent Labs     06/18/21  1117 06/18/21  1603 06/18/21  2119 06/19/21  0708   POCGLU 112 103 91 88       Blood Sugar Average: Last 72 hrs:  (P) 183 [id number]   Well controlled type 2 diabetes  Continue patient on low-dose sliding scale insulin with Accu-Chek QID

## 2021-06-19 NOTE — ASSESSMENT & PLAN NOTE
CTA showed - Increasing opacification in the right lung base suggesting worsening atelectasis or pneumonia    Continue Levaquin for 7-day course of therapy, day #5 (renally doses at 250 mg daily)   Patient afebrile, denies cough SOB  Blood cultures negative to date  On room air   Procalcitonin mild elevated to 0 36 -> 0 29    Results from last 7 days   Lab Units 06/18/21  0531 06/14/21  0547 06/14/21  0009 06/13/21  1907 06/13/21  1602   LACTIC ACID mmol/L  --   --  2 0  --  1 4   PROCALCITONIN ng/ml 0 29* 0 36*  --  0 18  --      Results from last 7 days   Lab Units 06/19/21  0508 06/17/21  0954 06/15/21  0521 06/14/21  0547 06/13/21  1540   WBC Thousand/uL 3 79* 6 50 4 83 4 71 6 25

## 2021-06-19 NOTE — PROGRESS NOTES
Sadia 46  Progress Note Aaron July 1945, 77 y o  male MRN: [id number]  Unit/Bed#: [address] [place] [address] Encounter: [id number]  Primary Care Provider: Michele Chaidez MD   Date and time admitted to hospital: 6/14/2021 10:31 AM    * Ascites  Assessment & Plan  Patient has exudative ascites secondary to malignancy  Cell count showed polymorphous predominance but culture data negative  Patient did not have any abdominal pain also  IR paracentesis on 6/14/2021 with removal of 2 L clear yellow ascites  Received albumin IV after that  IR paracentesis on 6/18/2021 with removal of 3 9 L of clear yellow ascites  Fluid cytology on 6/14 positive for malignancy, compatible with lung primary  Started patient on IV Albumin post paracentesis   Diuretics not indicated for malignant ascites per discussion with GI  Daily weight and I&Os  Appreciate oncology and nephrology input     Acute renal failure superimposed on stage 3 chronic kidney disease [facility])  Assessment & Plan  Lab Results   Component Value Date    EGFR 37 06/19/2021    EGFR 42 06/18/2021    EGFR 42 06/17/2021    CREATININE 1 78 (H) 06/19/2021    CREATININE 1 59 (H) 06/18/2021    CREATININE 1 60 (H) 06/17/2021     Baseline creatinine appears to be around 0 9-1 2  Creatinine 1 55 on admission  Patient is on Lasix 40 mg daily at home, currently on hold  Also patient is running hypotensive with poor oral intake due to dysphagia  Patient appears intravascularly depleted  Continue and increase amount of IV albumin today per nephrology   Avoid nephrotoxins and hypotension  Monitor Cr in am     Dysphagia  Assessment & Plan  Patient reports having trouble swallowing solid food, thus not eating much  Denies nausea, vomiting, chest pain  Speech swallow eval was limited due to prolonged retching during assessment  Consulted GI, appreciate input    Underwent EGD: mild gastritis, gastric polyp   Trial mechanical soft diet, follow-up biopsies     Sepsis [facility])  Assessment & Plan  CTA showed - Increasing opacification in the right lung base suggesting worsening atelectasis or pneumonia  Continue Levaquin for 7-day course of therapy, day #5 (renally doses at 250 mg daily)   Patient afebrile, denies cough SOB  Blood cultures negative to date  On room air   Procalcitonin mild elevated to 0 36 -> 0 29    Results from last 7 days   Lab Units 06/18/21  0531 06/14/21  0547 06/14/21  0009 06/13/21  1907 06/13/21  1602   LACTIC ACID mmol/L  --   --  2 0  --  1 4   PROCALCITONIN ng/ml 0 29* 0 36*  --  0 18  --      Results from last 7 days   Lab Units 06/19/21  0508 06/17/21  0954 06/15/21  0521 06/14/21  0547 06/13/21  1540   WBC Thousand/uL 3 79* 6 50 4 83 4 71 6 25         Carcinoma of lung, right ([facility] [address] )  Assessment & Plan  History of non-small-cell lung cancer diagnosed in December 2019  Patient underwent VATS with pleural decortication and pleurodesis in 7/2020  Patient was on Brigatinib, now on chemotherapy every 3 weeks and sees Dr Stef Noonan every Monday per patient  Not sure exactly what treatment he is on   CT suggested possible advancement of metastatic disease with involvement of the lingula  Consulted oncology, appreciate input  Per oncology, "Recent paracentesis demonstrated TTF1 positive adenocarcinoma in the ascitic fluid  If the ascitic fluid reaccumulates persistently/significantly, patient will need to be evaluated for catheter placement  Additional information is needed  Progressive ascites would be consistent with disease progression - possibly patient's present regimen needs to be amended  Medical oncology will reach out to Dr Stef Noonan for additional information including the most recent treatment plan  There are other options "    Ambulatory dysfunction  Assessment & Plan  Patient would benefit from short-term rehab  PT OT evaluated the patient and recommended short-term rehab    Case management following    Hyponatremia  Assessment & Plan  Combination of SIADH and pre renal   Na 132 -> 130 -> 130   Discontinued IV normal saline  Consulted nephrology  Will increase Albumin and monitor       Acute respiratory failure with hypoxia [facility])  Assessment & Plan  Multifactorial most likely secondary to significant ascites causing hypoventilation, right-sided loculated pleural effusion, and lung cancer along with suspected pneumonia  Initially patient was hypoxic requiring 2-4 L of oxygen via nasal cannula which worsened to 5 L and hence required to be in the ICU  Patient now on room air since 6/17/21, satting 92-98%  Will titrate off oxygen as tolerated to maintain sats > 92%  Patient possibly desaturates on exertion however has not exerted too much  Will obtain overnight pulse oximetry      Type 2 diabetes mellitus [facility])  Assessment & Plan  Lab Results   Component Value Date    HGBA1C 6 4 (H) 06/16/2021       Recent Labs     06/18/21  1117 06/18/21  1603 06/18/21  2119 06/19/21  0708   POCGLU 112 103 91 88       Blood Sugar Average: Last 72 hrs:  (P) 653 [id number]   Well controlled type 2 diabetes  Continue patient on low-dose sliding scale insulin with Accu-Chek QID    AV block  Assessment & Plan  Status post pacemaker insertion  Continue on atenolol with holding parameters  Patient has tachycardia which is more likely secondary to cancer related and reactive  In May 12th 2021 with Pulmonary patient already had a heart rate of 106 at that time  Telemetry showed ST,HR low 100's  Discontinued  Optimize electrolytes    History of pulmonary embolism  Assessment & Plan  History of DVT and PE  On Eliquis as outpatient  No acute PE on CTA  Continue patient on Eliquis 5 mg BID       Pleural effusion  Assessment & Plan  Loculated right-sided pleural effusion, IR evaluated the patient and not enough fluid for drainage  Continue supportive care        VTE Pharmacologic Prophylaxis:   Pharmacologic: Apixaban (Eliquis)  Mechanical VTE Prophylaxis in Place: Yes    Patient Centered Rounds: I have performed bedside rounds with nursing staff today  Discussions with Specialists or Other Care Team Provider: nursing, nephrology     Education and Discussions with Family / Patient: I have answered all questions to the best of my ability  Time Spent for Care: 30 minutes  More than 50% of total time spent on counseling and coordination of care as described above  Current Length of Stay: 5 day(s)    Current Patient Status: Inpatient   Certification Statement: The patient will continue to require additional inpatient hospital stay due to hypotension, CONCEPCIÓN     Discharge Plan: Patient is not medically stable for discharge today, likely in 48-72 hours  Code Status: Level 2 - DNAR: but accepts endotracheal intubation      Subjective:   Resting OOB in chair  No BM in several days but feels he might go today  Would like to try a nutritional supplement  Denies HA, dizziness, CP, or SOB  Abdominal distention stable  Objective:     Vitals:   Temp (24hrs), Av 9 °F (36 1 °C), Min:96 1 °F (35 6 °C), Max:97 2 °F (36 2 °C)    Temp:  [96 1 °F (35 6 °C)-97 2 °F (36 2 °C)] 97 2 °F (36 2 °C)  HR:  [] 102  Resp:  [16-18] 16  BP: ()/(43-96) 92/62  SpO2:  [93 %-100 %] 96 %  Body mass index is 31 79 kg/m²  Input and Output Summary (last 24 hours): Intake/Output Summary (Last 24 hours) at 2021 1008  Last data filed at 2021 0945  Gross per 24 hour   Intake 100 ml   Output 300 ml   Net -200 ml       Physical Exam:     Physical Exam  Vitals and nursing note reviewed  Constitutional:       General: He is not in acute distress  Appearance: He is well-developed  He is ill-appearing (chronically ill appearing, appears weak and deconditioned )  He is not toxic-appearing or diaphoretic  Comments: OOB in chair on room air    HENT:      Head: Normocephalic  Cardiovascular:      Rate and Rhythm: Regular rhythm   Tachycardia present  Pulmonary:      Effort: Pulmonary effort is normal  No tachypnea or respiratory distress  Breath sounds: Examination of the right-upper field reveals decreased breath sounds  Examination of the right-lower field reveals decreased breath sounds  Decreased breath sounds present  No wheezing, rhonchi or rales  Abdominal:      General: Bowel sounds are normal  There is distension (mild, soft )  Palpations: Abdomen is soft  Tenderness: There is no abdominal tenderness  Musculoskeletal:         General: No tenderness  Normal range of motion  Cervical back: Normal range of motion  Right lower leg: Edema (trace) present  Left lower leg: Edema (trace) present  Skin:     General: Skin is warm and dry  Capillary Refill: Capillary refill takes less than 2 seconds  Neurological:      Mental Status: He is alert and oriented to person, place, and time  Mental status is at baseline  Deep Tendon Reflexes: Reflexes are normal and symmetric  Psychiatric:         Mood and Affect: Mood normal          Behavior: Behavior normal          Thought Content: Thought content normal          Judgment: Judgment normal          Additional Data:     Labs:    Results from last 7 days   Lab Units 06/19/21  0508 06/17/21  0954   WBC Thousand/uL 3 79* 6 50   HEMOGLOBIN g/dL 9 6* 10 5*   HEMATOCRIT % 31 3* 34 2*   PLATELETS Thousands/uL 139* 237   NEUTROS PCT %  --  69   LYMPHS PCT %  --  11*   MONOS PCT %  --  14*   EOS PCT %  --  4     Results from last 7 days   Lab Units 06/19/21  0508 06/17/21  0954   POTASSIUM mmol/L 4 1 4 7   CHLORIDE mmol/L 97* 99*   CO2 mmol/L 20* 20*   BUN mg/dL 34* 29*   CREATININE mg/dL 1 78* 1 60*   CALCIUM mg/dL 7 7* 7 9*   ALK PHOS U/L  --  47   ALT U/L  --  16   AST U/L  --  18           * I Have Reviewed All Lab Data Listed Above  * Additional Pertinent Lab Tests Reviewed:  All Labs Within Last 24 Hours Reviewed    Imaging:    Imaging Reports Reviewed Today Include: Abdominal US, CTA chest   Imaging Personally Reviewed by Myself Includes:  None     Recent Cultures (last 7 days):     Results from last 7 days   Lab Units 06/14/21  1516 06/13/21  1916 06/13/21  1621 06/13/21  1616   BLOOD CULTURE   --   --  No Growth After 4 Days  No Growth After 5 Days  GRAM STAIN RESULT  Rare Polys  No organisms seen  --   --   --    BODY FLUID CULTURE, STERILE  No growth  --   --   --    LEGIONELLA URINARY ANTIGEN   --  Negative  --   --        Last 24 Hours Medication List:   Current Facility-Administered Medications   Medication Dose Route Frequency Provider Last Rate    apixaban  5 mg Oral BID Laury Wilson MD      atenolol  25 mg Oral Daily Laury Wilson MD      calcium carbonate-vitamin D  1 tablet Oral Daily With Breakfast Laury Wilson MD      cholecalciferol  1,000 Units Oral Daily Laury Wilson MD      cyanocobalamin  1,000 mcg Oral Daily Laury Wilson MD      folic acid  6,844 mcg Oral Daily Laury Wilson MD      insulin lispro  1-6 Units Subcutaneous TID [facility] Laury Wilson MD      levofloxacin  250 mg Oral Q24H NERISSA Mcqueen      melatonin  3 mg Oral HS PRN Laury Wilson MD      midodrine  5 mg Oral TID AC Laury Wilson MD      mirtazapine  15 mg Oral Daily Laury Wilson MD      oxyCODONE-acetaminophen  1 tablet Oral Q6H PRN Laury Wilson MD      pantoprazole  40 mg Oral QAM Laury Wilson MD      pravastatin  20 mg Oral Daily With Amari Smith MD      saccharomyces boulardii  250 mg Oral BID Laury Wilson MD          Today, Patient Was Seen By: NERISSA Winchester    ** Please Note: Dictation voice to text software may have been used in the creation of this document   **

## 2021-06-19 NOTE — CASE MANAGEMENT
CM called [facility] and spoke with [facility] in [place] Intake  Updated clinical and PT/OT notes were sent to her in [place]  She is working on [employer] for [facility]  CM spoke with Palmira MULTANI and dc is not anticipated over the weekend  CM to follow

## 2021-06-20 PROBLEM — I95.9 HYPOTENSION: Status: ACTIVE | Noted: 2021-06-20

## 2021-06-20 PROBLEM — E83.51 HYPOCALCEMIA: Status: ACTIVE | Noted: 2021-06-20

## 2021-06-20 LAB
ALBUMIN SERPL BCP-MCNC: 2.6 G/DL (ref 3.5–5)
ALP SERPL-CCNC: 27 U/L (ref 46–116)
ALT SERPL W P-5'-P-CCNC: 15 U/L (ref 12–78)
ANION GAP SERPL CALCULATED.3IONS-SCNC: 15 MMOL/L (ref 4–13)
AST SERPL W P-5'-P-CCNC: 10 U/L (ref 5–45)
BASOPHILS # BLD AUTO: 0.04 THOUSANDS/ΜL (ref 0–0.1)
BASOPHILS NFR BLD AUTO: 1 % (ref 0–1)
BILIRUB SERPL-MCNC: 0.57 MG/DL (ref 0.2–1)
BUN SERPL-MCNC: 37 MG/DL (ref 5–25)
CALCIUM ALBUM COR SERPL-MCNC: 7.8 MG/DL (ref 8.3–10.1)
CALCIUM SERPL-MCNC: 6.7 MG/DL (ref 8.3–10.1)
CHLORIDE SERPL-SCNC: 103 MMOL/L (ref 100–108)
CO2 SERPL-SCNC: 18 MMOL/L (ref 21–32)
CORTIS SERPL-MCNC: 9.2 UG/DL
CREAT SERPL-MCNC: 2.51 MG/DL (ref 0.6–1.3)
EOSINOPHIL # BLD AUTO: 0.2 THOUSAND/ΜL (ref 0–0.61)
EOSINOPHIL NFR BLD AUTO: 5 % (ref 0–6)
ERYTHROCYTE [DISTWIDTH] IN BLOOD BY AUTOMATED COUNT: 18.4 % (ref 11.6–15.1)
GFR SERPL CREATININE-BSD FRML MDRD: 24 ML/MIN/1.73SQ M
GLUCOSE SERPL-MCNC: 123 MG/DL (ref 65–140)
GLUCOSE SERPL-MCNC: 125 MG/DL (ref 65–140)
GLUCOSE SERPL-MCNC: 76 MG/DL (ref 65–140)
GLUCOSE SERPL-MCNC: 83 MG/DL (ref 65–140)
GLUCOSE SERPL-MCNC: 88 MG/DL (ref 65–140)
GLUCOSE SERPL-MCNC: 89 MG/DL (ref 65–140)
HCT VFR BLD AUTO: 29.5 % (ref 36.5–49.3)
HGB BLD-MCNC: 9 G/DL (ref 12–17)
IMM GRANULOCYTES # BLD AUTO: 0.04 THOUSAND/UL (ref 0–0.2)
IMM GRANULOCYTES NFR BLD AUTO: 1 % (ref 0–2)
LACTATE SERPL-SCNC: 0.9 MMOL/L (ref 0.5–2)
LYMPHOCYTES # BLD AUTO: 0.61 THOUSANDS/ΜL (ref 0.6–4.47)
LYMPHOCYTES NFR BLD AUTO: 14 % (ref 14–44)
MAGNESIUM SERPL-MCNC: 1.8 MG/DL (ref 1.6–2.6)
MCH RBC QN AUTO: 27.9 PG (ref 26.8–34.3)
MCHC RBC AUTO-ENTMCNC: 30.5 G/DL (ref 31.4–37.4)
MCV RBC AUTO: 91 FL (ref 82–98)
MONOCYTES # BLD AUTO: 0.64 THOUSAND/ΜL (ref 0.17–1.22)
MONOCYTES NFR BLD AUTO: 14 % (ref 4–12)
NEUTROPHILS # BLD AUTO: 2.94 THOUSANDS/ΜL (ref 1.85–7.62)
NEUTS SEG NFR BLD AUTO: 65 % (ref 43–75)
NRBC BLD AUTO-RTO: 0 /100 WBCS
OSMOLALITY UR/SERPL-RTO: 278 MMOL/KG (ref 282–298)
PHOSPHATE SERPL-MCNC: 4.4 MG/DL (ref 2.3–4.1)
PLATELET # BLD AUTO: 140 THOUSANDS/UL (ref 149–390)
PMV BLD AUTO: 10 FL (ref 8.9–12.7)
POTASSIUM SERPL-SCNC: 3.8 MMOL/L (ref 3.5–5.3)
PROCALCITONIN SERPL-MCNC: 0.46 NG/ML
PROT SERPL-MCNC: 4.5 G/DL (ref 6.4–8.2)
RBC # BLD AUTO: 3.23 MILLION/UL (ref 3.88–5.62)
SODIUM SERPL-SCNC: 136 MMOL/L (ref 136–145)
T4 FREE SERPL-MCNC: 1.13 NG/DL (ref 0.76–1.46)
TSH SERPL DL<=0.05 MIU/L-ACNC: 9.46 UIU/ML (ref 0.36–3.74)
WBC # BLD AUTO: 4.47 THOUSAND/UL (ref 4.31–10.16)

## 2021-06-20 PROCEDURE — 84145 PROCALCITONIN (PCT): CPT | Performed by: NURSE PRACTITIONER

## 2021-06-20 PROCEDURE — NC001 PR NO CHARGE: Performed by: NURSE PRACTITIONER

## 2021-06-20 PROCEDURE — 83930 ASSAY OF BLOOD OSMOLALITY: CPT | Performed by: INTERNAL MEDICINE

## 2021-06-20 PROCEDURE — 84439 ASSAY OF FREE THYROXINE: CPT | Performed by: NURSE PRACTITIONER

## 2021-06-20 PROCEDURE — 83605 ASSAY OF LACTIC ACID: CPT | Performed by: INTERNAL MEDICINE

## 2021-06-20 PROCEDURE — 82948 REAGENT STRIP/BLOOD GLUCOSE: CPT

## 2021-06-20 PROCEDURE — 80053 COMPREHEN METABOLIC PANEL: CPT | Performed by: NURSE PRACTITIONER

## 2021-06-20 PROCEDURE — 94762 N-INVAS EAR/PLS OXIMTRY CONT: CPT

## 2021-06-20 PROCEDURE — 99232 SBSQ HOSP IP/OBS MODERATE 35: CPT | Performed by: NURSE PRACTITIONER

## 2021-06-20 PROCEDURE — 83735 ASSAY OF MAGNESIUM: CPT | Performed by: NURSE PRACTITIONER

## 2021-06-20 PROCEDURE — 84100 ASSAY OF PHOSPHORUS: CPT | Performed by: NURSE PRACTITIONER

## 2021-06-20 PROCEDURE — 84443 ASSAY THYROID STIM HORMONE: CPT | Performed by: INTERNAL MEDICINE

## 2021-06-20 PROCEDURE — 85025 COMPLETE CBC W/AUTO DIFF WBC: CPT | Performed by: NURSE PRACTITIONER

## 2021-06-20 PROCEDURE — 99232 SBSQ HOSP IP/OBS MODERATE 35: CPT | Performed by: INTERNAL MEDICINE

## 2021-06-20 PROCEDURE — 82533 TOTAL CORTISOL: CPT | Performed by: INTERNAL MEDICINE

## 2021-06-20 PROCEDURE — 94760 N-INVAS EAR/PLS OXIMETRY 1: CPT

## 2021-06-20 RX ORDER — GUAIFENESIN 600 MG
600 TABLET, EXTENDED RELEASE 12 HR ORAL EVERY 12 HOURS SCHEDULED
Status: DISCONTINUED | OUTPATIENT
Start: 2021-06-20 | End: 2021-06-28 | Stop reason: HOSPADM

## 2021-06-20 RX ORDER — ALBUMIN (HUMAN) 12.5 G/50ML
25 SOLUTION INTRAVENOUS ONCE
Status: COMPLETED | OUTPATIENT
Start: 2021-06-20 | End: 2021-06-21

## 2021-06-20 RX ORDER — SODIUM CHLORIDE, SODIUM GLUCONATE, SODIUM ACETATE, POTASSIUM CHLORIDE, MAGNESIUM CHLORIDE, SODIUM PHOSPHATE, DIBASIC, AND POTASSIUM PHOSPHATE .53; .5; .37; .037; .03; .012; .00082 G/100ML; G/100ML; G/100ML; G/100ML; G/100ML; G/100ML; G/100ML
100 INJECTION, SOLUTION INTRAVENOUS CONTINUOUS
Status: DISCONTINUED | OUTPATIENT
Start: 2021-06-20 | End: 2021-06-21

## 2021-06-20 RX ORDER — ALBUTEROL SULFATE 2.5 MG/3ML
2.5 SOLUTION RESPIRATORY (INHALATION) EVERY 6 HOURS PRN
Status: DISCONTINUED | OUTPATIENT
Start: 2021-06-20 | End: 2021-06-28 | Stop reason: HOSPADM

## 2021-06-20 RX ORDER — CALCIUM GLUCONATE 20 MG/ML
2 INJECTION, SOLUTION INTRAVENOUS ONCE
Status: COMPLETED | OUTPATIENT
Start: 2021-06-20 | End: 2021-06-20

## 2021-06-20 RX ADMIN — CALCIUM CARBONATE 500 MG (1,250 MG)-VITAMIN D3 200 UNIT TABLET 1 TABLET: at 09:22

## 2021-06-20 RX ADMIN — ALBUMIN (HUMAN) 25 G: 0.25 INJECTION, SOLUTION INTRAVENOUS at 07:20

## 2021-06-20 RX ADMIN — SODIUM CHLORIDE, SODIUM GLUCONATE, SODIUM ACETATE, POTASSIUM CHLORIDE, MAGNESIUM CHLORIDE, SODIUM PHOSPHATE, DIBASIC, AND POTASSIUM PHOSPHATE 100 ML/HR: .53; .5; .37; .037; .03; .012; .00082 INJECTION, SOLUTION INTRAVENOUS at 19:51

## 2021-06-20 RX ADMIN — MIDODRINE HYDROCHLORIDE 10 MG: 5 TABLET ORAL at 10:56

## 2021-06-20 RX ADMIN — GUAIFENESIN 600 MG: 600 TABLET, EXTENDED RELEASE ORAL at 15:48

## 2021-06-20 RX ADMIN — MIDODRINE HYDROCHLORIDE 10 MG: 5 TABLET ORAL at 15:49

## 2021-06-20 RX ADMIN — MIRTAZAPINE 15 MG: 15 TABLET, FILM COATED ORAL at 09:21

## 2021-06-20 RX ADMIN — MIDODRINE HYDROCHLORIDE 10 MG: 5 TABLET ORAL at 09:20

## 2021-06-20 RX ADMIN — SODIUM CHLORIDE 500 ML: 0.9 INJECTION, SOLUTION INTRAVENOUS at 08:46

## 2021-06-20 RX ADMIN — CALCIUM GLUCONATE 2 G: 20 INJECTION, SOLUTION INTRAVENOUS at 10:42

## 2021-06-20 RX ADMIN — Medication 250 MG: at 17:59

## 2021-06-20 RX ADMIN — CYANOCOBALAMIN TAB 500 MCG 1000 MCG: 500 TAB at 09:22

## 2021-06-20 RX ADMIN — Medication 1000 UNITS: at 09:20

## 2021-06-20 RX ADMIN — APIXABAN 5 MG: 5 TABLET, FILM COATED ORAL at 17:59

## 2021-06-20 RX ADMIN — Medication 250 MG: at 09:21

## 2021-06-20 RX ADMIN — FOLIC ACID 1000 MCG: 1 TABLET ORAL at 09:21

## 2021-06-20 RX ADMIN — ALBUMIN (HUMAN) 25 G: 0.25 INJECTION, SOLUTION INTRAVENOUS at 16:24

## 2021-06-20 RX ADMIN — PRAVASTATIN SODIUM 20 MG: 20 TABLET ORAL at 15:48

## 2021-06-20 RX ADMIN — APIXABAN 5 MG: 5 TABLET, FILM COATED ORAL at 09:45

## 2021-06-20 RX ADMIN — HYDROCORTISONE SODIUM SUCCINATE 50 MG: 100 INJECTION, POWDER, FOR SOLUTION INTRAMUSCULAR; INTRAVENOUS at 17:54

## 2021-06-20 RX ADMIN — PANTOPRAZOLE SODIUM 40 MG: 40 TABLET, DELAYED RELEASE ORAL at 06:28

## 2021-06-20 RX ADMIN — HYDROCORTISONE SODIUM SUCCINATE 50 MG: 100 INJECTION, POWDER, FOR SOLUTION INTRAMUSCULAR; INTRAVENOUS at 22:33

## 2021-06-20 RX ADMIN — LEVOFLOXACIN 250 MG: 250 TABLET, FILM COATED ORAL at 09:21

## 2021-06-20 RX ADMIN — SODIUM CHLORIDE, SODIUM GLUCONATE, SODIUM ACETATE, POTASSIUM CHLORIDE, MAGNESIUM CHLORIDE, SODIUM PHOSPHATE, DIBASIC, AND POTASSIUM PHOSPHATE 125 ML/HR: .53; .5; .37; .037; .03; .012; .00082 INJECTION, SOLUTION INTRAVENOUS at 09:32

## 2021-06-20 NOTE — PROGRESS NOTES
[address] [facility] NOTE   Li Vogt 77 y o  male MRN: [id number]  Unit/Bed#: [address] [place] [address] Encounter: [id number]  Reason for Consult: CONCEPCIÓN    ASSESSMENT and PLAN:  1  Acute kidney injury (POA):  · Admission creatinine of 1 59 on June 13, 2021  · Baseline creatinine is around 0 9-1 2  · CONCEPCIÓN on admission was due to prerenal azotemia which was followed by double hit ATN (Contrast on 6/13/21 and hypotension on 6/18 and 6/19)  · Renal function is worsening  Creatinine is up to 2 51 today  · This is likely due to ongoing hypotension  · UA ordered but not done yet  Bladder scan 240 cc  · Check renal US       2  Hypotension:  · BP remains low  · Echo 6/15/21 showed EF of 55 to 60% without pericardial effusion  · No evidence of bleeding - Hgb is stable  · Not febrile and no leukocytosis to suggest infection  · Cortisol is pending  · Continue Midodrine to 10 mg TID  · Complete Albumin today  · Give NS bolus 500 cc  · Start IVF at 125 cc/hr  · If remains low, would rebolus and consider tx to SDU       3  Low bicarbonate:  · CO2 down to 18  · Anion gap is up to 15  · Check lactic acid  4  Respiratory failure:  · Improved       4  Hyponatremia:  · Resolved  Na 136      5  Hypocalcemia:  · Give calcium gluc 2 gm now in light of low BP  · Check Vitamin D      6  R pleural effusion  7  Malignant ascites  8  Non small cell lung cancer  9  Dysphagia    DISPOSITION:  · NS bolus 500 cc now after albumin 25 gm given  · Recheck BP after bolus  · If BP remains low, would rebolus and consider tx to SDU  · Start IVF with isolyte at 125 cc/hr  · Ca gluc 2 gm IV x 1    · Check renal US  · Check lactic acid  · Follow up cortisol  The above plan was discussed with SLIM  SUBJECTIVE / 24H INTERVAL HISTORY:  BP noted to be persistently low yesterday through the day  Patient feels weak  He continues to have dysphagia    No chest pain or shortness of breath  OBJECTIVE:  Current Weight: Weight - Scale: 94 1 kg (207 lb 7 3 oz)  Vitals:    06/20/21 0208 06/20/21 0311 06/20/21 0558 06/20/21 0719   BP: 91/54 93/57  (!) 82/50   Pulse: 75 73  75   Resp:       Temp:    (!) 96 7 °F (35 9 °C)   TempSrc:       SpO2: 94% 94%  96%   Weight:   94 1 kg (207 lb 7 3 oz)    Height:           Intake/Output Summary (Last 24 hours) at 6/20/2021 0858  Last data filed at 6/19/2021 1347  Gross per 24 hour   Intake --   Output 260 ml   Net -260 ml     General: conscious, cooperative, no distress  Skin: dry  Eyes: pink conjunctivae  ENT: moist mucous membranes  Chest/Lungs: equal chest expansion, clear breath sounds  CVS: distinct heart sounds, normal rate, regular rhythm, no rub  Abdomen: soft, non tender, non distended, normal bowel sounds  Extremities: no edema  : no palencia catheter  Neuro: awake, alert     Psych: appropriate affect    Medications:    Current Facility-Administered Medications:     apixaban (ELIQUIS) tablet 5 mg, 5 mg, Oral, BID, Diane Vega MD, 5 mg at 06/19/21 1739    atenolol (TENORMIN) tablet 25 mg, 25 mg, Oral, Daily, Diane eVga MD, 25 mg at 06/19/21 0944    calcium carbonate-vitamin D (OSCAL-D) 500 mg-200 units per tablet 1 tablet, 1 tablet, Oral, Daily With Breakfast, Diane Vega MD, 1 tablet at 06/19/21 0758    cholecalciferol (VITAMIN D3) tablet 1,000 Units, 1,000 Units, Oral, Daily, Diane Vega MD, 1,000 Units at 06/19/21 0944    cyanocobalamin (VITAMIN B-12) tablet 1,000 mcg, 1,000 mcg, Oral, Daily, Diane Vega MD, 1,000 mcg at 18/11/12 2912    folic acid (FOLVITE) tablet 1,000 mcg, 1,000 mcg, Oral, Daily, Diane Vega MD, 1,000 mcg at 06/19/21 0947    insulin lispro (HumaLOG) 100 units/mL subcutaneous injection 1-6 Units, 1-6 Units, Subcutaneous, TID AC **AND** Fingerstick Glucose (POCT), , , TID AC, Diane Vega MD    levofloxacin (LEVAQUIN) tablet 250 mg, 250 mg, Oral, Q24H, NERISSA Mcqueen, 250 mg at 06/19/21 1054    melatonin tablet 3 mg, 3 mg, Oral, HS PRN, Amy Rodriguez MD, 3 mg at 06/15/21 2140    midodrine (PROAMATINE) tablet 10 mg, 10 mg, Oral, TID AC, Alice Williamson MD, 10 mg at 06/19/21 1530    mirtazapine (REMERON) tablet 15 mg, 15 mg, Oral, Daily, Amy Rodriguez MD, 15 mg at 06/19/21 0945    ondansetron (ZOFRAN) injection 4 mg, 4 mg, Intravenous, Q6H PRN, NERISSA Kelly    oxyCODONE-acetaminophen (PERCOCET) 5-325 mg per tablet 1 tablet, 1 tablet, Oral, Q6H PRN, Amy Rodriguez MD, 1 tablet at 06/16/21 2028    pantoprazole (PROTONIX) EC tablet 40 mg, 40 mg, Oral, QAM, Amy Rodriguez MD, 40 mg at 06/20/21 6720    polyethylene glycol (MIRALAX) packet 17 g, 17 g, Oral, Daily, [employer], JULINP, 17 g at 06/19/21 1530    pravastatin (PRAVACHOL) tablet 20 mg, 20 mg, Oral, Daily With Michelle Gusman MD, 20 mg at 06/19/21 1530    saccharomyces boulardii (FLORASTOR) capsule 250 mg, 250 mg, Oral, BID, Amy Rodriguez MD, 250 mg at 06/19/21 1814    sodium chloride 0 9 % bolus 500 mL, 500 mL, Intravenous, Once, Alice Williamson MD, Last Rate: 500 mL/hr at 06/20/21 0846, 500 mL at 06/20/21 0846    Laboratory Results:  Results from last 7 days   Lab Units 06/20/21  0623 06/19/21  0508 06/18/21  0531 06/17/21  0954 06/15/21  0521 06/14/21  0547 06/14/21  0009 06/13/21  1540   WBC Thousand/uL 4 47 3 79*  --  6 50 4 83 4 71  --  6 25   HEMOGLOBIN g/dL 9 0* 9 6*  --  10 5* 9 1* 9 6*  --  11 2*   HEMATOCRIT % 29 5* 31 3*  --  34 2* 30 0* 29 6*  --  34 7*   PLATELETS Thousands/uL 140* 139*  --  237 164 179  --  218   POTASSIUM mmol/L 3 8 4 1 4 2 4 7 4 2 4 4 4 4 4 2   CHLORIDE mmol/L 103 97* 100 99* 100 99 99 96   CO2 mmol/L 18* 20* 16* 20* 23 22 20* 24   BUN mg/dL 37* 34* 31* 29* 27* 28* 29* 31*   CREATININE mg/dL 2 51* 1 78* 1 59* 1 60* 1 55* 1 55* 1 50* 1 59*   CALCIUM mg/dL 6 7* 7 7* 7 2* 7 9* 7 7* 7 4* 7 5* 8 0*   MAGNESIUM mg/dL 1 8 2 1 2 1 1 8  --   --   --   --    PHOSPHORUS mg/dL 4 4*  --   --   --   -- --   --   --

## 2021-06-20 NOTE — ASSESSMENT & PLAN NOTE
Lab Results   Component Value Date    HGBA1C 6 4 (H) 06/16/2021       Recent Labs     06/19/21  2101 06/20/21  0740 06/20/21  1054 06/20/21  1118   POCGLU 91 88 83 89       Blood Sugar Average: Last 72 hrs:  (P) 120 [id number]   Well controlled type 2 diabetes  Continue patient on low-dose sliding scale insulin with Accu-Chek QID

## 2021-06-20 NOTE — ASSESSMENT & PLAN NOTE
IR paracentesis on 6/14/2021 with removal of 2 L clear yellow ascites  IR paracentesis on 6/18/2021 with removal of 3 9 L of clear yellow ascites  Fluid cytology on 6/14 positive for malignancy, compatible with lung primary  Fluid culture shows no growth   Given IV Albumin post paracentesis   Diuretics not indicated for malignant ascites per discussion with GI  Daily weight and I&Os  Appreciate oncology input; indicated that patient follows with Dr Bravo Her, outpatient, recommend stabilizing patient and have him follow up outpatient once medically stable  Patient aware of malignant ascites  Patient would like to discuss different treatment options with oncology  He is not ready for hospice at this time but would transition if all other treatment options were exhausted     Patient underwent repeat paracentesis on 06/21 with removal of 3800 mL of cloudy yellow fluid  Patient tolerated procedure well

## 2021-06-20 NOTE — ASSESSMENT & PLAN NOTE
BP remains low despite IV Albumin  Lactic Acid normal   Nephrology following, appreciate input  S/p  mL bolus 6/20 am  Now on Isolyte 100 ml/hr   Continue Midodrine 10 mg TID  Trial YOLANDA stockings     Patient noted to have mild improvement in BP     Continue to monitor closely

## 2021-06-20 NOTE — ASSESSMENT & PLAN NOTE
Patient would benefit from short-term rehab  PT OT evaluated the patient and recommended short-term rehab  Case management following  Patient has a bed available at [facility]

## 2021-06-20 NOTE — QUICK NOTE
Had a long goals of care conversation with patient and his wife at the bedside  They would like to speak with oncology regarding further treatment options, and they would like to speak with CM regarding hospice  At this time, patient with cough and abdominal distention  Will decreased IVF to 100 mL/hr to avoid volume overload  Will give Albumin IV x1 for hypotension  Consult IR for possible paracentesis and thoracentesis tomorrow

## 2021-06-20 NOTE — PROGRESS NOTES
Case discussed with NERISSA Burns given ongoing borderline hypotension  Briefly, Gustavo Diaz is a 76 yo male with stage IV NSCL Cancer, COPD, h/o malignant pleural effusions s/p VATS, right sided decortication and pleurodesis who presented on 6/13 with shortness of breath  CT scan demonstrated right loculated pleural effusion with right lung opacification as well as new ascites  He was briefly admitted to the step down unit given hypotension that improved with fluid bolus  He is receiving antibiotic for possible pneumonia  While admitted, he has undergone paracentesis x 2  Cytology from ascitic fluid is positive for carcinoma which demonstrates progression of his disease  Heme/Onc is following  Since admission, he has been borderline hypotensive but today his SBPs have been in the high 80s-90s, MAP consistently above 65  He is already on midodrine 10mg TID and is receiving scheduled albumin  He was started on IV maintenance fluid today by Nephrology and the primary team  Patient seen at the bedside with his wife present  He is awake, alert and oriented and in no acute distress  I discussed that his blood pressure is low but acceptable at this time  If it should drop much lower, we could consider transfer to SD vs ICU for pressors  I educated him and his wife that vasopressors would temporarily improve his blood pressure but that it is not a permanent solution  He expressed understanding  Recommend ongoing goals of care discussion after family is presented with options from Heme/Onc  Will continue to follow peripherally

## 2021-06-20 NOTE — PLAN OF CARE
Nutrient/Hydration intake appropriate for improving, restoring or maintaining nutritional needs  Description: Monitor and assess patient's nutrition/hydration status for malnutrition  Collaborate with interdisciplinary team and initiate plan and interventions as ordered  Monitor patient's weight and dietary intake as ordered or per policy  Utilize nutrition screening tool and intervene as necessary  Determine patient's food preferences and provide high-protein, high-caloric foods as appropriate       INTERVENTIONS:  - Monitor oral intake, urinary output, labs, and treatment plans  - Assess nutrition and hydration status and recommend course of action  - Evaluate amount of meals eaten  - Assist patient with eating if necessary   - Allow adequate time for meals  - Recommend/ encourage appropriate diets, oral nutritional supplements, and vitamin/mineral supplements  - Order, calculate, and assess calorie counts as needed  - Provide specific nutrition/hydration education as appropriate  - Include patient/family/caregiver in decisions related to nutrition  Outcome: Progressing

## 2021-06-20 NOTE — ASSESSMENT & PLAN NOTE
CTA showed - Increasing opacification in the right lung base suggesting worsening atelectasis or pneumonia    Continue Levaquin for 7-day course of therapy, day #6 (renally dosed at 250 mg daily)   Patient afebrile, denies cough SOB  Blood cultures negative to date  On room air   Procalcitonin mild elevated to 0 36 -> 0 29 -> 0 3 but likely unreliable given hst of Ca    Results from last 7 days   Lab Units 06/20/21  1031 06/19/21  0508 06/18/21  0531 06/14/21  0547 06/14/21  0009 06/13/21  1907 06/13/21  1602   LACTIC ACID mmol/L 0 9  --   --   --  2 0  --  1 4   PROCALCITONIN ng/ml  --  0 30* 0 29* 0 36*  --  0 18  --      Results from last 7 days   Lab Units 06/20/21  0623 06/19/21  0508 06/17/21  0954 06/15/21  0521 06/14/21  0547 06/13/21  1540   WBC Thousand/uL 4 47 3 79* 6 50 4 83 4 71 6 25

## 2021-06-20 NOTE — ASSESSMENT & PLAN NOTE
Multifactorial, most likely secondary to significant ascites causing hypoventilation, right-sided loculated pleural effusion, and lung cancer along with suspected pneumonia  Initially patient was hypoxic requiring 2-4 L of oxygen via nasal cannula which worsened to 5 L and hence required to be in the ICU  Patient now on room air since 6/17/21, satting 92-98%  Will titrate off oxygen as tolerated to maintain sats > 92%  Patient possibly desaturates on exertion however has not exerted too much    Will obtain overnight pulse oximetry    Consider need for repeat CXR for any worsening hypoxia

## 2021-06-20 NOTE — ASSESSMENT & PLAN NOTE
Combination of SIADH and pre renal   Na 132 -> 130 -> 130 -> 136 ->128-> 130  Serum osmo low at 278  Patient has very poor oral intake   Nephrology following, appreciate input  S/p Albumin infusions   S/p  mL bolus on 6/20  Now on Isolyte at 125 mL/hr   Encourage oral intake and supplements   Monitor BMP

## 2021-06-20 NOTE — PROGRESS NOTES
Sadia 46  Progress Note Jenna Camara 1945, 77 y o  male MRN: [id number]  Unit/Bed#: [address]  Encounter: [id number]  Primary Care Provider: Hai Cooper MD   Date and time admitted to hospital: 6/14/2021 10:31 AM    * Ascites  Assessment & Plan  IR paracentesis on 6/14/2021 with removal of 2 L clear yellow ascites  IR paracentesis on 6/18/2021 with removal of 3 9 L of clear yellow ascites  Fluid cytology on 6/14 positive for malignancy, compatible with lung primary  Fluid culture shows no growth   Given IV Albumin post paracentesis   Diuretics not indicated for malignant ascites per discussion with GI  Daily weight and I&Os  Appreciate oncology input  Patient aware of malignant ascites  Patient would like to discuss different treatment options with oncology  He is not ready for hospice at this time but would transition if all other treatment options were exhausted  Hypotension  Assessment & Plan  BP remains low despite IV Albumin  Lactic Acid normal   Nephrology following, appreciate input  S/p  mL bolus 6/20 am  Now on Isolyte 125 ml/hr   Continue Midodrine 10 mg TID  Trial YOLANDA stockings     Patient noted to have mild improvement in BP  Discussed case with ICU who agrees with plan  If patient's BP remains low, discussed transferring to ICU for SDU    Acute renal failure superimposed on stage 3 chronic kidney disease [facility])  Assessment & Plan  Lab Results   Component Value Date    EGFR 24 06/20/2021    EGFR 37 06/19/2021    EGFR 42 06/18/2021    CREATININE 2 51 (H) 06/20/2021    CREATININE 1 78 (H) 06/19/2021    CREATININE 1 59 (H) 06/18/2021     Baseline creatinine appears to be around 0 9-1 2  Creatinine 1 55 on admission    Patient is on Lasix 40 mg daily at home, currently on hold due to hypotension   Cr today: 2 51  Nephrology following  S/p IV Albumin  S/p  mL bolus 6/20am  Now on Isolyte at 125 mL/hr   Check renal US   Avoid nephrotoxins and hypotension  Monitor Cr in am     Carcinoma of lung, right [facility])  Assessment & Plan  History of non-small-cell lung cancer diagnosed in December 2019  Patient underwent VATS with pleural decortication and pleurodesis in 7/2020  Patient was on Brigatinib, now on chemotherapy every 3 weeks and sees Dr Minda Osler every Monday per patient  Not sure exactly of treatment plan  CT suggested possible advancement of metastatic disease with involvement of the lingula  Paracentesis cytology (+) malignancy with lung primary - patient and family made aware      Consulted oncology, appreciate input  Per oncology, "Recent paracentesis demonstrated TTF1 positive adenocarcinoma in the ascitic fluid  If the ascitic fluid reaccumulates persistently/significantly, patient will need to be evaluated for catheter placement  Additional information is needed  Progressive ascites would be consistent with disease progression - possibly patient's present regimen needs to be amended  Medical oncology will reach out to Dr Minda Osler for additional information including the most recent treatment plan  There are other options "    At this time, patient's prognosis is very guarded  His hypotension and CONCEPCIÓN are an issue  He is weak and deconditioned  I discussed his poor prognosis with patient and family/friends  Family will be in to see him today  Discussed the possibility of hospice  Patient is not ready for hospice but would transition if all other options were exhausted  Discussed patient's case with ICU AP and attending in case patient requires SDU 2/ hypotension     Hypocalcemia  Assessment & Plan  Ca 7 8  S/p Calcium 2gm IV x1 6/20 am   Check Vit D level   CMP in am     Dysphagia  Assessment & Plan  Patient reports having trouble swallowing solid food, thus not eating much  Denies nausea, vomiting, chest pain  Speech swallow eval was limited due to prolonged retching during assessment  Consulted GI, appreciate input    Underwent EGD: mild gastritis, gastric polyp   Continue Protonix 40 mg daily   Trial mechanical soft diet, nutritional supplements   Follow-up biopsies     Sepsis [facility])  Assessment & Plan  CTA showed - Increasing opacification in the right lung base suggesting worsening atelectasis or pneumonia  Continue Levaquin for 7-day course of therapy, day #6 (renally dosed at 250 mg daily)   Patient afebrile, denies cough SOB  Blood cultures negative to date  On room air   Procalcitonin mild elevated to 0 36 -> 0 29 -> 0 3 but likely unreliable given hst of Ca    Results from last 7 days   Lab Units 06/20/21  1031 06/19/21  0508 06/18/21  0531 06/14/21  0547 06/14/21  0009 06/13/21  1907 06/13/21  1602   LACTIC ACID mmol/L 0 9  --   --   --  2 0  --  1 4   PROCALCITONIN ng/ml  --  0 30* 0 29* 0 36*  --  0 18  --      Results from last 7 days   Lab Units 06/20/21  0623 06/19/21  0508 06/17/21  0954 06/15/21  0521 06/14/21  0547 06/13/21  1540   WBC Thousand/uL 4 47 3 79* 6 50 4 83 4 71 6 25       Acute respiratory failure with hypoxia (HCC)  Assessment & Plan  Multifactorial, most likely secondary to significant ascites causing hypoventilation, right-sided loculated pleural effusion, and lung cancer along with suspected pneumonia  Initially patient was hypoxic requiring 2-4 L of oxygen via nasal cannula which worsened to 5 L and hence required to be in the ICU  Patient now on room air since 6/17/21, satting 92-98%  Will titrate off oxygen as tolerated to maintain sats > 92%  Patient possibly desaturates on exertion however has not exerted too much  Will obtain overnight pulse oximetry    Consider need for repeat CXR for any worsening hypoxia     Pleural effusion  Assessment & Plan  Loculated right-sided pleural effusion, IR evaluated the patient and not enough fluid for drainage  Continue supportive care  Consider repeat CXR for any shortness of breath or hypoxia       Ambulatory dysfunction  Assessment & Plan  Patient would benefit from short-term rehab  PT OT evaluated the patient and recommended short-term rehab  Case management following  Patient has a bed available at [address] [facility]  Hyponatremia  Assessment & Plan  Combination of SIADH and pre renal   Na 132 -> 130 -> 130 -> 136  Serum osmo low at 278  Patient has very poor oral intake   Nephrology following, appreciate input  S/p Albumin infusions   S/p  mL bolus on 6/20  Now on Isolyte at 125 mL/hr   Encourage oral intake and supplements   Monitor BMP    Type 2 diabetes mellitus [facility])  Assessment & Plan  Lab Results   Component Value Date    HGBA1C 6 4 (H) 06/16/2021       Recent Labs     06/19/21  2101 06/20/21  0740 06/20/21  1054 06/20/21  1118   POCGLU 91 88 83 89       Blood Sugar Average: Last 72 hrs:  (P) 421 [id number]   Well controlled type 2 diabetes  Continue patient on low-dose sliding scale insulin with Accu-Chek QID    AV block  Assessment & Plan  Status post pacemaker insertion  Will hold Atenolol 2/2 hypotension   Telemetry showed ST,HR low 100's  Discontinued  Optimize electrolytes    History of pulmonary embolism  Assessment & Plan  History of DVT and PE  On Eliquis as outpatient  No acute PE on CTA  Continue patient on Eliquis 5 mg BID     VTE Pharmacologic Prophylaxis:   Pharmacologic: Apixaban (Eliquis)  Mechanical VTE Prophylaxis in Place: Yes    Patient Centered Rounds: I have performed bedside rounds with nursing staff today  Discussions with Specialists or Other Care Team Provider: nursing, my attending, ICU AP and attending, nephrology     Education and Discussions with Family / Patient: I have answered all questions to the best of my ability  Patient's wife did not have a phone available  Patient provided the phone number to their neighbors (as patient's wife frequents their house) and gave permission to update the neighbors   Neighbors aware of patient's condition, will update wife, and bring wife to see patient today during visiting hours  Time Spent for Care: 30 minutes  More than 50% of total time spent on counseling and coordination of care as described above  Current Length of Stay: 6 day(s)    Current Patient Status: Inpatient   Certification Statement: The patient will continue to require additional inpatient hospital stay due to hypotension, CONCEPCIÓN     Discharge Plan: Patient is not medically stable for discharge today, likely in 72 hours pending progress  Patient might need hospice evaluation  Code Status: Level 2 - DNAR: but accepts endotracheal intubation      Subjective:   Patient resting out of bed in bed on room air  Tolerating IVF via port  Denies SOB, cough, or sputum production  Denies CP  Denies HA or dizziness  Reports very poor oral intake stating most foods get stuck in the back of his throat  He is having a difficult time taking medications crushed in applesauce  He will try to eat moist foods and supplements  Objective:     Vitals:   Temp (24hrs), Av 8 °F (36 °C), Min:96 1 °F (35 6 °C), Max:97 4 °F (36 3 °C)    Temp:  [96 1 °F (35 6 °C)-97 4 °F (36 3 °C)] 96 7 °F (35 9 °C)  HR:  [73-88] 87  Resp:  [16-20] 18  BP: (80-94)/(50-63) 89/57  SpO2:  [91 %-99 %] 99 %  Body mass index is 32 49 kg/m²  Input and Output Summary (last 24 hours): Intake/Output Summary (Last 24 hours) at 2021 1409  Last data filed at 2021 1042  Gross per 24 hour   Intake 1136 ml   Output --   Net 1136 ml       Physical Exam:     Physical Exam  Vitals and nursing note reviewed  Constitutional:       General: He is not in acute distress  Appearance: He is well-developed  He is ill-appearing  He is not toxic-appearing or diaphoretic  HENT:      Head: Normocephalic  Cardiovascular:      Rate and Rhythm: Normal rate  Pulmonary:      Effort: Pulmonary effort is normal  No tachypnea or respiratory distress  Breath sounds: Examination of the right-lower field reveals decreased breath sounds  Decreased breath sounds present  No wheezing, rhonchi or rales  Abdominal:      General: Bowel sounds are normal  There is distension (moderate, soft)  Palpations: Abdomen is soft  Tenderness: There is no abdominal tenderness  There is no guarding or rebound  Musculoskeletal:         General: No tenderness  Normal range of motion  Cervical back: Normal range of motion  Right lower leg: No edema  Left lower leg: No edema  Skin:     General: Skin is warm and dry  Capillary Refill: Capillary refill takes less than 2 seconds  Coloration: Skin is pale  Neurological:      Mental Status: He is alert and oriented to person, place, and time  Mental status is at baseline  Deep Tendon Reflexes: Reflexes are normal and symmetric  Psychiatric:         Mood and Affect: Mood normal          Behavior: Behavior normal          Thought Content: Thought content normal          Additional Data:     Labs:    Results from last 7 days   Lab Units 06/20/21  0623   WBC Thousand/uL 4 47   HEMOGLOBIN g/dL 9 0*   HEMATOCRIT % 29 5*   PLATELETS Thousands/uL 140*   NEUTROS PCT % 65   LYMPHS PCT % 14   MONOS PCT % 14*   EOS PCT % 5     Results from last 7 days   Lab Units 06/20/21  0623   POTASSIUM mmol/L 3 8   CHLORIDE mmol/L 103   CO2 mmol/L 18*   BUN mg/dL 37*   CREATININE mg/dL 2 51*   CALCIUM mg/dL 6 7*   ALK PHOS U/L 27*   ALT U/L 15   AST U/L 10           * I Have Reviewed All Lab Data Listed Above  * Additional Pertinent Lab Tests Reviewed: All Labs Within Last 24 Hours Reviewed    Imaging:    Imaging Reports Reviewed Today Include: Abdominal US, CTA chest   Imaging Personally Reviewed by Myself Includes:  None     Recent Cultures (last 7 days):     Results from last 7 days   Lab Units 06/14/21  1516 06/13/21  1916 06/13/21  1621 06/13/21  1616   BLOOD CULTURE   --   --  No Growth After 5 Days  No Growth After 5 Days     GRAM STAIN RESULT  Rare Polys  No organisms seen  --   --   -- BODY FLUID CULTURE, STERILE  No growth  --   --   --    LEGIONELLA URINARY ANTIGEN   --  Negative  --   --        Last 24 Hours Medication List:   Current Facility-Administered Medications   Medication Dose Route Frequency Provider Last Rate    apixaban  5 mg Oral BID Amy Rodriguez MD      calcium carbonate-vitamin D  1 tablet Oral Daily With Breakfast Amy Rodriguez MD      cholecalciferol  1,000 Units Oral Daily Amy Rodriguez MD      cyanocobalamin  1,000 mcg Oral Daily Amy Rodriguez MD      folic acid  8,669 mcg Oral Daily Amy Rodriguez MD      insulin lispro  1-6 Units Subcutaneous TID [facility] Amy Rodriguez MD      levofloxacin  250 mg Oral Q24H NERISSA Mcqueen      melatonin  3 mg Oral HS PRN Amy Rodriguez MD      midodrine  10 mg Oral TID [facility] Alice Williamson MD      mirtazapine  15 mg Oral Daily Amy Rodriguez MD      multi-electrolyte  125 mL/hr Intravenous Continuous Alice Williamson  mL/hr (06/20/21 0932)    ondansetron  4 mg Intravenous Q6H PRN NERISSA Kelly      oxyCODONE-acetaminophen  1 tablet Oral Q6H PRN Amy Rodriguez MD      pantoprazole  40 mg Oral QAM Amy Rodriguez MD      polyethylene glycol  17 g Oral Daily NERISSA Kendrick      pravastatin  20 mg Oral Daily With Gorge Bass MD      saccharomyces boulardii  250 mg Oral BID Amy Rodriguez MD          Today, Patient Was Seen By: NERISSA Kelly    ** Please Note: Dictation voice to text software may have been used in the creation of this document   **

## 2021-06-20 NOTE — ASSESSMENT & PLAN NOTE
History of non-small-cell lung cancer diagnosed in December 2019  Patient underwent VATS with pleural decortication and pleurodesis in 7/2020  Patient was on Brigatinib, now on chemotherapy every 3 weeks and sees Dr Vanessa Mejia every Monday per patient  Not sure exactly of treatment plan  CT suggested possible advancement of metastatic disease with involvement of the lingula  Paracentesis cytology (+) malignancy with lung primary - patient and family made aware      Consulted oncology, appreciate input  Per oncology, "Recent paracentesis demonstrated TTF1 positive adenocarcinoma in the ascitic fluid  If the ascitic fluid reaccumulates persistently/significantly, patient will need to be evaluated for catheter placement  Additional information is needed  Progressive ascites would be consistent with disease progression - possibly patient's present regimen needs to be amended  Medical oncology will reach out to Dr Vanessa Mejia for additional information including the most recent treatment plan  There are other options "    At this time, patient's prognosis is very guarded  His hypotension and CONCEPCIÓN are an issue  Previous provider indicated that she had a goals of care conversation with patient and his wife and they discussed possibility of hospice  On morning of 6/21 this provider spoke at length with the patient regarding his goals of care, indicated that he is aware that he has multiple issues including acute kidney injury, cancer cells in the ascitic fluid  He indicated that he wishes to continue to pursue medical treatment including dialysis for acute kidney injury, this was discussed by nephrology with the patient    Did discuss this with both patient and wife at the bedside, wife is in agreement with 's wishes to continue to pursue medical therapy  Repeat BMP continues to show worsening creatinine, currently 3 84  Did discuss with critical-care AP as patient does continue with continued low blood pressure, may require transfer to SDU if pursuing renal replacement therapy  Discussed patient's case with ICU AP and attending in case patient requires SDU 2/ hypotension   Will hold a m  dose of Eliquis

## 2021-06-20 NOTE — ASSESSMENT & PLAN NOTE
Status post pacemaker insertion  Continue to hold Atenolol 2/2 hypotension   Optimize electrolytes  BMP in am

## 2021-06-20 NOTE — ASSESSMENT & PLAN NOTE
Patient reports having trouble swallowing solid food, thus not eating much  Denies nausea, vomiting, chest pain  Speech swallow eval was limited due to prolonged retching during assessment  Consulted GI, appreciate input    Underwent EGD: mild gastritis, gastric polyp   Continue Protonix 40 mg daily   Trial mechanical soft diet, nutritional supplements   Follow-up biopsies

## 2021-06-20 NOTE — CASE MANAGEMENT
CM eceived a call from Jesus lawson at [facility] and they obtained SNF authorization  They can take patient at [facility] at any time  Patient is not for dc today  CM to follow on Monday

## 2021-06-20 NOTE — ASSESSMENT & PLAN NOTE
Lab Results   Component Value Date    EGFR 24 06/20/2021    EGFR 37 06/19/2021    EGFR 42 06/18/2021    CREATININE 2 51 (H) 06/20/2021    CREATININE 1 78 (H) 06/19/2021    CREATININE 1 59 (H) 06/18/2021     Baseline creatinine appears to be around 0 9-1 2  Creatinine 1 55 on admission  Patient is on Lasix 40 mg daily at home, currently on hold due to hypotension   Cr today: 2 51-> 3 84  Nephrology following  S/p IV Albumin  S/p  mL bolus 6/20am  Now on Isolyte at 100 mL/hr   Check renal US; pending   Avoid nephrotoxins and hypotension  Discussed with Nephrology, make patient NPO after midnight, anticipate possible dialysis/line placement in a   Will aury a m   Eliquis

## 2021-06-20 NOTE — ASSESSMENT & PLAN NOTE
History of DVT and PE  On Eliquis as outpatient  No acute PE on CTA  Continue patient on Eliquis 5 mg BID  Will hold a m   Dose on 06/22 as patient may require insertion temporary dialysis catheter  Discussed with ICU AP

## 2021-06-20 NOTE — ASSESSMENT & PLAN NOTE
Loculated right-sided pleural effusion, IR evaluated the patient and not enough fluid for drainage  Continue supportive care  Consider repeat CXR for any shortness of breath or hypoxia

## 2021-06-21 ENCOUNTER — APPOINTMENT (INPATIENT)
Dept: NON INVASIVE DIAGNOSTICS | Facility: HOSPITAL | Age: 76
DRG: 180 | End: 2021-06-21
Payer: COMMERCIAL

## 2021-06-21 ENCOUNTER — APPOINTMENT (INPATIENT)
Dept: RADIOLOGY | Facility: HOSPITAL | Age: 76
DRG: 180 | End: 2021-06-21
Payer: COMMERCIAL

## 2021-06-21 LAB
25(OH)D3 SERPL-MCNC: 49.9 NG/ML (ref 30–100)
ALBUMIN SERPL BCP-MCNC: 2.8 G/DL (ref 3.5–5)
ALP SERPL-CCNC: 28 U/L (ref 46–116)
ALT SERPL W P-5'-P-CCNC: 14 U/L (ref 12–78)
ANION GAP SERPL CALCULATED.3IONS-SCNC: 15 MMOL/L (ref 4–13)
ANION GAP SERPL CALCULATED.3IONS-SCNC: 15 MMOL/L (ref 4–13)
AST SERPL W P-5'-P-CCNC: 14 U/L (ref 5–45)
BACTERIA UR QL AUTO: ABNORMAL /HPF
BASOPHILS # BLD AUTO: 0.01 THOUSANDS/ΜL (ref 0–0.1)
BASOPHILS NFR BLD AUTO: 0 % (ref 0–1)
BILIRUB SERPL-MCNC: 0.58 MG/DL (ref 0.2–1)
BILIRUB UR QL STRIP: ABNORMAL
BUN SERPL-MCNC: 47 MG/DL (ref 5–25)
BUN SERPL-MCNC: 52 MG/DL (ref 5–25)
CALCIUM ALBUM COR SERPL-MCNC: 8.5 MG/DL (ref 8.3–10.1)
CALCIUM SERPL-MCNC: 7.5 MG/DL (ref 8.3–10.1)
CALCIUM SERPL-MCNC: 7.8 MG/DL (ref 8.3–10.1)
CHLORIDE SERPL-SCNC: 94 MMOL/L (ref 100–108)
CHLORIDE SERPL-SCNC: 96 MMOL/L (ref 100–108)
CLARITY UR: CLEAR
CO2 SERPL-SCNC: 17 MMOL/L (ref 21–32)
CO2 SERPL-SCNC: 21 MMOL/L (ref 21–32)
COLOR UR: YELLOW
CREAT SERPL-MCNC: 3.51 MG/DL (ref 0.6–1.3)
CREAT SERPL-MCNC: 3.84 MG/DL (ref 0.6–1.3)
EOSINOPHIL # BLD AUTO: 0.01 THOUSAND/ΜL (ref 0–0.61)
EOSINOPHIL NFR BLD AUTO: 0 % (ref 0–6)
ERYTHROCYTE [DISTWIDTH] IN BLOOD BY AUTOMATED COUNT: 18.2 % (ref 11.6–15.1)
GFR SERPL CREATININE-BSD FRML MDRD: 15 ML/MIN/1.73SQ M
GFR SERPL CREATININE-BSD FRML MDRD: 16 ML/MIN/1.73SQ M
GLUCOSE SERPL-MCNC: 146 MG/DL (ref 65–140)
GLUCOSE SERPL-MCNC: 154 MG/DL (ref 65–140)
GLUCOSE SERPL-MCNC: 173 MG/DL (ref 65–140)
GLUCOSE SERPL-MCNC: 194 MG/DL (ref 65–140)
GLUCOSE SERPL-MCNC: 243 MG/DL (ref 65–140)
GLUCOSE SERPL-MCNC: 246 MG/DL (ref 65–140)
GLUCOSE UR STRIP-MCNC: NEGATIVE MG/DL
HCT VFR BLD AUTO: 29.1 % (ref 36.5–49.3)
HGB BLD-MCNC: 8.8 G/DL (ref 12–17)
HGB UR QL STRIP.AUTO: NEGATIVE
HYALINE CASTS #/AREA URNS LPF: ABNORMAL /LPF
IMM GRANULOCYTES # BLD AUTO: 0.03 THOUSAND/UL (ref 0–0.2)
IMM GRANULOCYTES NFR BLD AUTO: 1 % (ref 0–2)
KETONES UR STRIP-MCNC: NEGATIVE MG/DL
LEUKOCYTE ESTERASE UR QL STRIP: NEGATIVE
LYMPHOCYTES # BLD AUTO: 0.27 THOUSANDS/ΜL (ref 0.6–4.47)
LYMPHOCYTES NFR BLD AUTO: 9 % (ref 14–44)
MAGNESIUM SERPL-MCNC: 2.2 MG/DL (ref 1.6–2.6)
MCH RBC QN AUTO: 27.2 PG (ref 26.8–34.3)
MCHC RBC AUTO-ENTMCNC: 30.2 G/DL (ref 31.4–37.4)
MCV RBC AUTO: 90 FL (ref 82–98)
MONOCYTES # BLD AUTO: 0.16 THOUSAND/ΜL (ref 0.17–1.22)
MONOCYTES NFR BLD AUTO: 5 % (ref 4–12)
MUCOUS THREADS UR QL AUTO: ABNORMAL
NEUTROPHILS # BLD AUTO: 2.63 THOUSANDS/ΜL (ref 1.85–7.62)
NEUTS SEG NFR BLD AUTO: 85 % (ref 43–75)
NITRITE UR QL STRIP: NEGATIVE
NON-SQ EPI CELLS URNS QL MICRO: ABNORMAL /HPF
NRBC BLD AUTO-RTO: 0 /100 WBCS
OSMOLALITY UR: 285 MMOL/KG
PH UR STRIP.AUTO: 5 [PH]
PHOSPHATE SERPL-MCNC: 5.4 MG/DL (ref 2.3–4.1)
PLATELET # BLD AUTO: 113 THOUSANDS/UL (ref 149–390)
PMV BLD AUTO: 10 FL (ref 8.9–12.7)
POTASSIUM SERPL-SCNC: 4.4 MMOL/L (ref 3.5–5.3)
POTASSIUM SERPL-SCNC: 5.6 MMOL/L (ref 3.5–5.3)
PROCALCITONIN SERPL-MCNC: 0.46 NG/ML
PROT SERPL-MCNC: 4.9 G/DL (ref 6.4–8.2)
PROT UR STRIP-MCNC: NEGATIVE MG/DL
RBC # BLD AUTO: 3.24 MILLION/UL (ref 3.88–5.62)
RBC #/AREA URNS AUTO: ABNORMAL /HPF
SODIUM 24H UR-SCNC: <5 MOL/L
SODIUM SERPL-SCNC: 128 MMOL/L (ref 136–145)
SODIUM SERPL-SCNC: 130 MMOL/L (ref 136–145)
SP GR UR STRIP.AUTO: 1.02 (ref 1–1.03)
UROBILINOGEN UR QL STRIP.AUTO: 0.2 E.U./DL
WBC # BLD AUTO: 3.11 THOUSAND/UL (ref 4.31–10.16)
WBC #/AREA URNS AUTO: ABNORMAL /HPF

## 2021-06-21 PROCEDURE — 81001 URINALYSIS AUTO W/SCOPE: CPT | Performed by: INTERNAL MEDICINE

## 2021-06-21 PROCEDURE — 82948 REAGENT STRIP/BLOOD GLUCOSE: CPT

## 2021-06-21 PROCEDURE — 32555 ASPIRATE PLEURA W/ IMAGING: CPT

## 2021-06-21 PROCEDURE — 0W9G3ZZ DRAINAGE OF PERITONEAL CAVITY, PERCUTANEOUS APPROACH: ICD-10-PCS | Performed by: RADIOLOGY

## 2021-06-21 PROCEDURE — 84145 PROCALCITONIN (PCT): CPT | Performed by: NURSE PRACTITIONER

## 2021-06-21 PROCEDURE — 80048 BASIC METABOLIC PNL TOTAL CA: CPT | Performed by: INTERNAL MEDICINE

## 2021-06-21 PROCEDURE — 83735 ASSAY OF MAGNESIUM: CPT | Performed by: NURSE PRACTITIONER

## 2021-06-21 PROCEDURE — 76770 US EXAM ABDO BACK WALL COMP: CPT

## 2021-06-21 PROCEDURE — 99232 SBSQ HOSP IP/OBS MODERATE 35: CPT | Performed by: NURSE PRACTITIONER

## 2021-06-21 PROCEDURE — 84100 ASSAY OF PHOSPHORUS: CPT | Performed by: NURSE PRACTITIONER

## 2021-06-21 PROCEDURE — 97116 GAIT TRAINING THERAPY: CPT

## 2021-06-21 PROCEDURE — 71045 X-RAY EXAM CHEST 1 VIEW: CPT

## 2021-06-21 PROCEDURE — 83935 ASSAY OF URINE OSMOLALITY: CPT | Performed by: NURSE PRACTITIONER

## 2021-06-21 PROCEDURE — 85025 COMPLETE CBC W/AUTO DIFF WBC: CPT | Performed by: NURSE PRACTITIONER

## 2021-06-21 PROCEDURE — 49083 ABD PARACENTESIS W/IMAGING: CPT | Performed by: RADIOLOGY

## 2021-06-21 PROCEDURE — 99233 SBSQ HOSP IP/OBS HIGH 50: CPT | Performed by: PHYSICIAN ASSISTANT

## 2021-06-21 PROCEDURE — 82306 VITAMIN D 25 HYDROXY: CPT | Performed by: INTERNAL MEDICINE

## 2021-06-21 PROCEDURE — 99233 SBSQ HOSP IP/OBS HIGH 50: CPT | Performed by: INTERNAL MEDICINE

## 2021-06-21 PROCEDURE — 80053 COMPREHEN METABOLIC PANEL: CPT | Performed by: NURSE PRACTITIONER

## 2021-06-21 PROCEDURE — 97110 THERAPEUTIC EXERCISES: CPT

## 2021-06-21 PROCEDURE — 84300 ASSAY OF URINE SODIUM: CPT | Performed by: NURSE PRACTITIONER

## 2021-06-21 RX ORDER — DEXTROSE MONOHYDRATE 25 G/50ML
25 INJECTION, SOLUTION INTRAVENOUS ONCE
Status: COMPLETED | OUTPATIENT
Start: 2021-06-21 | End: 2021-06-21

## 2021-06-21 RX ORDER — LIDOCAINE WITH 8.4% SOD BICARB 0.9%(10ML)
SYRINGE (ML) INJECTION CODE/TRAUMA/SEDATION MEDICATION
Status: COMPLETED | OUTPATIENT
Start: 2021-06-21 | End: 2021-06-21

## 2021-06-21 RX ORDER — ALBUMIN (HUMAN) 12.5 G/50ML
25 SOLUTION INTRAVENOUS EVERY 8 HOURS
Status: COMPLETED | OUTPATIENT
Start: 2021-06-21 | End: 2021-06-23

## 2021-06-21 RX ORDER — FUROSEMIDE 10 MG/ML
40 INJECTION INTRAMUSCULAR; INTRAVENOUS ONCE
Status: DISCONTINUED | OUTPATIENT
Start: 2021-06-21 | End: 2021-06-23

## 2021-06-21 RX ORDER — SODIUM POLYSTYRENE SULFONATE 4.1 MEQ/G
15 POWDER, FOR SUSPENSION ORAL; RECTAL ONCE
Status: COMPLETED | OUTPATIENT
Start: 2021-06-21 | End: 2021-06-21

## 2021-06-21 RX ADMIN — PANTOPRAZOLE SODIUM 40 MG: 40 TABLET, DELAYED RELEASE ORAL at 06:09

## 2021-06-21 RX ADMIN — GUAIFENESIN 600 MG: 600 TABLET, EXTENDED RELEASE ORAL at 21:29

## 2021-06-21 RX ADMIN — MIRTAZAPINE 15 MG: 15 TABLET, FILM COATED ORAL at 08:19

## 2021-06-21 RX ADMIN — GUAIFENESIN 600 MG: 600 TABLET, EXTENDED RELEASE ORAL at 08:19

## 2021-06-21 RX ADMIN — Medication 9 ML: at 10:57

## 2021-06-21 RX ADMIN — MIDODRINE HYDROCHLORIDE 10 MG: 5 TABLET ORAL at 15:26

## 2021-06-21 RX ADMIN — APIXABAN 5 MG: 5 TABLET, FILM COATED ORAL at 08:19

## 2021-06-21 RX ADMIN — LEVOFLOXACIN 250 MG: 250 TABLET, FILM COATED ORAL at 12:03

## 2021-06-21 RX ADMIN — Medication 250 MG: at 08:20

## 2021-06-21 RX ADMIN — INSULIN LISPRO 3 UNITS: 100 INJECTION, SOLUTION INTRAVENOUS; SUBCUTANEOUS at 16:43

## 2021-06-21 RX ADMIN — PRAVASTATIN SODIUM 20 MG: 20 TABLET ORAL at 16:44

## 2021-06-21 RX ADMIN — HYDROCORTISONE SODIUM SUCCINATE 50 MG: 100 INJECTION, POWDER, FOR SOLUTION INTRAMUSCULAR; INTRAVENOUS at 15:25

## 2021-06-21 RX ADMIN — SODIUM POLYSTYRENE SULFONATE 15 G: 1 POWDER ORAL; RECTAL at 08:16

## 2021-06-21 RX ADMIN — ALBUMIN (HUMAN) 25 G: 0.25 INJECTION, SOLUTION INTRAVENOUS at 16:44

## 2021-06-21 RX ADMIN — CALCIUM CARBONATE 500 MG (1,250 MG)-VITAMIN D3 200 UNIT TABLET 1 TABLET: at 08:19

## 2021-06-21 RX ADMIN — APIXABAN 5 MG: 5 TABLET, FILM COATED ORAL at 18:22

## 2021-06-21 RX ADMIN — INSULIN LISPRO 1 UNITS: 100 INJECTION, SOLUTION INTRAVENOUS; SUBCUTANEOUS at 12:06

## 2021-06-21 RX ADMIN — Medication 1000 UNITS: at 08:19

## 2021-06-21 RX ADMIN — MIDODRINE HYDROCHLORIDE 10 MG: 5 TABLET ORAL at 06:10

## 2021-06-21 RX ADMIN — ALBUMIN (HUMAN) 25 G: 0.25 INJECTION, SOLUTION INTRAVENOUS at 10:02

## 2021-06-21 RX ADMIN — HYDROCORTISONE SODIUM SUCCINATE 50 MG: 100 INJECTION, POWDER, FOR SOLUTION INTRAMUSCULAR; INTRAVENOUS at 21:29

## 2021-06-21 RX ADMIN — FOLIC ACID 1000 MCG: 1 TABLET ORAL at 08:23

## 2021-06-21 RX ADMIN — SODIUM BICARBONATE 100 ML/HR: 84 INJECTION, SOLUTION INTRAVENOUS at 12:04

## 2021-06-21 RX ADMIN — MIDODRINE HYDROCHLORIDE 10 MG: 5 TABLET ORAL at 12:03

## 2021-06-21 RX ADMIN — HYDROCORTISONE SODIUM SUCCINATE 50 MG: 100 INJECTION, POWDER, FOR SOLUTION INTRAMUSCULAR; INTRAVENOUS at 05:13

## 2021-06-21 RX ADMIN — SODIUM CHLORIDE, SODIUM GLUCONATE, SODIUM ACETATE, POTASSIUM CHLORIDE, MAGNESIUM CHLORIDE, SODIUM PHOSPHATE, DIBASIC, AND POTASSIUM PHOSPHATE 100 ML/HR: .53; .5; .37; .037; .03; .012; .00082 INJECTION, SOLUTION INTRAVENOUS at 05:15

## 2021-06-21 RX ADMIN — Medication 250 MG: at 18:22

## 2021-06-21 RX ADMIN — CYANOCOBALAMIN TAB 500 MCG 1000 MCG: 500 TAB at 08:19

## 2021-06-21 RX ADMIN — POLYETHYLENE GLYCOL 3350 17 G: 17 POWDER, FOR SOLUTION ORAL at 08:17

## 2021-06-21 RX ADMIN — INSULIN LISPRO 1 UNITS: 100 INJECTION, SOLUTION INTRAVENOUS; SUBCUTANEOUS at 08:30

## 2021-06-21 RX ADMIN — INSULIN HUMAN 10 UNITS: 100 INJECTION, SOLUTION PARENTERAL at 09:53

## 2021-06-21 RX ADMIN — DEXTROSE MONOHYDRATE 25 ML: 500 INJECTION PARENTERAL at 08:16

## 2021-06-21 NOTE — CASE MANAGEMENT
CM met with patient and discussed dc planning  Confirmed dc plan for patient to go to [place] at [facility]  IMM was discussed and he verbally states understanding  CM to set up [facility] transport at [place]  CM to follow

## 2021-06-21 NOTE — PROGRESS NOTES
[address]ia 46  Progress Note Connie Doctor 1945, 77 y o  male MRN: [id number]  Unit/Bed#: [address] [facility] Encounter: [id number]  Primary Care Provider: Zachary Samuels MD   Date and time admitted to hospital: 6/14/2021 10:31 AM    * Ascites  Assessment & Plan  IR paracentesis on 6/14/2021 with removal of 2 L clear yellow ascites  IR paracentesis on 6/18/2021 with removal of 3 9 L of clear yellow ascites  Fluid cytology on 6/14 positive for malignancy, compatible with lung primary  Fluid culture shows no growth   Given IV Albumin post paracentesis   Diuretics not indicated for malignant ascites per discussion with GI  Daily weight and I&Os  Appreciate oncology input; indicated that patient follows with Dr Christine Carrillo, outpatient, recommend stabilizing patient and have him follow up outpatient once medically stable  Patient aware of malignant ascites  Patient would like to discuss different treatment options with oncology  He is not ready for hospice at this time but would transition if all other treatment options were exhausted  Patient underwent repeat paracentesis on 06/21 with removal of 3800 mL of cloudy yellow fluid  Patient tolerated procedure well    Acute renal failure superimposed on stage 3 chronic kidney disease [facility])  Assessment & Plan  Lab Results   Component Value Date    EGFR 24 06/20/2021    EGFR 37 06/19/2021    EGFR 42 06/18/2021    CREATININE 2 51 (H) 06/20/2021    CREATININE 1 78 (H) 06/19/2021    CREATININE 1 59 (H) 06/18/2021     Baseline creatinine appears to be around 0 9-1 2  Creatinine 1 55 on admission    Patient is on Lasix 40 mg daily at home, currently on hold due to hypotension   Cr today: 2 51-> 3 84  Nephrology following  S/p IV Albumin  S/p  mL bolus 6/20am  Now on Isolyte at 100 mL/hr   Check renal US; pending   Avoid nephrotoxins and hypotension  Discussed with Nephrology, make patient NPO after midnight, anticipate possible dialysis/line placement in a m  Will hold a m  Eliquis     Carcinoma of lung, right [facility])  Assessment & Plan  History of non-small-cell lung cancer diagnosed in December 2019  Patient underwent VATS with pleural decortication and pleurodesis in 7/2020  Patient was on Brigatinib, now on chemotherapy every 3 weeks and sees Dr Aaron Lujan every Monday per patient  Not sure exactly of treatment plan  CT suggested possible advancement of metastatic disease with involvement of the lingula  Paracentesis cytology (+) malignancy with lung primary - patient and family made aware      Consulted oncology, appreciate input  Per oncology, "Recent paracentesis demonstrated TTF1 positive adenocarcinoma in the ascitic fluid  If the ascitic fluid reaccumulates persistently/significantly, patient will need to be evaluated for catheter placement  Additional information is needed  Progressive ascites would be consistent with disease progression - possibly patient's present regimen needs to be amended  Medical oncology will reach out to Dr Aaron Lujan for additional information including the most recent treatment plan  There are other options "    At this time, patient's prognosis is very guarded  His hypotension and CONCEPCIÓN are an issue  Previous provider indicated that she had a goals of care conversation with patient and his wife and they discussed possibility of hospice  On morning of 6/21 this provider spoke at length with the patient regarding his goals of care, indicated that he is aware that he has multiple issues including acute kidney injury, cancer cells in the ascitic fluid  He indicated that he wishes to continue to pursue medical treatment including dialysis for acute kidney injury, this was discussed by nephrology with the patient    Did discuss this with both patient and wife at the bedside, wife is in agreement with 's wishes to continue to pursue medical therapy  Repeat BMP continues to show worsening creatinine, currently 3 84  Did discuss with critical-care AP as patient does continue with continued low blood pressure, may require transfer to SDU if pursuing renal replacement therapy  Discussed patient's case with ICU AP and attending in case patient requires SDU 2/ hypotension   Will hold a m  dose of Eliquis    Hypotension  Assessment & Plan  BP remains low despite IV Albumin  Lactic Acid normal   Nephrology following, appreciate input  S/p  mL bolus 6/20 am  Now on Isolyte 100 ml/hr   Continue Midodrine 10 mg TID  Trial YOLANDA stockings     Patient noted to have mild improvement in BP  Continue to monitor closely    Pleural effusion  Assessment & Plan  Loculated right-sided pleural effusion, IR evaluated the patient and not enough fluid for drainage  Continue supportive care  Consider repeat CXR for any shortness of breath or hypoxia  History of pulmonary embolism  Assessment & Plan  History of DVT and PE  On Eliquis as outpatient  No acute PE on CTA  Continue patient on Eliquis 5 mg BID  Will hold a m  Dose on 06/22 as patient may require insertion temporary dialysis catheter  Discussed with ICU AP     AV block  Assessment & Plan  Status post pacemaker insertion  Continue to hold Atenolol 2/2 hypotension   Optimize electrolytes  BMP in am    Type 2 diabetes mellitus [facility])  Assessment & Plan  Lab Results   Component Value Date    HGBA1C 6 4 (H) 06/16/2021       Recent Labs     06/19/21  2101 06/20/21  0740 06/20/21  1054 06/20/21  1118   POCGLU 91 88 83 89       Blood Sugar Average: Last 72 hrs:  (P) 097 [id number]   Well controlled type 2 diabetes  Continue patient on low-dose sliding scale insulin with Accu-Chek QID    Acute respiratory failure with hypoxia (HCC)  Assessment & Plan  Multifactorial, most likely secondary to significant ascites causing hypoventilation, right-sided loculated pleural effusion, and lung cancer along with suspected pneumonia    Initially patient was hypoxic requiring 2-4 L of oxygen via nasal cannula which worsened to 5 L and hence required to be in the ICU  Patient now on room air since 6/17/21, satting 92-98%  Will titrate off oxygen as tolerated to maintain sats > 92%  Patient possibly desaturates on exertion however has not exerted too much  Will obtain overnight pulse oximetry    Consider need for repeat CXR for any worsening hypoxia     Hyponatremia  Assessment & Plan  Combination of SIADH and pre renal   Na 132 -> 130 -> 130 -> 136 ->128-> 130  Serum osmo low at 278  Patient has very poor oral intake   Nephrology following, appreciate input  S/p Albumin infusions   S/p  mL bolus on 6/20  Now on Isolyte at 125 mL/hr   Encourage oral intake and supplements   Monitor BMP    Sepsis ([address] )  Assessment & Plan  CTA showed - Increasing opacification in the right lung base suggesting worsening atelectasis or pneumonia  Continue Levaquin for 7-day course of therapy, day #6 (renally dosed at 250 mg daily)   Patient afebrile, denies cough SOB  Blood cultures negative to date  On room air   Procalcitonin mild elevated to 0 36 -> 0 29 -> 0 3 but likely unreliable given hst of Ca    Results from last 7 days   Lab Units 06/20/21  1031 06/19/21  0508 06/18/21  0531 06/14/21  0547 06/14/21  0009 06/13/21  1907 06/13/21  1602   LACTIC ACID mmol/L 0 9  --   --   --  2 0  --  1 4   PROCALCITONIN ng/ml  --  0 30* 0 29* 0 36*  --  0 18  --      Results from last 7 days   Lab Units 06/20/21  0623 06/19/21  0508 06/17/21  0954 06/15/21  0521 06/14/21  0547 06/13/21  1540   WBC Thousand/uL 4 47 3 79* 6 50 4 83 4 71 6 25       Ambulatory dysfunction  Assessment & Plan  Patient would benefit from short-term rehab  PT OT evaluated the patient and recommended short-term rehab  Case management following  Patient has a [facility] available at [facility]  Dysphagia  Assessment & Plan  Patient reports having trouble swallowing solid food, thus not eating much  Denies nausea, vomiting, chest pain  Speech swallow eval was limited due to prolonged retching during assessment  Consulted GI, appreciate input  Underwent EGD: mild gastritis, gastric polyp   Continue Protonix 40 mg daily   Trial mechanical soft diet, nutritional supplements   Follow-up biopsies     Hypocalcemia  Assessment & Plan  Ca 7 8  S/p Calcium 2gm IV x1  am   Check Vit D level; normal   CMP in am           VTE Pharmacologic Prophylaxis:   Pharmacologic: Apixaban (Eliquis)  Mechanical VTE Prophylaxis in Place: Yes    Patient Centered Rounds: I have performed bedside rounds with nursing staff today  Discussions with Specialists or Other Care Team Provider: Attending, nephrology, critical care AP    Education and Discussions with Family / Patient:  Spoke with the patient as wife at the bedside, I have answered all questions to the best of my abilities  Time Spent for Care: 30 minutes  More than 50% of total time spent on counseling and coordination of care as described above  Current Length of Stay: 7 day(s)    Current Patient Status: Inpatient   Certification Statement: The patient will continue to require additional inpatient hospital stay due to Monitoring BMP, worsening acute kidney injury, possible need for dialysis, paracentesis    Discharge Plan:  Not stable for discharge today    Code Status: Level 2 - DNAR: but accepts endotracheal intubation      Subjective:   Patient seen sitting up in chair, resting comfortably  Reports that he slept well last night  Indicated that he spoke with Nephrology and is aware that he has acute kidney injury and wishes to pursue option of dialysis  Verbalized understanding of the severity of his cancer, indicated that he wishes to continue pursuing medical treatment at this time  He denies headache, dizziness, chest pain, shortness of breath, nausea, vomiting or diarrhea      Objective:     Vitals:   Temp (24hrs), Av 5 °F (36 4 °C), Min:96 5 °F (35 8 °C), Max:98 3 °F (36 8 °C)    Temp:  [96 5 °F (35 8 °C)-98 3 °F (36 8 °C)] 96 5 °F (35 8 °C)  HR:  [61-92] 92  Resp:  [16-20] 18  BP: ()/(51-61) 103/61  SpO2:  [93 %-100 %] 99 %  Body mass index is 32 42 kg/m²  Input and Output Summary (last 24 hours): Intake/Output Summary (Last 24 hours) at 6/21/2021 1705  Last data filed at 6/21/2021 1401  Gross per 24 hour   Intake 1590 ml   Output 4100 ml   Net -2510 ml       Physical Exam:     Physical Exam  Vitals and nursing note reviewed  Constitutional:       General: He is not in acute distress  Comments: Chronically ill appearing gentleman resting quietly in chair   HENT:      Head: Normocephalic  Nose: Nose normal       Mouth/Throat:      Mouth: Mucous membranes are moist    Eyes:      Extraocular Movements: Extraocular movements intact  Cardiovascular:      Rate and Rhythm: Normal rate and regular rhythm  Pulses: Normal pulses  Heart sounds: Normal heart sounds  Pulmonary:      Effort: Pulmonary effort is normal       Breath sounds: Normal breath sounds  Abdominal:      General: Bowel sounds are normal  There is distension  Tenderness: There is no abdominal tenderness  Genitourinary:     Comments: Voiding spontaneously  Musculoskeletal:      Cervical back: Normal range of motion  Right lower leg: No edema  Left lower leg: No edema  Comments: Ambulates with walker   Skin:     Capillary Refill: Capillary refill takes less than 2 seconds  Coloration: Skin is pale  Neurological:      Mental Status: He is alert and oriented to person, place, and time  Psychiatric:         Mood and Affect: Mood normal          Behavior: Behavior normal          Thought Content:  Thought content normal          Judgment: Judgment normal          Additional Data:     Labs:    Results from last 7 days   Lab Units 06/21/21  0512   WBC Thousand/uL 3 11*   HEMOGLOBIN g/dL 8 8*   HEMATOCRIT % 29 1*   PLATELETS Thousands/uL 113*   NEUTROS PCT % 85* LYMPHS PCT % 9*   MONOS PCT % 5   EOS PCT % 0     Results from last 7 days   Lab Units 06/21/21  1529 06/21/21  0512   POTASSIUM mmol/L 4 4 5 6*   CHLORIDE mmol/L 94* 96*   CO2 mmol/L 21 17*   BUN mg/dL 52* 47*   CREATININE mg/dL 3 84* 3 51*   CALCIUM mg/dL 7 8* 7 5*   ALK PHOS U/L  --  28*   ALT U/L  --  14   AST U/L  --  14           * I Have Reviewed All Lab Data Listed Above  * Additional Pertinent Lab Tests Reviewed:  All Labs Within Last 24 Hours Reviewed    Imaging:    Imaging Reports Reviewed Today Include:  None  Imaging Personally Reviewed by Myself Includes:  None    Recent Cultures (last 7 days):           Last 24 Hours Medication List:   Current Facility-Administered Medications   Medication Dose Route Frequency Provider Last Rate    albumin human  25 g Intravenous Q8H Jess Downey MD 0 g (06/21/21 1157)    albuterol  2 5 mg Nebulization Q6H PRN NERISSA Garsia      apixaban  5 mg Oral BID Kathrin Dhillon MD      calcium carbonate-vitamin D  1 tablet Oral Daily With Breakfast Kathrin Dhillon MD      cholecalciferol  1,000 Units Oral Daily Kathrin Dhillon MD      cyanocobalamin  1,000 mcg Oral Daily Kathrin Dhillon MD      folic acid  1,243 mcg Oral Daily Kathrin Dhillon MD      furosemide  40 mg Intravenous Once Jess Downey MD      guaiFENesin  600 mg Oral Q12H [address] NERISSA Garsia      hydrocortisone sodium succinate  50 mg Intravenous [facility] Vana Mcardle, MD      insulin lispro  1-6 Units Subcutaneous TID [facility] Kathrin Dhillon MD      melatonin  3 mg Oral HS PRN Kathrin Dhillon MD      midodrine  10 mg Oral TID [facility] Vana Mcardle, MD      mirtazapine  15 mg Oral Daily Kathrin Dhillon MD      ondansetron  4 mg Intravenous Q6H PRN NERISSA Garsia      oxyCODONE-acetaminophen  1 tablet Oral Q6H PRN Kathrin Dhillon MD      pantoprazole  40 mg Oral QAM Kathrin Dhillon MD      polyethylene glycol  17 g Oral Daily NERISSA Gonzalez      pravastatin  20 mg Oral Daily With Tocagen Daniele Khoury MD      saccharomyces boulardii  250 mg Oral BID Daniele Khoury MD      sodium bicarbonate infusion  100 mL/hr Intravenous Continuous Jana Lawson  mL/hr (06/21/21 1204)        Today, Patient Was Seen By: NERISSA Calvillo    ** Please Note: Dictation voice to text software may have been used in the creation of this document   **

## 2021-06-21 NOTE — PROGRESS NOTES
NEPHROLOGY PROGRESS NOTE   Vikki Fischer 77 y o  male MRN: [id number]  Unit/Bed#: [address] [place] [address] Encounter: [id number]    ASSESSMENT & PLAN:   77 y o  male  with history of non-small cell lung cancer status post VATS, diabetes mellitus, hypertension ho was admitted to [facility] on 6/14/21 after being transferred from [facility] where he presented with SOB  He was transferred to [address] [place] [address] after he was found to be hypotensive at [facility]   Nephrology consulted for acute kidney injury  Acute kidney injury, POA  -Baseline creatinine:  0 9-1 2 mg/dl, reviewed baseline creatinine from Care everywhere, creatinine was 1 2 in December 2020  -Admission creatinine:  1 59 mg/dl  - Work up:   · UA with microscopy:  Pending  · Imaging:  Renal ultrasound is currently pending  -Etiology:  Acute kidney injury likely due to ATN in the setting of hypotension  Could also have a component of contrast nephropathy as patient received CTA with contrast on 06/13 but renal function started worsening since 06/19   [facility] Course:  Renal function continue to worsen with peak creatinine 3 5 on 06/21 and potassium trending up to 5 6  Also has worsening metabolic acidosis  -Plan:   · Patient has worsening of renal function with creatinine 3 5 mg/dL today likely due to persistent hypotension  Also has hyperkalemia and oliguria  Ordered management of hyperkalemia with insulin and dextrose, will give 1 dose of IV Lasix 40 mg, repeat BMP later in the afternoon today  If renal function continue to worsen may need to consider renal replacement therapy  · Discussed regarding goals of care with the patient in the setting of metastatic malignancy, patient wants everything done as per my discussion with him and once dialysis done needed  At this time will monitor response to medical treatment but if renal function continue to worsen or if hyperkalemia worsens may need to consider renal replacement therapy    Also discussed with patient that if his blood pressure drops on dialysis, may need to consider CRRT and he verbalized understanding  discussed about risk and benefit with the patient regarding dialysis, patient signed consent  which was placed in the chart on 06/21  · Changed IV fluid to bicarbonate drip due to finding of worsening acidosis  · Avoid nephrotoxins and dose all medications per EGFR  · Avoid hypotension  Continue midodrine and IV albumin   · Recommend giving NPO for possible dialysis tomorrow if renal function continue to worsen                                   BP/hypotension  -blood pressure is persistently low with only slight improvement with use of midodrine and IV albumin  -echocardiogram showed ejection fraction of 55-60% with indeterminate diastolic function  -continue midodrine 10 mg t i d  And restarted on IV albumin 25 g q 8 hours  Continue IV fluid, avoid hypotension if blood pressure drops further and if MAP< 65 ,  May need to consider vasopressors  -continue hydrocortisone per primary team as a random cortisol level was low at 9 2 on 06/20  -acetic fluid was positive for wbc's -3387  Antibiotics per primary team    Hyperkalemia:  Potassium 5 6 suddenly worsening in the setting of worsening renal function due to ATN, ordered for insulin and dextrose as well as Kayexalate, repeat BMP in the afternoon today, changed IV fluid to bicarb drip to correct acidosis which would also help  Recommend low-potassium diet    Hyponatremia:  Likely due to free water intake in the setting of acute kidney injury with poor free water restriction, continue fluid restriction 1800 mL per day  -also TSH was elevated indicating hypothyroidism but free T4 within normal limit    Hyperphosphatemia:  Due to acute kidney injury with phosphorus level trending up, continue to monitor  Metabolic acidosis:   - Bicarb level trending down to 17 with elevated anion gap at 15    Lactic acid level was within normal limit on 06/20   -likely due to acute kidney injury  -IV fluid as mentioned above change to bicarbonate drip    Anemia:  Hemoglobin 8 8, continue to monitor per primary team    Hypocalcemia:  Calcium level now improving  Follow-up vitamin-D level, currently pending    Ascites status post IR paracentesis on 06/14 and 6/18  Last paracentesis on 06/18 with 3 9 L of fluid removed and study was positive for malignancy compatible with lung as primary  Others:  Right pleural effusion/malignant ascites/non-small cell lung cancer followed with Dr Naya Kemp    Discussed with primary team advanced practitioner      SUBJECTIVE:  No shortness of breath  No nausea vomiting  Blood pressure slightly better    OBJECTIVE:  Current Weight: Weight - Scale: 93 9 kg (207 lb 0 2 oz)  Vitals:    06/21/21 0739   BP: 102/60   Pulse: 61   Resp: 16   Temp: (!) 96 5 °F (35 8 °C)   SpO2: 95%       Intake/Output Summary (Last 24 hours) at 6/21/2021 0917  Last data filed at 6/21/2021 0600  Gross per 24 hour   Intake 2596 83 ml   Output 250 ml   Net 2346 83 ml       Physical Exam  General:  Ill looking, awake  Eyes: Conjunctivae pink,  Sclera anicteric  ENT: lips and mucous membranes moist  Neck: supple   Chest: Clear to Auscultation both lungs,  no crackles, ronchus or wheezing  CVS: S1 & S2 present, normal rate, regular rhythm, no murmur    Abdomen: soft, non-tender, non-distended, Bowel sounds normoactive  Extremities: trace  edema of  legs, on compression stockings  Skin: no rash  Neuro: awake, alert, oriented x 3  Psych: Mood and affect appropriate       Medications:    Current Facility-Administered Medications:     albuterol inhalation solution 2 5 mg, 2 5 mg, Nebulization, Q6H PRN, NERISSA Jones    [facility]) tablet 5 mg, 5 mg, Oral, BID, Lucy Casillas MD, 5 mg at 06/21/21 0819    calcium carbonate-vitamin D (OSCAL-D) 500 mg-200 units per tablet 1 tablet, 1 tablet, Oral, Daily With Breakfast, Lucy Casillas MD, 1 tablet at 06/21/21 7549    cholecalciferol (VITAMIN D3) tablet 1,000 Units, 1,000 Units, Oral, Daily, Denisse Brennan MD, 1,000 Units at 06/21/21 0819    cyanocobalamin (VITAMIN B-12) tablet 1,000 mcg, 1,000 mcg, Oral, Daily, Denisse Brennan MD, 1,000 mcg at 75/65/06 3842    folic acid (FOLVITE) tablet 1,000 mcg, 1,000 mcg, Oral, Daily, Denisse Brennan MD, 1,000 mcg at 06/21/21 0823    guaiFENesin (MUCINEX) 12 hr tablet 600 mg, 600 mg, Oral, Q12H [address], Sherren Six, CRNP, 600 mg at 06/21/21 4197    hydrocortisone sodium succinate (PF) (Solu-CORTEF) injection 50 mg, 50 mg, Intravenous, Q8H [address], Claudia Woodard MD, 50 mg at 06/21/21 0513    insulin lispro (HumaLOG) 100 units/mL subcutaneous injection 1-6 Units, 1-6 Units, Subcutaneous, TID AC, 1 Units at 06/21/21 0830 **AND** Fingerstick Glucose (POCT), , , TID AC, Denisse Brennan MD    insulin regular (HumuLIN R,NovoLIN R) injection 10 Units, 10 Units, Intravenous, Once, Jakob Darnell MD    [facility]) tablet 250 mg, 250 mg, Oral, Q24H, Luma Stewart, NERISSA, 250 mg at 06/20/21 6662    melatonin tablet 3 mg, 3 mg, Oral, HS PRN, Denisse Brennan MD, 3 mg at 06/15/21 2140    midodrine (PROAMATINE) tablet 10 mg, 10 mg, Oral, TID AC, Claudia Woodard MD, 10 mg at 06/21/21 0610    mirtazapine (REMERON) tablet 15 mg, 15 mg, Oral, Daily, Denisse Brennan MD, 15 mg at 06/21/21 0819    multi-electrolyte (PLASMALYTE-A/ISOLYTE-S PH 7 4) IV solution, 100 mL/hr, Intravenous, Continuous, Sherren Six, CRNP, Last Rate: 100 mL/hr at 06/21/21 0515, 100 mL/hr at 06/21/21 0515    ondansetron (ZOFRAN) injection 4 mg, 4 mg, Intravenous, Q6H PRN, Sherren Six, CRNP    oxyCODONE-acetaminophen (PERCOCET) 5-325 mg per tablet 1 tablet, 1 tablet, Oral, Q6H PRN, Denisse Brennan MD, 1 tablet at 06/16/21 2028    pantoprazole (PROTONIX) EC tablet 40 mg, 40 mg, Oral, QAM, Denisse Brennan MD, 40 mg at 06/21/21 0609    polyethylene glycol (MIRALAX) packet 17 g, 17 g, Oral, Daily, [place] NERISSA Hunter, 17 g at 06/21/21 0817    pravastatin (PRAVACHOL) tablet 20 mg, 20 mg, Oral, Daily With Feliz Stephen MD, 20 mg at 06/20/21 1548    saccharomyces boulardii (FLORASTOR) capsule 250 mg, 250 mg, Oral, BID, Eduin Zhao MD, 250 mg at 06/21/21 0820    Invasive Devices:        Lab Results:   Results from last 7 days   Lab Units 06/21/21  0512 06/20/21  0623 06/19/21  0508 06/17/21  0954   WBC Thousand/uL 3 11* 4 47 3 79* 6 50   HEMOGLOBIN g/dL 8 8* 9 0* 9 6* 10 5*   HEMATOCRIT % 29 1* 29 5* 31 3* 34 2*   PLATELETS Thousands/uL 113* 140* 139* 237   POTASSIUM mmol/L 5 6* 3 8 4 1 4 7   CHLORIDE mmol/L 96* 103 97* 99*   CO2 mmol/L 17* 18* 20* 20*   BUN mg/dL 47* 37* 34* 29*   CREATININE mg/dL 3 51* 2 51* 1 78* 1 60*   CALCIUM mg/dL 7 5* 6 7* 7 7* 7 9*   MAGNESIUM mg/dL 2 2 1 8 2 1 1 8   PHOSPHORUS mg/dL 5 4* 4 4*  --   --    ALK PHOS U/L 28* 27*  --  47   ALT U/L 14 15  --  16   AST U/L 14 10  --  18       Previous work up:  Renal ultrasound pending    CTA chest, no PE, increased opacification right lung base suggesting worsening atelectasis or pneumonia with finding of complex right-sided loculated pleural effusion, finding of 4 mm lung nodule in the lingula suspicious for metastatic disease  Also finding of malignant mediastinal adenopathy and  Ascites    Portions of the record may have been created with voice recognition software  Occasional wrong word or "sound a like" substitutions may have occurred due to the inherent limitations of voice recognition software  Read the chart carefully and recognize, using context, where substitutions have occurred  If you have any questions, please contact the dictating provider

## 2021-06-21 NOTE — SEDATION DOCUMENTATION
3800 ml of yellow cloudy fluid removed via paracentesis  Procedure preformed without complication  Band aid applied to site  Patient's MAP remained stable at >65 throughout the procedure

## 2021-06-21 NOTE — BRIEF OP NOTE (RAD/CATH)
INTERVENTIONAL RADIOLOGY PROCEDURE NOTE    Date: 6/21/2021    Procedure: Paracentesis    Preoperative diagnosis:   1  Dysphagia    2  Metastatic lung cancer (metastasis from lung to other site) ([address] )    3  Ascites    4  Acute renal failure superimposed on stage 3 chronic kidney disease ([address] )    5  Hyponatremia         Postoperative diagnosis: Same  Surgeon: Mahnaz Diana MD     Assistant: None  No qualified resident was available  Blood loss: None    Specimens: 3800 mL cloudy yellow ascites fluid removed     Findings: Successful paracentesis    Complications: None immediate      Anesthesia: local

## 2021-06-21 NOTE — PROGRESS NOTES
Medical Oncology/Hematology Progress Note  Selene Luna, 77 y o , 1945  [address], [id number]  06/21/21    Assessment and Plan:    1  Metastatic Adenocarcinoma lung cancer,ALK positive  Patient's primary oncologist is Dr Ralf Gordon records were not available  today  Patient is a poor historian and is not aware of his previous oncology specific medications  Medical oncology was asked to follow-up with the patient at the request of the primary care service  At this time, the patient understands that with his worsening kidney functions and hypotension, he is not a candidate for chemotherapy  However with a performance status improvement, this could then be considered  Previously, hospice was discussed  Patient would like to follow-up with primary care oncologist as a outpatient, when his performance status improves in order to seek additional treatment     _____________________________________________________________    History of present illness: This is a 64-year-old male with history of metastatic bronchogenic carcinoma, malignant pleural effusions, diabetes, COPD, heart block with pacemaker who was recently admitted to [facility] due to swallowing  Dr Conor Kwong note: 6/18/2021:  "Patient is followed by Dr Ralf Torres  Specific information is not presently available but patient was previously diagnosed with 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 (treated with brigatinib previously)  Patient states that he last received this treatment approximately 5 months ago  More recently, Mr Rhoda Grayson has been on "IV treatments" (NOS)  "    Primary service called me today to let me know that his performance status has decreased  Request was made for discussion of goals of care  Interval history:  Patient was found at bedside resting comfortably in a chair  Patient was confused as to his previous chemotherapy regimens    Patient would like to pursue treatment as an outpatient  Review of Systems   Constitutional: Negative for activity change, appetite change, fatigue and fever  HENT: Negative for nosebleeds  Respiratory: Negative for cough, choking and shortness of breath  Cardiovascular: Negative for chest pain, palpitations and leg swelling  Gastrointestinal: Negative for abdominal distention, abdominal pain, anal bleeding, blood in stool, constipation, diarrhea, nausea and vomiting  Endocrine: Negative for cold intolerance  Genitourinary: Negative for hematuria  Musculoskeletal: Negative for myalgias  Skin: Negative for color change, pallor and rash  Allergic/Immunologic: Negative for immunocompromised state  Neurological: Negative for headaches  Hematological: Negative for adenopathy  Does not bruise/bleed easily  All other systems reviewed and are negative  Past medical history:   Past Medical History:   Diagnosis Date    Cancer ([place] [address] )     RT Lung    Diabetes mellitus ([place] [address] )     Hypertension      Past surgical history:   Past Surgical History:   Procedure Laterality Date    CARDIAC PACEMAKER PLACEMENT  09/2019    CHOLECYSTECTOMY      kidney damage from gallbladder removal     FOOT SURGERY      tendon    IR PARACENTESIS  6/14/2021    IR PARACENTESIS  6/18/2021    IR PARACENTESIS  6/21/2021    PORTACATH PLACEMENT      PROSTATE SURGERY       Allergies:    Allergies   Allergen Reactions    Diovan [Valsartan] Angioedema    Penicillins Anaphylaxis    Lisinopril     Oxytocin        Home medications:   Medications Prior to Admission   Medication    apixaban (Eliquis) 5 mg    atenolol (TENORMIN) 25 mg tablet    Calcium Carbonate-Vit D-Min (CALCIUM 1200 PO)    cholecalciferol (VITAMIN D3) 1,000 units tablet    Cranberry 1000 MG CAPS    furosemide (LASIX) 40 mg tablet    glimepiride (AMARYL) 1 mg tablet    glucosamine-chondroitin 500-400 MG tablet    losartan (COZAAR) 50 mg tablet    lovastatin (MEVACOR) 20 mg tablet    ondansetron (ZOFRAN) 4 mg tablet    pantoprazole (PROTONIX) 40 mg tablet    senna (SENOKOT) 8 6 MG tablet    acyclovir (ZOVIRAX) 200 mg capsule    BRIGATINIB PO    [facility] Vitamin B-12 1000 MCG tablet    folic acid (FOLVITE) 1 mg tablet    mirtazapine (REMERON) 15 mg tablet    oxyCODONE-acetaminophen (PERCOCET) 5-325 mg per tablet       Hospital medications:   Current Facility-Administered Medications:     albumin human (FLEXBUMIN) 25 % injection 25 g, 25 g, Intravenous, Q8H, Agustin Baker MD, Stopped at 06/21/21 1157    albuterol inhalation solution 2 5 mg, 2 5 mg, Nebulization, Q6H PRN, NERISSA Garcia    apixaban (ELIQUIS) tablet 5 mg, 5 mg, Oral, BID, Jennifer Calabrese MD, 5 mg at 06/21/21 0819    calcium carbonate-vitamin D (OSCAL-D) 500 mg-200 units per tablet 1 tablet, 1 tablet, Oral, Daily With Breakfast, Jennifer Calabrese MD, 1 tablet at 06/21/21 0819    cholecalciferol (VITAMIN D3) tablet 1,000 Units, 1,000 Units, Oral, Daily, Jennifer Calabrese MD, 1,000 Units at 06/21/21 0819    cyanocobalamin (VITAMIN B-12) tablet 1,000 mcg, 1,000 mcg, Oral, Daily, Jennifer Calabrese MD, 1,000 mcg at 66/48/87 8077    folic acid (FOLVITE) tablet 1,000 mcg, 1,000 mcg, Oral, Daily, Jennifer Calabrese MD, 1,000 mcg at 06/21/21 3368    furosemide (LASIX) injection 40 mg, 40 mg, Intravenous, Once, Agustin Baker MD    [facility]) 12 hr tablet 600 mg, 600 mg, Oral, Q12H [facility], NERISSA Garcia, 600 mg at 06/21/21 5728    hydrocortisone sodium succinate (PF) (Solu-CORTEF) injection 50 mg, 50 mg, Intravenous, Q8H [facility], Alyssa Sanon MD, 50 mg at 06/21/21 0513    insulin lispro (HumaLOG) 100 units/mL subcutaneous injection 1-6 Units, 1-6 Units, Subcutaneous, TID AC, 1 Units at 06/21/21 1206 **AND** Fingerstick Glucose (POCT), , , TID AC, Jennifer Calabrese MD    melatonin tablet 3 mg, 3 mg, Oral, HS PRN, Jennifer Calabrese MD, 3 mg at 06/15/21 2140    midodrine (PROAMATINE) tablet 10 mg, 10 mg, Oral, TID AC, James Pearce MD, 10 mg at 06/21/21 1203    mirtazapine (REMERON) tablet 15 mg, 15 mg, Oral, Daily, Ivan Finch MD, 15 mg at 06/21/21 0819    ondansetron (ZOFRAN) injection 4 mg, 4 mg, Intravenous, Q6H PRN, NERISSA Guevara    oxyCODONE-acetaminophen (PERCOCET) 5-325 mg per tablet 1 tablet, 1 tablet, Oral, Q6H PRN, Ivan Finch MD, 1 tablet at 06/16/21 2028    pantoprazole (PROTONIX) EC tablet 40 mg, 40 mg, Oral, QAM, Ivan Finch MD, 40 mg at 06/21/21 0609    polyethylene glycol (MIRALAX) packet 17 g, 17 g, Oral, Daily, NERISSA Burgos, 17 g at 06/21/21 0940    pravastatin (PRAVACHOL) tablet 20 mg, 20 mg, Oral, Daily With Randall Campos MD, 20 mg at 06/20/21 1548    saccharomyces boulardii (FLORASTOR) capsule 250 mg, 250 mg, Oral, BID, Ivan Finch MD, 250 mg at 06/21/21 0820    sodium bicarbonate 150 mEq in dextrose 5 % 1,000 mL infusion, 100 mL/hr, Intravenous, Continuous, Kvng Johnson MD, Last Rate: 100 mL/hr at 06/21/21 1204, 100 mL/hr at 06/21/21 1204    Social history:   Social History     Tobacco Use    Smoking status: Former Smoker     Packs/day: 2 00     Years: 9 00     Pack years: 18 00     Types: Cigarettes    Smokeless tobacco: Never Used   Vaping Use    Vaping Use: Never used   Substance Use Topics    Alcohol use: Not Currently    Drug use: Never     Family history:   Family History   Family history unknown: Yes     Vitals:  Vitals:    06/21/21 1410   BP: 103/61   Pulse: 92   Resp: 18   Temp:    SpO2: 99%     Physical Exam  Constitutional:       Appearance: He is obese  HENT:      Head: Normocephalic and atraumatic  Nose: Nose normal    Eyes:      General: No scleral icterus  Extraocular Movements: Extraocular movements intact  Conjunctiva/sclera: Conjunctivae normal    Cardiovascular:      Rate and Rhythm: Normal rate and regular rhythm  Heart sounds: Murmur heard       Pulmonary:      Effort: Pulmonary effort is normal  No respiratory distress  Abdominal:      General: Abdomen is flat  There is no distension  Palpations: Abdomen is soft  Neurological:      Mental Status: He is alert  Comments: Could not provide a reliable history regarding cancer care  Labs and Pertinent Reports Reviewed  Lab Results   Component Value Date    WBC 3 11 (L) 06/21/2021    HGB 8 8 (L) 06/21/2021    HCT 29 1 (L) 06/21/2021    MCV 90 06/21/2021     (L) 06/21/2021     Lab Results   Component Value Date    SODIUM 128 (L) 06/21/2021    K 5 6 (H) 06/21/2021    CL 96 (L) 06/21/2021    CO2 17 (L) 06/21/2021    AGAP 15 (H) 06/21/2021    BUN 47 (H) 06/21/2021    CREATININE 3 51 (H) 06/21/2021    GLUC 146 (H) 06/21/2021    CALCIUM 7 5 (L) 06/21/2021    AST 14 06/21/2021    ALT 14 06/21/2021    ALKPHOS 28 (L) 06/21/2021    TP 4 9 (L) 06/21/2021    TBILI 0 58 06/21/2021    EGFR 16 06/21/2021     Please note: This report has been generated by voice recognition software system  Therefore, there may be syntax, spelling and/or grammatical errors  Please call if you have any questions

## 2021-06-21 NOTE — PHYSICAL THERAPY NOTE
Attempted PT however pt off unit for testing/procedure  Will follow    Cricket Serrano PT  [id number]     06/21/21 1056   Note Type   Note Type Treatment   Cancel Reasons Patient off floor/test

## 2021-06-21 NOTE — PHYSICAL THERAPY NOTE
PT TREATMENT     06/21/21 6815   Note Type   Note Type Treatment   Pain Assessment   Pain Assessment Tool Pain Assessment not indicated - pt denies pain   Restrictions/Precautions   Other Precautions Chair Alarm; Bed Alarm;Cognitive; Fall Risk   General   Chart Reviewed Yes   Cognition   Arousal/Participation Cooperative   Attention Attends with cues to redirect   Following Commands Follows one step commands with increased time or repetition   Subjective   Subjective "I want to get out of this bed"   Bed Mobility   Supine to Sit 4  Minimal assistance   Transfers   Sit to Stand 4  Minimal assistance   Additional items Verbal cues; Impulsive; Increased time required   Stand to Sit 4  Minimal assistance   Additional items Increased time required; Impulsive;Verbal cues   Stand pivot 4  Minimal assistance   Additional items Increased time required; Impulsive;Verbal cues  (with walker)   Ambulation/Elevation   Gait pattern   (flexed posture, unsteady)   Gait Assistance 3  Moderate assist   Assistive Device Rolling walker   Distance 20 feet, 35 feet   Balance   Static Sitting Fair +   Dynamic Sitting Fair   Static Standing Fair -   Dynamic Standing Poor   Activity Tolerance   Activity Tolerance Patient limited by fatigue   Assessment   Prognosis Good   Problem List Decreased strength;Decreased endurance; Impaired balance;Decreased mobility; Decreased safety awareness   Assessment Pt demonstrates improving endurance and activity tolerance overall as pt was able to take 2 short walks today with a walker  Pt needed extended rest break between walks due to fatigue  Pt needs lots of cues for safety and pacing of activity  Pt left OOB in a chair after PT     The patient's AM-PAC Basic Mobility Inpatient Short Form Raw Score is 14, Standardized Score is 35 55  A standardized score less than 42 9 suggests the patient may benefit from discharge to post-acute rehabilitation services          Plan   Treatment/Interventions ADL retraining;Functional transfer training;LE strengthening/ROM; Elevations; Therapeutic exercise; Endurance training;Gait training;Bed mobility; Equipment eval/education;Patient/family training   Progress Progressing toward goals   PT Frequency 5x/wk   Recommendation   PT Discharge Recommendation Post acute rehabilitation services   AM-PAC Basic Mobility Inpatient   Turning in Bed Without Bedrails 3   Lying on Back to Sitting on Edge of Flat Bed 3   Moving Bed to Chair 2   Standing Up From Chair 3   Walk in Room 2   Climb 3-5 Stairs 1   Basic Mobility Inpatient Raw Score 14   Basic Mobility Standardized Score 68 22   Licensure   [place] License Number  Derrek DAMON [id number]

## 2021-06-21 NOTE — UTILIZATION REVIEW
Continued Stay Review    Date:  6/18/21                         Current Patient Class: inaptient    Current Level of Care: acute          Assessment/Plan:   Per GI Consult  Patient has metastatic cancer and malignant pleural effusion  Patient seems to have difficulty in initiation of swallowing  Differential diagnosis includes infectious process such as yeast or viral illnesses of the esophagus  Cannot exclude myopathy of the throat muscles causing difficulty in swallowing which could be secondary to platinum based chemotherapy agents  Upper endoscopy will be done for evaluation of any obstructive lesion or inflammatory lesion of the esophagus or pharynx  Further recommendations will follow  ATTENDING   Acute Respiratory failure with hypoxia , patient had stay in ICU now gmf oxygen 2L nc desaturation with little exertion  Sepsis possible atelectasis or pneumonia continue Levaquin  Pleural effusion  Loculated right-sided pleural effusion, IR evaluated the patient and not enough fluid for drainage  CONCEPCIÓN continue hold lasix for now and pt also hypotensive   Abdomen large , US abdomen done yesterday showing Moderate quantity of diffuse abdominal ascites  For paracentesis today 6/18/21  Carcinoma of lung Right CT suggested possible advancement of metastatic disease with involvement of the lingula  Patient follows up with Dr Jayna Banda as an outpatient  6/14 cytology  positive for malignancy, compatible with lung primary  Consulting Oncology  IR PARACENTESIS : Paracentesis completed  3900 ml clear yellow fluid removed  Bandaid to site  Patient tolerated well       6/18 EGD:       Vital Signs:   /18/21 07:33:46  97 5 °F (36 4 °C)  103  18  90/69  76  98 %  28  2 L/min  Nasal cannula  Lying   06/18/21 0315  97 6 °F (36 4 °C)  109Abnormal   18  110/62  90  94 %             Pertinent Labs/Diagnostic Results:   6/17 US abdomen  Moderate quantity of diffuse abdominal ascites      Results from last 7 days   Lab Units 06/21/21  5887 06/20/21  8889 06/19/21  0508 06/17/21  0954 06/15/21  0521   WBC Thousand/uL 3 11* 4 47 3 79* 6 50 4 83   HEMOGLOBIN g/dL 8 8* 9 0* 9 6* 10 5* 9 1*   HEMATOCRIT % 29 1* 29 5* 31 3* 34 2* 30 0*   PLATELETS Thousands/uL 113* 140* 139* 237 164   NEUTROS ABS Thousands/µL 2 63 2 94  --  4 51 3 30     Results from last 7 days   Lab Units 06/21/21  0512 06/20/21  0623 06/19/21  0508 06/18/21  0531 06/17/21  0954   SODIUM mmol/L 128* 136 130* 130* 132*   POTASSIUM mmol/L 5 6* 3 8 4 1 4 2 4 7   CHLORIDE mmol/L 96* 103 97* 100 99*   CO2 mmol/L 17* 18* 20* 16* 20*   ANION GAP mmol/L 15* 15* 13 14* 13   BUN mg/dL 47* 37* 34* 31* 29*   CREATININE mg/dL 3 51* 2 51* 1 78* 1 59* 1 60*   EGFR ml/min/1 73sq m 16 24 37 42 42   CALCIUM mg/dL 7 5* 6 7* 7 7* 7 2* 7 9*   MAGNESIUM mg/dL 2 2 1 8 2 1 2 1 1 8   PHOSPHORUS mg/dL 5 4* 4 4*  --   --   --      Results from last 7 days   Lab Units 06/21/21  0512 06/20/21  0623 06/17/21  0954   AST U/L 14 10 18   ALT U/L 14 15 16   ALK PHOS U/L 28* 27* 47   TOTAL PROTEIN g/dL 4 9* 4 5* 5 2*   ALBUMIN g/dL 2 8* 2 6* 2 2*   TOTAL BILIRUBIN mg/dL 0 58 0 57 0 71     Results from last 7 days   Lab Units 06/21/21  0739 06/20/21  2123 06/20/21  1615 06/20/21  1118 06/20/21  1054 06/20/21  0740 06/19/21  2101 06/19/21  1605 06/19/21  1133 06/19/21  0708 06/18/21  2119 06/18/21  1603   POC GLUCOSE mg/dl 154* 123 125 89 83 88 91 91 123 88 91 103     Results from last 7 days   Lab Units 06/21/21  0512 06/20/21  0623 06/19/21  0508 06/18/21  0531 06/17/21  0954 06/15/21  0521   GLUCOSE RANDOM mg/dL 146* 76 91 114 104 84     Results from last 7 days   Lab Units 06/16/21  0436   HEMOGLOBIN A1C % 6 4*   EAG mg/dl 137             Results from last 7 days   Lab Units 06/20/21  0623 06/19/21  0508 06/18/21  0531   PROCALCITONIN ng/ml 0 46* 0 30* 0 29*     Results from last 7 days   Lab Units 06/20/21  1031   LACTIC ACID mmol/L 0 9                 Results from last 7 days   Lab Units 06/14/21  1516 GRAM STAIN RESULT  Rare Polys  No organisms seen   BODY FLUID CULTURE, STERILE  No growth     Results from last 7 days   Lab Units 06/14/21  1517   TOTAL COUNTED  100   WBC FLUID /ul 3,387       telemetry  NPO  IR Paracentesis   EGD  Daily weight I/o  Medications:   Scheduled Medications:  albumin human, 25 g, Intravenous, Q8H  apixaban, 5 mg, Oral, BID  calcium carbonate-vitamin D, 1 tablet, Oral, Daily With Breakfast  cholecalciferol, 1,000 Units, Oral, Daily  cyanocobalamin, 1,000 mcg, Oral, Daily  folic acid, 5,111 mcg, Oral, Daily  furosemide, 40 mg, Intravenous, Once  guaiFENesin, 600 mg, Oral, Q12H LOLY  hydrocortisone sodium succinate, 50 mg, Intravenous, Q8H LOLY  insulin lispro, 1-6 Units, Subcutaneous, TID AC  levofloxacin, 250 mg, Oral, Q24H  midodrine, 10 mg, Oral, TID AC  mirtazapine, 15 mg, Oral, Daily  pantoprazole, 40 mg, Oral, QAM  polyethylene glycol, 17 g, Oral, Daily  pravastatin, 20 mg, Oral, Daily With Dinner  saccharomyces boulardii, 250 mg, Oral, BID      Continuous IV Infusions:  sodium bicarbonate infusion, 100 mL/hr, Intravenous, Continuous      PRN Meds:  albuterol, 2 5 mg, Nebulization, Q6H PRN  lidocaine 1% buffered, , Infiltration, Code/Trauma/Sedation Med  melatonin, 3 mg, Oral, HS PRN  ondansetron, 4 mg, Intravenous, Q6H PRN  oxyCODONE-acetaminophen, 1 tablet, Oral, Q6H PRN        Discharge Plan: d    Network Utilization Review Department  ATTENTION: Please call with any questions or concerns to [phone] and carefully listen to the prompts so that you are directed to the right person  All voicemails are confidential   Ez Ramsey all requests for admission clinical reviews, approved or denied determinations and any other requests to dedicated fax number below belonging to the campus where the patient is receiving treatment   List of dedicated fax numbers for the Facilities:  FACILITY NAME UR FAX NUMBER   ADMISSION DENIALS (Administrative/Medical Necessity) [phone]   [facility] (Maternity/NICU/Pediatrics) [address] [facility],[address] [place] [address] [id number] [address] [facility] [phone]     Continued Stay Review  Date:  6/18/21 Thursday                      Current Patient Class: Inpatient    Current Level of Care: Acute Med Surg    Assessment/Plan:   Per GI Consult  Patient has metastatic cancer and malignant pleural effusion  Patient seems to have difficulty in initiation of swallowing  Differential diagnosis includes infectious process such as yeast or viral illnesses of the esophagus  Cannot exclude myopathy of the throat muscles causing difficulty in swallowing which could be secondary to platinum based chemotherapy agents  Upper endoscopy will be done for evaluation of any obstructive lesion or inflammatory lesion of the esophagus or pharynx  Further recommendations will follow  ATTENDING   Acute Respiratory failure with hypoxia , patient had stay in ICU now gmf oxygen 2L nc desaturation with little exertion  Sepsis possible atelectasis or pneumonia continue Levaquin  Pleural effusion  Loculated right-sided pleural effusion, IR evaluated the patient and not enough fluid for drainage  CONCEPCIÓN continue hold lasix for now and pt also hypotensive   Abdomen large , US abdomen done yesterday showing Moderate quantity of diffuse abdominal ascites    For paracentesis today 6/18/21  Carcinoma of lung Right CT suggested possible advancement of metastatic disease with involvement of the lingula  Patient follows up with Dr Aaron Lujan as an outpatient  6/14 cytology  positive for malignancy, compatible with lung primary  Consulting Oncology  IR PARACENTESIS : Paracentesis completed  3900 ml clear yellow fluid removed  Bandaid to site  Patient tolerated well  6/18 EGD WITH BIOPSIES:   INDICATION:  Dysphagia  ANESTHESIA INFORMATION:  ASA: III  Anesthesia Type: IV Sedation with Anesthesia      FINDINGS:  · Mild erythematous mucosa in the antrum; performed cold forceps biopsy to rule out H  pylori  · The upper third of the esophagus, middle third of the esophagus, lower third of the esophagus and GE junction appeared normal   · The duodenal bulb, 1st part of the duodenum and 2nd part of the duodenum appeared normal      IMPRESSION:  1  Mild gastritis  2  Gastric polyp  3  Normal esophagus  4  Normal duodenum     RECOMMENDATION:  1  Mechanical soft diet  2  Follow-up biopsies  3   Continue current medication     Vital Signs:   6/18/21 07:33:46  97 5 °F (36 4 °C)  103  18  90/69  76  98 %  28  2 L/min  Nasal cannula  Lying   06/18/21 0315  97 6 °F (36 4 °C)  109Abnormal   18  110/62  90  94 %             Pertinent Labs/Diagnostic Results:   6/17 US abdomen  Moderate quantity of diffuse abdominal ascites      Results from last 7 days   Lab Units 06/21/21  0512 06/20/21  0623 06/19/21  0508 06/17/21  0954 06/15/21  0521   WBC Thousand/uL 3 11* 4 47 3 79* 6 50 4 83   HEMOGLOBIN g/dL 8 8* 9 0* 9 6* 10 5* 9 1*   HEMATOCRIT % 29 1* 29 5* 31 3* 34 2* 30 0*   PLATELETS Thousands/uL 113* 140* 139* 237 164   NEUTROS ABS Thousands/µL 2 63 2 94  --  4 51 3 30     Results from last 7 days   Lab Units 06/21/21  0512 06/20/21  0623 06/19/21  0508 06/18/21  0531 06/17/21  0954   SODIUM mmol/L 128* 136 130* 130* 132*   POTASSIUM mmol/L 5 6* 3 8 4 1 4 2 4 7   CHLORIDE mmol/L 96* 103 97* 100 99*   CO2 mmol/L 17* 18* 20* 16* 20*   ANION GAP mmol/L 15* 15* 13 14* 13   BUN mg/dL 47* 37* 34* 31* 29* CREATININE mg/dL 3 51* 2 51* 1 78* 1 59* 1 60*   EGFR ml/min/1 73sq m 16 24 37 42 42   CALCIUM mg/dL 7 5* 6 7* 7 7* 7 2* 7 9*   MAGNESIUM mg/dL 2 2 1 8 2 1 2 1 1 8   PHOSPHORUS mg/dL 5 4* 4 4*  --   --   --      Results from last 7 days   Lab Units 06/21/21  0512 06/20/21  0623 06/17/21  0954   AST U/L 14 10 18   ALT U/L 14 15 16   ALK PHOS U/L 28* 27* 47   TOTAL PROTEIN g/dL 4 9* 4 5* 5 2*   ALBUMIN g/dL 2 8* 2 6* 2 2*   TOTAL BILIRUBIN mg/dL 0 58 0 57 0 71     Results from last 7 days   Lab Units 06/21/21  0739 06/20/21  2123 06/20/21  1615 06/20/21  1118 06/20/21  1054 06/20/21  0740 06/19/21  2101 06/19/21  1605 06/19/21  1133 06/19/21  0708 06/18/21  2119 06/18/21  1603   POC GLUCOSE mg/dl 154* 123 125 89 83 88 91 91 123 88 91 103     Results from last 7 days   Lab Units 06/21/21  0512 06/20/21  0623 06/19/21  0508 06/18/21  0531 06/17/21  0954 06/15/21  0521   GLUCOSE RANDOM mg/dL 146* 76 91 114 104 84     Results from last 7 days   Lab Units 06/16/21  0436   HEMOGLOBIN A1C % 6 4*   EAG mg/dl 137             Results from last 7 days   Lab Units 06/20/21  0623 06/19/21  0508 06/18/21  0531   PROCALCITONIN ng/ml 0 46* 0 30* 0 29*     Results from last 7 days   Lab Units 06/20/21  1031   LACTIC ACID mmol/L 0 9                 Results from last 7 days   Lab Units 06/14/21  1516   GRAM STAIN RESULT  Rare Polys  No organisms seen   BODY FLUID CULTURE, STERILE  No growth     Results from last 7 days   Lab Units 06/14/21  1517   TOTAL COUNTED  100   WBC FLUID /ul 3,387       telemetry  NPO  IR Paracentesis   EGD  Daily weight I/o  Medications:   Scheduled Medications:  albumin human, 25 g, Intravenous, Q8H  apixaban, 5 mg, Oral, BID  calcium carbonate-vitamin D, 1 tablet, Oral, Daily With Breakfast  cholecalciferol, 1,000 Units, Oral, Daily  cyanocobalamin, 1,000 mcg, Oral, Daily  folic acid, 1,203 mcg, Oral, Daily  furosemide, 40 mg, Intravenous, Once  guaiFENesin, 600 mg, Oral, Q12H LOLY  hydrocortisone sodium succinate, 50 mg, Intravenous, Q8H [address]  insulin lispro, 1-6 Units, Subcutaneous, TID AC  levofloxacin, 250 mg, Oral, Q24H  midodrine, 10 mg, Oral, TID AC  mirtazapine, 15 mg, Oral, Daily  pantoprazole, 40 mg, Oral, QAM  polyethylene glycol, 17 g, Oral, Daily  pravastatin, 20 mg, Oral, Daily With Dinner  saccharomyces boulardii, 250 mg, Oral, BID      Continuous IV Infusions:  sodium bicarbonate infusion, 100 mL/hr, Intravenous, Continuous      PRN Meds:  albuterol, 2 5 mg, Nebulization, Q6H PRN  lidocaine 1% buffered, , Infiltration, Code/Trauma/Sedation Med  melatonin, 3 mg, Oral, HS PRN  ondansetron, 4 mg, Intravenous, Q6H PRN  oxyCODONE-acetaminophen, 1 tablet, Oral, Q6H PRN        Discharge Plan: d    Network Utilization Review Department  ATTENTION: Please call with any questions or concerns to [phone] and carefully listen to the prompts so that you are directed to the right person  All voicemails are confidential   Makenna Hendricks all requests for admission clinical reviews, approved or denied determinations and any other requests to dedicated fax number below belonging to the campus where the patient is receiving treatment   List of dedicated fax numbers for the Facilities:  [address] DENIALS (Administrative/Medical Necessity) [phone]   [address] (Maternity/NICU/Pediatrics) [phone]   [address] [place] [address] [facility] [address] [id number] [address] [phone]

## 2021-06-21 NOTE — TREATMENT PLAN
Repeat labs from 3:00 p m  Reviewed, renal function continue to worsen to creatinine 3 8, bicarb improving and potassium now within normal limit, continue to monitor    If renal function continue to worsen may consider renal replacement therapy next 24-48 hours

## 2021-06-22 ENCOUNTER — TELEPHONE (OUTPATIENT)
Dept: GASTROENTEROLOGY | Facility: CLINIC | Age: 76
End: 2021-06-22

## 2021-06-22 LAB
ANION GAP SERPL CALCULATED.3IONS-SCNC: 11 MMOL/L (ref 4–13)
BASOPHILS # BLD AUTO: 0 THOUSANDS/ΜL (ref 0–0.1)
BASOPHILS NFR BLD AUTO: 0 % (ref 0–1)
BUN SERPL-MCNC: 55 MG/DL (ref 5–25)
CALCIUM SERPL-MCNC: 7.6 MG/DL (ref 8.3–10.1)
CHLORIDE SERPL-SCNC: 94 MMOL/L (ref 100–108)
CO2 SERPL-SCNC: 26 MMOL/L (ref 21–32)
CREAT SERPL-MCNC: 3.58 MG/DL (ref 0.6–1.3)
EOSINOPHIL # BLD AUTO: 0 THOUSAND/ΜL (ref 0–0.61)
EOSINOPHIL NFR BLD AUTO: 0 % (ref 0–6)
ERYTHROCYTE [DISTWIDTH] IN BLOOD BY AUTOMATED COUNT: 18.2 % (ref 11.6–15.1)
GFR SERPL CREATININE-BSD FRML MDRD: 16 ML/MIN/1.73SQ M
GLUCOSE SERPL-MCNC: 188 MG/DL (ref 65–140)
GLUCOSE SERPL-MCNC: 222 MG/DL (ref 65–140)
GLUCOSE SERPL-MCNC: 239 MG/DL (ref 65–140)
GLUCOSE SERPL-MCNC: 242 MG/DL (ref 65–140)
GLUCOSE SERPL-MCNC: 258 MG/DL (ref 65–140)
HCT VFR BLD AUTO: 26 % (ref 36.5–49.3)
HGB BLD-MCNC: 8.2 G/DL (ref 12–17)
IMM GRANULOCYTES # BLD AUTO: 0.06 THOUSAND/UL (ref 0–0.2)
IMM GRANULOCYTES NFR BLD AUTO: 2 % (ref 0–2)
INR PPP: 2.11 (ref 0.84–1.19)
LYMPHOCYTES # BLD AUTO: 0.27 THOUSANDS/ΜL (ref 0.6–4.47)
LYMPHOCYTES NFR BLD AUTO: 9 % (ref 14–44)
MCH RBC QN AUTO: 28.1 PG (ref 26.8–34.3)
MCHC RBC AUTO-ENTMCNC: 31.5 G/DL (ref 31.4–37.4)
MCV RBC AUTO: 89 FL (ref 82–98)
MONOCYTES # BLD AUTO: 0.17 THOUSAND/ΜL (ref 0.17–1.22)
MONOCYTES NFR BLD AUTO: 6 % (ref 4–12)
NEUTROPHILS # BLD AUTO: 2.42 THOUSANDS/ΜL (ref 1.85–7.62)
NEUTS SEG NFR BLD AUTO: 83 % (ref 43–75)
NRBC BLD AUTO-RTO: 0 /100 WBCS
PHOSPHATE SERPL-MCNC: 5.5 MG/DL (ref 2.3–4.1)
PLATELET # BLD AUTO: 104 THOUSANDS/UL (ref 149–390)
PMV BLD AUTO: 10.4 FL (ref 8.9–12.7)
POTASSIUM SERPL-SCNC: 4.1 MMOL/L (ref 3.5–5.3)
PROTHROMBIN TIME: 23.4 SECONDS (ref 11.6–14.5)
RBC # BLD AUTO: 2.92 MILLION/UL (ref 3.88–5.62)
SODIUM SERPL-SCNC: 131 MMOL/L (ref 136–145)
WBC # BLD AUTO: 2.92 THOUSAND/UL (ref 4.31–10.16)

## 2021-06-22 PROCEDURE — 94760 N-INVAS EAR/PLS OXIMETRY 1: CPT

## 2021-06-22 PROCEDURE — 84100 ASSAY OF PHOSPHORUS: CPT | Performed by: INTERNAL MEDICINE

## 2021-06-22 PROCEDURE — 82948 REAGENT STRIP/BLOOD GLUCOSE: CPT

## 2021-06-22 PROCEDURE — 99232 SBSQ HOSP IP/OBS MODERATE 35: CPT | Performed by: INTERNAL MEDICINE

## 2021-06-22 PROCEDURE — 80048 BASIC METABOLIC PNL TOTAL CA: CPT | Performed by: INTERNAL MEDICINE

## 2021-06-22 PROCEDURE — 85610 PROTHROMBIN TIME: CPT | Performed by: INTERNAL MEDICINE

## 2021-06-22 PROCEDURE — 99232 SBSQ HOSP IP/OBS MODERATE 35: CPT | Performed by: NURSE PRACTITIONER

## 2021-06-22 PROCEDURE — 85025 COMPLETE CBC W/AUTO DIFF WBC: CPT | Performed by: INTERNAL MEDICINE

## 2021-06-22 RX ORDER — SODIUM CHLORIDE 9 MG/ML
75 INJECTION, SOLUTION INTRAVENOUS CONTINUOUS
Status: DISCONTINUED | OUTPATIENT
Start: 2021-06-22 | End: 2021-06-23

## 2021-06-22 RX ADMIN — ALBUMIN (HUMAN) 25 G: 0.25 INJECTION, SOLUTION INTRAVENOUS at 17:11

## 2021-06-22 RX ADMIN — SODIUM CHLORIDE 75 ML/HR: 0.9 INJECTION, SOLUTION INTRAVENOUS at 10:18

## 2021-06-22 RX ADMIN — MIDODRINE HYDROCHLORIDE 10 MG: 5 TABLET ORAL at 08:15

## 2021-06-22 RX ADMIN — HYDROCORTISONE SODIUM SUCCINATE 50 MG: 100 INJECTION, POWDER, FOR SOLUTION INTRAMUSCULAR; INTRAVENOUS at 13:59

## 2021-06-22 RX ADMIN — HYDROCORTISONE SODIUM SUCCINATE 50 MG: 100 INJECTION, POWDER, FOR SOLUTION INTRAMUSCULAR; INTRAVENOUS at 05:03

## 2021-06-22 RX ADMIN — MIDODRINE HYDROCHLORIDE 10 MG: 5 TABLET ORAL at 11:43

## 2021-06-22 RX ADMIN — APIXABAN 5 MG: 5 TABLET, FILM COATED ORAL at 18:29

## 2021-06-22 RX ADMIN — Medication 250 MG: at 08:14

## 2021-06-22 RX ADMIN — MIRTAZAPINE 15 MG: 15 TABLET, FILM COATED ORAL at 08:15

## 2021-06-22 RX ADMIN — INSULIN LISPRO 2 UNITS: 100 INJECTION, SOLUTION INTRAVENOUS; SUBCUTANEOUS at 11:43

## 2021-06-22 RX ADMIN — PANTOPRAZOLE SODIUM 40 MG: 40 TABLET, DELAYED RELEASE ORAL at 05:03

## 2021-06-22 RX ADMIN — Medication 1000 UNITS: at 08:15

## 2021-06-22 RX ADMIN — PRAVASTATIN SODIUM 20 MG: 20 TABLET ORAL at 16:40

## 2021-06-22 RX ADMIN — GUAIFENESIN 600 MG: 600 TABLET, EXTENDED RELEASE ORAL at 08:15

## 2021-06-22 RX ADMIN — CYANOCOBALAMIN TAB 500 MCG 1000 MCG: 500 TAB at 08:14

## 2021-06-22 RX ADMIN — SODIUM BICARBONATE 100 ML/HR: 84 INJECTION, SOLUTION INTRAVENOUS at 03:37

## 2021-06-22 RX ADMIN — HYDROCORTISONE SODIUM SUCCINATE 50 MG: 100 INJECTION, POWDER, FOR SOLUTION INTRAMUSCULAR; INTRAVENOUS at 21:54

## 2021-06-22 RX ADMIN — SODIUM CHLORIDE 75 ML/HR: 0.9 INJECTION, SOLUTION INTRAVENOUS at 23:22

## 2021-06-22 RX ADMIN — ALBUMIN (HUMAN) 25 G: 0.25 INJECTION, SOLUTION INTRAVENOUS at 01:08

## 2021-06-22 RX ADMIN — CALCIUM CARBONATE 500 MG (1,250 MG)-VITAMIN D3 200 UNIT TABLET 1 TABLET: at 08:14

## 2021-06-22 RX ADMIN — Medication 250 MG: at 17:11

## 2021-06-22 RX ADMIN — INSULIN LISPRO 3 UNITS: 100 INJECTION, SOLUTION INTRAVENOUS; SUBCUTANEOUS at 16:41

## 2021-06-22 RX ADMIN — INSULIN LISPRO 3 UNITS: 100 INJECTION, SOLUTION INTRAVENOUS; SUBCUTANEOUS at 08:15

## 2021-06-22 RX ADMIN — ALBUMIN (HUMAN) 25 G: 0.25 INJECTION, SOLUTION INTRAVENOUS at 10:18

## 2021-06-22 RX ADMIN — GUAIFENESIN 600 MG: 600 TABLET, EXTENDED RELEASE ORAL at 20:28

## 2021-06-22 RX ADMIN — MIDODRINE HYDROCHLORIDE 10 MG: 5 TABLET ORAL at 16:41

## 2021-06-22 RX ADMIN — FOLIC ACID 1000 MCG: 1 TABLET ORAL at 08:14

## 2021-06-22 NOTE — ASSESSMENT & PLAN NOTE
IR paracentesis on 6/14/2021 with removal of 2 L clear yellow ascites  IR paracentesis on 6/18/2021 with removal of 3 9 L of clear yellow ascites  Fluid cytology on 6/14 positive for malignancy, compatible with lung primary  Fluid culture shows no growth   Diuretics not indicated for malignant ascites per discussion with GI  Daily weight and I&Os  Appreciate oncology input; indicated that patient follows with Dr Minda Osler, outpatient, recommend stabilizing patient and have him follow up outpatient once medically stable  Patient aware of malignant ascites  Patient would like to discuss different treatment options with oncology  He is not ready for hospice at this time but would transition if all other treatment options were exhausted     Patient underwent repeat paracentesis on 06/21 with removal of 3800 mL of cloudy yellow fluid  Patient tolerated procedure well

## 2021-06-22 NOTE — PLAN OF CARE
Problem: RESPIRATORY - ADULT  Goal: Achieves optimal ventilation and oxygenation  Description: INTERVENTIONS:  - Assess for changes in respiratory status  - Assess for changes in mentation and behavior  - Position to facilitate oxygenation and minimize respiratory effort  - Oxygen administered by appropriate delivery if ordered  - Initiate smoking cessation education as indicated  - Encourage broncho-pulmonary hygiene including cough, deep breathe, Incentive Spirometry  - Assess the need for suctioning and aspirate as needed  - Assess and instruct to report SOB or any respiratory difficulty  - Respiratory Therapy support as indicated  Outcome: Progressing     Problem: Potential for Falls  Goal: Patient will remain free of falls  Description: INTERVENTIONS:  - Educate patient/family on patient safety including physical limitations  - Instruct patient to call for assistance with activity   - Consult OT/PT to assist with strengthening/mobility   - Keep Call bell within reach  - Keep bed low and locked with side rails adjusted as appropriate  - Keep care items and personal belongings within reach  - Initiate and maintain comfort rounds  - Make Fall Risk Sign visible to staff  - Offer Toileting every 2 Hours, in advance of need  - Initiate/Maintain bed/chair alarm  - Obtain necessary fall risk management equipment: chair alarm  - Apply yellow socks and bracelet for high fall risk patients  - Consider moving patient to room near nurses station  Outcome: Progressing     Problem: METABOLIC, FLUID AND ELECTROLYTES - ADULT  Goal: Electrolytes maintained within normal limits  Description: INTERVENTIONS:  - Monitor labs and assess patient for signs and symptoms of electrolyte imbalances  - Administer electrolyte replacement as ordered  - Monitor response to electrolyte replacements, including repeat lab results as appropriate  - Instruct patient on fluid and nutrition as appropriate  Outcome: Progressing

## 2021-06-22 NOTE — ASSESSMENT & PLAN NOTE
History of DVT and PE  On Eliquis as outpatient  No acute PE on CTA  Continue patient on Eliquis 5 mg BID  Will hold a m   Dose on 06/23 as patient may require insertion temporary dialysis catheter as per discussion with Nephrology

## 2021-06-22 NOTE — ASSESSMENT & PLAN NOTE
Patient reports having trouble swallowing solid food, thus not eating much  Denies nausea, vomiting, chest pain  Speech swallow eval was limited due to prolonged retching during assessment  Consulted GI, appreciate input    Underwent EGD: mild gastritis, gastric polyp   Continue Protonix 40 mg daily   Continue mechanical soft diet, nutritional supplements ; tolerating well  Follow-up biopsies

## 2021-06-22 NOTE — ASSESSMENT & PLAN NOTE
Combination of SIADH and pre renal   Na 132 -> 130 -> 130 -> 136 ->128-> 130->131  Serum osmo low at 278  Oral intake improving   Nephrology following, appreciate input  S/p Albumin infusions   S/p  mL bolus on 6/20  Now on Isolyte at 125 mL/hr   Encourage oral intake and supplements   Monitor BMP

## 2021-06-22 NOTE — ASSESSMENT & PLAN NOTE
Lab Results   Component Value Date    HGBA1C 6 4 (H) 06/16/2021       Recent Labs     06/21/21  2102 06/22/21  0708 06/22/21  1142 06/22/21  1631   POCGLU 243* 239* 222* 242*       Blood Sugar Average: Last 72 hrs:  (P) 151 25   Well controlled type 2 diabetes  Continue patient on low-dose sliding scale insulin with Accu-Chek QID

## 2021-06-22 NOTE — PROGRESS NOTES
NEPHROLOGY PROGRESS NOTE   Marshall Christina 77 y o  male MRN: [id number]  Unit/Bed#: [address] [place] [address] Encounter: [id number]    ASSESSMENT & PLAN:   77 y o  male  with history of non-small cell lung cancer status post VATS, diabetes mellitus, hypertension ho was admitted to [address] on 6/14/21 after being transferred from [facility] where he presented with SOB  He was transferred to [address] [place] [address] after he was found to be hypotensive at [facility]   Nephrology consulted for acute kidney injury  Acute kidney injury, POA  -Baseline creatinine:  0 9-1 2 mg/dl, reviewed baseline creatinine from Care everywhere, creatinine was 1 2 in December 2020  -Admission creatinine:  1 59 mg/dl  - Work up:   · UA with microscopy:  0-1 RBC per high-power field, 10-20 RBC per high-power field, no protein  · Imaging:  Renal ultrasound no hydronephrosis, left kidney with upper pole cyst 2 9 cm in size along with scarring  -Etiology:  Acute kidney injury likely due to ATN in the setting of hypotension  also have a component of contrast nephropathy as patient received CTA with contrast on 06/13 but renal function started worsening since 06/19 which is slightly late onset but still possible   [facility] Course:  Renal function continue to worsen with peak creatinine 3 8 on 06/21 and had worsening acidosis on 06/21 after which was placed on bicarb drip and received medical treatment for hyperkalemia on 06/21  Renal function improved to creatinine 3 5 on 6/22, acidosis improving    -Plan:   ·  Renal function improving to creatinine 3 5, urine output slightly better last 24 hours, patient does not have any uremic symptoms  Acidosis improving and hyperkalemia resolved  No indication for renal replacement therapy at this time  · Switched IV fluid to normal saline from bicarb drip    Will continue normal saline at 75 mL/hour  · Discussed regarding goals of care with the patient in the setting of metastatic malignancy, patient wants everything done as per my discussion with him  · discussed about risk and benefit with the patient regarding dialysis, patient signed consent  which was placed in the chart on 06/21  · Avoid nephrotoxins and dose all medications per EGFR  · Avoid hypotension  Continue midodrine and IV albumin   · Recommend giving NPO for possible dialysis tomorrow if renal function worsens again                            BP/hypotension  -blood pressure is still on lower side  -echocardiogram showed ejection fraction of 55-60% with indeterminate diastolic function  -continue midodrine 10 mg t i d and on IV albumin 25 g q 8 hours     -Continue IV fluid, avoid hypotension if blood pressure drops further and if MAP< 65 ,  May need to consider vasopressors  -continue hydrocortisone per primary team as a random cortisol level was low at 9 2 on 06/20  -acetic fluid was positive for wbc's -3387 per g stain negative for any growth  Antibiotics per primary team- completed antibiotics for possible pneumonia    Hyperkalemia:  Resolved with medical treatment and correction of acidosis with bicarb drip  Continue to monitor    Hyponatremia:  Likely due to free water intake in the setting of acute kidney injury with poor free water restriction, -continue fluid restriction 1800 mL per day  -also TSH was elevated indicating hypothyroidism but free T4 within normal limit  -sodium level slightly improving to 131, continue to monitor    Hyperphosphatemia:  Due to acute kidney injury with phosphorus level trending up, continue to monitor  Metabolic acidosis:   -lactic acid was within normal limit at 0 9, likely metabolic acidosis in the setting of acute kidney injury  -bicarb level improved to 26 with bicarbonate drip given on 06/21  Will stop bicarb drip and start on normal saline as mentioned above    Continue to monitor bicarb level    Anemia:  Hemoglobin 8 2, continue to monitor per primary team    Hypocalcemia:  Calcium level now improving and corrected calcium within normal range  Vitamin-D level within normal range at 49 9  Ascites status post IR paracentesis on 06/14 and 6/18 and 6/21     -Ascitic fluid positive for malignancy compatible with lung as primary   -last paracentesis was on 06/21 for 3 8 L  Others:  Right pleural effusion/malignant ascites/non-small cell lung cancer followed with Dr Delia Lawson    Discussed with primary team advanced practitioner      SUBJECTIVE:  No chest pain, no shortness of breath   patient feeling much better  status post paracentesis on 06/21    OBJECTIVE:  Current Weight: Weight - Scale: 94 9 kg (209 lb 3 5 oz)  Vitals:    06/22/21 0715   BP: 104/64   Pulse: 64   Resp: 21   Temp:    SpO2: 95%       Intake/Output Summary (Last 24 hours) at 6/22/2021 0924  Last data filed at 6/22/2021 5693  Gross per 24 hour   Intake --   Output 4425 ml   Net -4425 ml       Physical Exam  General:  Ill looking, awake  Eyes: Conjunctivae pink,  Sclera anicteric  ENT: lips and mucous membranes moist  Neck: supple   Chest: Clear to Auscultation both lungs,  no crackles, ronchus or wheezing  CVS: S1 & S2 present, normal rate, regular rhythm, no murmur  Abdomen: soft, non-tender, non-distended, Bowel sounds normoactive  Extremities: trace edema of both legs    Using compression stockings  Skin: no rash  Neuro: awake, alert, oriented  Psych: Mood and affect appropriate       Medications:    Current Facility-Administered Medications:     albumin human (FLEXBUMIN) 25 % injection 25 g, 25 g, Intravenous, Q8H, Mark Mixon MD, Last Rate: 0 mL/hr at 06/21/21 1157, 25 g at 06/22/21 0108    albuterol inhalation solution 2 5 mg, 2 5 mg, Nebulization, Q6H PRN, NERISSA Wallis    calcium carbonate-vitamin D (OSCAL-D) 500 mg-200 units per tablet 1 tablet, 1 tablet, Oral, Daily With Breakfast, Mayda Gamboa MD, 1 tablet at 06/22/21 0814    cholecalciferol (VITAMIN D3) tablet 1,000 Units, 1,000 Units, Oral, Daily, Mayda Gamboa MD, 1,000 Units at 06/22/21 0815    cyanocobalamin (VITAMIN B-12) tablet 1,000 mcg, 1,000 mcg, Oral, Daily, Ivan Finch MD, 1,000 mcg at 66/58/25 5601    folic acid (FOLVITE) tablet 1,000 mcg, 1,000 mcg, Oral, Daily, Ivan Finch MD, 1,000 mcg at 06/22/21 0814    furosemide (LASIX) injection 40 mg, 40 mg, Intravenous, Once, Kvng Johnson MD    guaiFENesin [facility]) 12 hr tablet 600 mg, 600 mg, Oral, Q12H [address], NERISSA Guevara, 600 mg at 06/22/21 0815    hydrocortisone sodium succinate (PF) (Solu-CORTEF) injection 50 mg, 50 mg, Intravenous, Q8H [address], James Pearce MD, 50 mg at 06/22/21 0503    insulin lispro (HumaLOG) 100 units/mL subcutaneous injection 1-6 Units, 1-6 Units, Subcutaneous, TID AC, 3 Units at 06/22/21 0815 **AND** Fingerstick Glucose (POCT), , , TID AC, Ivan Finch MD    melatonin tablet 3 mg, 3 mg, Oral, HS PRN, Ivan Finch MD, 3 mg at 06/15/21 2140    midodrine (PROAMATINE) tablet 10 mg, 10 mg, Oral, TID AC, James Pearce MD, 10 mg at 06/22/21 0815    mirtazapine (REMERON) tablet 15 mg, 15 mg, Oral, Daily, Ivan Finch MD, 15 mg at 06/22/21 0815    ondansetron (ZOFRAN) injection 4 mg, 4 mg, Intravenous, Q6H PRN, NERISSA Guevara    oxyCODONE-acetaminophen (PERCOCET) 5-325 mg per tablet 1 tablet, 1 tablet, Oral, Q6H PRN, Ivan Finch MD, 1 tablet at 06/16/21 2028    pantoprazole (PROTONIX) EC tablet 40 mg, 40 mg, Oral, QAM, Ivan Finch MD, 40 mg at 06/22/21 0503    polyethylene glycol (MIRALAX) packet 17 g, 17 g, Oral, Daily, [employer], CRNP, 17 g at 06/21/21 0817    pravastatin (PRAVACHOL) tablet 20 mg, 20 mg, Oral, Daily With Randall Campos MD, 20 mg at 06/21/21 1644    saccharomyces boulardii (FLORASTOR) capsule 250 mg, 250 mg, Oral, BID, Ivan Finch MD, 250 mg at 06/22/21 0814    sodium bicarbonate 150 mEq in dextrose 5 % 1,000 mL infusion, 100 mL/hr, Intravenous, Continuous, Kvng Johnson MD, Last Rate: 100 mL/hr at 06/22/21 0337, 100 mL/hr at 06/22/21 0337    Invasive Devices:        Lab Results:   Results from last 7 days   Lab Units 06/22/21  0459 06/21/21  1529 06/21/21  0512 06/20/21  0623 06/19/21  0508 06/17/21  0954   WBC Thousand/uL 2 92*  --  3 11* 4 47 3 79* 6 50   HEMOGLOBIN g/dL 8 2*  --  8 8* 9 0* 9 6* 10 5*   HEMATOCRIT % 26 0*  --  29 1* 29 5* 31 3* 34 2*   PLATELETS Thousands/uL 104*  --  113* 140* 139* 237   POTASSIUM mmol/L 4 1 4 4 5 6* 3 8 4 1 4 7   CHLORIDE mmol/L 94* 94* 96* 103 97* 99*   CO2 mmol/L 26 21 17* 18* 20* 20*   BUN mg/dL 55* 52* 47* 37* 34* 29*   CREATININE mg/dL 3 58* 3 84* 3 51* 2 51* 1 78* 1 60*   CALCIUM mg/dL 7 6* 7 8* 7 5* 6 7* 7 7* 7 9*   MAGNESIUM mg/dL  --   --  2 2 1 8 2 1 1 8   PHOSPHORUS mg/dL 5 5*  --  5 4* 4 4*  --   --    ALK PHOS U/L  --   --  28* 27*  --  47   ALT U/L  --   --  14 15  --  16   AST U/L  --   --  14 10  --  18       Previous work up:  Renal ultrasound pending    CTA chest, no PE, increased opacification right lung base suggesting worsening atelectasis or pneumonia with finding of complex right-sided loculated pleural effusion, finding of 4 mm lung nodule in the lingula suspicious for metastatic disease  Also finding of malignant mediastinal adenopathy and  Ascites    Portions of the record may have been created with voice recognition software  Occasional wrong word or "sound a like" substitutions may have occurred due to the inherent limitations of voice recognition software  Read the chart carefully and recognize, using context, where substitutions have occurred  If you have any questions, please contact the dictating provider

## 2021-06-22 NOTE — SPEECH THERAPY NOTE
Pt NPO for procedure (had planned 1 f/u session to Swallowing Evaluation)  Will f/u within 24-48 hrs as able and indicated

## 2021-06-22 NOTE — PROGRESS NOTES
Sadia 46  Progress Note Manan Orf 1945, 77 y o  male MRN: [id number]  Unit/Bed#: [address] [facility] Encounter: [id number]  Primary Care Provider: Magalys Fish MD   Date and time admitted to hospital: 6/14/2021 10:31 AM    * Ascites  Assessment & Plan  IR paracentesis on 6/14/2021 with removal of 2 L clear yellow ascites  IR paracentesis on 6/18/2021 with removal of 3 9 L of clear yellow ascites  Fluid cytology on 6/14 positive for malignancy, compatible with lung primary  Fluid culture shows no growth   Diuretics not indicated for malignant ascites per discussion with GI  Daily weight and I&Os  Appreciate oncology input; indicated that patient follows with Dr Kandice Morales, outpatient, recommend stabilizing patient and have him follow up outpatient once medically stable  Patient aware of malignant ascites  Patient would like to discuss different treatment options with oncology  He is not ready for hospice at this time but would transition if all other treatment options were exhausted  Patient underwent repeat paracentesis on 06/21 with removal of 3800 mL of cloudy yellow fluid  Patient tolerated procedure well    Acute renal failure superimposed on stage 3 chronic kidney disease [facility])  Assessment & Plan  Lab Results   Component Value Date    EGFR 16 06/22/2021    EGFR 15 06/21/2021    EGFR 16 06/21/2021    CREATININE 3 58 (H) 06/22/2021    CREATININE 3 84 (H) 06/21/2021    CREATININE 3 51 (H) 06/21/2021     Baseline creatinine appears to be around 0 9-1 2  Creatinine 1 55 on admission    Patient is on Lasix 40 mg daily at home, currently on hold due to hypotension   Cr today: 2 51-> 3 84-> 3 58  Nephrology following  S/p IV Albumin  S/p  mL bolus 6/20am  Normal saline at 75 cc  Check renal US; no hydronephrosis benign-appearing simple cyst noted, ascites seen within the pelvis as per radiology report   Avoid nephrotoxins and hypotension  Discussed with Nephrology, make patient NPO after midnight in the event of worsening creatinine, follow-up on BMP  Will hold a m  Eliquis     Carcinoma of lung, right [facility])  Assessment & Plan  History of non-small-cell lung cancer diagnosed in December 2019  Patient underwent VATS with pleural decortication and pleurodesis in 7/2020  Patient was on Brigatinib, now on chemotherapy every 3 weeks and sees Dr Mariela Ceron every Monday per patient  Not sure exactly of treatment plan  CT suggested possible advancement of metastatic disease with involvement of the lingula  Paracentesis cytology (+) malignancy with lung primary - patient and family made aware      Consulted oncology, appreciate input  Per oncology, "Recent paracentesis demonstrated TTF1 positive adenocarcinoma in the ascitic fluid  If the ascitic fluid reaccumulates persistently/significantly, patient will need to be evaluated for catheter placement  Additional information is needed  Progressive ascites would be consistent with disease progression - possibly patient's present regimen needs to be amended  Medical oncology will reach out to Dr Mariela Ceron for additional information including the most recent treatment plan  There are other options "    At this time, patient's prognosis is very guarded  His hypotension and CONCEPCIÓN are an issue  Previous provider indicated that she had a goals of care conversation with patient and his wife and they discussed possibility of hospice  On morning of 6/21 this provider spoke at length with the patient regarding his goals of care, indicated that he is aware that he has multiple issues including acute kidney injury, cancer cells in the ascitic fluid  He indicated that he wishes to continue to pursue medical treatment including dialysis for acute kidney injury, this was discussed by nephrology with the patient    Did discuss this with both patient and wife at the bedside, wife is in agreement with 's wishes to continue to pursue medical therapy  Repeat BMP improved to 3 58; discussed with Nephrology, continue to monitor and as long as patient continues to trend down no need for renal replacement therapy  As per discussion with Nephrology will make NPO in the event that creatinine worsens and patient will require placement of dialysis line/dialysis  Will hold a m  dose of Eliquis    Hypotension  Assessment & Plan  BP remains low despite IV Albumin  Lactic Acid normal   Nephrology following, appreciate input  S/p  mL bolus 6/20 am  Normal saline at 75 as per Nephrology  Continue Midodrine 10 mg TID  Trial YOLANDA stockings     Patient noted to have mild improvement in BP  Continue to monitor closely    Pleural effusion  Assessment & Plan  Loculated right-sided pleural effusion, IR evaluated the patient and not enough fluid for drainage  Continue supportive care  Consider repeat CXR for any shortness of breath or hypoxia  History of pulmonary embolism  Assessment & Plan  History of DVT and PE  On Eliquis as outpatient  No acute PE on CTA  Continue patient on Eliquis 5 mg BID  Will hold a m   Dose on 06/23 as patient may require insertion temporary dialysis catheter as per discussion with Nephrology      AV block  Assessment & Plan  Status post pacemaker insertion  Continue to hold Atenolol 2/2 hypotension   Optimize electrolytes  BMP in am    Type 2 diabetes mellitus [facility])  Assessment & Plan  Lab Results   Component Value Date    HGBA1C 6 4 (H) 06/16/2021       Recent Labs     06/21/21  2102 06/22/21  0708 06/22/21  1142 06/22/21  1631   POCGLU 243* 239* 222* 242*       Blood Sugar Average: Last 72 hrs:  (P) 151 25   Well controlled type 2 diabetes  Continue patient on low-dose sliding scale insulin with Accu-Chek QID    Acute respiratory failure with hypoxia (HCC)  Assessment & Plan  Multifactorial, most likely secondary to significant ascites causing hypoventilation, right-sided loculated pleural effusion, and lung cancer along with suspected pneumonia  Initially patient was hypoxic requiring 2-4 L of oxygen via nasal cannula which worsened to 5 L and hence required to be in the ICU  Patient now on room air since 6/17/21, satting 92-98%  Will titrate off oxygen as tolerated to maintain sats > 92%  Patient possibly desaturates on exertion however has not exerted too much  Patient tolerated activity in room ambulating to bathroom  Reports he feels his breathing is improved    Hyponatremia  Assessment & Plan  Combination of SIADH and pre renal   Na 132 -> 130 -> 130 -> 136 ->128-> 130->131  Serum osmo low at 278  Oral intake improving   Nephrology following, appreciate input  S/p Albumin infusions   S/p  mL bolus on 6/20  Now on Isolyte at 125 mL/hr   Encourage oral intake and supplements   Monitor BMP    Sepsis ([facility] [address] )  Assessment & Plan  CTA showed - Increasing opacification in the right lung base suggesting worsening atelectasis or pneumonia  Continue Levaquin for 7-day course of therapy, day #6 (renally dosed at 250 mg daily)   Patient afebrile, denies cough SOB  Blood cultures negative to date  On room air   Procalcitonin mild elevated to 0 36 -> 0 29 -> 0 3 but likely unreliable given hst of Ca    Results from last 7 days   Lab Units 06/21/21  0512 06/20/21  1031 06/20/21  0623 06/19/21  0508 06/18/21  0531   LACTIC ACID mmol/L  --  0 9  --   --   --    PROCALCITONIN ng/ml 0 46*  --  0 46* 0 30* 0 29*     Results from last 7 days   Lab Units 06/22/21  0459 06/21/21  0512 06/20/21  0623 06/19/21  0508 06/17/21  0954   WBC Thousand/uL 2 92* 3 11* 4 47 3 79* 6 50       Ambulatory dysfunction  Assessment & Plan  Patient would benefit from short-term rehab  PT OT evaluated the patient and recommended short-term rehab  Case management following  Patient has a bed available at [address]  Dysphagia  Assessment & Plan  Patient reports having trouble swallowing solid food, thus not eating much    Denies nausea, vomiting, chest pain    Speech swallow eval was limited due to prolonged retching during assessment  Consulted GI, appreciate input  Underwent EGD: mild gastritis, gastric polyp   Continue Protonix 40 mg daily   Continue mechanical soft diet, nutritional supplements ; tolerating well  Follow-up biopsies     Hypocalcemia  Assessment & Plan  Ca 7 8  S/p Calcium 2gm IV x1  am    Vit D level; normal   CMP in am           VTE Pharmacologic Prophylaxis:   Pharmacologic: Apixaban (Eliquis)  Mechanical VTE Prophylaxis in Place: Yes    Patient Centered Rounds: I have performed bedside rounds with nursing staff today  Discussions with Specialists or Other Care Team Provider:  Nephrology, attending and Case Management    Education and Discussions with Family / Patient:  I spoke with the patient at the bedside, I have answered all questions to the best of my abilities  Family provided update    Time Spent for Care: 30 minutes  More than 50% of total time spent on counseling and coordination of care as described above  Current Length of Stay: 8 day(s)    Current Patient Status: Inpatient   Certification Statement: The patient will continue to require additional inpatient hospital stay due to Repeat labs, monitoring kidney function, possible RRT as per discussion with Nephrology    Discharge Plan:  Not stable for discharge today    Code Status: Level 2 - DNAR: but accepts endotracheal intubation      Subjective:   Patient seen sitting up in chair resting comfortably  Reports that he slept well, feels that his breathing is better  Feels that his abdomen is much less distended and he feels a lot more comfortable  Denies any headache, dizziness, shortness of breath, chest pain, nausea, vomiting or diarrhea      Objective:     Vitals:   Temp (24hrs), Av 7 °F (35 9 °C), Min:96 1 °F (35 6 °C), Max:97 2 °F (36 2 °C)    Temp:  [96 1 °F (35 6 °C)-97 2 °F (36 2 °C)] 97 2 °F (36 2 °C)  HR:  [] 88  Resp:  [18-21] 20  BP: ()/(54-74) 103/74  SpO2:  [92 %-100 %] 97 %  Body mass index is 32 77 kg/m²  Input and Output Summary (last 24 hours): Intake/Output Summary (Last 24 hours) at 6/22/2021 1911  Last data filed at 6/22/2021 1836  Gross per 24 hour   Intake 475 ml   Output 575 ml   Net -100 ml       Physical Exam:     Physical Exam  Vitals and nursing note reviewed  Constitutional:       General: He is not in acute distress  HENT:      Head: Normocephalic  Nose: Nose normal       Mouth/Throat:      Mouth: Mucous membranes are moist    Eyes:      Extraocular Movements: Extraocular movements intact  Pupils: Pupils are equal, round, and reactive to light  Cardiovascular:      Rate and Rhythm: Normal rate and regular rhythm  Pulses: Normal pulses  Heart sounds: Murmur heard  Pulmonary:      Effort: Pulmonary effort is normal       Comments: Diminished at bases  Abdominal:      General: Bowel sounds are normal  There is distension  Palpations: Abdomen is soft  Tenderness: There is no abdominal tenderness  Genitourinary:     Comments: Voiding spontaneously  Musculoskeletal:         General: No swelling  Cervical back: Normal range of motion  Comments: Ambulates with walker   Skin:     General: Skin is warm and dry  Capillary Refill: Capillary refill takes less than 2 seconds  Coloration: Skin is pale  Neurological:      Mental Status: He is alert and oriented to person, place, and time  Psychiatric:         Mood and Affect: Mood normal          Behavior: Behavior normal          Thought Content:  Thought content normal          Judgment: Judgment normal          Additional Data:     Labs:    Results from last 7 days   Lab Units 06/22/21  0459   WBC Thousand/uL 2 92*   HEMOGLOBIN g/dL 8 2*   HEMATOCRIT % 26 0*   PLATELETS Thousands/uL 104*   NEUTROS PCT % 83*   LYMPHS PCT % 9*   MONOS PCT % 6   EOS PCT % 0     Results from last 7 days   Lab Units 06/22/21  0453 06/21/21  0512   POTASSIUM mmol/L 4 1 5 6*   CHLORIDE mmol/L 94* 96*   CO2 mmol/L 26 17*   BUN mg/dL 55* 47*   CREATININE mg/dL 3 58* 3 51*   CALCIUM mg/dL 7 6* 7 5*   ALK PHOS U/L  --  28*   ALT U/L  --  14   AST U/L  --  14     Results from last 7 days   Lab Units 06/22/21  0459   INR  2 11*       * I Have Reviewed All Lab Data Listed Above  * Additional Pertinent Lab Tests Reviewed:  All Labs Within Last 24 Hours Reviewed    Imaging:  Ultrasound kidney and bladder    Imaging Reports Reviewed Today Include:   Imaging Personally Reviewed by Myself Includes:  None    Recent Cultures (last 7 days):           Last 24 Hours Medication List:   Current Facility-Administered Medications   Medication Dose Route Frequency Provider Last Rate    albumin human  25 g Intravenous Q8H Rupesh Bonds MD 0 g (06/21/21 1157)    albuterol  2 5 mg Nebulization Q6H PRN NERISSA Mazariegos      calcium carbonate-vitamin D  1 tablet Oral Daily With Breakfast Spencer Conn MD      cholecalciferol  1,000 Units Oral Daily Spencer Conn MD      cyanocobalamin  1,000 mcg Oral Daily Spencer Conn MD      folic acid  4,848 mcg Oral Daily Spencer Conn MD      furosemide  40 mg Intravenous Once Rupesh Bonds MD      guaiFENesin  600 mg Oral Q12H [address] NERISSA Mazariegos      hydrocortisone sodium succinate  50 mg Intravenous [facility] Stella Moore MD      insulin lispro  1-6 Units Subcutaneous TID [facility] Spencer Conn MD      melatonin  3 mg Oral HS PRN Spencer Conn MD      midodrine  10 mg Oral TID [facility] Stella Moore MD      mirtazapine  15 mg Oral Daily Spencer Conn MD      ondansetron  4 mg Intravenous Q6H PRN NERISSA Mazariegos      oxyCODONE-acetaminophen  1 tablet Oral Q6H PRN Spencer Conn MD      pantoprazole  40 mg Oral QAM Spencer Conn MD      polyethylene glycol  17 g Oral Daily Skipper NERISSA Nagy      pravastatin  20 mg Oral Daily With Shaila Ceballos MD      saccharomyces boulardii  250 mg Oral BID Van Muñoz MD      sodium chloride  75 mL/hr Intravenous Continuous Abe Cornelius MD 75 mL/hr (06/22/21 1018)        Today, Patient Was Seen By: NERISSA Garrido    ** Please Note: Dictation voice to text software may have been used in the creation of this document   **

## 2021-06-22 NOTE — TELEPHONE ENCOUNTER
lmom for pt to call back to schedule f/u appt with Dr Steve Mckeon  Will try calling again in 1 week  If he calls back please get him scheduled  Thank you

## 2021-06-22 NOTE — ASSESSMENT & PLAN NOTE
BP remains low despite IV Albumin  Lactic Acid normal   Nephrology following, appreciate input  S/p  mL bolus 6/20 am  Normal saline at 75 as per Nephrology  Continue Midodrine 10 mg TID  Trial YOLANDA stockings     Patient noted to have mild improvement in BP     Continue to monitor closely

## 2021-06-22 NOTE — ASSESSMENT & PLAN NOTE
Patient would benefit from short-term rehab  PT OT evaluated the patient and recommended short-term rehab  Case management following  Patient has a bed available at [address] [place] [address]

## 2021-06-22 NOTE — ASSESSMENT & PLAN NOTE
History of non-small-cell lung cancer diagnosed in December 2019  Patient underwent VATS with pleural decortication and pleurodesis in 7/2020  Patient was on Brigatinib, now on chemotherapy every 3 weeks and sees Dr Jayna Banda every Monday per patient  Not sure exactly of treatment plan  CT suggested possible advancement of metastatic disease with involvement of the lingula  Paracentesis cytology (+) malignancy with lung primary - patient and family made aware      Consulted oncology, appreciate input  Per oncology, "Recent paracentesis demonstrated TTF1 positive adenocarcinoma in the ascitic fluid  If the ascitic fluid reaccumulates persistently/significantly, patient will need to be evaluated for catheter placement  Additional information is needed  Progressive ascites would be consistent with disease progression - possibly patient's present regimen needs to be amended  Medical oncology will reach out to Dr Jayna Banda for additional information including the most recent treatment plan  There are other options "    At this time, patient's prognosis is very guarded  His hypotension and CONCEPCIÓN are an issue  Previous provider indicated that she had a goals of care conversation with patient and his wife and they discussed possibility of hospice  On morning of 6/21 this provider spoke at length with the patient regarding his goals of care, indicated that he is aware that he has multiple issues including acute kidney injury, cancer cells in the ascitic fluid  He indicated that he wishes to continue to pursue medical treatment including dialysis for acute kidney injury, this was discussed by nephrology with the patient    Did discuss this with both patient and wife at the bedside, wife is in agreement with 's wishes to continue to pursue medical therapy  Repeat BMP improved to 3 58; discussed with Nephrology, continue to monitor and as long as patient continues to trend down no need for renal replacement therapy  As per discussion with Nephrology will make NPO in the event that creatinine worsens and patient will require placement of dialysis line/dialysis  Will hold a m  dose of Eliquis

## 2021-06-22 NOTE — ASSESSMENT & PLAN NOTE
Multifactorial, most likely secondary to significant ascites causing hypoventilation, right-sided loculated pleural effusion, and lung cancer along with suspected pneumonia  Initially patient was hypoxic requiring 2-4 L of oxygen via nasal cannula which worsened to 5 L and hence required to be in the ICU  Patient now on room air since 6/17/21, satting 92-98%  Will titrate off oxygen as tolerated to maintain sats > 92%  Patient possibly desaturates on exertion however has not exerted too much    Patient tolerated activity in room ambulating to bathroom  Reports he feels his breathing is improved

## 2021-06-22 NOTE — TELEPHONE ENCOUNTER
----- Message from Liliana Haynes LPN sent at 0/79/3945  8:28 AM EDT -----  Patient aware via [facility]  Educated  Please call patient to schedule f/u appt   Thank you

## 2021-06-22 NOTE — ASSESSMENT & PLAN NOTE
CTA showed - Increasing opacification in the right lung base suggesting worsening atelectasis or pneumonia    Continue Levaquin for 7-day course of therapy, day #6 (renally dosed at 250 mg daily)   Patient afebrile, denies cough SOB  Blood cultures negative to date  On room air   Procalcitonin mild elevated to 0 36 -> 0 29 -> 0 3 but likely unreliable given hst of Ca    Results from last 7 days   Lab Units 06/21/21  0512 06/20/21  1031 06/20/21  0623 06/19/21  0508 06/18/21  0531   LACTIC ACID mmol/L  --  0 9  --   --   --    PROCALCITONIN ng/ml 0 46*  --  0 46* 0 30* 0 29*     Results from last 7 days   Lab Units 06/22/21  0459 06/21/21  0512 06/20/21  0623 06/19/21  0508 06/17/21  0954   WBC Thousand/uL 2 92* 3 11* 4 47 3 79* 6 50

## 2021-06-22 NOTE — ASSESSMENT & PLAN NOTE
Lab Results   Component Value Date    EGFR 16 06/22/2021    EGFR 15 06/21/2021    EGFR 16 06/21/2021    CREATININE 3 58 (H) 06/22/2021    CREATININE 3 84 (H) 06/21/2021    CREATININE 3 51 (H) 06/21/2021     Baseline creatinine appears to be around 0 9-1 2  Creatinine 1 55 on admission  Patient is on Lasix 40 mg daily at home, currently on hold due to hypotension   Cr today: 2 51-> 3 84-> 3 58  Nephrology following  S/p IV Albumin  S/p  mL bolus 6/20am  Normal saline at 75 cc  Check renal US; no hydronephrosis benign-appearing simple cyst noted, ascites seen within the pelvis as per radiology report   Avoid nephrotoxins and hypotension  Discussed with Nephrology, make patient NPO after midnight in the event of worsening creatinine, follow-up on BMP  Will hold kayla Johnson

## 2021-06-23 ENCOUNTER — APPOINTMENT (INPATIENT)
Dept: NON INVASIVE DIAGNOSTICS | Facility: HOSPITAL | Age: 76
DRG: 180 | End: 2021-06-23
Payer: COMMERCIAL

## 2021-06-23 PROBLEM — J96.01 ACUTE RESPIRATORY FAILURE WITH HYPOXIA (HCC): Status: RESOLVED | Noted: 2021-06-13 | Resolved: 2021-06-23

## 2021-06-23 PROBLEM — A41.9 SEPSIS (HCC): Status: RESOLVED | Noted: 2021-06-14 | Resolved: 2021-06-23

## 2021-06-23 LAB
ANION GAP SERPL CALCULATED.3IONS-SCNC: 11 MMOL/L (ref 4–13)
BUN SERPL-MCNC: 56 MG/DL (ref 5–25)
CALCIUM SERPL-MCNC: 7.9 MG/DL (ref 8.3–10.1)
CHLORIDE SERPL-SCNC: 97 MMOL/L (ref 100–108)
CO2 SERPL-SCNC: 26 MMOL/L (ref 21–32)
CREAT SERPL-MCNC: 3.33 MG/DL (ref 0.6–1.3)
ERYTHROCYTE [DISTWIDTH] IN BLOOD BY AUTOMATED COUNT: 18.6 % (ref 11.6–15.1)
GFR SERPL CREATININE-BSD FRML MDRD: 17 ML/MIN/1.73SQ M
GLUCOSE SERPL-MCNC: 190 MG/DL (ref 65–140)
GLUCOSE SERPL-MCNC: 194 MG/DL (ref 65–140)
GLUCOSE SERPL-MCNC: 198 MG/DL (ref 65–140)
GLUCOSE SERPL-MCNC: 209 MG/DL (ref 65–140)
HCT VFR BLD AUTO: 26.8 % (ref 36.5–49.3)
HGB BLD-MCNC: 8.4 G/DL (ref 12–17)
MAGNESIUM SERPL-MCNC: 2 MG/DL (ref 1.6–2.6)
MCH RBC QN AUTO: 27.6 PG (ref 26.8–34.3)
MCHC RBC AUTO-ENTMCNC: 31.3 G/DL (ref 31.4–37.4)
MCV RBC AUTO: 88 FL (ref 82–98)
PHOSPHATE SERPL-MCNC: 5.2 MG/DL (ref 2.3–4.1)
PLATELET # BLD AUTO: 103 THOUSANDS/UL (ref 149–390)
PMV BLD AUTO: 9.7 FL (ref 8.9–12.7)
POTASSIUM SERPL-SCNC: 3.6 MMOL/L (ref 3.5–5.3)
RBC # BLD AUTO: 3.04 MILLION/UL (ref 3.88–5.62)
SODIUM SERPL-SCNC: 134 MMOL/L (ref 136–145)
WBC # BLD AUTO: 3.07 THOUSAND/UL (ref 4.31–10.16)

## 2021-06-23 PROCEDURE — 82948 REAGENT STRIP/BLOOD GLUCOSE: CPT

## 2021-06-23 PROCEDURE — 85027 COMPLETE CBC AUTOMATED: CPT | Performed by: NURSE PRACTITIONER

## 2021-06-23 PROCEDURE — 80048 BASIC METABOLIC PNL TOTAL CA: CPT | Performed by: INTERNAL MEDICINE

## 2021-06-23 PROCEDURE — 99232 SBSQ HOSP IP/OBS MODERATE 35: CPT | Performed by: NURSE PRACTITIONER

## 2021-06-23 PROCEDURE — 99232 SBSQ HOSP IP/OBS MODERATE 35: CPT | Performed by: INTERNAL MEDICINE

## 2021-06-23 PROCEDURE — 83735 ASSAY OF MAGNESIUM: CPT | Performed by: NURSE PRACTITIONER

## 2021-06-23 PROCEDURE — 84100 ASSAY OF PHOSPHORUS: CPT | Performed by: INTERNAL MEDICINE

## 2021-06-23 RX ORDER — FUROSEMIDE 40 MG/1
40 TABLET ORAL DAILY
Status: DISCONTINUED | OUTPATIENT
Start: 2021-06-23 | End: 2021-06-28 | Stop reason: HOSPADM

## 2021-06-23 RX ADMIN — GUAIFENESIN 600 MG: 600 TABLET, EXTENDED RELEASE ORAL at 23:00

## 2021-06-23 RX ADMIN — FUROSEMIDE 40 MG: 40 TABLET ORAL at 10:15

## 2021-06-23 RX ADMIN — INSULIN LISPRO 2 UNITS: 100 INJECTION, SOLUTION INTRAVENOUS; SUBCUTANEOUS at 08:55

## 2021-06-23 RX ADMIN — HYDROCORTISONE SODIUM SUCCINATE 25 MG: 100 INJECTION, POWDER, FOR SOLUTION INTRAMUSCULAR; INTRAVENOUS at 13:15

## 2021-06-23 RX ADMIN — INSULIN LISPRO 2 UNITS: 100 INJECTION, SOLUTION INTRAVENOUS; SUBCUTANEOUS at 11:20

## 2021-06-23 RX ADMIN — MIRTAZAPINE 15 MG: 15 TABLET, FILM COATED ORAL at 08:55

## 2021-06-23 RX ADMIN — Medication 1000 UNITS: at 08:55

## 2021-06-23 RX ADMIN — FOLIC ACID 1000 MCG: 1 TABLET ORAL at 08:55

## 2021-06-23 RX ADMIN — PANTOPRAZOLE SODIUM 40 MG: 40 TABLET, DELAYED RELEASE ORAL at 05:42

## 2021-06-23 RX ADMIN — CALCIUM CARBONATE 500 MG (1,250 MG)-VITAMIN D3 200 UNIT TABLET 1 TABLET: at 08:55

## 2021-06-23 RX ADMIN — CYANOCOBALAMIN TAB 500 MCG 1000 MCG: 500 TAB at 08:55

## 2021-06-23 RX ADMIN — MIDODRINE HYDROCHLORIDE 10 MG: 5 TABLET ORAL at 11:20

## 2021-06-23 RX ADMIN — HYDROCORTISONE SODIUM SUCCINATE 25 MG: 100 INJECTION, POWDER, FOR SOLUTION INTRAMUSCULAR; INTRAVENOUS at 22:59

## 2021-06-23 RX ADMIN — Medication 250 MG: at 08:55

## 2021-06-23 RX ADMIN — INSULIN LISPRO 2 UNITS: 100 INJECTION, SOLUTION INTRAVENOUS; SUBCUTANEOUS at 16:51

## 2021-06-23 RX ADMIN — MIDODRINE HYDROCHLORIDE 10 MG: 5 TABLET ORAL at 06:01

## 2021-06-23 RX ADMIN — GUAIFENESIN 600 MG: 600 TABLET, EXTENDED RELEASE ORAL at 08:55

## 2021-06-23 RX ADMIN — HYDROCORTISONE SODIUM SUCCINATE 50 MG: 100 INJECTION, POWDER, FOR SOLUTION INTRAMUSCULAR; INTRAVENOUS at 05:43

## 2021-06-23 RX ADMIN — ALBUMIN (HUMAN) 25 G: 0.25 INJECTION, SOLUTION INTRAVENOUS at 01:31

## 2021-06-23 RX ADMIN — APIXABAN 5 MG: 5 TABLET, FILM COATED ORAL at 17:16

## 2021-06-23 RX ADMIN — PRAVASTATIN SODIUM 20 MG: 20 TABLET ORAL at 16:51

## 2021-06-23 RX ADMIN — Medication 250 MG: at 17:16

## 2021-06-23 RX ADMIN — MIDODRINE HYDROCHLORIDE 10 MG: 5 TABLET ORAL at 16:51

## 2021-06-23 NOTE — PROGRESS NOTES
Sadia 46  Progress Note Wolf Elise 1945, 77 y o  male MRN: [id number]  Unit/Bed#: 708 [facility] Encounter: [id number]  Primary Care Provider: Mark Frank MD   Date and time admitted to hospital: 6/14/2021 10:31 AM    * Ascites  Assessment & Plan  IR paracentesis on 6/14/2021 with removal of 2 L clear yellow ascites  IR paracentesis on 6/18/2021 with removal of 3 9 L of clear yellow ascites  IR paracentesis on 6/21/2021 with removal of 3 8 L of cloudy yellow ascites  Fluid cytology on 6/14 positive for malignancy, compatible with lung primary  Fluid culture shows no growth - patient is status post 7 day course of Levaquin for possible pneumonia  · Patient received IV albumin following paracentesis  · Appreciate oncology input; indicated that patient follows with Dr Roni Park, outpatient, recommend stabilizing patient and have him follow up outpatient once medically stable  · Patient aware of malignant ascites  Patient would like to discuss different treatment options with oncology  He is not ready for hospice at this time but would transition if all other treatment options were exhausted  · Daily weight and I&Os    Hypotension  Assessment & Plan  BP was persistently in the 80s and 90s  Nephrology following, appreciate input  Cortisol level low at 9 2  · Started stress-dosed steroids 6/20 pm   · Will decrease Hydrocortisone to 25 mg IV q8h  · Per nephrology, stop IVF as patient becoming edematous   · Restart home Lasix, 40 mg daily   · Continue Midodrine 10 mg TID  · YOLANDA stockings   · Monitor closely     Acute renal failure superimposed on stage 3 chronic kidney disease [facility])  Assessment & Plan  Lab Results   Component Value Date    EGFR 17 06/23/2021    EGFR 16 06/22/2021    EGFR 15 06/21/2021    CREATININE 3 33 (H) 06/23/2021    CREATININE 3 58 (H) 06/22/2021    CREATININE 3 84 (H) 06/21/2021     Baseline creatinine appears to be around 0 9-1 2    Creatinine 1 55 on admission  Patient is on Lasix 40 mg daily at home, currently on hold due to hypotension   Cr worsening during admission  Creatinine peaked at 3 84 on 06/21  Likely due to hepatorenal syndrome, hypotension, and contrast dye  Renal US: "No hydronephrosis benign-appearing simple cyst noted, ascites seen within the pelvis "   · Appreciate nephrology input  · S/p aggressive IV albumin  · Stop IVF   · Restart home Lasix, 40 mg daily   · Avoid nephrotoxins and hypotension  · CMP in am    Carcinoma of lung, right [facility])  Assessment & Plan  History of non-small-cell lung cancer diagnosed in December 2019  Patient underwent VATS with pleural decortication and pleurodesis in 7/2020  Patient follows Dr Vanessa Mejia  Not sure exactly of treatment plan  CT suggested possible advancement of metastatic disease with involvement of the lingula  Paracentesis cytology (+) malignancy with lung primary - patient and family made aware    Consulted oncology, appreciate input  Per oncology, "Recent paracentesis demonstrated TTF1 positive adenocarcinoma in the ascitic fluid  If the ascitic fluid reaccumulates persistently/significantly, patient will need to be evaluated for catheter placement  Additional information is needed  Progressive ascites would be consistent with disease progression - possibly patient's present regimen needs to be amended  Medical oncology will reach out to Dr Vanessa Mejia for additional information including the most recent treatment plan  There are other options "  At this time, patient's prognosis is very guarded  His hypotension and CONCEPCIÓN are an issue  Multiple providers have discussed goals of care with patient and his wife  Multiple providers have suggested hospice at this time, however, patient remains treatment focused    · Plan for outpatient Oncology follow-up    Hypocalcemia  Assessment & Plan  Ca 7 8  · S/p Calcium 2gm IV x1 6/20   · Vit D level; normal   · CMP in am to view corrected calcium Dysphagia  Assessment & Plan  Trouble swallowing solid food, thus, not eating much  Denies nausea, vomiting, chest pain  · Speech swallow eval was limited due to prolonged retching during assessment  · Consulted GI, appreciate input  · Underwent EGD: mild gastritis, gastric polyp   · Biopsy: "Mild chronic inactive gastritis  Negative for H  pylori by routine H&E  Negative for malignancy "  · Continue Protonix 40 mg daily   · Continue mechanical soft diet, nutritional supplements; tolerating well    Hyponatremia  Assessment & Plan  Combination of SIADH and pre renal  Na 132 -> 130 -> 130 -> 136 ->128-> 130->131-> 134  Serum osmo low at 278  · Nephrology following, appreciate input  · Oral intake somewhat improved   · Stop IVF  · Restart home Lasix, 40 mg daily   · Encourage nutritional supplements   · Monitor CMP    Pleural effusion  Assessment & Plan  Loculated right-sided pleural effusion  · IR evaluated the patient and not enough fluid for drainage  · Discontinue IVF 6/23  · Restart Lasix 6/23     Ambulatory dysfunction  Assessment & Plan  Patient would benefit from short-term rehab  PT OT evaluated the patient and recommended short-term rehab  · Case management following  Patient has a bed available at [address] [place] [address]  Type 2 diabetes mellitus [facility])  Assessment & Plan  Lab Results   Component Value Date    HGBA1C 6 4 (H) 06/16/2021       Recent Labs     06/22/21  1142 06/22/21  1631 06/22/21  2100 06/23/21  0748   POCGLU 222* 242* 188* 198*       Blood Sugar Average: Last 72 hrs:  (P) 172 [id number]   Well controlled type 2 diabetes  Continue patient on low-dose sliding scale insulin with Accu-Chek QID    AV block  Assessment & Plan  Status post pacemaker insertion  · Continue holding Atenolol 2/2 hypotension   · Optimize electrolytes    History of pulmonary embolism  Assessment & Plan  History of DVT and PE  On Eliquis as outpatient    No acute PE on CTA  · OK to resume Eliquis 5 mg BID Sepsis (HCC)-resolved as of 6/23/2021  Assessment & Plan  CTA showed - Increasing opacification in the right lung base suggesting worsening atelectasis or pneumonia  Patient afebrile, denies cough SOB  Blood cultures negative to date  On room air   Procalcitonin mild elevated to 0 36 -> 0 29 -> 0 3 but likely unreliable given hst of Ca  · Completed 7-day course of Levaquin for suspected PNA   Results from last 7 days   Lab Units 06/21/21  0512 06/20/21  1031 06/20/21  0623 06/19/21  0508 06/18/21  0531   LACTIC ACID mmol/L  --  0 9  --   --   --    PROCALCITONIN ng/ml 0 46*  --  0 46* 0 30* 0 29*     Results from last 7 days   Lab Units 06/23/21  0542 06/22/21  0459 06/21/21  0512 06/20/21  0623 06/19/21  0508 06/17/21  0954   WBC Thousand/uL 3 07* 2 92* 3 11* 4 47 3 79* 6 50       Acute respiratory failure with hypoxia (HCC)-resolved as of 6/23/2021  Assessment & Plan  Multifactorial, most likely secondary to significant ascites causing hypoventilation, right-sided loculated pleural effusion, and lung cancer along with suspected pneumonia  Initially patient was hypoxic requiring 2-4 L of oxygen via nasal cannula which worsened to 5 L and hence required brief stay in ICU  · Patient now on room air       VTE Pharmacologic Prophylaxis:   Pharmacologic: Apixaban (Eliquis)  Mechanical VTE Prophylaxis in Place: Yes    Patient Centered Rounds: I have performed bedside rounds with nursing staff today  Discussions with Specialists or Other Care Team Provider: CM, nursing, nephrology     Education and Discussions with Family / Patient: I have answered all questions to the best of my ability  Will update wife  Time Spent for Care: 30 minutes  More than 50% of total time spent on counseling and coordination of care as described above      Current Length of Stay: 9 day(s)    Current Patient Status: Inpatient   Certification Statement: The patient will continue to require additional inpatient hospital stay due to decrease stress-dose steroids, CONCEPCIÓN     Discharge Plan: Patient is not medically stable for discharge today, likely in 72 hours pending progress  Patient would benefit from STR  Code Status: Level 2 - DNAR: but accepts endotracheal intubation      Subjective:   Patient seen and examined at bedside  He is resting out of bed in chair on room air  Overall, reports he is feeling better  Reports mild abdominal distension  Denies shortness of breath or chest pain  Denies any nausea or vomiting  Reports several soft bowel movements  Would like to eat as Nephrology stated he would not need any intervention today  Objective:     Vitals:   Temp (24hrs), Av 9 °F (36 1 °C), Min:96 5 °F (35 8 °C), Max:97 4 °F (36 3 °C)    Temp:  [96 5 °F (35 8 °C)-97 4 °F (36 3 °C)] 96 8 °F (36 °C)  HR:  [65-87] 70  Resp:  [18-20] 19  BP: (100-129)/(72-87) 100/72  SpO2:  [94 %-97 %] 97 %  Body mass index is 33 11 kg/m²  Input and Output Summary (last 24 hours): Intake/Output Summary (Last 24 hours) at 2021 1345  Last data filed at 2021 1016  Gross per 24 hour   Intake 2372 5 ml   Output 650 ml   Net 1722 5 ml       Physical Exam:     Physical Exam  Vitals and nursing note reviewed  Constitutional:       General: He is not in acute distress  Appearance: He is well-developed  He is not toxic-appearing or diaphoretic  HENT:      Head: Normocephalic  Cardiovascular:      Rate and Rhythm: Normal rate  Pulmonary:      Effort: Pulmonary effort is normal  No tachypnea or respiratory distress  Breath sounds: Examination of the right-lower field reveals decreased breath sounds  Examination of the left-lower field reveals decreased breath sounds  Decreased breath sounds present  No wheezing, rhonchi or rales  Abdominal:      General: Bowel sounds are normal  There is distension  Palpations: Abdomen is soft  Tenderness: There is no abdominal tenderness  There is no guarding or rebound  Musculoskeletal:         General: Normal range of motion  Cervical back: Normal range of motion  Right lower leg: Edema present  Left lower leg: Edema present  Skin:     General: Skin is warm and dry  Capillary Refill: Capillary refill takes less than 2 seconds  Comments: Right anterior chest wall Port-A-Cath  Scattered bruises on upper extremities   Neurological:      Mental Status: He is alert and oriented to person, place, and time  Motor: Weakness (Generalized weakness) present  Deep Tendon Reflexes: Reflexes are normal and symmetric  Psychiatric:         Mood and Affect: Mood normal          Behavior: Behavior normal          Thought Content: Thought content normal          Judgment: Judgment normal          Additional Data:     Labs:    Results from last 7 days   Lab Units 06/23/21  0542 06/22/21  0459   WBC Thousand/uL 3 07* 2 92*   HEMOGLOBIN g/dL 8 4* 8 2*   HEMATOCRIT % 26 8* 26 0*   PLATELETS Thousands/uL 103* 104*   NEUTROS PCT %  --  83*   LYMPHS PCT %  --  9*   MONOS PCT %  --  6   EOS PCT %  --  0     Results from last 7 days   Lab Units 06/23/21  0542 06/21/21  0512   POTASSIUM mmol/L 3 6 5 6*   CHLORIDE mmol/L 97* 96*   CO2 mmol/L 26 17*   BUN mg/dL 56* 47*   CREATININE mg/dL 3 33* 3 51*   CALCIUM mg/dL 7 9* 7 5*   ALK PHOS U/L  --  28*   ALT U/L  --  14   AST U/L  --  14     Results from last 7 days   Lab Units 06/22/21  0459   INR  2 11*       * I Have Reviewed All Lab Data Listed Above  * Additional Pertinent Lab Tests Reviewed:  All Labs Within Last 24 Hours Reviewed    Imaging:    Imaging Reports Reviewed Today Include:  renal ultrasound  Imaging Personally Reviewed by Myself Includes:  None    Recent Cultures (last 7 days):           Last 24 Hours Medication List:   Current Facility-Administered Medications   Medication Dose Route Frequency Provider Last Rate    albuterol  2 5 mg Nebulization Q6H PRN NERISSA Gan      apixaban  5 mg Oral BID NERISSA Jones      calcium carbonate-vitamin D  1 tablet Oral Daily With Breakfast Lucy Casillas MD      cholecalciferol  1,000 Units Oral Daily Lucy Casillas MD      cyanocobalamin  1,000 mcg Oral Daily Lucy Casillas MD      folic acid  8,607 mcg Oral Daily Lucy Casillas MD      furosemide  40 mg Oral Daily Wallace Wyman MD      guaiFENesin  600 mg Oral Q12H [address] NERISSA Jones      hydrocortisone sodium succinate  25 mg Intravenous [facility] NERISSA Jones      insulin lispro  1-6 Units Subcutaneous TID [facility] [place] Lucy Casillas MD      melatonin  3 mg Oral HS PRN Lucy Casillas MD      midodrine  10 mg Oral TID [facility] [place] Samm Mcgee MD      mirtazapine  15 mg Oral Daily Lucy Casillas MD      ondansetron  4 mg Intravenous Q6H PRN NERISSA Jones      oxyCODONE-acetaminophen  1 tablet Oral Q6H PRN Lucy Casillas MD      pantoprazole  40 mg Oral QAM Lucy Casillas MD      polyethylene glycol  17 g Oral Daily NERISSA Ugalde      pravastatin  20 mg Oral Daily With Corby Kebede MD      saccharomyces boulardii  250 mg Oral BID Lucy Casillas MD          Today, Patient Was Seen By: NERISSA Jones    ** Please Note: Dictation voice to text software may have been used in the creation of this document   **

## 2021-06-23 NOTE — ASSESSMENT & PLAN NOTE
Lab Results   Component Value Date    HGBA1C 6 4 (H) 06/16/2021       Recent Labs     06/22/21  1142 06/22/21  1631 06/22/21  2100 06/23/21  0748   POCGLU 222* 242* 188* 198*       Blood Sugar Average: Last 72 hrs:  (P) 172 [id number]   Well controlled type 2 diabetes  Continue patient on low-dose sliding scale insulin with Accu-Chek QID

## 2021-06-23 NOTE — ASSESSMENT & PLAN NOTE
Combination of SIADH and pre renal  Na 132 -> 130 -> 130 -> 136 ->128-> 130->131-> 134  Serum osmo low at 278  · Nephrology following, appreciate input  · Oral intake somewhat improved   · Stop IVF  · Restart home Lasix, 40 mg daily   · Encourage nutritional supplements   · Monitor CMP

## 2021-06-23 NOTE — ASSESSMENT & PLAN NOTE
Patient would benefit from short-term rehab  PT OT evaluated the patient and recommended short-term rehab  · Case management following  Patient has a bed available at [address] [place] [address]

## 2021-06-23 NOTE — ASSESSMENT & PLAN NOTE
Loculated right-sided pleural effusion  · IR evaluated the patient and not enough fluid for drainage      · Discontinue IVF 6/23  · Restart Lasix 6/23

## 2021-06-23 NOTE — ASSESSMENT & PLAN NOTE
Status post pacemaker insertion  · Continue holding Atenolol 2/2 hypotension   · Optimize electrolytes Star Wedge Flap Text: The defect edges were debeveled with a #15 scalpel blade.  Given the location of the defect, shape of the defect and the proximity to free margins a star wedge flap was deemed most appropriate.  Using a sterile surgical marker, an appropriate rotation flap was drawn incorporating the defect and placing the expected incisions within the relaxed skin tension lines where possible. The area thus outlined was incised deep to adipose tissue with a #15 scalpel blade.  The skin margins were undermined to an appropriate distance in all directions utilizing iris scissors.

## 2021-06-23 NOTE — ASSESSMENT & PLAN NOTE
History of DVT and PE  On Eliquis as outpatient    No acute PE on CTA  · OK to resume Eliquis 5 mg BID

## 2021-06-23 NOTE — ASSESSMENT & PLAN NOTE
IR paracentesis on 6/14/2021 with removal of 2 L clear yellow ascites  IR paracentesis on 6/18/2021 with removal of 3 9 L of clear yellow ascites  IR paracentesis on 6/21/2021 with removal of 3 8 L of cloudy yellow ascites  Fluid cytology on 6/14 positive for malignancy, compatible with lung primary  Fluid culture shows no growth - patient is status post 7 day course of Levaquin for possible pneumonia  · Patient received IV albumin following paracentesis  · Appreciate oncology input; indicated that patient follows with Dr Ralf Torres, outpatient, recommend stabilizing patient and have him follow up outpatient once medically stable  · Patient aware of malignant ascites  Patient would like to discuss different treatment options with oncology  He is not ready for hospice at this time but would transition if all other treatment options were exhausted     · Daily weight and I&Os

## 2021-06-23 NOTE — ASSESSMENT & PLAN NOTE
Ca 7 8  · S/p Calcium 2gm IV x1 6/20   · Vit D level; normal   · CMP in am to view corrected calcium

## 2021-06-23 NOTE — ASSESSMENT & PLAN NOTE
CTA showed - Increasing opacification in the right lung base suggesting worsening atelectasis or pneumonia    Patient afebrile, denies cough SOB  Blood cultures negative to date  On room air   Procalcitonin mild elevated to 0 36 -> 0 29 -> 0 3 but likely unreliable given hst of Ca  · Completed 7-day course of Levaquin for suspected PNA   Results from last 7 days   Lab Units 06/21/21  0512 06/20/21  1031 06/20/21  0623 06/19/21  0508 06/18/21  0531   LACTIC ACID mmol/L  --  0 9  --   --   --    PROCALCITONIN ng/ml 0 46*  --  0 46* 0 30* 0 29*     Results from last 7 days   Lab Units 06/23/21  0542 06/22/21  0459 06/21/21  0512 06/20/21  0623 06/19/21  0508 06/17/21  0954   WBC Thousand/uL 3 07* 2 92* 3 11* 4 47 3 79* 6 50

## 2021-06-23 NOTE — ASSESSMENT & PLAN NOTE
BP was persistently in the 80s and 90s  Nephrology following, appreciate input  Cortisol level low at 9 2  · Started stress-dosed steroids 6/20 pm   · Will decrease Hydrocortisone to 25 mg IV q8h  · Per nephrology, stop IVF as patient becoming edematous   · Restart home Lasix, 40 mg daily   · Continue Midodrine 10 mg TID  · YOLANDA stockings   · Monitor closely

## 2021-06-23 NOTE — ASSESSMENT & PLAN NOTE
Trouble swallowing solid food, thus, not eating much  Denies nausea, vomiting, chest pain  · Speech swallow eval was limited due to prolonged retching during assessment  · Consulted GI, appreciate input  · Underwent EGD: mild gastritis, gastric polyp   · Biopsy: "Mild chronic inactive gastritis  Negative for H  pylori by routine H&E   Negative for malignancy "  · Continue Protonix 40 mg daily   · Continue mechanical soft diet, nutritional supplements; tolerating well

## 2021-06-23 NOTE — PROGRESS NOTES
NEPHROLOGY PROGRESS NOTE   Fercho William 77 y o  male MRN: [id number]  Unit/Bed#: 2 [place] [address] Encounter: [id number]    ASSESSMENT & PLAN:   77 y o  male  with history of non-small cell lung cancer status post VATS, diabetes mellitus, hypertension ho was admitted to [facility] on 6/14/21 after being transferred from [facility] where he presented with SOB  He was transferred to [address] [place] [address] after he was found to be hypotensive at [facility]   Nephrology consulted for acute kidney injury    Acute kidney injury, POA  -Baseline creatinine:  0 9-1 2 mg/dl, reviewed baseline creatinine from Care everywhere, creatinine was 1 2 in December 2020  -Admission creatinine:  1 59 mg/dl  - Work up:   · UA with microscopy:  0-1 RBC per high-power field, 10-20 RBC per high-power field, no protein  · Imaging:  Renal ultrasound no hydronephrosis, left kidney with upper pole cyst 2 9 cm in size along with scarring  -Etiology:  Acute kidney injury likely due to ATN in the setting of hypotension  also have a component of contrast nephropathy as patient received CTA with contrast on 06/13 but renal function started worsening since 06/19 which is slightly late onset but still possible   [facility] Course:  Renal function continue to worsen with peak creatinine 3 8 on 06/21 and had worsening acidosis on 06/21 after which was placed on bicarb drip and received medical treatment for hyperkalemia on 06/21     -Renal function improved to creatinine 3 3 today  -Plan:   ·  Renal function improving  No indication for renal replacement therapy at this time  Urine output around 600 mL  · Would stop IV fluid and start on Lasix 40 mg daily p o   Which is his home dose as he has lower extremity edema  · Discussed regarding goals of care with the patient in the setting of metastatic malignancy, patient wants everything done as per my discussion with him  · discussed about risk and benefit with the patient regarding dialysis, patient signed consent which was placed in the chart on 06/21  · Avoid nephrotoxins and dose all medications per EGFR  · Avoid hypotension  Continue midodrine  Okay to stop further IV albumin                      BP/hypotension  -blood pressure acceptable  -echocardiogram showed ejection fraction of 55-60% with indeterminate diastolic function  -status post IV albumin  Would stop further doses  -continue midodrine 10 mg t i d   -continue hydrocortisone per primary team as a random cortisol level was low at 9 2 on 06/20  -ascitic fluid was positive for wbc's -3387 but culture and  g stain negative for any growth  Antibiotics per primary team- completed antibiotics for possible pneumonia    Hyponatremia:  Likely due to free water intake in the setting of acute kidney injury with poor free water excretion  -continue fluid restriction 1800 mL per day  -also TSH was elevated indicating hypothyroidism but free T4 within normal limit  -sodium level slightly improving to 134, continue to monitor    Hyperphosphatemia:  Due to acute kidney injury, trending down 5 2 on 6/23  continue to monitor  Metabolic acidosis:   -lactic acid was within normal limit at 0 9, likely metabolic acidosis in the setting of acute kidney injury  -bicarb level improved to 26 with bicarbonate drip given on 06/21     -continue to monitor    Anemia:  Hemoglobin 8 2, continue to monitor per primary team    Hypocalcemia: resolved  Calcium level now improving and corrected calcium within normal range  Vitamin-D level within normal range at 49 9  Ascites status post IR paracentesis on 06/14 and 6/18 and 6/21     -Ascitic fluid positive for malignancy compatible with lung as primary   -last paracentesis was on 06/21 for 3 8 L      Hyperkalemia on 06/21:  Resolved with medical treatment    Others:  Right pleural effusion/malignant ascites/non-small cell lung cancer followed with Dr Batsheva Major of DVT and PE on Eliquis as outpatient    Discussed with primary team advanced practitioner      SUBJECTIVE:  Patient denies any nausea vomiting or shortness of breath, sitting comfortably on the chair  No new complaints    OBJECTIVE:  Current Weight: Weight - Scale: 95 9 kg (211 lb 6 7 oz)  Vitals:    06/23/21 0716   BP: 129/87   Pulse: 65   Resp: 19   Temp: (!) 96 8 °F (36 °C)   SpO2: 94%       Intake/Output Summary (Last 24 hours) at 6/23/2021 0934  Last data filed at 6/23/2021 0601  Gross per 24 hour   Intake 2053 75 ml   Output 650 ml   Net 1403 75 ml       Physical Exam  General:  Ill looking, awake  Eyes: Conjunctivae pink,  Sclera anicteric  ENT: lips and mucous membranes moist  Neck: supple   Chest: Clear to Auscultation both lungs,  no crackles, ronchus or wheezing  CVS: S1 & S2 present, normal rate, regular rhythm, no murmur    Abdomen: soft, non-tender, distended due to ascites, Bowel sounds normoactive  Extremities: 1 + edema of  Legs, using compression stockings   Skin: no rash  Neuro: awake, alert, oriented  Psych: Mood and affect appropriate         Medications:    Current Facility-Administered Medications:     albuterol inhalation solution 2 5 mg, 2 5 mg, Nebulization, Q6H PRN, NERISSA Varma    calcium carbonate-vitamin D (OSCAL-D) 500 mg-200 units per tablet 1 tablet, 1 tablet, Oral, Daily With Breakfast, Tomasa Lind MD, 1 tablet at 06/23/21 0855    cholecalciferol (VITAMIN D3) tablet 1,000 Units, 1,000 Units, Oral, Daily, Tomasa Lind MD, 1,000 Units at 06/23/21 0855    cyanocobalamin (VITAMIN B-12) tablet 1,000 mcg, 1,000 mcg, Oral, Daily, Tomasa Lind MD, 1,000 mcg at 49/65/19 3681    folic acid (FOLVITE) tablet 1,000 mcg, 1,000 mcg, Oral, Daily, Tomasa Lind MD, 1,000 mcg at 06/23/21 0855    furosemide (LASIX) injection 40 mg, 40 mg, Intravenous, Once, MD Huseyin [facility]) 12 hr tablet 600 mg, 600 mg, Oral, Q12H [facility], NERISSA Varma, 600 mg at 06/23/21 0855    hydrocortisone sodium succinate (PF) (Solu-CORTEF) injection 25 mg, 25 mg, Intravenous, Q8H [address], NERISSA Thao    insulin lispro (HumaLOG) 100 units/mL subcutaneous injection 1-6 Units, 1-6 Units, Subcutaneous, TID AC, 2 Units at 06/23/21 0855 **AND** Fingerstick Glucose (POCT), , , TID AC, Estefany Baxter MD    melatonin tablet 3 mg, 3 mg, Oral, HS PRN, Estefany Baxter MD, 3 mg at 06/15/21 2140    midodrine (PROAMATINE) tablet 10 mg, 10 mg, Oral, TID AC, Jeff Minaya MD, 10 mg at 06/23/21 0601    mirtazapine (REMERON) tablet 15 mg, 15 mg, Oral, Daily, Estefany Baxter MD, 15 mg at 06/23/21 0855    ondansetron (ZOFRAN) injection 4 mg, 4 mg, Intravenous, Q6H PRN, NERISSA Thao    oxyCODONE-acetaminophen (PERCOCET) 5-325 mg per tablet 1 tablet, 1 tablet, Oral, Q6H PRN, Estefany Baxter MD, 1 tablet at 06/16/21 2028    pantoprazole (PROTONIX) EC tablet 40 mg, 40 mg, Oral, QAM, Estefany Baxter MD, 40 mg at 06/23/21 0542    polyethylene glycol (MIRALAX) packet 17 g, 17 g, Oral, Daily, [employer], CRNP, 17 g at 06/21/21 9278    pravastatin (PRAVACHOL) tablet 20 mg, 20 mg, Oral, Daily With Mireya Adams MD, 20 mg at 06/22/21 1640    saccharomyces boulardii (FLORASTOR) capsule 250 mg, 250 mg, Oral, BID, Estefany Baxter MD, 250 mg at 06/23/21 0855    sodium chloride 0 9 % infusion, 75 mL/hr, Intravenous, Continuous, Gerardo Aquino MD, Last Rate: 75 mL/hr at 06/22/21 2322, 75 mL/hr at 06/22/21 2322    Invasive Devices:        Lab Results:   Results from last 7 days   Lab Units 06/23/21  0542 06/22/21  0459 06/21/21  1529 06/21/21  0512 06/20/21  0623 06/18/21  0531 06/17/21  0954   WBC Thousand/uL 3 07* 2 92*  --  3 11* 4 47   < > 6 50   HEMOGLOBIN g/dL 8 4* 8 2*  --  8 8* 9 0*   < > 10 5*   HEMATOCRIT % 26 8* 26 0*  --  29 1* 29 5*   < > 34 2*   PLATELETS Thousands/uL 103* 104*  --  113* 140*   < > 237   POTASSIUM mmol/L 3 6 4 1 4 4 5 6* 3 8  --  4 7   CHLORIDE mmol/L 97* 94* 94* 96* 103  --  99*   CO2 mmol/L 26 26 21 17* 18*  --  20*   BUN mg/dL 56* 55* 52* 47* 37*  --  29*   CREATININE mg/dL 3 33* 3 58* 3 84* 3 51* 2 51*  --  1 60*   CALCIUM mg/dL 7 9* 7 6* 7 8* 7 5* 6 7*  --  7 9*   MAGNESIUM mg/dL 2 0  --   --  2 2 1 8  --  1 8   PHOSPHORUS mg/dL 5 2* 5 5*  --  5 4* 4 4*   < >  --    ALK PHOS U/L  --   --   --  28* 27*  --  47   ALT U/L  --   --   --  14 15  --  16   AST U/L  --   --   --  14 10  --  18    < > = values in this interval not displayed  Previous work up:  Renal ultrasound pending    CTA chest, no PE, increased opacification right lung base suggesting worsening atelectasis or pneumonia with finding of complex right-sided loculated pleural effusion, finding of 4 mm lung nodule in the lingula suspicious for metastatic disease  Also finding of malignant mediastinal adenopathy and  Ascites    Portions of the record may have been created with voice recognition software  Occasional wrong word or "sound a like" substitutions may have occurred due to the inherent limitations of voice recognition software  Read the chart carefully and recognize, using context, where substitutions have occurred  If you have any questions, please contact the dictating provider

## 2021-06-23 NOTE — ASSESSMENT & PLAN NOTE
Lab Results   Component Value Date    EGFR 17 06/23/2021    EGFR 16 06/22/2021    EGFR 15 06/21/2021    CREATININE 3 33 (H) 06/23/2021    CREATININE 3 58 (H) 06/22/2021    CREATININE 3 84 (H) 06/21/2021     Baseline creatinine appears to be around 0 9-1 2  Creatinine 1 55 on admission    Patient is on Lasix 40 mg daily at home, currently on hold due to hypotension   Cr worsening during admission  Creatinine peaked at 3 84 on 06/21  Likely due to hepatorenal syndrome, hypotension, and contrast dye  Renal US: "No hydronephrosis benign-appearing simple cyst noted, ascites seen within the pelvis "   · Appreciate nephrology input  · S/p aggressive IV albumin  · Stop IVF   · Restart home Lasix, 40 mg daily   · Avoid nephrotoxins and hypotension  · CMP in am

## 2021-06-23 NOTE — ASSESSMENT & PLAN NOTE
Multifactorial, most likely secondary to significant ascites causing hypoventilation, right-sided loculated pleural effusion, and lung cancer along with suspected pneumonia  Initially patient was hypoxic requiring 2-4 L of oxygen via nasal cannula which worsened to 5 L and hence required brief stay in ICU    · Patient now on room air

## 2021-06-24 LAB
ALBUMIN SERPL BCP-MCNC: 3.3 G/DL (ref 3.5–5)
ALP SERPL-CCNC: 42 U/L (ref 46–116)
ALT SERPL W P-5'-P-CCNC: 15 U/L (ref 12–78)
ANION GAP SERPL CALCULATED.3IONS-SCNC: 10 MMOL/L (ref 4–13)
AST SERPL W P-5'-P-CCNC: 10 U/L (ref 5–45)
BASOPHILS # BLD AUTO: 0 THOUSANDS/ΜL (ref 0–0.1)
BASOPHILS NFR BLD AUTO: 0 % (ref 0–1)
BILIRUB SERPL-MCNC: 0.4 MG/DL (ref 0.2–1)
BUN SERPL-MCNC: 60 MG/DL (ref 5–25)
CALCIUM ALBUM COR SERPL-MCNC: 8.6 MG/DL (ref 8.3–10.1)
CALCIUM SERPL-MCNC: 8 MG/DL (ref 8.3–10.1)
CHLORIDE SERPL-SCNC: 100 MMOL/L (ref 100–108)
CO2 SERPL-SCNC: 26 MMOL/L (ref 21–32)
CREAT SERPL-MCNC: 3.24 MG/DL (ref 0.6–1.3)
EOSINOPHIL # BLD AUTO: 0.02 THOUSAND/ΜL (ref 0–0.61)
EOSINOPHIL NFR BLD AUTO: 1 % (ref 0–6)
ERYTHROCYTE [DISTWIDTH] IN BLOOD BY AUTOMATED COUNT: 18.9 % (ref 11.6–15.1)
GFR SERPL CREATININE-BSD FRML MDRD: 18 ML/MIN/1.73SQ M
GLUCOSE SERPL-MCNC: 202 MG/DL (ref 65–140)
GLUCOSE SERPL-MCNC: 211 MG/DL (ref 65–140)
GLUCOSE SERPL-MCNC: 214 MG/DL (ref 65–140)
GLUCOSE SERPL-MCNC: 239 MG/DL (ref 65–140)
GLUCOSE SERPL-MCNC: 255 MG/DL (ref 65–140)
HCT VFR BLD AUTO: 29.1 % (ref 36.5–49.3)
HGB BLD-MCNC: 9 G/DL (ref 12–17)
IMM GRANULOCYTES # BLD AUTO: 0.05 THOUSAND/UL (ref 0–0.2)
IMM GRANULOCYTES NFR BLD AUTO: 1 % (ref 0–2)
LYMPHOCYTES # BLD AUTO: 0.57 THOUSANDS/ΜL (ref 0.6–4.47)
LYMPHOCYTES NFR BLD AUTO: 14 % (ref 14–44)
MAGNESIUM SERPL-MCNC: 2 MG/DL (ref 1.6–2.6)
MCH RBC QN AUTO: 27.8 PG (ref 26.8–34.3)
MCHC RBC AUTO-ENTMCNC: 30.9 G/DL (ref 31.4–37.4)
MCV RBC AUTO: 90 FL (ref 82–98)
MONOCYTES # BLD AUTO: 0.4 THOUSAND/ΜL (ref 0.17–1.22)
MONOCYTES NFR BLD AUTO: 10 % (ref 4–12)
NEUTROPHILS # BLD AUTO: 2.92 THOUSANDS/ΜL (ref 1.85–7.62)
NEUTS SEG NFR BLD AUTO: 74 % (ref 43–75)
NRBC BLD AUTO-RTO: 0 /100 WBCS
PHOSPHATE SERPL-MCNC: 4.6 MG/DL (ref 2.3–4.1)
PLATELET # BLD AUTO: 97 THOUSANDS/UL (ref 149–390)
PMV BLD AUTO: 9.6 FL (ref 8.9–12.7)
POTASSIUM SERPL-SCNC: 3.9 MMOL/L (ref 3.5–5.3)
PROT SERPL-MCNC: 5.1 G/DL (ref 6.4–8.2)
RBC # BLD AUTO: 3.24 MILLION/UL (ref 3.88–5.62)
SODIUM SERPL-SCNC: 136 MMOL/L (ref 136–145)
WBC # BLD AUTO: 3.96 THOUSAND/UL (ref 4.31–10.16)

## 2021-06-24 PROCEDURE — 97110 THERAPEUTIC EXERCISES: CPT

## 2021-06-24 PROCEDURE — 85025 COMPLETE CBC W/AUTO DIFF WBC: CPT | Performed by: NURSE PRACTITIONER

## 2021-06-24 PROCEDURE — 84100 ASSAY OF PHOSPHORUS: CPT | Performed by: NURSE PRACTITIONER

## 2021-06-24 PROCEDURE — 99232 SBSQ HOSP IP/OBS MODERATE 35: CPT | Performed by: NURSE PRACTITIONER

## 2021-06-24 PROCEDURE — 94760 N-INVAS EAR/PLS OXIMETRY 1: CPT

## 2021-06-24 PROCEDURE — 82948 REAGENT STRIP/BLOOD GLUCOSE: CPT

## 2021-06-24 PROCEDURE — 83735 ASSAY OF MAGNESIUM: CPT | Performed by: NURSE PRACTITIONER

## 2021-06-24 PROCEDURE — 80053 COMPREHEN METABOLIC PANEL: CPT | Performed by: NURSE PRACTITIONER

## 2021-06-24 PROCEDURE — 97530 THERAPEUTIC ACTIVITIES: CPT

## 2021-06-24 PROCEDURE — 99232 SBSQ HOSP IP/OBS MODERATE 35: CPT | Performed by: INTERNAL MEDICINE

## 2021-06-24 PROCEDURE — 92526 ORAL FUNCTION THERAPY: CPT

## 2021-06-24 RX ADMIN — GUAIFENESIN 600 MG: 600 TABLET, EXTENDED RELEASE ORAL at 08:28

## 2021-06-24 RX ADMIN — Medication 250 MG: at 08:29

## 2021-06-24 RX ADMIN — PANTOPRAZOLE SODIUM 40 MG: 40 TABLET, DELAYED RELEASE ORAL at 06:25

## 2021-06-24 RX ADMIN — INSULIN LISPRO 2 UNITS: 100 INJECTION, SOLUTION INTRAVENOUS; SUBCUTANEOUS at 08:33

## 2021-06-24 RX ADMIN — MIDODRINE HYDROCHLORIDE 10 MG: 5 TABLET ORAL at 18:23

## 2021-06-24 RX ADMIN — GUAIFENESIN 600 MG: 600 TABLET, EXTENDED RELEASE ORAL at 22:00

## 2021-06-24 RX ADMIN — APIXABAN 5 MG: 5 TABLET, FILM COATED ORAL at 08:28

## 2021-06-24 RX ADMIN — APIXABAN 5 MG: 5 TABLET, FILM COATED ORAL at 18:22

## 2021-06-24 RX ADMIN — Medication 1000 UNITS: at 08:27

## 2021-06-24 RX ADMIN — FOLIC ACID 1000 MCG: 1 TABLET ORAL at 08:29

## 2021-06-24 RX ADMIN — Medication 250 MG: at 18:20

## 2021-06-24 RX ADMIN — MIDODRINE HYDROCHLORIDE 10 MG: 5 TABLET ORAL at 06:25

## 2021-06-24 RX ADMIN — HYDROCORTISONE SODIUM SUCCINATE 25 MG: 100 INJECTION, POWDER, FOR SOLUTION INTRAMUSCULAR; INTRAVENOUS at 14:49

## 2021-06-24 RX ADMIN — INSULIN LISPRO 3 UNITS: 100 INJECTION, SOLUTION INTRAVENOUS; SUBCUTANEOUS at 18:25

## 2021-06-24 RX ADMIN — MIDODRINE HYDROCHLORIDE 10 MG: 5 TABLET ORAL at 12:22

## 2021-06-24 RX ADMIN — MIRTAZAPINE 15 MG: 15 TABLET, FILM COATED ORAL at 08:28

## 2021-06-24 RX ADMIN — POLYETHYLENE GLYCOL 3350 17 G: 17 POWDER, FOR SOLUTION ORAL at 08:30

## 2021-06-24 RX ADMIN — CYANOCOBALAMIN TAB 500 MCG 1000 MCG: 500 TAB at 08:29

## 2021-06-24 RX ADMIN — CALCIUM CARBONATE 500 MG (1,250 MG)-VITAMIN D3 200 UNIT TABLET 1 TABLET: at 08:27

## 2021-06-24 RX ADMIN — INSULIN LISPRO 3 UNITS: 100 INJECTION, SOLUTION INTRAVENOUS; SUBCUTANEOUS at 12:29

## 2021-06-24 RX ADMIN — INSULIN LISPRO 3 UNITS: 100 INJECTION, SOLUTION INTRAVENOUS; SUBCUTANEOUS at 08:34

## 2021-06-24 RX ADMIN — HYDROCORTISONE SODIUM SUCCINATE 25 MG: 100 INJECTION, POWDER, FOR SOLUTION INTRAMUSCULAR; INTRAVENOUS at 06:25

## 2021-06-24 RX ADMIN — FUROSEMIDE 40 MG: 40 TABLET ORAL at 08:29

## 2021-06-24 RX ADMIN — INSULIN LISPRO 2 UNITS: 100 INJECTION, SOLUTION INTRAVENOUS; SUBCUTANEOUS at 18:25

## 2021-06-24 RX ADMIN — HYDROCORTISONE SODIUM SUCCINATE 25 MG: 100 INJECTION, POWDER, FOR SOLUTION INTRAMUSCULAR; INTRAVENOUS at 22:03

## 2021-06-24 RX ADMIN — PRAVASTATIN SODIUM 20 MG: 20 TABLET ORAL at 18:21

## 2021-06-24 NOTE — ASSESSMENT & PLAN NOTE
History of non-small-cell lung cancer diagnosed in December 2019  Patient underwent VATS with pleural decortication and pleurodesis in 7/2020  Patient follows Dr Bravo Her  Not sure exactly of treatment plan  CT suggested possible advancement of metastatic disease with involvement of the lingula  Paracentesis cytology (+) malignancy with lung primary - patient and family made aware    Consulted oncology, appreciate input  Per oncology, "Recent paracentesis demonstrated TTF1 positive adenocarcinoma in the ascitic fluid  If the ascitic fluid reaccumulates persistently/significantly, patient will need to be evaluated for catheter placement  Additional information is needed  Progressive ascites would be consistent with disease progression - possibly patient's present regimen needs to be amended  Medical oncology will reach out to Dr Bravo Her for additional information including the most recent treatment plan  There are other options "  At this time, patient's prognosis is very guarded  His hypotension and CONCEPCIÓN are an issue  Multiple providers have discussed goals of care with patient and his wife  Multiple providers have suggested hospice at this time, however, patient remains treatment focused    · Plan for outpatient Oncology follow-up  · Check limited abdomen US on Monday to assess ascites

## 2021-06-24 NOTE — PROGRESS NOTES
NEPHROLOGY PROGRESS NOTE   Vikki Fischer 77 y o  male MRN: [id number]  Unit/Bed#: 2 [place] [address] Encounter: [id number]    ASSESSMENT & PLAN:   77 y o  male  with history of non-small cell lung cancer status post VATS, diabetes mellitus, hypertension ho was admitted to [facility] on 6/14/21 after being transferred from [facility] where he presented with SOB  He was transferred to [address] [place] [address] after he was found to be hypotensive at [facility]   Nephrology consulted for acute kidney injury    Acute kidney injury, POA  -Baseline creatinine:  0 9-1 2 mg/dl, reviewed baseline creatinine from Care everywhere, creatinine was 1 2 in December 2020  -Admission creatinine:  1 59 mg/dl  - Work up:   · UA with microscopy:  0-1 RBC per high-power field, 10-20 RBC per high-power field, no protein  · Imaging:  Renal ultrasound no hydronephrosis, left kidney with upper pole cyst 2 9 cm in size along with scarring  -Etiology:  Acute kidney injury likely due to ATN in the setting of hypotension  also have a component of contrast nephropathy as patient received CTA with contrast on 06/13 but renal function started worsening since 06/19 which is slightly late onset but still possible   [facility] Course:  Renal function continue to worsen with peak creatinine 3 8 on 06/21 and had worsening acidosis on 06/21 after which was placed on bicarb drip and received medical treatment for hyperkalemia on 06/21     -was started on Lasix 40 mg daily on 06/23  -Renal function improved to creatinine 3 2 today  -Plan:   · Renal function improving, no indication for renal replacement therapy at this time    Continue to monitor renal function  · Continue Lasix 40 mg daily  · Discussed regarding goals of care with the patient in the setting of metastatic malignancy, patient wants everything done as per my discussion with him  · discussed about risk and benefit with the patient regarding dialysis, patient signed consent  which was placed in the chart on 06/21  · Avoid nephrotoxins and dose all medications per EGFR  · Avoid hypotension  Continue midodrine  Previously was treated with albumin but now off albumin  Blood pressure has improved            BP/hypotension  -blood pressure acceptable and improving  -echocardiogram showed ejection fraction of 55-60% with indeterminate diastolic function  -status post IV albumin     -continue midodrine 10 mg t i d   -continue hydrocortisone per primary team as a random cortisol level was low at 9 2 on 06/20, dose was tapered per primary team  -ascitic fluid was positive for wbc's -3387 but culture and  g stain negative for any growth  Antibiotics per primary team- completed antibiotics for possible pneumonia    Hyponatremia:  Likely due to free water intake in the setting of acute kidney injury with poor free water excretion  -continue fluid restriction 1800 mL per day  -also TSH was elevated indicating hypothyroidism but free T4 within normal limit  -sodium level now within normal range at 136 mEq/liter, continue to monitor    Hyperphosphatemia:  Due to acute kidney injury, trending down 4 6  continue to monitor  Metabolic acidosis:   -lactic acid was within normal limit at 0 9, likely metabolic acidosis in the setting of acute kidney injury  -bicarb level improved to 26 with bicarbonate drip given on 06/21     -continue to monitor    Anemia:  Hemoglobin 9 0, continue to monitor per primary team    Hypocalcemia: resolved  Calcium level now improving and corrected calcium within normal range  Vitamin-D level within normal range at 49 9  Casper Blake Continue Oscal and vitamin-D at current dose    Ascites status post IR paracentesis on 06/14 and 6/18 and 6/21     -Ascitic fluid positive for malignancy compatible with lung as primary   -last paracentesis was on 06/21 for 3 8 L      Hyperkalemia on 06/21:  Resolved with medical treatment    Others:  Right pleural effusion/malignant ascites/non-small cell lung cancer followed with Dr Belgica Garcia of DVT and PE on Eliquis as outpatient  Discussed with primary team advanced practitioner       SUBJECTIVE:  No new complaints  No nausea vomiting  Lower extremity edema improving    OBJECTIVE:  Current Weight: Weight - Scale: 95 6 kg (210 lb 12 2 oz)  Vitals:    06/24/21 0707   BP: 129/73   Pulse: 86   Resp:    Temp: (!) 97 °F (36 1 °C)   SpO2: 95%       Intake/Output Summary (Last 24 hours) at 6/24/2021 1000  Last data filed at 6/24/2021 0631  Gross per 24 hour   Intake 798 75 ml   Output 550 ml   Net 248 75 ml       Physical Exam  General:  Ill looking, awake  Eyes: Conjunctivae pink,  Sclera anicteric  ENT: lips and mucous membranes moist  Neck: supple   Chest: Clear to Auscultation both lungs,  no crackles, ronchus or wheezing  CVS: S1 & S2 present, normal rate, regular rhythm, no murmur    Abdomen: soft, non-tender, abdomen is slightly distended but improving, Bowel sounds normoactive  Extremities:  Trace edema of  legs  Skin: no rash  Neuro: awake, alert, oriented x 3   Psych: Mood and affect appropriate       Medications:    Current Facility-Administered Medications:     albuterol inhalation solution 2 5 mg, 2 5 mg, Nebulization, Q6H PRN, NERISSA Saunders    apixaban [facility]) tablet 5 mg, 5 mg, Oral, BID, NERISSA Mcqueen, 5 mg at 06/24/21 6771    calcium carbonate-vitamin D (OSCAL-D) 500 mg-200 units per tablet 1 tablet, 1 tablet, Oral, Daily With Breakfast, Diane Vega MD, 1 tablet at 06/24/21 0827    cholecalciferol (VITAMIN D3) tablet 1,000 Units, 1,000 Units, Oral, Daily, Diane Vega MD, 1,000 Units at 06/24/21 0827    cyanocobalamin (VITAMIN B-12) tablet 1,000 mcg, 1,000 mcg, Oral, Daily, Diane Vega MD, 1,000 mcg at 08/66/00 7493    folic acid (FOLVITE) tablet 1,000 mcg, 1,000 mcg, Oral, Daily, Diane Vega MD, 1,000 mcg at 06/24/21 0829    furosemide (LASIX) tablet 40 mg, 40 mg, Oral, Daily, Roni Louis MD, 40 mg at 06/24/21 7082    guaiFENesin (MUCINEX) 12 hr tablet 600 mg, 600 mg, Oral, Q12H [address], NERISSA Nunez, 600 mg at 06/24/21 4466    hydrocortisone sodium succinate (PF) (Solu-CORTEF) injection 25 mg, 25 mg, Intravenous, Q8H [address], NERISSA Nunez, 25 mg at 06/24/21 7604    insulin lispro (HumaLOG) 100 units/mL subcutaneous injection 1-6 Units, 1-6 Units, Subcutaneous, TID AC, 2 Units at 06/24/21 0833 **AND** Fingerstick Glucose (POCT), , , TID AC, Jose Perez MD    insulin lispro (HumaLOG) 100 units/mL subcutaneous injection 3 Units, 3 Units, Subcutaneous, TID With Meals, NERISSA Nunez, 3 Units at 06/24/21 0834    melatonin tablet 3 mg, 3 mg, Oral, HS PRN, Jose Perez MD, 3 mg at 06/15/21 2140    midodrine (PROAMATINE) tablet 10 mg, 10 mg, Oral, TID AC, Gina Meza MD, 10 mg at 06/24/21 8173    mirtazapine (REMERON) tablet 15 mg, 15 mg, Oral, Daily, Jose Perez MD, 15 mg at 06/24/21 0828    ondansetron (ZOFRAN) injection 4 mg, 4 mg, Intravenous, Q6H PRN, NERISSA Nunez    oxyCODONE-acetaminophen (PERCOCET) 5-325 mg per tablet 1 tablet, 1 tablet, Oral, Q6H PRN, Jose Perez MD, 1 tablet at 06/16/21 2028    pantoprazole (PROTONIX) EC tablet 40 mg, 40 mg, Oral, QAM, Jose Perez MD, 40 mg at 06/24/21 0625    polyethylene glycol (MIRALAX) packet 17 g, 17 g, Oral, Daily, NERISSA Allred, 17 g at 06/24/21 0830    pravastatin (PRAVACHOL) tablet 20 mg, 20 mg, Oral, Daily With Janice Taveras MD, 20 mg at 06/23/21 1651    saccharomyces boulardii (FLORASTOR) capsule 250 mg, 250 mg, Oral, BID, Jose Perez MD, 250 mg at 06/24/21 0829    Invasive Devices:        Lab Results:   Results from last 7 days   Lab Units 06/24/21  0631 06/23/21  0542 06/22/21  0459 06/21/21  0512 06/20/21  0623   WBC Thousand/uL 3 96* 3 07* 2 92* 3 11* 4 47   HEMOGLOBIN g/dL 9 0* 8 4* 8 2* 8 8* 9 0*   HEMATOCRIT % 29 1* 26 8* 26 0* 29 1* 29 5*   PLATELETS Thousands/uL 97* 103* 104* 113* 140* POTASSIUM mmol/L 3 9 3 6 4 1 5 6* 3 8   CHLORIDE mmol/L 100 97* 94* 96* 103   CO2 mmol/L 26 26 26 17* 18*   BUN mg/dL 60* 56* 55* 47* 37*   CREATININE mg/dL 3 24* 3 33* 3 58* 3 51* 2 51*   CALCIUM mg/dL 8 0* 7 9* 7 6* 7 5* 6 7*   MAGNESIUM mg/dL 2 0 2 0  --  2 2 1 8   PHOSPHORUS mg/dL 4 6* 5 2* 5 5* 5 4* 4 4*   ALK PHOS U/L 42*  --   --  28* 27*   ALT U/L 15  --   --  14 15   AST U/L 10  --   --  14 10       Previous work up:  Renal ultrasound pending    CTA chest, no PE, increased opacification right lung base suggesting worsening atelectasis or pneumonia with finding of complex right-sided loculated pleural effusion, finding of 4 mm lung nodule in the lingula suspicious for metastatic disease  Also finding of malignant mediastinal adenopathy and  Ascites    Portions of the record may have been created with voice recognition software  Occasional wrong word or "sound a like" substitutions may have occurred due to the inherent limitations of voice recognition software  Read the chart carefully and recognize, using context, where substitutions have occurred  If you have any questions, please contact the dictating provider

## 2021-06-24 NOTE — ASSESSMENT & PLAN NOTE
BP was persistently in the 80s and 90s  Cortisol level low at 9 2  · Nephrology following, appreciate input  · Started stress-dosed steroids with Hydrocortisone 50 mg q8h on 6/20   · Planning to taper off steroids  Decreased Hydrocortisone to 25 mg IV q8h for 6/23 and 6/24   Decreased to 25 mg q 12 hr today,then daily x 2 more days per discussion with attending  · Restarted home Lasix, 40 mg daily   · Continue Midodrine 10 mg TID  · YOLANDA stockings   · Monitor BP

## 2021-06-24 NOTE — ASSESSMENT & PLAN NOTE
IR paracentesis on 6/14/2021 with removal of 2 L clear yellow ascites  IR paracentesis on 6/18/2021 with removal of 3 9 L of clear yellow ascites  IR paracentesis on 6/21/2021 with removal of 3 8 L of cloudy yellow ascites  Fluid cytology on 6/14 positive for malignancy, compatible with lung primary  Fluid culture shows no growth - patient is status post 7 day course of Levaquin for possible pneumonia  · Patient received IV albumin following paracentesis  · Appreciate oncology input; indicated that patient follows with Dr Aaron Lujan, outpatient, recommend stabilizing patient and have him follow up outpatient once medically stable  · Patient aware of malignant ascites  Patient would like to discuss different treatment options with oncology  He is not ready for hospice at this time but would transition if all other treatment options were exhausted     · Resumed home Lasix 40 mg po daily  · Daily weight and I&Os  · Check limited US on Monday to assess ascites

## 2021-06-24 NOTE — PHYSICAL THERAPY NOTE
PT TREATMENT     06/24/21 1450   PT Last Visit   PT Visit Date 06/24/21   Note Type   Note Type Treatment   Pain Assessment   Pain Assessment Tool 0-10   Pain Score No Pain   Restrictions/Precautions   Weight Bearing Precautions Per Order No   Other Precautions Chair Alarm; Bed Alarm; Fall Risk;Pain   General   Chart Reviewed Yes   Family/Caregiver Present Yes  (wife)   Cognition   Overall Cognitive Status WFL   Arousal/Participation Cooperative   Attention Attends with cues to redirect   Orientation Level Oriented X4   Following Commands Follows all commands and directions without difficulty   Comments mildly impulsive at first; needed to request to wait on PT   Subjective   Subjective Pt agreeable to PT   Bed Mobility   Additional Comments Pt presents in chair   Transfers   Sit to Stand 6  Modified independent   Additional items Verbal cues   Stand to Sit 6  Modified independent   Additional items Verbal cues   Stand pivot 5  Supervision   Additional items Verbal cues   Ambulation/Elevation   Gait pattern Foward flexed   Gait Assistance 4  Minimal assist   Additional items Assist x 1;Verbal cues   Assistive Device Rolling walker   Distance 50 feet with changes in direction   Balance   Ambulatory Fair -  (with RW)   Activity Tolerance   Activity Tolerance Patient limited by fatigue   Exercises   Hip Flexion Sitting;20 reps;Bilateral   Knee AROM Long Arc Quad Sitting;20 reps;Bilateral   Ankle Pumps Sitting;20 reps;Bilateral  (heel toe raises)   Balance training  cues to use RW correctly when walking   Assessment   Prognosis Good   Problem List Decreased strength;Decreased endurance; Impaired balance;Decreased mobility; Decreased safety awareness   Assessment Pt liked walking, however fatigues quickly  Returned to room , positioned in chair and completed exercises with frequent rest after each extremity exercise due to incresed HR  O2 sats remained WFLs but HR increased to 124 at one point briefly    Recovered to 90's with brief rest each time  With exercise: HR ranged from 99 bpm to 110 bpm (one time to 124 bpm briefly)  RN made aware of same  Will benefit from continued PT  The patient's AM-PAC Basic Mobility Inpatient Short Form Raw Score is 16, Standardized Score is 38 32  A standardized score less than 42 9 suggests the patient may benefit from discharge to post-acute rehabilitation services  Please also refer to the recommendation of the Physical Therapist for safe discharge planning  Goals   Patient Goals to be able to move around more and get stronger   Recommendation   PT Discharge Recommendation Post acute rehabilitation services   AM-PAC Basic Mobility Inpatient   Turning in Bed Without Bedrails 3   Lying on Back to Sitting on Edge of Flat Bed 3   Moving Bed to Chair 3   Standing Up From Chair 3   Walk in Room 3   Climb 3-5 Stairs 1   Basic Mobility Inpatient Raw Score 16   Basic Mobility Standardized Score 54 68   Licensure   [place] License Number  Agustin Ordoñez Vinay PT  [id number]

## 2021-06-24 NOTE — ASSESSMENT & PLAN NOTE
Combination of SIADH and pre renal  Na 132 -> 130 -> 130 -> 136 ->128-> 130->131-> 134->136 ->140 today  Serum osmo low at 278  · Nephrology following, appreciate input  · Oral intake somewhat improved   · Restarted home Lasix, 40 mg daily   · Encourage nutritional supplements   · Monitor CMP

## 2021-06-24 NOTE — ASSESSMENT & PLAN NOTE
Status post pacemaker insertion  · Continue holding Atenolol 2/2 hypotension   · Optimize electrolytes

## 2021-06-24 NOTE — ASSESSMENT & PLAN NOTE
Lab Results   Component Value Date    EGFR 18 06/24/2021    EGFR 17 06/23/2021    EGFR 16 06/22/2021    CREATININE 3 24 (H) 06/24/2021    CREATININE 3 33 (H) 06/23/2021    CREATININE 3 58 (H) 06/22/2021     Baseline creatinine appears to be around 0 9-1 2  Creatinine 1 55 on admission    Patient is on Lasix 40 mg daily at home, currently on hold due to hypotension   Cr worsening during admission  Creatinine peaked at 3 84 on 06/21  Creatinine today 2 92  Likely due to hepatorenal syndrome, hypotension, and contrast dye  Renal US: "No hydronephrosis benign-appearing simple cyst noted, ascites seen within the pelvis "   · Appreciate nephrology input  · S/p aggressive IV albumin and IVF  · Resumed home Lasix 40 mg daily   · Avoid nephrotoxins and hypotension  · BMP in am

## 2021-06-24 NOTE — SPEECH THERAPY NOTE
Speech/Language Pathology Progress Note    Patient Name: Mario Alcazar  JIZHF'P Date: 6/24/2021     Problem List  Principal Problem:    Ascites  Active Problems:    Carcinoma of lung, right ([facility] [address] )    Pleural effusion    History of pulmonary embolism    AV block    Type 2 diabetes mellitus ([facility] [address] )    Hyponatremia    Acute renal failure superimposed on stage 3 chronic kidney disease (HCC)    Ambulatory dysfunction    Dysphagia    Hypotension    Hypocalcemia    Subjective:  "I'm doing much better than when we met "    Objective:  Pt seen for f/u to swallowing evaluation (pt with significant "retching" s/p po intake during evaluation)  Pt now s/p EGD & paracentesis  Pt ingested puree, soft food and thin liquid c adequate appearing oral mgmt & prompt appearing swallow initiation  No coughing, throat clearing, multiple swallows, c/o stasis etc     Assessment:  No s/s oral/pharyngeal or esophageal dysphagia with soft-solid food or thin liquid  Plan/Recommendations:  Please consider upgraded diet (begin with level 3 dysphagia and progress if desired and tolerated)  No additional recommendations at this time      Elsa Rockwell [address] [phone]  Available via [facility] Text

## 2021-06-24 NOTE — ASSESSMENT & PLAN NOTE
CTA showed - Increasing opacification in the right lung base suggesting worsening atelectasis or pneumonia    Patient afebrile, denies cough SOB  Blood cultures negative to date  On room air   Procalcitonin mild elevated to 0 36 -> 0 29 -> 0 3 but likely unreliable given hst of Ca  · Completed 7-day course of Levaquin for suspected PNA   Results from last 7 days   Lab Units 06/21/21  0512 06/20/21  1031 06/20/21  0623 06/19/21  0508 06/18/21  0531   LACTIC ACID mmol/L  --  0 9  --   --   --    PROCALCITONIN ng/ml 0 46*  --  0 46* 0 30* 0 29*     Results from last 7 days   Lab Units 06/24/21  0631 06/23/21  0542 06/22/21  0459 06/21/21  0512 06/20/21  0623 06/19/21  0508   WBC Thousand/uL 3 96* 3 07* 2 92* 3 11* 4 47 3 79*

## 2021-06-24 NOTE — PROGRESS NOTES
[address] 46  Progress Note Jayla Delcid 1945, 77 y o  male MRN: [id number]  Unit/Bed#: [address] [facility] [facility] Encounter: [id number]  Primary Care Provider: Nancy Dodson MD   Date and time admitted to hospital: 6/14/2021 10:31 AM    * Ascites  Assessment & Plan  IR paracentesis on 6/14/2021 with removal of 2 L clear yellow ascites  IR paracentesis on 6/18/2021 with removal of 3 9 L of clear yellow ascites  IR paracentesis on 6/21/2021 with removal of 3 8 L of cloudy yellow ascites  Fluid cytology on 6/14 positive for malignancy, compatible with lung primary  Fluid culture shows no growth - patient is status post 7 day course of Levaquin for possible pneumonia  · Patient received IV albumin following paracentesis  · Appreciate oncology input; indicated that patient follows with Dr Aaron Lujan, outpatient, recommend stabilizing patient and have him follow up outpatient once medically stable  · Patient aware of malignant ascites  Patient would like to discuss different treatment options with oncology  He is not ready for hospice at this time but would transition if all other treatment options were exhausted  · Resumed home Lasix 40 mg po daily  · Daily weight and I&Os    Hypotension  Assessment & Plan  BP was persistently in the 80s and 90s  Cortisol level low at 9 2  · Nephrology following, appreciate input  · Started stress-dosed steroids with Hydrocortisone 50 mg q8h on 6/20   · Planning to taper off steroids  Decreased Hydrocortisone to 25 mg IV q8h for 6/23 and 6/24  Plan to decrease to 25 mg q12h on 6/25 and 6/26   Plan to stop on 6/27 and monitor BP/Cr into 6/28  · Restarted home Lasix, 40 mg daily   · Continue Midodrine 10 mg TID  · YOLANDA stockings   · Monitor BP    Acute renal failure superimposed on stage 3 chronic kidney disease [facility])  Assessment & Plan  Lab Results   Component Value Date    EGFR 18 06/24/2021    EGFR 17 06/23/2021    EGFR 16 06/22/2021    CREATININE 3 24 (H) 06/24/2021    CREATININE 3 33 (H) 06/23/2021    CREATININE 3 58 (H) 06/22/2021     Baseline creatinine appears to be around 0 9-1 2  Creatinine 1 55 on admission  Patient is on Lasix 40 mg daily at home, currently on hold due to hypotension   Cr worsening during admission  Creatinine peaked at 3 84 on 06/21  Likely due to hepatorenal syndrome, hypotension, and contrast dye  Renal US: "No hydronephrosis benign-appearing simple cyst noted, ascites seen within the pelvis "   · Appreciate nephrology input  · S/p aggressive IV albumin and IVF  · Resumed home Lasix 40 mg daily   · Avoid nephrotoxins and hypotension  · CMP in am    Carcinoma of lung, right [facility])  Assessment & Plan  History of non-small-cell lung cancer diagnosed in December 2019  Patient underwent VATS with pleural decortication and pleurodesis in 7/2020  Patient follows Dr Mehreen Busby  Not sure exactly of treatment plan  CT suggested possible advancement of metastatic disease with involvement of the lingula  Paracentesis cytology (+) malignancy with lung primary - patient and family made aware    Consulted oncology, appreciate input  Per oncology, "Recent paracentesis demonstrated TTF1 positive adenocarcinoma in the ascitic fluid  If the ascitic fluid reaccumulates persistently/significantly, patient will need to be evaluated for catheter placement  Additional information is needed  Progressive ascites would be consistent with disease progression - possibly patient's present regimen needs to be amended  Medical oncology will reach out to Dr Mehreen Busby for additional information including the most recent treatment plan  There are other options "  At this time, patient's prognosis is very guarded  His hypotension and CONCEPCIÓN are an issue  Multiple providers have discussed goals of care with patient and his wife  Multiple providers have suggested hospice at this time, however, patient remains treatment focused    · Plan for outpatient Oncology follow-up    Hypocalcemia  Assessment & Plan  Ca 7 8  · S/p Calcium 2gm IV x1 6/20   · Vit D level; normal   · CMP in am to view corrected calcium     Dysphagia  Assessment & Plan  Trouble swallowing solid food, thus, not eating much  Denies nausea, vomiting, chest pain  · Speech swallow eval was limited due to prolonged retching during assessment  · Consulted GI, appreciate input  · Underwent EGD: mild gastritis, gastric polyp   · Biopsy: "Mild chronic inactive gastritis  Negative for H  pylori by routine H&E  Negative for malignancy "  · Continue Protonix 40 mg daily   · Per speech therapy, increase to dysphagia 3 soft diet with thin liquids    Hyponatremia  Assessment & Plan  Combination of SIADH and pre renal  Na 132 -> 130 -> 130 -> 136 ->128-> 130->131-> 134->136  Serum osmo low at 278  · Nephrology following, appreciate input  · Oral intake somewhat improved   · Restarted home Lasix, 40 mg daily   · Encourage nutritional supplements   · Monitor CMP    Pleural effusion  Assessment & Plan  Loculated right-sided pleural effusion  · IR evaluated the patient and not enough fluid for drainage  · Discontinued IVF 6/23  · Restarted Lasix 40 mg po daily 6/23     Ambulatory dysfunction  Assessment & Plan  Patient would benefit from short-term rehab  PT OT evaluated the patient and recommended short-term rehab  · Case management following  Patient has a bed available at [facility]       Type 2 diabetes mellitus [facility])  Assessment & Plan  Lab Results   Component Value Date    HGBA1C 6 4 (H) 06/16/2021       Recent Labs     06/23/21  0748 06/23/21  1114 06/23/21  1635 06/24/21  0717   POCGLU 198* 190* 209* 202*       Blood Sugar Average: Last 72 hrs:  (P) 442 [id number]   Well controlled type 2 diabetes  Holding Amaryl  Start lisinopril 3 units 3 times daily with meals  Continue patient on low-dose sliding scale insulin with Accu-Chek QID    AV block  Assessment & Plan  Status post pacemaker insertion  · Continue holding Atenolol 2/2 hypotension   · Optimize electrolytes    History of pulmonary embolism  Assessment & Plan  History of DVT and PE  On Eliquis as outpatient  No acute PE on CTA  · Continue Eliquis 5 mg BID     Sepsis (HCC)-resolved as of 6/23/2021  Assessment & Plan  CTA showed - Increasing opacification in the right lung base suggesting worsening atelectasis or pneumonia  Patient afebrile, denies cough SOB  Blood cultures negative to date  On room air   Procalcitonin mild elevated to 0 36 -> 0 29 -> 0 3 but likely unreliable given hst of Ca  · Completed 7-day course of Levaquin for suspected PNA   Results from last 7 days   Lab Units 06/21/21  0512 06/20/21  1031 06/20/21  0623 06/19/21  0508 06/18/21  0531   LACTIC ACID mmol/L  --  0 9  --   --   --    PROCALCITONIN ng/ml 0 46*  --  0 46* 0 30* 0 29*     Results from last 7 days   Lab Units 06/24/21  0631 06/23/21  0542 06/22/21  0459 06/21/21  0512 06/20/21  0623 06/19/21  0508   WBC Thousand/uL 3 96* 3 07* 2 92* 3 11* 4 47 3 79*       Acute respiratory failure with hypoxia (HCC)-resolved as of 6/23/2021  Assessment & Plan  Multifactorial, most likely secondary to significant ascites causing hypoventilation, right-sided loculated pleural effusion, and lung cancer along with suspected pneumonia  Initially patient was hypoxic requiring 2-4 L of oxygen via nasal cannula which worsened to 5 L and hence required brief stay in ICU  · Patient now on room air       VTE Pharmacologic Prophylaxis:   Pharmacologic: Apixaban (Eliquis)  Mechanical VTE Prophylaxis in Place: Yes    Patient Centered Rounds: I have performed bedside rounds with nursing staff today  Discussions with Specialists or Other Care Team Provider: CM, nursing, nephrology, speech therapy    Education and Discussions with Family / Patient: I have answered all questions to the best of my ability  Voicemail left with wife  Time Spent for Care: 30 minutes    More than 50% of total time spent on counseling and coordination of care as described above  Current Length of Stay: 10 day(s)    Current Patient Status: Inpatient   Certification Statement: The patient will continue to require additional inpatient hospital stay due to decrease stress-dose steroids, CONCEPCIÓN     Discharge Plan: Patient is not medically stable for discharge today  Plan to taper off of hydrocortisone over the weekend  Likely discharge Monday if blood pressure and creatinine stable  Code Status: Level 2 - DNAR: but accepts endotracheal intubation      Subjective:   Patient seen and examined at bedside  Patient resting out of bed in chair  Patient tolerating dysphagia 2 diet  Speech therapy recommends advancing to soft diet which patient is amenable  Patient denies any pain  Feels like his left upper abdomen might be slightly more distended today compared to yesterday  He is tolerating Amaury stockings  He is going to elevate his feet today  Appetite greatly improved  Denies any headache, dizziness, chest pain, or shortness of breath  Denies any nausea or vomiting  He is aware of the plan to taper off hydrocortisone and monitor his kidney function/blood pressure through Monday  The goal will be discharged home on Monday  He is pleased with this plan  Objective:     Vitals:   Temp (24hrs), Av 1 °F (36 2 °C), Min:96 6 °F (35 9 °C), Max:97 5 °F (36 4 °C)    Temp:  [96 6 °F (35 9 °C)-97 5 °F (36 4 °C)] 97 °F (36 1 °C)  HR:  [70-86] 80  Resp:  [16-18] 16  BP: ()/(61-86) 126/73  SpO2:  [94 %-96 %] 96 %  Body mass index is 33 01 kg/m²  Input and Output Summary (last 24 hours): Intake/Output Summary (Last 24 hours) at 2021 1208  Last data filed at 2021 0631  Gross per 24 hour   Intake 480 ml   Output 550 ml   Net -70 ml       Physical Exam:     Physical Exam  Vitals and nursing note reviewed  Constitutional:       General: He is not in acute distress       Appearance: He is well-developed  He is ill-appearing (appears deconditioned )  He is not toxic-appearing or diaphoretic  HENT:      Head: Normocephalic  Cardiovascular:      Rate and Rhythm: Normal rate  Pulmonary:      Effort: Pulmonary effort is normal  No tachypnea or respiratory distress  Breath sounds: Examination of the right-lower field reveals decreased breath sounds  Examination of the left-lower field reveals decreased breath sounds  Decreased breath sounds present  No wheezing, rhonchi or rales  Abdominal:      General: Bowel sounds are normal  There is no distension  Palpations: Abdomen is soft  Tenderness: There is no abdominal tenderness  Musculoskeletal:         General: No tenderness  Normal range of motion  Cervical back: Normal range of motion  Right lower leg: Edema present  Left lower leg: Edema present  Skin:     General: Skin is warm and dry  Capillary Refill: Capillary refill takes less than 2 seconds  Coloration: Skin is pale  Findings: No rash  Comments: Right anterior chest wall POC   Neurological:      Mental Status: He is alert and oriented to person, place, and time  Mental status is at baseline  Deep Tendon Reflexes: Reflexes are normal and symmetric  Psychiatric:         Mood and Affect: Mood normal          Behavior: Behavior normal          Thought Content:  Thought content normal          Judgment: Judgment normal          Additional Data:     Labs:    Results from last 7 days   Lab Units 06/24/21  0631   WBC Thousand/uL 3 96*   HEMOGLOBIN g/dL 9 0*   HEMATOCRIT % 29 1*   PLATELETS Thousands/uL 97*   NEUTROS PCT % 74   LYMPHS PCT % 14   MONOS PCT % 10   EOS PCT % 1     Results from last 7 days   Lab Units 06/24/21  0631   POTASSIUM mmol/L 3 9   CHLORIDE mmol/L 100   CO2 mmol/L 26   BUN mg/dL 60*   CREATININE mg/dL 3 24*   CALCIUM mg/dL 8 0*   ALK PHOS U/L 42*   ALT U/L 15   AST U/L 10     Results from last 7 days   Lab Units 06/22/21  0459   INR  2 11*       * I Have Reviewed All Lab Data Listed Above  * Additional Pertinent Lab Tests Reviewed: All Labs Within Last 24 Hours Reviewed    Imaging:    Imaging Reports Reviewed Today Include:  renal ultrasound  Imaging Personally Reviewed by Myself Includes:  None    Recent Cultures (last 7 days):           Last 24 Hours Medication List:   Current Facility-Administered Medications   Medication Dose Route Frequency Provider Last Rate    albuterol  2 5 mg Nebulization Q6H PRN NERISSA Santacruz      apixaban  5 mg Oral BID NERISSA Santacruz      calcium carbonate-vitamin D  1 tablet Oral Daily With Breakfast Taylor Alcantar MD      cholecalciferol  1,000 Units Oral Daily Taylor Alcantar MD      cyanocobalamin  1,000 mcg Oral Daily Taylor Alcantar MD      folic acid  8,987 mcg Oral Daily Taylor Alcantar MD      furosemide  40 mg Oral Daily Sujatha Clemente MD      guaiFENesin  600 mg Oral Q12H [facility] NERISSA Santacruz      hydrocortisone sodium succinate  25 mg Intravenous [facility] NERISSA Santacruz      insulin lispro  1-6 Units Subcutaneous TID [facility] Taylor Alcantar MD      insulin lispro  3 Units Subcutaneous TID With Meals NERISSA Santacruz      melatonin  3 mg Oral HS PRN Taylor Alcantar MD      midodrine  10 mg Oral TID [facility] Ankit Navas MD      mirtazapine  15 mg Oral Daily Taylor Alcantar MD      ondansetron  4 mg Intravenous Q6H PRN NERISSA Santacruz      oxyCODONE-acetaminophen  1 tablet Oral Q6H PRN Taylor Alcantar MD      pantoprazole  40 mg Oral QAM Taylor Alcantar MD      polyethylene glycol  17 g Oral Daily NERISSA Zarate      pravastatin  20 mg Oral Daily With Skye Vargas MD      saccharomyces boulardii  250 mg Oral BID Taylor Alcantar MD          Today, Patient Was Seen By: NERISSA Santacruz    ** Please Note: Dictation voice to text software may have been used in the creation of this document   **

## 2021-06-24 NOTE — ASSESSMENT & PLAN NOTE
Ca 7 8  · S/p Calcium 2gm IV x1 6/20   · Vit D level; normal   · Calcium today 8 0, corrected calcium 8 6

## 2021-06-24 NOTE — ASSESSMENT & PLAN NOTE
Trouble swallowing solid food, thus, not eating much earlier during stay  Denies nausea, vomiting, chest pain  · Speech swallow eval was limited due to prolonged retching during assessment  · Consulted GI, appreciate input  · Underwent EGD: mild gastritis, gastric polyp   · Biopsy: "Mild chronic inactive gastritis  Negative for H  pylori by routine H&E   Negative for malignancy "  · Continue Protonix 40 mg daily   · Per speech therapy, increase to dysphagia 3 soft diet with thin liquids  · Appetite improving per patient, denies dysphagia currently

## 2021-06-24 NOTE — ASSESSMENT & PLAN NOTE
Patient would benefit from short-term rehab  PT OT evaluated the patient and recommended short-term rehab  · Case management following  Patient has a bed available at [facility]

## 2021-06-24 NOTE — ASSESSMENT & PLAN NOTE
Loculated right-sided pleural effusion  · IR evaluated the patient and not enough fluid for drainage      · Discontinued IVF 6/23  · Restarted Lasix 40 mg po daily 6/23

## 2021-06-25 LAB
ALBUMIN SERPL BCP-MCNC: 3.3 G/DL (ref 3.5–5)
ALP SERPL-CCNC: 54 U/L (ref 46–116)
ALT SERPL W P-5'-P-CCNC: 18 U/L (ref 12–78)
ANION GAP SERPL CALCULATED.3IONS-SCNC: 10 MMOL/L (ref 4–13)
AST SERPL W P-5'-P-CCNC: 14 U/L (ref 5–45)
BASOPHILS # BLD AUTO: 0.01 THOUSANDS/ΜL (ref 0–0.1)
BASOPHILS NFR BLD AUTO: 0 % (ref 0–1)
BILIRUB SERPL-MCNC: 0.43 MG/DL (ref 0.2–1)
BUN SERPL-MCNC: 63 MG/DL (ref 5–25)
CALCIUM ALBUM COR SERPL-MCNC: 8.6 MG/DL (ref 8.3–10.1)
CALCIUM SERPL-MCNC: 8 MG/DL (ref 8.3–10.1)
CHLORIDE SERPL-SCNC: 101 MMOL/L (ref 100–108)
CO2 SERPL-SCNC: 29 MMOL/L (ref 21–32)
CREAT SERPL-MCNC: 2.92 MG/DL (ref 0.6–1.3)
EOSINOPHIL # BLD AUTO: 0.02 THOUSAND/ΜL (ref 0–0.61)
EOSINOPHIL NFR BLD AUTO: 1 % (ref 0–6)
ERYTHROCYTE [DISTWIDTH] IN BLOOD BY AUTOMATED COUNT: 19.2 % (ref 11.6–15.1)
GFR SERPL CREATININE-BSD FRML MDRD: 20 ML/MIN/1.73SQ M
GLUCOSE SERPL-MCNC: 155 MG/DL (ref 65–140)
GLUCOSE SERPL-MCNC: 189 MG/DL (ref 65–140)
GLUCOSE SERPL-MCNC: 205 MG/DL (ref 65–140)
GLUCOSE SERPL-MCNC: 256 MG/DL (ref 65–140)
GLUCOSE SERPL-MCNC: 281 MG/DL (ref 65–140)
HCT VFR BLD AUTO: 29.7 % (ref 36.5–49.3)
HGB BLD-MCNC: 9.1 G/DL (ref 12–17)
IMM GRANULOCYTES # BLD AUTO: 0.08 THOUSAND/UL (ref 0–0.2)
IMM GRANULOCYTES NFR BLD AUTO: 2 % (ref 0–2)
LYMPHOCYTES # BLD AUTO: 0.57 THOUSANDS/ΜL (ref 0.6–4.47)
LYMPHOCYTES NFR BLD AUTO: 13 % (ref 14–44)
MAGNESIUM SERPL-MCNC: 2 MG/DL (ref 1.6–2.6)
MCH RBC QN AUTO: 27.7 PG (ref 26.8–34.3)
MCHC RBC AUTO-ENTMCNC: 30.6 G/DL (ref 31.4–37.4)
MCV RBC AUTO: 91 FL (ref 82–98)
MONOCYTES # BLD AUTO: 0.47 THOUSAND/ΜL (ref 0.17–1.22)
MONOCYTES NFR BLD AUTO: 11 % (ref 4–12)
NEUTROPHILS # BLD AUTO: 3.12 THOUSANDS/ΜL (ref 1.85–7.62)
NEUTS SEG NFR BLD AUTO: 73 % (ref 43–75)
NRBC BLD AUTO-RTO: 0 /100 WBCS
PLATELET # BLD AUTO: 90 THOUSANDS/UL (ref 149–390)
PMV BLD AUTO: 9.9 FL (ref 8.9–12.7)
POTASSIUM SERPL-SCNC: 4.2 MMOL/L (ref 3.5–5.3)
PROT SERPL-MCNC: 5.1 G/DL (ref 6.4–8.2)
RBC # BLD AUTO: 3.28 MILLION/UL (ref 3.88–5.62)
SODIUM SERPL-SCNC: 140 MMOL/L (ref 136–145)
WBC # BLD AUTO: 4.27 THOUSAND/UL (ref 4.31–10.16)

## 2021-06-25 PROCEDURE — 97116 GAIT TRAINING THERAPY: CPT

## 2021-06-25 PROCEDURE — 99232 SBSQ HOSP IP/OBS MODERATE 35: CPT | Performed by: INTERNAL MEDICINE

## 2021-06-25 PROCEDURE — 94760 N-INVAS EAR/PLS OXIMETRY 1: CPT

## 2021-06-25 PROCEDURE — 82948 REAGENT STRIP/BLOOD GLUCOSE: CPT

## 2021-06-25 PROCEDURE — 80053 COMPREHEN METABOLIC PANEL: CPT | Performed by: NURSE PRACTITIONER

## 2021-06-25 PROCEDURE — 85025 COMPLETE CBC W/AUTO DIFF WBC: CPT | Performed by: NURSE PRACTITIONER

## 2021-06-25 PROCEDURE — 83735 ASSAY OF MAGNESIUM: CPT | Performed by: NURSE PRACTITIONER

## 2021-06-25 PROCEDURE — 99232 SBSQ HOSP IP/OBS MODERATE 35: CPT | Performed by: NURSE PRACTITIONER

## 2021-06-25 RX ADMIN — CALCIUM CARBONATE 500 MG (1,250 MG)-VITAMIN D3 200 UNIT TABLET 1 TABLET: at 09:39

## 2021-06-25 RX ADMIN — PRAVASTATIN SODIUM 20 MG: 20 TABLET ORAL at 15:31

## 2021-06-25 RX ADMIN — POLYETHYLENE GLYCOL 3350 17 G: 17 POWDER, FOR SOLUTION ORAL at 09:39

## 2021-06-25 RX ADMIN — INSULIN LISPRO 3 UNITS: 100 INJECTION, SOLUTION INTRAVENOUS; SUBCUTANEOUS at 09:44

## 2021-06-25 RX ADMIN — Medication 1000 UNITS: at 09:39

## 2021-06-25 RX ADMIN — GUAIFENESIN 600 MG: 600 TABLET, EXTENDED RELEASE ORAL at 09:39

## 2021-06-25 RX ADMIN — APIXABAN 5 MG: 5 TABLET, FILM COATED ORAL at 09:39

## 2021-06-25 RX ADMIN — APIXABAN 5 MG: 5 TABLET, FILM COATED ORAL at 17:38

## 2021-06-25 RX ADMIN — PANTOPRAZOLE SODIUM 40 MG: 40 TABLET, DELAYED RELEASE ORAL at 06:20

## 2021-06-25 RX ADMIN — HYDROCORTISONE SODIUM SUCCINATE 25 MG: 100 INJECTION, POWDER, FOR SOLUTION INTRAMUSCULAR; INTRAVENOUS at 06:20

## 2021-06-25 RX ADMIN — INSULIN LISPRO 4 UNITS: 100 INJECTION, SOLUTION INTRAVENOUS; SUBCUTANEOUS at 12:26

## 2021-06-25 RX ADMIN — MIDODRINE HYDROCHLORIDE 10 MG: 5 TABLET ORAL at 15:30

## 2021-06-25 RX ADMIN — Medication 250 MG: at 17:38

## 2021-06-25 RX ADMIN — INSULIN LISPRO 3 UNITS: 100 INJECTION, SOLUTION INTRAVENOUS; SUBCUTANEOUS at 17:39

## 2021-06-25 RX ADMIN — HYDROCORTISONE SODIUM SUCCINATE 25 MG: 100 INJECTION, POWDER, FOR SOLUTION INTRAMUSCULAR; INTRAVENOUS at 15:29

## 2021-06-25 RX ADMIN — INSULIN LISPRO 3 UNITS: 100 INJECTION, SOLUTION INTRAVENOUS; SUBCUTANEOUS at 12:27

## 2021-06-25 RX ADMIN — INSULIN LISPRO 1 UNITS: 100 INJECTION, SOLUTION INTRAVENOUS; SUBCUTANEOUS at 09:43

## 2021-06-25 RX ADMIN — MIDODRINE HYDROCHLORIDE 10 MG: 5 TABLET ORAL at 06:19

## 2021-06-25 RX ADMIN — Medication 250 MG: at 09:39

## 2021-06-25 RX ADMIN — MIDODRINE HYDROCHLORIDE 10 MG: 5 TABLET ORAL at 12:29

## 2021-06-25 RX ADMIN — MIRTAZAPINE 15 MG: 15 TABLET, FILM COATED ORAL at 09:39

## 2021-06-25 RX ADMIN — FUROSEMIDE 40 MG: 40 TABLET ORAL at 09:39

## 2021-06-25 RX ADMIN — INSULIN LISPRO 1 UNITS: 100 INJECTION, SOLUTION INTRAVENOUS; SUBCUTANEOUS at 17:38

## 2021-06-25 RX ADMIN — GUAIFENESIN 600 MG: 600 TABLET, EXTENDED RELEASE ORAL at 22:17

## 2021-06-25 RX ADMIN — FOLIC ACID 1000 MCG: 1 TABLET ORAL at 09:39

## 2021-06-25 RX ADMIN — CYANOCOBALAMIN TAB 500 MCG 1000 MCG: 500 TAB at 09:39

## 2021-06-25 NOTE — PHYSICAL THERAPY NOTE
PHYSICAL THERAPY TREATMENT  TIME: 8325-4000    NAME:  Marshall Vasquez  DATE: 06/25/21    AGE:   77 y o  Mrn:   [id number]  Length Of Stay: 11    ADMIT DX:  HCAP (healthcare-associated pneumonia) [J18 9]    Past Medical History:   Diagnosis Date    Cancer ([facility] )     RT Lung    Diabetes mellitus ([facility] )     Hypertension      Past Surgical History:   Procedure Laterality Date    CARDIAC PACEMAKER PLACEMENT  09/2019    CHOLECYSTECTOMY      kidney damage from gallbladder removal     FOOT SURGERY      tendon    IR PARACENTESIS  6/14/2021    IR PARACENTESIS  6/18/2021    IR PARACENTESIS  6/21/2021    PORTACATH PLACEMENT      PROSTATE SURGERY         Performed at least 2 patient identifiers during session: Name, Mimi Erp and ID bracelet        06/25/21 1326   PT Last Visit   PT Visit Date 06/25/21   Note Type   Note Type Treatment   Pain Assessment   Pain Assessment Tool 0-10   Pain Score 4   Pain Location/Orientation Orientation: Right  (flank)   Pain Radiating Towards non radiating   Pain Onset/Description Onset: Ongoing; Descriptor: Discomfort   Effect of Pain on Daily Activities limits comfort   Patient's Stated Pain Goal No pain   Hospital Pain Intervention(s) Ambulation/increased activity;Repositioned   Multiple Pain Sites No   Restrictions/Precautions   Weight Bearing Precautions Per Order No   Other Precautions Chair Alarm; Bed Alarm; Fall Risk;Pain;Telemetry   General   Chart Reviewed Yes   Additional Pertinent History Pt admitted 6/14/2021 with increased shortness of breath, generalized weakness, and a cough x1 week  Response to Previous Treatment Patient with no complaints from previous session  Family/Caregiver Present Yes  (wife present t/o session)   Cognition   Overall Cognitive Status WFL   Arousal/Participation Alert; Cooperative   Attention Within functional limits   Orientation Level Oriented X4   Memory Within functional limits   Following Commands Follows all commands and directions without difficulty   Subjective   Subjective "I'm bullheaded  I think i'm progressing well enough to go home, i don't want rehab anymore "   Bed Mobility   Additional Comments Bed mobility not completed on this date as pt presents and was left seated in bedside recliner chair  Transfers   Sit to Stand 6  Modified independent   Additional items   (RW)   Stand to Sit 6  Modified independent   Additional items Armrests  (RW)   Stand pivot 5  Supervision   Additional items Assist x 1;Verbal cues  (RW)   Ambulation/Elevation   Gait pattern Wide CARLOS; Short stride   Gait Assistance 5  Supervision   Additional items Assist x 1;Verbal cues   Assistive Device Rolling walker   Distance 52ft x2 with standing functional rest break leaning against wall   Stair Management Assistance 4  Minimal assist  (CGA)   Additional items Assist x 1;Verbal cues; Tactile cues   Stair Management Technique Step to pattern; Foreward; Two rails   Number of Stairs 4   Balance   Static Sitting Good   Dynamic Sitting Fair +   Static Standing Good   Dynamic Standing Fair +   Ambulatory Fair +   Endurance Deficit   Endurance Deficit Yes   Endurance Deficit Description Pt with generalized weakness and fatigue rsulting in decreased endurance  SPO2 and HR monitored and stable at rest and with mobility  Activity Tolerance   Activity Tolerance Patient limited by fatigue   Assessment   Prognosis Good   Problem List Decreased strength;Decreased endurance; Impaired balance;Decreased mobility;Obesity;Pain   Assessment Patient seen for PT treatment today with focus on gait training and elevations training  Patient eager and agreeable to participate in session  Patient demonstrates ability to transfer with rolling walker at Manny level, ambulate total of 104ft with rolling walker and supervision, standing rest break provided long-term  Patient also demonstrates ability to negotiate 8" curb step x4 reps with CGA, in order to simulate patient's steps to enter his home  Patient making good progress towards therapy goals and return to prior level of function  Patient's wife present throughout session, patient and wife agree that patient does not require rehab stay upon d/c, are interested in home services  Based on patient's progress with therapy, patient demonstrates functional capacity for return to home PT services  Patient will need RW upon discharge, spoke with  Roselinetimoteo Briscoe via TT  Pt demonstrates independence with completion of B LE AROM exercises, states he has been completing t/o the day  At end of session, pt was left seated in bedside recliner chair, alarm engaged, wife present  Care returned to RN  Recommend HHPT upon d/c  Will continue skilled PT POC as able and appropriate  Goals   Patient Goals "to get stronger and go home"   STG Expiration Date 06/22/21   Short Term Goal #1 Pt will complete: Bed mobility-S; transfers-Min assist   Short Term Goal #2 Pt will ambulate with a RW functional household distances-Min assist   LTG Expiration Date 06/29/21   Long Term Goal #1 Pt will complete: Bed mobility and transfers-I; pt will ambulate up/down 3-4 steps with a rail so he can enter/exit his home-S   Long Term Goal #2 Pt will ambulate with a RW functional household distances-I; balance with a RW will be F+/good for safe gait and mobility and to decrease fall risk   PT Treatment Day 4   Plan   Treatment/Interventions Functional transfer training;LE strengthening/ROM; Elevations; Therapeutic exercise; Endurance training;Equipment eval/education; Bed mobility;Gait training;Spoke to nursing;Spoke to case management;OT   Progress Progressing toward goals   PT Frequency 5x/wk   Recommendation   PT Discharge Recommendation Home with home health rehabilitation   Equipment Recommended [place] walker   Change/add to [employer]?  No   AM-PAC Basic Mobility Inpatient   Turning in Bed Without Bedrails 3   Lying on Back to Sitting on Edge of Flat Bed 3   Moving Bed to Chair 3   Standing Up From Chair 4   Walk in Room 3   Climb 3-5 Stairs 3   Basic Mobility Inpatient Raw Score 19   Basic Mobility Standardized Score 42 48       The patient's AM-PAC Basic Mobility Inpatient Short Form Raw Score is 19, Standardized Score is 42 48  A standardized score less than 42 9 suggests the patient may benefit from discharge to post-acute rehabilitation services   Please also refer to the recommendation of the Physical Therapist for safe discharge planning, as pt making good progress towards therapy goals and PLOF, and is anticipated to demonstrate functional capacity for return to home once time for d/c         Chad Stratton PT, DPT   Available via [employer]  NPI # [id number]  [place] License - [id number]  [address] [facility] - [id number]  6/25/2021

## 2021-06-25 NOTE — PROGRESS NOTES
[facility] 46  Progress Note Juan Antonio Acosta 1945, 77 y o  male MRN: [id number]  Unit/Bed#: [address] Encounter: [id number]  Primary Care Provider: Micah Carrillo MD   Date and time admitted to hospital: 6/14/2021 10:31 AM    * Ascites  Assessment & Plan  IR paracentesis on 6/14/2021 with removal of 2 L clear yellow ascites  IR paracentesis on 6/18/2021 with removal of 3 9 L of clear yellow ascites  IR paracentesis on 6/21/2021 with removal of 3 8 L of cloudy yellow ascites  Fluid cytology on 6/14 positive for malignancy, compatible with lung primary  Fluid culture shows no growth - patient is status post 7 day course of Levaquin for possible pneumonia  · Patient received IV albumin following paracentesis  · Appreciate oncology input; indicated that patient follows with Dr Hayes De Leon, outpatient, recommend stabilizing patient and have him follow up outpatient once medically stable  · Patient aware of malignant ascites  Patient would like to discuss different treatment options with oncology  He is not ready for hospice at this time but would transition if all other treatment options were exhausted  · Resumed home Lasix 40 mg po daily  · Daily weight and I&Os  · Check limited US on Monday to assess ascites    Acute renal failure superimposed on stage 3 chronic kidney disease [facility])  Assessment & Plan  Lab Results   Component Value Date    EGFR 18 06/24/2021    EGFR 17 06/23/2021    EGFR 16 06/22/2021    CREATININE 3 24 (H) 06/24/2021    CREATININE 3 33 (H) 06/23/2021    CREATININE 3 58 (H) 06/22/2021     Baseline creatinine appears to be around 0 9-1 2  Creatinine 1 55 on admission    Patient is on Lasix 40 mg daily at home, currently on hold due to hypotension   Cr worsening during admission  Creatinine peaked at 3 84 on 06/21  Creatinine today 2 92  Likely due to hepatorenal syndrome, hypotension, and contrast dye  Renal US: "No hydronephrosis benign-appearing simple cyst noted, ascites seen within the pelvis "   · Appreciate nephrology input  · S/p aggressive IV albumin and IVF  · Resumed home Lasix 40 mg daily   · Avoid nephrotoxins and hypotension  · BMP in am    Dysphagia  Assessment & Plan  Trouble swallowing solid food, thus, not eating much earlier during stay  Denies nausea, vomiting, chest pain  · Speech swallow eval was limited due to prolonged retching during assessment  · Consulted GI, appreciate input  · Underwent EGD: mild gastritis, gastric polyp   · Biopsy: "Mild chronic inactive gastritis  Negative for H  pylori by routine H&E  Negative for malignancy "  · Continue Protonix 40 mg daily   · Per speech therapy, increase to dysphagia 3 soft diet with thin liquids  · Appetite improving per patient, denies dysphagia currently    Hyponatremia  Assessment & Plan  Combination of SIADH and pre renal  Na 132 -> 130 -> 130 -> 136 ->128-> 130->131-> 134->136 ->140 today  Serum osmo low at 278  · Nephrology following, appreciate input  · Oral intake somewhat improved   · Restarted home Lasix, 40 mg daily   · Encourage nutritional supplements   · Monitor CMP    Acute respiratory failure with hypoxia (HCC)-resolved as of 6/23/2021  Assessment & Plan  Multifactorial, most likely secondary to significant ascites causing hypoventilation, right-sided loculated pleural effusion, and lung cancer along with suspected pneumonia  Initially patient was hypoxic requiring 2-4 L of oxygen via nasal cannula which worsened to 5 L and hence required brief stay in ICU  · Patient now on room air     Sepsis (HCC)-resolved as of 6/23/2021  Assessment & Plan  CTA showed - Increasing opacification in the right lung base suggesting worsening atelectasis or pneumonia    Patient afebrile, denies cough SOB  Blood cultures negative to date  On room air   Procalcitonin mild elevated to 0 36 -> 0 29 -> 0 3 but likely unreliable given hst of Ca  · Completed 7-day course of Levaquin for suspected PNA   Results from last 7 days   Lab Units 06/21/21  0512 06/20/21  1031 06/20/21  0623 06/19/21  0508 06/18/21  0531   LACTIC ACID mmol/L  --  0 9  --   --   --    PROCALCITONIN ng/ml 0 46*  --  0 46* 0 30* 0 29*     Results from last 7 days   Lab Units 06/24/21  0631 06/23/21  0542 06/22/21  0459 06/21/21  0512 06/20/21  0623 06/19/21  0508   WBC Thousand/uL 3 96* 3 07* 2 92* 3 11* 4 47 3 79*       Pleural effusion  Assessment & Plan  Loculated right-sided pleural effusion  · IR evaluated the patient and not enough fluid for drainage  · Discontinued IVF 6/23  · Restarted Lasix 40 mg po daily 6/23     Hypocalcemia  Assessment & Plan  Ca 7 8  · S/p Calcium 2gm IV x1 6/20   · Vit D level; normal   · Calcium today 8 0, corrected calcium 8 6    Hypotension  Assessment & Plan  BP was persistently in the 80s and 90s  Cortisol level low at 9 2  · Nephrology following, appreciate input  · Started stress-dosed steroids with Hydrocortisone 50 mg q8h on 6/20   · Planning to taper off steroids  Decreased Hydrocortisone to 25 mg IV q8h for 6/23 and 6/24  Decreased to 25 mg q 12 hr today,then daily x 2 more days per discussion with attending  · Restarted home Lasix, 40 mg daily   · Continue Midodrine 10 mg TID  · YOLANDA stockings   · Monitor BP    Ambulatory dysfunction  Assessment & Plan  Patient would benefit from short-term rehab  PT OT evaluated the patient and recommended short-term rehab  · Case management following  Patient has a bed available at [address] [facility]       Type 2 diabetes mellitus [facility])  Assessment & Plan  Lab Results   Component Value Date    HGBA1C 6 4 (H) 06/16/2021       Recent Labs     06/23/21  0748 06/23/21  1114 06/23/21  1635 06/24/21  0717   POCGLU 198* 190* 209* 202*       Blood Sugar Average: Last 72 hrs:  (P) 559 [id number]   Well controlled type 2 diabetes  Holding Amaryl  Glucose elevated likely due to hydrocortisone  Started Humalog 3 units 3 times daily with meals  Continue patient on low-dose sliding scale insulin with Accu-Chek QID    AV block  Assessment & Plan  Status post pacemaker insertion  · Continue holding Atenolol 2/2 hypotension   · Optimize electrolytes    History of pulmonary embolism  Assessment & Plan  History of DVT and PE  On Eliquis as outpatient  No acute PE on CTA  · Continue Eliquis 5 mg BID     Carcinoma of lung, right [facility])  Assessment & Plan  History of non-small-cell lung cancer diagnosed in December 2019  Patient underwent VATS with pleural decortication and pleurodesis in 7/2020  Patient follows Dr John Figueroa  Not sure exactly of treatment plan  CT suggested possible advancement of metastatic disease with involvement of the lingula  Paracentesis cytology (+) malignancy with lung primary - patient and family made aware    Consulted oncology, appreciate input  Per oncology, "Recent paracentesis demonstrated TTF1 positive adenocarcinoma in the ascitic fluid  If the ascitic fluid reaccumulates persistently/significantly, patient will need to be evaluated for catheter placement  Additional information is needed  Progressive ascites would be consistent with disease progression - possibly patient's present regimen needs to be amended  Medical oncology will reach out to Dr John Figueroa for additional information including the most recent treatment plan  There are other options "  At this time, patient's prognosis is very guarded  His hypotension and CONCEPCIÓN are an issue  Multiple providers have discussed goals of care with patient and his wife  Multiple providers have suggested hospice at this time, however, patient remains treatment focused  · Plan for outpatient Oncology follow-up  · Check limited abdomen US on Monday to assess ascites      VTE Pharmacologic Prophylaxis:   Pharmacologic: Apixaban (Eliquis)  Mechanical VTE Prophylaxis in Place: No    Patient Centered Rounds: I have performed bedside rounds with nursing staff today      Discussions with Specialists or Other Care Team Provider: Nephrology    Education and Discussions with Family / Patient:  Yes    Time Spent for Care: 20 minutes  More than 50% of total time spent on counseling and coordination of care as described above  Current Length of Stay: 11 day(s)    Current Patient Status: Inpatient   Certification Statement: The patient will continue to require additional inpatient hospital stay due to Ascites, hypotension    Discharge Plan:  Short-term rehab likely on Monday if continues to improve    Code Status: Level 2 - DNAR: but accepts endotracheal intubation      Subjective:   Patient denies nausea vomiting abdominal pain diarrhea, constipation, fever, chills  Denies difficulty swallowing  Reports appetite is good  Objective:     Vitals:   Temp (24hrs), Av 5 °F (36 4 °C), Min:97 4 °F (36 3 °C), Max:97 6 °F (36 4 °C)    Temp:  [97 4 °F (36 3 °C)-97 6 °F (36 4 °C)] 97 6 °F (36 4 °C)  HR:  [] 109  Resp:  [18-20] 18  BP: (108-134)/(73-87) 111/76  SpO2:  [92 %-95 %] 93 %  Body mass index is 33 46 kg/m²  Input and Output Summary (last 24 hours): Intake/Output Summary (Last 24 hours) at 2021 194  Last data filed at 2021 0601  Gross per 24 hour   Intake --   Output 725 ml   Net -725 ml       Physical Exam:     Physical Exam  Vitals and nursing note reviewed  Constitutional:       Appearance: He is well-developed  HENT:      Head: Normocephalic and atraumatic  Neck:      Thyroid: No thyromegaly  Vascular: No JVD  Trachea: No tracheal deviation  Cardiovascular:      Rate and Rhythm: Normal rate and regular rhythm  Heart sounds: Normal heart sounds  Pulmonary:      Effort: Pulmonary effort is normal  No respiratory distress  Breath sounds: No wheezing or rales  Comments: RLL diminished BS,on room air, satting low 90's  Abdominal:      General: Bowel sounds are normal  There is distension  Palpations: Abdomen is soft  Tenderness: There is no abdominal tenderness  There is no guarding  Musculoskeletal:         General: Swelling present  Cervical back: Neck supple  Comments: Bilateral lower leg/feet non pitting edema  Skin:     General: Skin is warm and dry  Neurological:      General: No focal deficit present  Mental Status: He is alert and oriented to person, place, and time  Psychiatric:         Mood and Affect: Mood normal          Judgment: Judgment normal          Additional Data:     Labs:    Results from last 7 days   Lab Units 06/25/21  0521   WBC Thousand/uL 4 27*   HEMOGLOBIN g/dL 9 1*   HEMATOCRIT % 29 7*   PLATELETS Thousands/uL 90*   NEUTROS PCT % 73   LYMPHS PCT % 13*   MONOS PCT % 11   EOS PCT % 1     Results from last 7 days   Lab Units 06/25/21  0521   POTASSIUM mmol/L 4 2   CHLORIDE mmol/L 101   CO2 mmol/L 29   BUN mg/dL 63*   CREATININE mg/dL 2 92*   CALCIUM mg/dL 8 0*   ALK PHOS U/L 54   ALT U/L 18   AST U/L 14     Results from last 7 days   Lab Units 06/22/21  0459   INR  2 11*       * I Have Reviewed All Lab Data Listed Above  * Additional Pertinent Lab Tests Reviewed:  [facility] 67 Admission Reviewed    Imaging:    Imaging Reports Reviewed Today Include:  None  Imaging Personally Reviewed by Myself Includes:  None    Recent Cultures (last 7 days):           Last 24 Hours Medication List:   Current Facility-Administered Medications   Medication Dose Route Frequency Provider Last Rate    albuterol  2 5 mg Nebulization Q6H PRN Lorelei Pry, CRNP      apixaban  5 mg Oral BID Lorelei Pry, CRNP      calcium carbonate-vitamin D  1 tablet Oral Daily With Breakfast Kathrin Dhillon MD      cholecalciferol  1,000 Units Oral Daily Kathrin Dhillon MD      cyanocobalamin  1,000 mcg Oral Daily Kathrin Dhillon MD      folic acid  0,811 mcg Oral Daily Kathrin Dhillon MD      furosemide  40 mg Oral Daily Jess Downey MD      guaiFENesin  600 mg Oral Q12H [address], CRNP      [START ON 6/26/2021] hydrocortisone sodium succinate  25 mg Intravenous Daily NERISSA Hagan      insulin lispro  1-6 Units Subcutaneous TID AC Deborah Mrax MD      insulin lispro  3 Units Subcutaneous TID With Meals NERISSA Ayala      melatonin  3 mg Oral HS PRN Deborah Marx MD      midodrine  10 mg Oral TID [facility] Daniel Grant MD      mirtazapine  15 mg Oral Daily Deborah Marx MD      ondansetron  4 mg Intravenous Q6H PRN NERISSA Ayala      oxyCODONE-acetaminophen  1 tablet Oral Q6H PRN Deborah Marx MD      pantoprazole  40 mg Oral QAM Deborah Marx MD      polyethylene glycol  17 g Oral Daily Newt NERISSA Stanley      pravastatin  20 mg Oral Daily With Debra Douglas MD      saccharomyces boulardii  250 mg Oral BID Deborah Marx MD          Today, Patient Was Seen By: NERISSA Cavazos    ** Please Note: Dragon 360 Dictation voice to text software may have been used in the creation of this document   **

## 2021-06-25 NOTE — PLAN OF CARE
Problem: MOBILITY - ADULT  Goal: Maintain or return to baseline ADL function  Description: INTERVENTIONS:  -  Assess patient's ability to carry out ADLs; assess patient's baseline for ADL function and identify physical deficits which impact ability to perform ADLs (bathing, care of mouth/teeth, toileting, grooming, dressing, etc )  - Assess/evaluate cause of self-care deficits   - Assess range of motion  - Assess patient's mobility; develop plan if impaired  - Assess patient's need for assistive devices and provide as appropriate  - Encourage maximum independence but intervene and supervise when necessary  - Involve family in performance of ADLs  - Assess for home care needs following discharge   - Consider OT consult to assist with ADL evaluation and planning for discharge  - Provide patient education as appropriate  Outcome: Progressing     Problem: RESPIRATORY - ADULT  Goal: Achieves optimal ventilation and oxygenation  Description: INTERVENTIONS:  - Assess for changes in respiratory status  - Assess for changes in mentation and behavior  - Position to facilitate oxygenation and minimize respiratory effort  - Oxygen administered by appropriate delivery if ordered  - Initiate smoking cessation education as indicated  - Encourage broncho-pulmonary hygiene including cough, deep breathe, Incentive Spirometry  - Assess the need for suctioning and aspirate as needed  - Assess and instruct to report SOB or any respiratory difficulty  - Respiratory Therapy support as indicated  Outcome: Progressing

## 2021-06-25 NOTE — PLAN OF CARE
Problem: PHYSICAL THERAPY ADULT  Goal: Performs mobility at highest level of function for planned discharge setting  See evaluation for individualized goals  Description: Treatment/Interventions: Functional transfer training, LE strengthening/ROM, Elevations, Therapeutic exercise, Endurance training, Equipment eval/education, Bed mobility, Gait training, Spoke to nursing, Spoke to case management, OT  Equipment Recommended: Tien Novak       See flowsheet documentation for full assessment, interventions and recommendations  Outcome: Progressing  Note: Prognosis: Good  Problem List: Decreased strength, Decreased endurance, Impaired balance, Decreased mobility, Obesity, Pain  Assessment: Patient seen for PT treatment today with focus on gait training and elevations training  Patient eager and agreeable to participate in session  Patient demonstrates ability to transfer with rolling walker at Manny level, ambulate total of 104ft with rolling walker and supervision, standing rest break provided long term  Patient also demonstrates ability to negotiate 8" curb step x4 reps with CGA, in order to simulate patient's steps to enter his home  Patient making good progress towards therapy goals and return to prior level of function  Patient's wife present throughout session, patient and wife agree that patient does not require rehab stay upon d/c, are interested in home services  Based on patient's progress with therapy, patient demonstrates functional capacity for return to home PT services  Patient will need RW upon discharge, spoke with  Soniya Silveira via TT  Pt demonstrates independence with completion of B LE AROM exercises, states he has been completing t/o the day  At end of session, pt was left seated in bedside recliner chair, alarm engaged, wife present  Care returned to RN  Recommend HHPT upon d/c  Will continue skilled PT POC as able and appropriate        PT Discharge Recommendation: Home with home health rehabilitation    See flowsheet documentation for full assessment

## 2021-06-25 NOTE — PROGRESS NOTES
NEPHROLOGY PROGRESS NOTE   Davidson Mccray 77 y o  male MRN: [id number]  Unit/Bed#: [address] [place] [address] Encounter: [id number]    ASSESSMENT & PLAN:   77 y o  male  with history of non-small cell lung cancer status post VATS, diabetes mellitus, hypertension ho was admitted to [facility] on 6/14/21 after being transferred from [facility] where he presented with SOB  He was transferred to [address] [place] [address] after he was found to be hypotensive at [facility]   Nephrology consulted for acute kidney injury    Acute kidney injury, POA  -Baseline creatinine:  0 9-1 2 mg/dl, reviewed baseline creatinine from Care everywhere, creatinine was 1 2 in December 2020  -Admission creatinine:  1 59 mg/dl  - Work up:   · UA with microscopy:  0-1 RBC per high-power field, 10-20 RBC per high-power field, no protein  · Imaging:  Renal ultrasound no hydronephrosis, left kidney with upper pole cyst 2 9 cm in size along with scarring  -Etiology:  Acute kidney injury likely due to ATN in the setting of hypotension  also have a component of contrast nephropathy as patient received CTA with contrast on 06/13 but renal function started worsening since 06/19 which is slightly late onset but still possible   [facility] Course:  Renal function continue to worsen with peak creatinine 3 8 on 06/21 and had worsening acidosis on 06/21 after which was placed on bicarb drip and received medical treatment for hyperkalemia on 06/21     -was started on Lasix 40 mg daily on 06/23  -Renal function improved to creatinine 2 9 today   -Plan:   · Renal function improving, no indication for renal replacement therapy at this time    Continue to monitor renal function  · Continue Lasix 40 mg daily  · Discussed regarding goals of care with the patient in the setting of metastatic malignancy, patient wants everything done as per my discussion with him  · discussed about risk and benefit with the patient regarding dialysis, patient signed consent  which was placed in the chart on 06/21  · Avoid nephrotoxins and dose all medications per EGFR  · Avoid hypotension  Continue midodrine  Previously was treated with albumin but now off albumin  Blood pressure has improved            BP/hypotension  -blood pressure acceptable and improving  -echocardiogram showed ejection fraction of 55-60% with indeterminate diastolic function  -status post IV albumin     -continue midodrine 10 mg t i d   -continue hydrocortisone per primary team as a random cortisol level was low at 9 2 on 06/20, dose was tapered per primary team  -ascitic fluid was positive for wbc's -3387 but culture and  g stain negative for any growth  completed antibiotics for possible pneumonia    Hyponatremia- resolved :    -was Likely due to free water intake in the setting of acute kidney injury with poor free water excretion  -continue fluid restriction 1800 mL per day  -also TSH was elevated indicating hypothyroidism but free T4 within normal limit  -sodium level now within normal range at 140 mg/dl    Hyperphosphatemia:  Due to acute kidney injury, trending down 4 6  continue to monitor  Metabolic acidosis:   -lactic acid was within normal limit at 0 9, likely metabolic acidosis in the setting of acute kidney injury  -bicarb level improved to 29 with bicarbonate drip given on 06/21     -continue to monitor    Anemia:  Hemoglobin 9 1, continue to monitor per primary team    Hypocalcemia: resolved  Calcium level now improving and corrected calcium within normal range  Vitamin-D level within normal range at 49 9  Continue Oscal and vitamin-D at current dose    Ascites status post IR paracentesis on 06/14 and 6/18 and 6/21     -Ascitic fluid positive for malignancy compatible with lung as primary   -last paracentesis was on 06/21 for 3 8 L      Hyperkalemia on 06/21:  Resolved with medical treatment    Others:  Right pleural effusion/malignant ascites/non-small cell lung cancer followed with Dr Mk Spring of DVT and PE on Eliquis as outpatient  Discussed with primary team AP  Patient would be okay for outpatient care from Nephrology side if renal function remains stable or better work tomorrow      SUBJECTIVE:  No new complaints, lower extremity edema improved  No shortness of breaths    OBJECTIVE:  Current Weight: Weight - Scale: 96 9 kg (213 lb 10 oz)  Vitals:    06/25/21 0842   BP:    Pulse: 81   Resp: 20   Temp:    SpO2: 93%   /87   Pulse 81   Temp (!) 97 4 °F (36 3 °C)   Resp 20   Ht 5' 7" (1 702 m)   Wt 96 9 kg (213 lb 10 oz)   SpO2 93%   BMI 33 46 kg/m²       Intake/Output Summary (Last 24 hours) at 6/25/2021 1147  Last data filed at 6/25/2021 0601  Gross per 24 hour   Intake --   Output 725 ml   Net -725 ml       Physical Exam  General:  Ill looking, awake  Eyes: Conjunctivae pink,  Sclera anicteric  ENT: lips and mucous membranes moist  Neck: supple   Chest: Clear to Auscultation both lungs,  no crackles, ronchus or wheezing  CVS: S1 & S2 present, normal rate, regular rhythm, no murmur    Abdomen: soft, non-tender, non-distended, Bowel sounds normoactive  Extremities: trace  edema of  legs  Skin: no rash  Neuro: awake, alert, oriented x 3   Psych: Mood and affect appropriate       Medications:    Current Facility-Administered Medications:     albuterol inhalation solution 2 5 mg, 2 5 mg, Nebulization, Q6H PRN, NERISSA Saunders    [facility]) tablet 5 mg, 5 mg, Oral, BID, NERISSA Mcqueen, 5 mg at 06/25/21 2248    calcium carbonate-vitamin D (OSCAL-D) 500 mg-200 units per tablet 1 tablet, 1 tablet, Oral, Daily With Breakfast, Diane Vega MD, 1 tablet at 06/25/21 7117    cholecalciferol (VITAMIN D3) tablet 1,000 Units, 1,000 Units, Oral, Daily, Diane Vega MD, 1,000 Units at 06/25/21 5236    cyanocobalamin (VITAMIN B-12) tablet 1,000 mcg, 1,000 mcg, Oral, Daily, Diane Vega MD, 1,000 mcg at 45/62/80 0002    folic acid (FOLVITE) tablet 1,000 mcg, 1,000 mcg, Oral, Daily, Amparo Blair MD, 1,000 mcg at 06/25/21 0939    furosemide (LASIX) tablet 40 mg, 40 mg, Oral, Daily, Mirian Nickerson MD, 40 mg at 06/25/21 0939    guaiFENesin (MUCINEX) 12 hr tablet 600 mg, 600 mg, Oral, Q12H [facility], [facility], 600 mg at 06/25/21 2268    hydrocortisone sodium succinate (PF) (Solu-CORTEF) injection 25 mg, 25 mg, Intravenous, Q8H [facility], [facility], 25 mg at 06/25/21 0620    insulin lispro (HumaLOG) 100 units/mL subcutaneous injection 1-6 Units, 1-6 Units, Subcutaneous, TID AC, 1 Units at 06/25/21 0943 **AND** Fingerstick Glucose (POCT), , , TID AC, Amparo Blair MD    insulin lispro (HumaLOG) 100 units/mL subcutaneous injection 3 Units, 3 Units, Subcutaneous, TID With Meals, Edwards NERISSA Navas, 3 Units at 06/25/21 0944    melatonin tablet 3 mg, 3 mg, Oral, HS PRN, Amparo Blair MD, 3 mg at 06/15/21 2140    midodrine (PROAMATINE) tablet 10 mg, 10 mg, Oral, TID AC, Bry Cintron MD, 10 mg at 06/25/21 8063    mirtazapine (REMERON) tablet 15 mg, 15 mg, Oral, Daily, Amparo Blair MD, 15 mg at 06/25/21 0939    ondansetron (ZOFRAN) injection 4 mg, 4 mg, Intravenous, Q6H PRN, NERISSA Toleod    oxyCODONE-acetaminophen (PERCOCET) 5-325 mg per tablet 1 tablet, 1 tablet, Oral, Q6H PRN, Amparo Blair MD, 1 tablet at 06/16/21 2028    pantoprazole (PROTONIX) EC tablet 40 mg, 40 mg, Oral, QAM, Amparo Blair MD, 40 mg at 06/25/21 0620    polyethylene glycol (MIRALAX) packet 17 g, 17 g, Oral, Daily, [employer], CRNP, 17 g at 06/25/21 2091    pravastatin (PRAVACHOL) tablet 20 mg, 20 mg, Oral, Daily With Leonid Randolph MD, 20 mg at 06/24/21 1821    saccharomyces boulardii (FLORASTOR) capsule 250 mg, 250 mg, Oral, BID, Amparo Blair MD, 250 mg at 06/25/21 0939    Invasive Devices:        Lab Results:   Results from last 7 days   Lab Units 06/25/21  0521 06/24/21  0631 06/23/21  0542 06/22/21  0459 06/21/21  0512   WBC Thousand/uL 4 27* 3 96* 3 07* 2 92* 3 11*   HEMOGLOBIN g/dL 9 1* 9 0* 8 4* 8 2* 8 8*   HEMATOCRIT % 29 7* 29 1* 26 8* 26 0* 29 1*   PLATELETS Thousands/uL 90* 97* 103* 104* 113*   POTASSIUM mmol/L 4 2 3 9 3 6 4 1 5 6*   CHLORIDE mmol/L 101 100 97* 94* 96*   CO2 mmol/L 29 26 26 26 17*   BUN mg/dL 63* 60* 56* 55* 47*   CREATININE mg/dL 2 92* 3 24* 3 33* 3 58* 3 51*   CALCIUM mg/dL 8 0* 8 0* 7 9* 7 6* 7 5*   MAGNESIUM mg/dL 2 0 2 0 2 0  --  2 2   PHOSPHORUS mg/dL  --  4 6* 5 2* 5 5* 5 4*   ALK PHOS U/L 54 42*  --   --  28*   ALT U/L 18 15  --   --  14   AST U/L 14 10  --   --  14       Previous work up:  Renal ultrasound pending    CTA chest, no PE, increased opacification right lung base suggesting worsening atelectasis or pneumonia with finding of complex right-sided loculated pleural effusion, finding of 4 mm lung nodule in the lingula suspicious for metastatic disease  Also finding of malignant mediastinal adenopathy and  Ascites    Portions of the record may have been created with voice recognition software  Occasional wrong word or "sound a like" substitutions may have occurred due to the inherent limitations of voice recognition software  Read the chart carefully and recognize, using context, where substitutions have occurred  If you have any questions, please contact the dictating provider

## 2021-06-26 LAB
ANION GAP SERPL CALCULATED.3IONS-SCNC: 11 MMOL/L (ref 4–13)
BUN SERPL-MCNC: 62 MG/DL (ref 5–25)
CALCIUM SERPL-MCNC: 8.5 MG/DL (ref 8.3–10.1)
CHLORIDE SERPL-SCNC: 100 MMOL/L (ref 100–108)
CO2 SERPL-SCNC: 27 MMOL/L (ref 21–32)
CREAT SERPL-MCNC: 2.58 MG/DL (ref 0.6–1.3)
GFR SERPL CREATININE-BSD FRML MDRD: 23 ML/MIN/1.73SQ M
GLUCOSE SERPL-MCNC: 168 MG/DL (ref 65–140)
GLUCOSE SERPL-MCNC: 173 MG/DL (ref 65–140)
GLUCOSE SERPL-MCNC: 272 MG/DL (ref 65–140)
GLUCOSE SERPL-MCNC: 307 MG/DL (ref 65–140)
GLUCOSE SERPL-MCNC: 309 MG/DL (ref 65–140)
MAGNESIUM SERPL-MCNC: 1.9 MG/DL (ref 1.6–2.6)
POTASSIUM SERPL-SCNC: 4 MMOL/L (ref 3.5–5.3)
SODIUM SERPL-SCNC: 138 MMOL/L (ref 136–145)

## 2021-06-26 PROCEDURE — 80048 BASIC METABOLIC PNL TOTAL CA: CPT | Performed by: INTERNAL MEDICINE

## 2021-06-26 PROCEDURE — 83735 ASSAY OF MAGNESIUM: CPT | Performed by: NURSE PRACTITIONER

## 2021-06-26 PROCEDURE — 99232 SBSQ HOSP IP/OBS MODERATE 35: CPT | Performed by: INTERNAL MEDICINE

## 2021-06-26 PROCEDURE — 94760 N-INVAS EAR/PLS OXIMETRY 1: CPT

## 2021-06-26 PROCEDURE — 82948 REAGENT STRIP/BLOOD GLUCOSE: CPT

## 2021-06-26 PROCEDURE — 99232 SBSQ HOSP IP/OBS MODERATE 35: CPT | Performed by: NURSE PRACTITIONER

## 2021-06-26 RX ORDER — MAGNESIUM SULFATE 1 G/100ML
1 INJECTION INTRAVENOUS ONCE
Status: COMPLETED | OUTPATIENT
Start: 2021-06-26 | End: 2021-06-26

## 2021-06-26 RX ORDER — BISACODYL 10 MG
10 SUPPOSITORY, RECTAL RECTAL ONCE
Status: COMPLETED | OUTPATIENT
Start: 2021-06-26 | End: 2021-06-26

## 2021-06-26 RX ORDER — DOCUSATE SODIUM 100 MG/1
100 CAPSULE, LIQUID FILLED ORAL 2 TIMES DAILY
Status: DISCONTINUED | OUTPATIENT
Start: 2021-06-26 | End: 2021-06-28 | Stop reason: HOSPADM

## 2021-06-26 RX ADMIN — DOCUSATE SODIUM 100 MG: 100 CAPSULE, LIQUID FILLED ORAL at 17:39

## 2021-06-26 RX ADMIN — INSULIN LISPRO 3 UNITS: 100 INJECTION, SOLUTION INTRAVENOUS; SUBCUTANEOUS at 17:42

## 2021-06-26 RX ADMIN — PRAVASTATIN SODIUM 20 MG: 20 TABLET ORAL at 17:39

## 2021-06-26 RX ADMIN — POLYETHYLENE GLYCOL 3350 17 G: 17 POWDER, FOR SOLUTION ORAL at 10:42

## 2021-06-26 RX ADMIN — Medication 1000 UNITS: at 10:39

## 2021-06-26 RX ADMIN — FOLIC ACID 1000 MCG: 1 TABLET ORAL at 10:39

## 2021-06-26 RX ADMIN — INSULIN LISPRO 4 UNITS: 100 INJECTION, SOLUTION INTRAVENOUS; SUBCUTANEOUS at 12:37

## 2021-06-26 RX ADMIN — Medication 12.5 MG: at 17:43

## 2021-06-26 RX ADMIN — HYDROCORTISONE SODIUM SUCCINATE 25 MG: 100 INJECTION, POWDER, FOR SOLUTION INTRAMUSCULAR; INTRAVENOUS at 10:39

## 2021-06-26 RX ADMIN — APIXABAN 5 MG: 5 TABLET, FILM COATED ORAL at 10:39

## 2021-06-26 RX ADMIN — GUAIFENESIN 600 MG: 600 TABLET, EXTENDED RELEASE ORAL at 21:56

## 2021-06-26 RX ADMIN — INSULIN LISPRO 3 UNITS: 100 INJECTION, SOLUTION INTRAVENOUS; SUBCUTANEOUS at 08:30

## 2021-06-26 RX ADMIN — Medication 250 MG: at 10:39

## 2021-06-26 RX ADMIN — MIDODRINE HYDROCHLORIDE 10 MG: 5 TABLET ORAL at 06:09

## 2021-06-26 RX ADMIN — CALCIUM CARBONATE 500 MG (1,250 MG)-VITAMIN D3 200 UNIT TABLET 1 TABLET: at 10:39

## 2021-06-26 RX ADMIN — FUROSEMIDE 40 MG: 40 TABLET ORAL at 10:39

## 2021-06-26 RX ADMIN — MIDODRINE HYDROCHLORIDE 10 MG: 5 TABLET ORAL at 17:39

## 2021-06-26 RX ADMIN — MIRTAZAPINE 15 MG: 15 TABLET, FILM COATED ORAL at 10:39

## 2021-06-26 RX ADMIN — INSULIN LISPRO 1 UNITS: 100 INJECTION, SOLUTION INTRAVENOUS; SUBCUTANEOUS at 21:57

## 2021-06-26 RX ADMIN — BISACODYL 10 MG: 10 SUPPOSITORY RECTAL at 17:39

## 2021-06-26 RX ADMIN — INSULIN LISPRO 4 UNITS: 100 INJECTION, SOLUTION INTRAVENOUS; SUBCUTANEOUS at 17:41

## 2021-06-26 RX ADMIN — PANTOPRAZOLE SODIUM 40 MG: 40 TABLET, DELAYED RELEASE ORAL at 05:13

## 2021-06-26 RX ADMIN — APIXABAN 5 MG: 5 TABLET, FILM COATED ORAL at 17:39

## 2021-06-26 RX ADMIN — MIDODRINE HYDROCHLORIDE 10 MG: 5 TABLET ORAL at 12:37

## 2021-06-26 RX ADMIN — Medication 250 MG: at 17:39

## 2021-06-26 RX ADMIN — INSULIN LISPRO 3 UNITS: 100 INJECTION, SOLUTION INTRAVENOUS; SUBCUTANEOUS at 12:36

## 2021-06-26 RX ADMIN — MAGNESIUM SULFATE HEPTAHYDRATE 1 G: 1 INJECTION, SOLUTION INTRAVENOUS at 17:39

## 2021-06-26 RX ADMIN — CYANOCOBALAMIN TAB 500 MCG 1000 MCG: 500 TAB at 10:39

## 2021-06-26 RX ADMIN — INSULIN LISPRO 4 UNITS: 100 INJECTION, SOLUTION INTRAVENOUS; SUBCUTANEOUS at 08:30

## 2021-06-26 RX ADMIN — GUAIFENESIN 600 MG: 600 TABLET, EXTENDED RELEASE ORAL at 10:39

## 2021-06-26 NOTE — ASSESSMENT & PLAN NOTE
Lab Results   Component Value Date    EGFR 23 06/26/2021    EGFR 20 06/25/2021    EGFR 18 06/24/2021    CREATININE 2 58 (H) 06/26/2021    CREATININE 2 92 (H) 06/25/2021    CREATININE 3 24 (H) 06/24/2021     Baseline creatinine appears to be around 0 9-1 2  Creatinine 1 55 on admission  Patient is on Lasix 40 mg daily at home, was held due to hypotension initially    Cr worsening during admission  Creatinine peaked at 3 84 on 06/21  Creatinine today 2 58  Likely due to ATN in the setting of hypotension, and possible contrast dye  Renal US: "No hydronephrosis benign-appearing simple cyst noted, ascites seen within the pelvis "   · Appreciate nephrology input  · S/p aggressive IV albumin and IVF  · Resumed home Lasix 40 mg daily   · Avoid nephrotoxins and hypotension  · Monitor BMP

## 2021-06-26 NOTE — ASSESSMENT & PLAN NOTE
History of non-small-cell lung cancer diagnosed in December 2019  Patient underwent VATS with pleural decortication and pleurodesis in 7/2020  Patient follows Dr Saundra Morgan  Not sure exactly of treatment plan  CT suggested possible advancement of metastatic disease with involvement of the lingula  Paracentesis cytology (+) malignancy with lung primary - patient and family made aware    Consulted oncology, appreciate input  Per oncology, "Recent paracentesis demonstrated TTF1 positive adenocarcinoma in the ascitic fluid  If the ascitic fluid reaccumulates persistently/significantly, patient will need to be evaluated for catheter placement  Additional information is needed  Progressive ascites would be consistent with disease progression - possibly patient's present regimen needs to be amended  Medical oncology will reach out to Dr Saundra Morgan for additional information including the most recent treatment plan  There are other options "  At this time, patient's prognosis is very guarded  His hypotension and CONCEPCIÓN are an issue  Multiple providers have discussed goals of care with patient and his wife  Multiple providers have suggested hospice at this time, however, patient remains treatment focused    · Plan for outpatient Oncology follow-up

## 2021-06-26 NOTE — PLAN OF CARE
Problem: MOBILITY - ADULT  Goal: Maintain or return to baseline ADL function  Description: INTERVENTIONS:  -  Assess patient's ability to carry out ADLs; assess patient's baseline for ADL function and identify physical deficits which impact ability to perform ADLs (bathing, care of mouth/teeth, toileting, grooming, dressing, etc )  - Assess/evaluate cause of self-care deficits   - Assess range of motion  - Assess patient's mobility; develop plan if impaired  - Assess patient's need for assistive devices and provide as appropriate  - Encourage maximum independence but intervene and supervise when necessary  - Involve family in performance of ADLs  - Assess for home care needs following discharge   - Consider OT consult to assist with ADL evaluation and planning for discharge  - Provide patient education as appropriate  Outcome: Progressing     Problem: MOBILITY - ADULT  Goal: Maintains/Returns to pre admission functional level  Description: INTERVENTIONS:  - Perform BMAT or MOVE assessment daily    - Set and communicate daily mobility goal to care team and patient/family/caregiver  - Collaborate with rehabilitation services on mobility goals if consulted  - Perform Range of Motion 4-6 times a day  - Reposition patient every 2 hours    - Dangle patient 2 times a day  - Stand patient 2 times a day  - Ambulate patient 2 times a day  - Out of bed to chair 2 times a day   - Out of bed for meals 2 times a day  - Out of bed for toileting  - Record patient progress and toleration of activity level   Outcome: Progressing

## 2021-06-26 NOTE — ASSESSMENT & PLAN NOTE
BP was persistently in the 80s and 90s  Random cortisol level low at 9 2  · Nephrology following, appreciate input  · Started stress-dosed steroids with Hydrocortisone 50 mg q8h on 6/20   · Planning to taper off steroids  Decreased Hydrocortisone to 25 mg IV q8h for 6/23 and 6/24   Decreased to 25 mg q 12 hr 6/25,then daily x 2 more days per discussion with attending  · Restarted home Lasix, 40 mg daily   · Continue Midodrine 10 mg TID  · YOLANDA stockings   · Monitor BP

## 2021-06-26 NOTE — ASSESSMENT & PLAN NOTE
Combination of SIADH and pre renal  Na 132 -> 130 -> 130 -> 136 ->128-> 130->131-> 134->136 ->140  Serum osmo low at 278  · Nephrology following, appreciate input  · Oral intake somewhat improved   · Restarted home Lasix, 40 mg daily   · Encourage nutritional supplements   · Monitor CMP

## 2021-06-26 NOTE — PLAN OF CARE
Problem: Prexisting or High Potential for Compromised Skin Integrity  Goal: Skin integrity is maintained or improved  Description: INTERVENTIONS:  - Identify patients at risk for skin breakdown  - Assess and monitor skin integrity  - Assess and monitor nutrition and hydration status  - Monitor labs   - Assess for incontinence   - Turn and reposition patient  - Assist with mobility/ambulation  - Relieve pressure over bony prominences  - Avoid friction and shearing  - Provide appropriate hygiene as needed including keeping skin clean and dry  - Evaluate need for skin moisturizer/barrier cream  - Collaborate with interdisciplinary team   - Patient/family teaching  - Consider wound care consult   Outcome: Progressing     Problem: MOBILITY - ADULT  Goal: Maintain or return to baseline ADL function  Description: INTERVENTIONS:  -  Assess patient's ability to carry out ADLs; assess patient's baseline for ADL function and identify physical deficits which impact ability to perform ADLs (bathing, care of mouth/teeth, toileting, grooming, dressing, etc )  - Assess/evaluate cause of self-care deficits   - Assess range of motion  - Assess patient's mobility; develop plan if impaired  - Assess patient's need for assistive devices and provide as appropriate  - Encourage maximum independence but intervene and supervise when necessary  - Involve family in performance of ADLs  - Assess for home care needs following discharge   - Consider OT consult to assist with ADL evaluation and planning for discharge  - Provide patient education as appropriate  Outcome: Progressing  Goal: Maintains/Returns to pre admission functional level  Description: INTERVENTIONS:  - Perform BMAT or MOVE assessment daily    - Set and communicate daily mobility goal to care team and patient/family/caregiver  - Collaborate with rehabilitation services on mobility goals if consulted  - Perform Range of Motion 4-6 times a day    - Reposition patient every 2 hours   - Dangle patient 2 times a day  - Stand patient 2 times a day  - Ambulate patient 2 times a day  - Out of bed to chair 2 times a day   - Out of bed for meals 2 times a day  - Out of bed for toileting  - Record patient progress and toleration of activity level   Outcome: Progressing     Problem: Potential for Falls  Goal: Patient will remain free of falls  Description: INTERVENTIONS:  - Educate patient/family on patient safety including physical limitations  - Instruct patient to call for assistance with activity   - Consult OT/PT to assist with strengthening/mobility   - Keep Call bell within reach  - Keep bed low and locked with side rails adjusted as appropriate  - Keep care items and personal belongings within reach  - Initiate and maintain comfort rounds  - Make Fall Risk Sign visible to staff  - Offer Toileting every 2 Hours, in advance of need  - Initiate/Maintain bed/chair alarm  - Obtain necessary fall risk management equipment: chair alarm  - Apply yellow socks and bracelet for high fall risk patients  - Consider moving patient to room near nurses station  Outcome: Progressing     Problem: CARDIOVASCULAR - ADULT  Goal: Maintains optimal cardiac output and hemodynamic stability  Description: INTERVENTIONS:  - Monitor I/O, vital signs and rhythm  - Monitor for S/S and trends of decreased cardiac output  - Administer and titrate ordered vasoactive medications to optimize hemodynamic stability  - Assess quality of pulses, skin color and temperature  - Assess for signs of decreased coronary artery perfusion  - Instruct patient to report change in severity of symptoms  Outcome: Progressing     Problem: RESPIRATORY - ADULT  Goal: Achieves optimal ventilation and oxygenation  Description: INTERVENTIONS:  - Assess for changes in respiratory status  - Assess for changes in mentation and behavior  - Position to facilitate oxygenation and minimize respiratory effort  - Oxygen administered by appropriate delivery if ordered  - Initiate smoking cessation education as indicated  - Encourage broncho-pulmonary hygiene including cough, deep breathe, Incentive Spirometry  - Assess the need for suctioning and aspirate as needed  - Assess and instruct to report SOB or any respiratory difficulty  - Respiratory Therapy support as indicated  Outcome: Progressing     Problem: METABOLIC, FLUID AND ELECTROLYTES - ADULT  Goal: Electrolytes maintained within normal limits  Description: INTERVENTIONS:  - Monitor labs and assess patient for signs and symptoms of electrolyte imbalances  - Administer electrolyte replacement as ordered  - Monitor response to electrolyte replacements, including repeat lab results as appropriate  - Instruct patient on fluid and nutrition as appropriate  Outcome: Progressing  Goal: Fluid balance maintained  Description: INTERVENTIONS:  - Monitor labs   - Monitor I/O and WT  - Instruct patient on fluid and nutrition as appropriate  - Assess for signs & symptoms of volume excess or deficit  Outcome: Progressing  Goal: Glucose maintained within target range  Description: INTERVENTIONS:  - Monitor Blood Glucose as ordered  - Assess for signs and symptoms of hyperglycemia and hypoglycemia  - Administer ordered medications to maintain glucose within target range  - Assess nutritional intake and initiate nutrition service referral as needed  Outcome: Progressing     Problem: Nutrition/Hydration-ADULT  Goal: Nutrient/Hydration intake appropriate for improving, restoring or maintaining nutritional needs  Description: Monitor and assess patient's nutrition/hydration status for malnutrition  Collaborate with interdisciplinary team and initiate plan and interventions as ordered  Monitor patient's weight and dietary intake as ordered or per policy  Utilize nutrition screening tool and intervene as necessary  Determine patient's food preferences and provide high-protein, high-caloric foods as appropriate  INTERVENTIONS:  - Monitor oral intake, urinary output, labs, and treatment plans  - Assess nutrition and hydration status and recommend course of action  - Evaluate amount of meals eaten  - Assist patient with eating if necessary   - Allow adequate time for meals  - Recommend/ encourage appropriate diets, oral nutritional supplements, and vitamin/mineral supplements  - Assess need for intravenous fluids  - Provide specific nutrition/hydration education as appropriate  - Include patient/family/caregiver in decisions related to nutrition  Outcome: Progressing

## 2021-06-26 NOTE — ASSESSMENT & PLAN NOTE
Ca 7 8  · S/p Calcium 2gm IV x1 6/20   · Vit D level; normal   · Calcium today 8 0, corrected calcium 8 6  · Continue vitamin-D and Oscal

## 2021-06-26 NOTE — ASSESSMENT & PLAN NOTE
Patient would benefit from short-term rehab  PT OT evaluated the patient and recommended short-term rehab  · Case management following  Patient has a bed available at [facility]

## 2021-06-26 NOTE — PROGRESS NOTES
Sadia 46  Progress Note Nivia Lepe 1945, 77 y o  male MRN: [id number]  Unit/Bed#: [address] Encounter: [id number]  Primary Care Provider: Kay Williamson MD   Date and time admitted to hospital: 6/14/2021 10:31 AM    * Ascites  Assessment & Plan  IR paracentesis on 6/14/2021 with removal of 2 L clear yellow ascites  IR paracentesis on 6/18/2021 with removal of 3 9 L of clear yellow ascites  IR paracentesis on 6/21/2021 with removal of 3 8 L of cloudy yellow ascites  Fluid cytology on 6/14 positive for malignancy, compatible with lung primary  Fluid culture shows no growth - patient is status post 7 day course of Levaquin for possible pneumonia  · Patient received IV albumin following paracentesis  · Appreciate oncology input; indicated that patient follows with Dr Jayna Banda, outpatient, recommend stabilizing patient and have him follow up outpatient once medically stable  · Patient aware of malignant ascites  Patient would like to discuss different treatment options with oncology  He is not ready for hospice at this time but would transition if all other treatment options were exhausted  · Resumed home Lasix 40 mg po daily  · Daily weight and I&Os  · Consult IR for paracentesis on Monday prior to possible discharge  Will recommend arrange for weekly paracentesis prior to discharge  Acute renal failure superimposed on stage 3 chronic kidney disease [facility])  Assessment & Plan  Lab Results   Component Value Date    EGFR 23 06/26/2021    EGFR 20 06/25/2021    EGFR 18 06/24/2021    CREATININE 2 58 (H) 06/26/2021    CREATININE 2 92 (H) 06/25/2021    CREATININE 3 24 (H) 06/24/2021     Baseline creatinine appears to be around 0 9-1 2  Creatinine 1 55 on admission  Patient is on Lasix 40 mg daily at home, was held due to hypotension initially    Cr worsening during admission  Creatinine peaked at 3 84 on 06/21  Creatinine today 2 58  Likely due to ATN in the setting of hypotension, and possible contrast dye  Renal US: "No hydronephrosis benign-appearing simple cyst noted, ascites seen within the pelvis "   · Appreciate nephrology input  · S/p aggressive IV albumin and IVF  · Resumed home Lasix 40 mg daily   · Avoid nephrotoxins and hypotension  · Monitor BMP    Hypotension  Assessment & Plan  BP was persistently in the 80s and 90s  Random cortisol level low at 9 2  · Nephrology following, appreciate input  · Started stress-dosed steroids with Hydrocortisone 50 mg q8h on 6/20   · Planning to taper off steroids  Decreased Hydrocortisone to 25 mg IV q8h for 6/23 and 6/24  Decreased to 25 mg q 12 hr 6/25,then daily x 2 more days per discussion with attending  · Restarted home Lasix, 40 mg daily   · Continue Midodrine 10 mg TID  · YOLANAD stockings   · Monitor BP    Carcinoma of lung, right [facility])  Assessment & Plan  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  If the ascitic fluid reaccumulates persistently/significantly, patient will need to be evaluated for catheter placement  Additional information is needed  Progressive ascites would be consistent with disease progression - possibly patient's present regimen needs to be amended  Medical oncology will reach out to Dr Nova Lainez for additional information including the most recent treatment plan  There are other options "  At this time, patient's prognosis is very guarded  His hypotension and CONCEPCIÓN are an issue    Multiple providers have discussed goals of care with patient and his wife  Multiple providers have suggested hospice at this time, however, patient remains treatment focused  · Plan for outpatient Oncology follow-up      Dysphagia  Assessment & Plan  Trouble swallowing solid food, thus, not eating much earlier during stay  Denies nausea, vomiting, chest pain  · Speech swallow eval was limited due to prolonged retching during assessment  · Consulted GI, appreciate input  · Underwent EGD: mild gastritis, gastric polyp   · Biopsy: "Mild chronic inactive gastritis  Negative for H  pylori by routine H&E  Negative for malignancy "  · Continue Protonix 40 mg daily   · Per speech therapy, increase to dysphagia 3 soft diet with thin liquids  · Appetite improving per patient, denies dysphagia currently    Hyponatremia  Assessment & Plan  Combination of SIADH and pre renal  Na 132 -> 130 -> 130 -> 136 ->128-> 130->131-> 134->136 ->140  Serum osmo low at 278  · Nephrology following, appreciate input  · Oral intake somewhat improved   · Restarted home Lasix, 40 mg daily   · Encourage nutritional supplements   · Monitor CMP    Pleural effusion  Assessment & Plan  Loculated right-sided pleural effusion  · IR evaluated the patient and not enough fluid for drainage  · Discontinued IVF 6/23  · Restarted Lasix 40 mg po daily 6/23     Hypocalcemia  Assessment & Plan  Ca 7 8  · S/p Calcium 2gm IV x1 6/20   · Vit D level; normal   · Calcium today 8 0, corrected calcium 8 6  · Continue vitamin-D and Oscal    Ambulatory dysfunction  Assessment & Plan  Patient would benefit from short-term rehab  PT OT evaluated the patient and recommended short-term rehab  · Case management following  Patient has a bed available at [facility]       Type 2 diabetes mellitus [facility])  Assessment & Plan  Lab Results   Component Value Date    HGBA1C 6 4 (H) 06/16/2021       Recent Labs     06/25/21  2051 06/26/21  0946 06/26/21  1125 06/26/21  1525   POCGLU 256* 307* 272* 309*       Blood Sugar Average: Last 72 hrs:  (P) 265 [id number]   Well controlled type 2 diabetes  Holding Amaryl  Glucose elevated likely due to hydrocortisone  Started Humalog 3 units 3 times daily with meals  Continue patient on low-dose sliding scale insulin with Accu-Chek QID    AV block  Assessment & Plan  Status post pacemaker insertion  · Continue holding Atenolol 2/2 hypotension  · Start low-dose metoprolol due to tachycardia   · Optimize electrolytes    History of pulmonary embolism  Assessment & Plan  History of DVT and PE  On Eliquis as outpatient  No acute PE on CTA  · Continue Eliquis 5 mg BID       VTE Pharmacologic Prophylaxis:   Pharmacologic: Apixaban (Eliquis)  Mechanical VTE Prophylaxis in Place: No    Patient Centered Rounds: I have performed bedside rounds with nursing staff today  Discussions with Specialists or Other Care Team Provider:  Attending    Education and Discussions with Family / Patient:  Yes    Time Spent for Care: 20 minutes  More than 50% of total time spent on counseling and coordination of care as described above  Current Length of Stay: 12 day(s)    Current Patient Status: Inpatient   Certification Statement: The patient will continue to require additional inpatient hospital stay due to Ascites, hypotension    Discharge Plan:  Short-term rehab on Monday post paracentesis if remains stable    Code Status: Level 2 - DNAR: but accepts endotracheal intubation      Subjective:   Patient reports abdominal bloating and right lower chest pain from cancer  Denies nausea vomiting diarrhea  Reports no BM for 2-3 days  Denies chest pain, palpitation, headache, SOB, fever, chills  Reports slight dizzy at times      Objective:     Vitals:   Temp (24hrs), Av 7 °F (36 5 °C), Min:97 5 °F (36 4 °C), Max:97 9 °F (36 6 °C)    Temp:  [97 5 °F (36 4 °C)-97 9 °F (36 6 °C)] 97 9 °F (36 6 °C)  HR:  [] 111  Resp:  [16-18] 16  BP: (104-125)/(68-85) 104/68  SpO2:  [93 %-95 %] 93 %  Body mass index is 33 32 kg/m²  Input and Output Summary (last 24 hours): Intake/Output Summary (Last 24 hours) at 6/26/2021 1638  Last data filed at 6/26/2021 0514  Gross per 24 hour   Intake --   Output 825 ml   Net -825 ml       Physical Exam:     Physical Exam  Vitals and nursing note reviewed  Constitutional:       Appearance: He is well-developed  HENT:      Head: Normocephalic and atraumatic  Neck:      Thyroid: No thyromegaly  Vascular: No JVD  Trachea: No tracheal deviation  Cardiovascular:      Rate and Rhythm: Tachycardia present  Heart sounds: Normal heart sounds  Comments: Heart rate 120s on bedside monitor  Pulmonary:      Effort: Pulmonary effort is normal  No respiratory distress  Breath sounds: Normal breath sounds  No wheezing or rales  Abdominal:      General: Bowel sounds are normal  There is distension  Palpations: Abdomen is soft  Tenderness: There is no abdominal tenderness  There is no guarding  Musculoskeletal:         General: Swelling present  Cervical back: Neck supple  Comments: Lower extremity nonpitting edema  Skin:     General: Skin is warm and dry  Neurological:      General: No focal deficit present  Mental Status: He is alert and oriented to person, place, and time     Psychiatric:         Mood and Affect: Mood normal          Judgment: Judgment normal          Additional Data:     Labs:    Results from last 7 days   Lab Units 06/25/21  0521   WBC Thousand/uL 4 27*   HEMOGLOBIN g/dL 9 1*   HEMATOCRIT % 29 7*   PLATELETS Thousands/uL 90*   NEUTROS PCT % 73   LYMPHS PCT % 13*   MONOS PCT % 11   EOS PCT % 1     Results from last 7 days   Lab Units 06/26/21  0512 06/25/21  0521   POTASSIUM mmol/L 4 0 4 2   CHLORIDE mmol/L 100 101   CO2 mmol/L 27 29   BUN mg/dL 62* 63*   CREATININE mg/dL 2 58* 2 92*   CALCIUM mg/dL 8 5 8 0*   ALK PHOS U/L  --  54   ALT U/L  --  18   AST U/L  --  14     Results from last 7 days   Lab Units 06/22/21  0459   INR  2 11*       * I Have Reviewed All Lab Data Listed Above  * Additional Pertinent Lab Tests Reviewed:  Blas 67 Admission Reviewed    Imaging:    Imaging Reports Reviewed Today Include:  None  Imaging Personally Reviewed by Myself Includes:  None    Recent Cultures (last 7 days):           Last 24 Hours Medication List:   Current Facility-Administered Medications   Medication Dose Route Frequency Provider Last Rate    albuterol  2 5 mg Nebulization Q6H PRN NERISSA Nunez      apixaban  5 mg Oral BID NERISSA Nunez      bisacodyl  10 mg Rectal Once NERISSA Hagan      calcium carbonate-vitamin D  1 tablet Oral Daily With Breakfast Jose Perez MD      cholecalciferol  1,000 Units Oral Daily Jose Perez MD      cyanocobalamin  1,000 mcg Oral Daily Jose Perez MD      docusate sodium  100 mg Oral BID NERISSA Hagan      folic acid  2,358 mcg Oral Daily Jose Perez MD      furosemide  40 mg Oral Daily Andra Hay MD      guaiFENesin  600 mg Oral Q12H [address] NERISSA Nunez      hydrocortisone sodium succinate  25 mg Intravenous Daily NERISSA Hagan      insulin lispro  1-6 Units Subcutaneous TID [facility] [place] Jose Perez MD      insulin lispro  3 Units Subcutaneous TID With Meals NERISSA Nunez      magnesium sulfate  1 g Intravenous Once NERISSA Hagan      melatonin  3 mg Oral HS PRN Jose Perez MD      metoprolol tartrate  12 5 mg Oral Q12H [address] CuNERISSA Hayward      midodrine  10 mg Oral TID [facility] [place] Gina Meza MD      mirtazapine  15 mg Oral Daily Jose Perez MD      ondansetron  4 mg Intravenous Q6H PRN NERISSA Nunez      oxyCODONE-acetaminophen  1 tablet Oral Q6H PRN Jose Perez MD      pantoprazole  40 mg Oral QAM Jose Perez MD      polyethylene glycol  17 g Oral Daily Sid DitchNERISSA      pravastatin  20 mg Oral Daily With MD Prem Young boulardii  250 mg Oral BID Amparo Blair MD          Today, Patient Was Seen By: NERISSA Szymanski    ** Please Note: Dragon 360 Dictation voice to text software may have been used in the creation of this document   **

## 2021-06-26 NOTE — ASSESSMENT & PLAN NOTE
Lab Results   Component Value Date    HGBA1C 6 4 (H) 06/16/2021       Recent Labs     06/25/21 2051 06/26/21  0946 06/26/21  1125 06/26/21  1525   POCGLU 256* 307* 272* 309*       Blood Sugar Average: Last 72 hrs:  (P) 709 [id number]   Well controlled type 2 diabetes  Holding Amaryl  Glucose elevated likely due to hydrocortisone  Started Humalog 3 units 3 times daily with meals  Continue patient on low-dose sliding scale insulin with Accu-Chek QID

## 2021-06-26 NOTE — ASSESSMENT & PLAN NOTE
IR paracentesis on 6/14/2021 with removal of 2 L clear yellow ascites  IR paracentesis on 6/18/2021 with removal of 3 9 L of clear yellow ascites  IR paracentesis on 6/21/2021 with removal of 3 8 L of cloudy yellow ascites  Fluid cytology on 6/14 positive for malignancy, compatible with lung primary  Fluid culture shows no growth - patient is status post 7 day course of Levaquin for possible pneumonia  · Patient received IV albumin following paracentesis  · Appreciate oncology input; indicated that patient follows with Dr Minda Osler, outpatient, recommend stabilizing patient and have him follow up outpatient once medically stable  · Patient aware of malignant ascites  Patient would like to discuss different treatment options with oncology  He is not ready for hospice at this time but would transition if all other treatment options were exhausted  · Resumed home Lasix 40 mg po daily  · Daily weight and I&Os  · Consult IR for paracentesis on Monday prior to possible discharge  Will recommend arrange for weekly paracentesis prior to discharge

## 2021-06-26 NOTE — ASSESSMENT & PLAN NOTE
Status post pacemaker insertion  · Continue holding Atenolol 2/2 hypotension  · Start low-dose metoprolol due to tachycardia   · Optimize electrolytes

## 2021-06-26 NOTE — PROGRESS NOTES
NEPHROLOGY PROGRESS NOTE   Enid Domínguez 77 y o  male MRN: [id number]  Unit/Bed#: [address] [address] Encounter: [id number]    ASSESSMENT & PLAN:   77 y o  male  with history of non-small cell lung cancer status post VATS, diabetes mellitus, hypertension ho was admitted to [facility] on 6/14/21 after being transferred from [facility] where he presented with SOB  He was transferred to [address] [place] [address] after he was found to be hypotensive at [facility]   Nephrology consulted for acute kidney injury    Acute kidney injury, POA  -Baseline creatinine:  0 9-1 2 mg/dl, reviewed baseline creatinine from Care everywhere, creatinine was 1 2 in December 2020  -Admission creatinine:  1 59 mg/dl  - Work up:   · UA with microscopy:  0-1 RBC per high-power field, 10-20 RBC per high-power field, no protein  · Imaging:  Renal ultrasound no hydronephrosis, left kidney with upper pole cyst 2 9 cm in size along with scarring  -Etiology:  Acute kidney injury likely due to ATN in the setting of hypotension  also have a component of contrast nephropathy as patient received CTA with contrast on 06/13 but renal function started worsening since 06/19 which is slightly late onset but still possible   [facility] Course:  Renal function continue to worsen with peak creatinine 3 8 on 06/21 and had worsening acidosis on 06/21 after which was placed on bicarb drip and received medical treatment for hyperkalemia on 06/21     -was started on Lasix 40 mg daily on 06/23  -Renal function improved to creatinine 2 58 today   -Plan:   · Renal function improving, no indication for renal replacement therapy at this time    Continue to monitor renal function  · Continue Lasix 40 mg daily  · Discussed regarding goals of care with the patient in the setting of metastatic malignancy, patient wants everything done as per my discussion with him  · discussed about risk and benefit with the patient regarding dialysis, patient signed consent  which was placed in the chart on 06/21  · Overall stable for outpatient care from Nephrology side  · Avoid nephrotoxins and dose all medications per EGFR  · Avoid hypotension  Continue midodrine  Previously was treated with albumin but now off albumin  Blood pressure has improved  Would continue midodrine on discharge    BP/hypotension  -blood pressure acceptable and improving  -echocardiogram showed ejection fraction of 55-60% with indeterminate diastolic function  -status post IV albumin     -continue midodrine 10 mg t i d   -continue hydrocortisone per primary team as a random cortisol level was low at 9 2 on 06/20, dose was tapered per primary team  -ascitic fluid was positive for wbc's -3387 but culture and  g stain negative for any growth  completed antibiotics for possible pneumonia    Hyponatremia- resolved :    -was Likely due to free water intake in the setting of acute kidney injury with poor free water excretion  -continue fluid restriction 1800 mL per day  -also TSH was elevated indicating hypothyroidism but free T4 within normal limit  -sodium level now within normal range at 138 mg/dl    Hyperphosphatemia:  Due to acute kidney injury, trending down 4 6  continue to monitor  Metabolic acidosis:   -lactic acid was within normal limit at 0 9, likely metabolic acidosis in the setting of acute kidney injury  -bicarb level improved with bicarbonate drip given on 06/21  Stable at level 27  -continue to monitor    Anemia:  Hemoglobin 9 1, continue to monitor per primary team    Hypocalcemia: resolved  Calcium level now improving and corrected calcium within normal range  Vitamin-D level within normal range at 49 9    Continue Oscal and vitamin-D at current dose    Ascites status post IR paracentesis on 06/14 and 6/18 and 6/21/lower extremity edema     -Ascitic fluid positive for malignancy compatible with lung as primary   -last paracentesis was on 06/21 for 3 8 L   -continue diuretics as mentioned above    Hyperkalemia on 06/21:  Resolved with medical treatment    Others:  Right pleural effusion/malignant ascites/non-small cell lung cancer followed with Dr Lacey Peña of DVT and PE on Eliquis as outpatient  Discussed with primary team advanced practitioner  Patient is stable for outpatient care from Nephrology side  Will arrange for outpatient follow-up, office informed  Repeat BMP 1 week to monitor renal function      SUBJECTIVE:  Lower extremity edema stable, no new complaints  No chest pain or shortness of breath  Denies any abdominal pain    OBJECTIVE:  Current Weight: Weight - Scale: 96 5 kg (212 lb 11 9 oz)  Vitals:    06/26/21 0819   BP: 119/85   Pulse: (!) 111   Resp:    Temp:    SpO2: 93%   /85   Pulse (!) 111   Temp 97 6 °F (36 4 °C)   Resp 18   Ht 5' 7" (1 702 m)   Wt 96 5 kg (212 lb 11 9 oz)   SpO2 93%   BMI 33 32 kg/m²       Intake/Output Summary (Last 24 hours) at 6/26/2021 0846  Last data filed at 6/26/2021 0514  Gross per 24 hour   Intake --   Output 825 ml   Net -825 ml       Physical Exam  General:  Ill looking, awake  Eyes: Conjunctivae pink,  Sclera anicteric  ENT: lips and mucous membranes moist  Neck: supple   Chest: Clear to Auscultation both lungs,  no crackles, ronchus or wheezing  CVS: S1 & S2 present, normal rate, regular rhythm, no murmur    Abdomen: soft, non-tender, non-distended, Bowel sounds normoactive  Extremities:  1 + edema of  legs  Skin: no rash  Neuro: awake, alert, oriented x 3   Psych: Mood and affect appropriate       Medications:    Current Facility-Administered Medications:     albuterol inhalation solution 2 5 mg, 2 5 mg, Nebulization, Q6H PRN, NERISSA Monroe    apixaban Sinan Kenny) tablet 5 mg, 5 mg, Oral, BID, NERISSA Mcqueen, 5 mg at 06/25/21 2323    calcium carbonate-vitamin D (OSCAL-D) 500 mg-200 units per tablet 1 tablet, 1 tablet, Oral, Daily With Breakfast, Mark Mulligan MD, 1 tablet at 06/25/21 0939    cholecalciferol (VITAMIN D3) tablet 1,000 Units, 1,000 Units, Oral, Daily, Artemisa Gottron, MD, 1,000 Units at 06/25/21 0939    cyanocobalamin (VITAMIN B-12) tablet 1,000 mcg, 1,000 mcg, Oral, Daily, Artemisa Gottron, MD, 1,000 mcg at 11/71/57 5639    folic acid (FOLVITE) tablet 1,000 mcg, 1,000 mcg, Oral, Daily, Artemisa Gottron, MD, 1,000 mcg at 06/25/21 0939    furosemide (LASIX) tablet 40 mg, 40 mg, Oral, Daily, Manny Gonzales MD, 40 mg at 06/25/21 0939    guaiFENesin (MUCINEX) 12 hr tablet 600 mg, 600 mg, Oral, Q12H [facility], NERISSA Napoles, 600 mg at 06/25/21 2217    hydrocortisone sodium succinate (PF) (Solu-CORTEF) injection 25 mg, 25 mg, Intravenous, Daily, NERISSA Hagan    insulin lispro (HumaLOG) 100 units/mL subcutaneous injection 1-6 Units, 1-6 Units, Subcutaneous, TID AC, 1 Units at 06/25/21 1738 **AND** Fingerstick Glucose (POCT), , , TID AC, Artemisa Gottron, MD    insulin lispro (HumaLOG) 100 units/mL subcutaneous injection 3 Units, 3 Units, Subcutaneous, TID With Meals, NERISSA Napoles, 3 Units at 06/25/21 1739    melatonin tablet 3 mg, 3 mg, Oral, HS PRN, Artemisa Gottron, MD, 3 mg at 06/15/21 2140    midodrine (PROAMATINE) tablet 10 mg, 10 mg, Oral, TID AC, Edmund Acosta MD, 10 mg at 06/26/21 0609    mirtazapine (REMERON) tablet 15 mg, 15 mg, Oral, Daily, Artemisa Gottron, MD, 15 mg at 06/25/21 0939    ondansetron (ZOFRAN) injection 4 mg, 4 mg, Intravenous, Q6H PRN, NERISSA Napoles    oxyCODONE-acetaminophen (PERCOCET) 5-325 mg per tablet 1 tablet, 1 tablet, Oral, Q6H PRN, Artemisa Gottron, MD, 1 tablet at 06/16/21 2028    pantoprazole (PROTONIX) EC tablet 40 mg, 40 mg, Oral, QAM, Artemisa Gottron, MD, 40 mg at 06/26/21 0513    polyethylene glycol (MIRALAX) packet 17 g, 17 g, Oral, Daily, [employer], CRNP, 17 g at 06/25/21 2510    pravastatin (PRAVACHOL) tablet 20 mg, 20 mg, Oral, Daily With Sherri Gallego MD, 20 mg at 06/25/21 1531    saccharomyces boulardii (FLORASTOR) capsule 250 mg, 250 mg, Oral, BID, Mark Mulligan MD, 250 mg at 06/25/21 1738    Invasive Devices:        Lab Results:   Results from last 7 days   Lab Units 06/26/21  0512 06/25/21  0521 06/24/21  0631 06/23/21  0542 06/22/21  0459 06/21/21  0512   WBC Thousand/uL  --  4 27* 3 96* 3 07* 2 92* 3 11*   HEMOGLOBIN g/dL  --  9 1* 9 0* 8 4* 8 2* 8 8*   HEMATOCRIT %  --  29 7* 29 1* 26 8* 26 0* 29 1*   PLATELETS Thousands/uL  --  90* 97* 103* 104* 113*   POTASSIUM mmol/L 4 0 4 2 3 9 3 6 4 1 5 6*   CHLORIDE mmol/L 100 101 100 97* 94* 96*   CO2 mmol/L 27 29 26 26 26 17*   BUN mg/dL 62* 63* 60* 56* 55* 47*   CREATININE mg/dL 2 58* 2 92* 3 24* 3 33* 3 58* 3 51*   CALCIUM mg/dL 8 5 8 0* 8 0* 7 9* 7 6* 7 5*   MAGNESIUM mg/dL 1 9 2 0 2 0 2 0  --  2 2   PHOSPHORUS mg/dL  --   --  4 6* 5 2* 5 5* 5 4*   ALK PHOS U/L  --  54 42*  --   --  28*   ALT U/L  --  18 15  --   --  14   AST U/L  --  14 10  --   --  14       Previous work up:  Renal ultrasound pending    CTA chest, no PE, increased opacification right lung base suggesting worsening atelectasis or pneumonia with finding of complex right-sided loculated pleural effusion, finding of 4 mm lung nodule in the lingula suspicious for metastatic disease  Also finding of malignant mediastinal adenopathy and  Ascites    Portions of the record may have been created with voice recognition software  Occasional wrong word or "sound a like" substitutions may have occurred due to the inherent limitations of voice recognition software  Read the chart carefully and recognize, using context, where substitutions have occurred  If you have any questions, please contact the dictating provider

## 2021-06-27 LAB
ANION GAP SERPL CALCULATED.3IONS-SCNC: 8 MMOL/L (ref 4–13)
BUN SERPL-MCNC: 65 MG/DL (ref 5–25)
CALCIUM SERPL-MCNC: 8.4 MG/DL (ref 8.3–10.1)
CHLORIDE SERPL-SCNC: 100 MMOL/L (ref 100–108)
CO2 SERPL-SCNC: 31 MMOL/L (ref 21–32)
CREAT SERPL-MCNC: 2.5 MG/DL (ref 0.6–1.3)
ERYTHROCYTE [DISTWIDTH] IN BLOOD BY AUTOMATED COUNT: 19.9 % (ref 11.6–15.1)
GFR SERPL CREATININE-BSD FRML MDRD: 24 ML/MIN/1.73SQ M
GLUCOSE SERPL-MCNC: 107 MG/DL (ref 65–140)
GLUCOSE SERPL-MCNC: 130 MG/DL (ref 65–140)
GLUCOSE SERPL-MCNC: 141 MG/DL (ref 65–140)
GLUCOSE SERPL-MCNC: 159 MG/DL (ref 65–140)
GLUCOSE SERPL-MCNC: 184 MG/DL (ref 65–140)
HCT VFR BLD AUTO: 33.7 % (ref 36.5–49.3)
HGB BLD-MCNC: 10.3 G/DL (ref 12–17)
MAGNESIUM SERPL-MCNC: 2 MG/DL (ref 1.6–2.6)
MCH RBC QN AUTO: 27.8 PG (ref 26.8–34.3)
MCHC RBC AUTO-ENTMCNC: 30.6 G/DL (ref 31.4–37.4)
MCV RBC AUTO: 91 FL (ref 82–98)
PLATELET # BLD AUTO: 92 THOUSANDS/UL (ref 149–390)
PMV BLD AUTO: 10 FL (ref 8.9–12.7)
POTASSIUM SERPL-SCNC: 4.4 MMOL/L (ref 3.5–5.3)
RBC # BLD AUTO: 3.7 MILLION/UL (ref 3.88–5.62)
SODIUM SERPL-SCNC: 139 MMOL/L (ref 136–145)
WBC # BLD AUTO: 8.26 THOUSAND/UL (ref 4.31–10.16)

## 2021-06-27 PROCEDURE — 83735 ASSAY OF MAGNESIUM: CPT | Performed by: NURSE PRACTITIONER

## 2021-06-27 PROCEDURE — 85027 COMPLETE CBC AUTOMATED: CPT | Performed by: NURSE PRACTITIONER

## 2021-06-27 PROCEDURE — 80048 BASIC METABOLIC PNL TOTAL CA: CPT | Performed by: NURSE PRACTITIONER

## 2021-06-27 PROCEDURE — 99232 SBSQ HOSP IP/OBS MODERATE 35: CPT | Performed by: INTERNAL MEDICINE

## 2021-06-27 PROCEDURE — 82948 REAGENT STRIP/BLOOD GLUCOSE: CPT

## 2021-06-27 PROCEDURE — 99232 SBSQ HOSP IP/OBS MODERATE 35: CPT | Performed by: NURSE PRACTITIONER

## 2021-06-27 RX ADMIN — MIRTAZAPINE 15 MG: 15 TABLET, FILM COATED ORAL at 09:34

## 2021-06-27 RX ADMIN — INSULIN LISPRO 1 UNITS: 100 INJECTION, SOLUTION INTRAVENOUS; SUBCUTANEOUS at 13:27

## 2021-06-27 RX ADMIN — INSULIN LISPRO 3 UNITS: 100 INJECTION, SOLUTION INTRAVENOUS; SUBCUTANEOUS at 13:27

## 2021-06-27 RX ADMIN — GUAIFENESIN 600 MG: 600 TABLET, EXTENDED RELEASE ORAL at 09:33

## 2021-06-27 RX ADMIN — MIDODRINE HYDROCHLORIDE 10 MG: 5 TABLET ORAL at 17:25

## 2021-06-27 RX ADMIN — HYDROCORTISONE SODIUM SUCCINATE 25 MG: 100 INJECTION, POWDER, FOR SOLUTION INTRAMUSCULAR; INTRAVENOUS at 09:37

## 2021-06-27 RX ADMIN — GUAIFENESIN 600 MG: 600 TABLET, EXTENDED RELEASE ORAL at 20:27

## 2021-06-27 RX ADMIN — CALCIUM CARBONATE 500 MG (1,250 MG)-VITAMIN D3 200 UNIT TABLET 1 TABLET: at 09:33

## 2021-06-27 RX ADMIN — MIDODRINE HYDROCHLORIDE 10 MG: 5 TABLET ORAL at 06:25

## 2021-06-27 RX ADMIN — FOLIC ACID 1000 MCG: 1 TABLET ORAL at 09:33

## 2021-06-27 RX ADMIN — Medication 250 MG: at 09:33

## 2021-06-27 RX ADMIN — FUROSEMIDE 40 MG: 40 TABLET ORAL at 09:34

## 2021-06-27 RX ADMIN — DOCUSATE SODIUM 100 MG: 100 CAPSULE, LIQUID FILLED ORAL at 09:33

## 2021-06-27 RX ADMIN — CYANOCOBALAMIN TAB 500 MCG 1000 MCG: 500 TAB at 09:34

## 2021-06-27 RX ADMIN — PANTOPRAZOLE SODIUM 40 MG: 40 TABLET, DELAYED RELEASE ORAL at 06:24

## 2021-06-27 RX ADMIN — Medication 3 MG: at 22:17

## 2021-06-27 RX ADMIN — APIXABAN 5 MG: 5 TABLET, FILM COATED ORAL at 17:25

## 2021-06-27 RX ADMIN — Medication 12.5 MG: at 20:27

## 2021-06-27 RX ADMIN — Medication 1000 UNITS: at 09:34

## 2021-06-27 RX ADMIN — Medication 12.5 MG: at 09:33

## 2021-06-27 RX ADMIN — MIDODRINE HYDROCHLORIDE 10 MG: 5 TABLET ORAL at 13:26

## 2021-06-27 RX ADMIN — INSULIN LISPRO 1 UNITS: 100 INJECTION, SOLUTION INTRAVENOUS; SUBCUTANEOUS at 17:24

## 2021-06-27 RX ADMIN — APIXABAN 5 MG: 5 TABLET, FILM COATED ORAL at 09:33

## 2021-06-27 RX ADMIN — OXYCODONE HYDROCHLORIDE AND ACETAMINOPHEN 1 TABLET: 5; 325 TABLET ORAL at 06:10

## 2021-06-27 RX ADMIN — INSULIN LISPRO 3 UNITS: 100 INJECTION, SOLUTION INTRAVENOUS; SUBCUTANEOUS at 09:59

## 2021-06-27 RX ADMIN — Medication 250 MG: at 17:25

## 2021-06-27 RX ADMIN — PRAVASTATIN SODIUM 20 MG: 20 TABLET ORAL at 17:25

## 2021-06-27 RX ADMIN — DOCUSATE SODIUM 100 MG: 100 CAPSULE, LIQUID FILLED ORAL at 17:25

## 2021-06-27 RX ADMIN — OXYCODONE HYDROCHLORIDE AND ACETAMINOPHEN 1 TABLET: 5; 325 TABLET ORAL at 20:28

## 2021-06-27 NOTE — ASSESSMENT & PLAN NOTE
Status post pacemaker insertion  · Continue holding Atenolol 2/2 hypotension  · Started low-dose metoprolol due to tachycardia with good response  Recommend to continue on discharge    · Optimize electrolytes

## 2021-06-27 NOTE — ASSESSMENT & PLAN NOTE
Lab Results   Component Value Date    EGFR 24 06/27/2021    EGFR 23 06/26/2021    EGFR 20 06/25/2021    CREATININE 2 50 (H) 06/27/2021    CREATININE 2 58 (H) 06/26/2021    CREATININE 2 92 (H) 06/25/2021     Baseline creatinine appears to be around 0 9-1 2  Creatinine 1 55 on admission  Patient is on Lasix 40 mg daily at home, was held due to hypotension initially    Cr worsening during admission  Creatinine peaked at 3 84 on 06/21  Creatinine today 2 50  Likely due to ATN in the setting of hypotension, and possible contrast dye  Renal US: "No hydronephrosis benign-appearing simple cyst noted, ascites seen within the pelvis "   · Appreciate nephrology input  · S/p aggressive IV albumin and IVF  · Resumed home Lasix 40 mg daily   · Avoid nephrotoxins and hypotension  · Monitor BMP

## 2021-06-27 NOTE — PROGRESS NOTES
[facility] 46  Progress Note Mynornette Cough 1945, 77 y o  male MRN: [id number]  Unit/Bed#: [address] Encounter: [id number]  Primary Care Provider: Neftali Heart MD   Date and time admitted to hospital: 6/14/2021 10:31 AM    * Ascites  Assessment & Plan  IR paracentesis on 6/14/2021 with removal of 2 L clear yellow ascites  IR paracentesis on 6/18/2021 with removal of 3 9 L of clear yellow ascites  IR paracentesis on 6/21/2021 with removal of 3 8 L of cloudy yellow ascites  Fluid cytology on 6/14 positive for malignancy, compatible with lung primary  Fluid culture shows no growth - patient is status post 7 day course of Levaquin for possible pneumonia  · Patient received IV albumin following paracentesis  · Appreciate oncology input; indicated that patient follows with Dr Cindy Figueroa, outpatient, recommend stabilizing patient and have him follow up outpatient once medically stable  · Patient aware of malignant ascites  Patient would like to discuss different treatment options with oncology  He is not ready for hospice at this time but would transition if all other treatment options were exhausted  · Resumed home Lasix 40 mg po daily  · Daily weight and I&Os  Abdomen is distended, patient reports pressure in abdomen  · Consult IR for paracentesis on Monday prior to possible discharge  Would recommend arrange for weekly paracentesis prior to discharge  Patient made aware of plan,in agreement  · Continue fluid restriction on discharge  Acute renal failure superimposed on stage 3 chronic kidney disease [facility])  Assessment & Plan  Lab Results   Component Value Date    EGFR 24 06/27/2021    EGFR 23 06/26/2021    EGFR 20 06/25/2021    CREATININE 2 50 (H) 06/27/2021    CREATININE 2 58 (H) 06/26/2021    CREATININE 2 92 (H) 06/25/2021     Baseline creatinine appears to be around 0 9-1 2  Creatinine 1 55 on admission    Patient is on Lasix 40 mg daily at home, was held due to hypotension initially  Cr worsening during admission  Creatinine peaked at 3 84 on 06/21  Creatinine today 2 50  Likely due to ATN in the setting of hypotension, and possible contrast dye  Renal US: "No hydronephrosis benign-appearing simple cyst noted, ascites seen within the pelvis "   · Appreciate nephrology input  · S/p aggressive IV albumin and IVF  · Resumed home Lasix 40 mg daily   · Avoid nephrotoxins and hypotension  · Monitor BMP    Hypotension  Assessment & Plan  BP was persistently in the 80s and 90s  Random cortisol level low at 9 2  · Nephrology following, appreciate input  · Started stress-dosed steroids with Hydrocortisone 50 mg q8h on 6/20   Tapered off today  · Restarted home Lasix, 40 mg daily   · Continue Midodrine 10 mg TID  · YOLANDA stockings   · Monitor BP    Carcinoma of lung, right [facility])  Assessment & Plan  History of non-small-cell lung cancer diagnosed in December 2019  Patient underwent VATS with pleural decortication and pleurodesis in 7/2020  Patient follows Dr Alexandra Feldman  Not sure exactly of treatment plan  CT suggested possible advancement of metastatic disease with involvement of the lingula  Paracentesis cytology (+) malignancy with lung primary - patient and family made aware    Consulted oncology, appreciate input  Per oncology, "Recent paracentesis demonstrated TTF1 positive adenocarcinoma in the ascitic fluid  If the ascitic fluid reaccumulates persistently/significantly, patient will need to be evaluated for catheter placement  Additional information is needed  Progressive ascites would be consistent with disease progression - possibly patient's present regimen needs to be amended  Medical oncology will reach out to Dr Alexandra Feldman for additional information including the most recent treatment plan  There are other options "  At this time, patient's prognosis is very guarded  His hypotension and CONCEPCIÓN are an issue    Multiple providers have discussed goals of care with patient and his wife   Multiple providers have suggested hospice at this time, however, patient remains treatment focused  · Plan for outpatient Oncology follow-up      Dysphagia  Assessment & Plan  Trouble swallowing solid food, thus, not eating much earlier during stay  Denies nausea, vomiting, chest pain currently  Denies dysphagia currently  · Initial speech swallow eval was limited due to prolonged retching during assessment  · Repeat speech swallow eval showed no s/s of oral or pharyngeal or esophageal dysphagia with soft solid food or thin liquids  Recommend upgrade diet as tolerated  · Consulted GI, appreciate input  · Underwent EGD: mild gastritis, gastric polyp   · Biopsy: "Mild chronic inactive gastritis  Negative for H  pylori by routine H&E  Negative for malignancy "  · Continue Protonix 40 mg daily   · Change diet to regular per patient request today  Appetite improving  Hyponatremia  Assessment & Plan  Combination of SIADH and pre renal  Na 132 -> 130 -> 130 -> 136 ->128-> 130->131-> 134->136 ->140  Normalized  Serum osmo low at 278  · Nephrology following, appreciate input  · Oral intake improved   · Restarted home Lasix, 40 mg daily   · Encourage nutritional supplements   · Monitor CMP    Pleural effusion  Assessment & Plan  Loculated right-sided pleural effusion  · IR evaluated the patient and not enough fluid for drainage  · Discontinued IVF 6/23  · Restarted Lasix 40 mg po daily 6/23     Hypocalcemia  Assessment & Plan  Ca 7 8  Improved  · S/p Calcium 2gm IV x1 6/20   · Vit D level; normal   · Continue vitamin-D and Oscal    Ambulatory dysfunction  Assessment & Plan  PT OT evaluated the patient earlier during stay and recommended short-term rehab  · Case management following  Patient has a bed available [facility]  · Patient now wants to go home with VNA as he is feeling much better now  Will need to notify CM in AM   · Plan to discharge tomorrow after paracentesis if remains stable  Type 2 diabetes mellitus [facility])  Assessment & Plan  Lab Results   Component Value Date    HGBA1C 6 4 (H) 06/16/2021       Recent Labs     06/26/21  2042 06/27/21  0756 06/27/21  1136 06/27/21  1610   POCGLU 173* 141* 159* 184*       Blood Sugar Average: Last 72 hrs:  (P) 222 2   Well controlled type 2 diabetes  Holding Amaryl  Glucose elevated likely due to hydrocortisone  Started Humalog 3 units 3 times daily with meals, will discontinue as hydrocortisone is tapered off today  Continue patient on low-dose sliding scale insulin with Accu-Chek QID    AV block  Assessment & Plan  Status post pacemaker insertion  · Continue holding Atenolol 2/2 hypotension  · Started low-dose metoprolol due to tachycardia with good response  Recommend to continue on discharge  · Optimize electrolytes    History of pulmonary embolism  Assessment & Plan  History of DVT and PE  On Eliquis as outpatient  No acute PE on CTA  · Continue Eliquis 5 mg BID       VTE Pharmacologic Prophylaxis:   Pharmacologic: Apixaban (Eliquis)  Mechanical VTE Prophylaxis in Place: No    Patient Centered Rounds: I have performed bedside rounds with nursing staff today  Discussions with Specialists or Other Care Team Provider:  Yes    Education and Discussions with Family / Patient:  Yes    Time Spent for Care: 20 minutes  More than 50% of total time spent on counseling and coordination of care as described above  Current Length of Stay: 13 day(s)    Current Patient Status: Inpatient   Certification Statement: The patient will continue to require additional inpatient hospital stay due to Ascites, acute kidney injury hypotension    Discharge Plan:  Home with VNA post paracentesis in a m  If remains stable    Code Status: Level 2 - DNAR: but accepts endotracheal intubation      Subjective:   Patient reports feeling much better  Reports pressure in abdomen and abdomen distension  Denies nausea vomiting diarrhea, constipation, fever, chills  Wants to go home tomorrow not rehab  Patient requesting regular diet  Objective:     Vitals:   Temp (24hrs), Av 4 °F (36 3 °C), Min:97 2 °F (36 2 °C), Max:97 6 °F (36 4 °C)    Temp:  [97 2 °F (36 2 °C)-97 6 °F (36 4 °C)] 97 2 °F (36 2 °C)  HR:  [73-99] 73  Resp:  [16-20] 16  BP: (101-121)/(66-81) 110/66  SpO2:  [92 %-98 %] 98 %  Body mass index is 33 15 kg/m²  Input and Output Summary (last 24 hours): Intake/Output Summary (Last 24 hours) at 2021 1642  Last data filed at 2021 0659  Gross per 24 hour   Intake --   Output 300 ml   Net -300 ml       Physical Exam:     Physical Exam  Vitals and nursing note reviewed  Constitutional:       Appearance: He is well-developed  He is obese  HENT:      Head: Normocephalic and atraumatic  Neck:      Thyroid: No thyromegaly  Vascular: No JVD  Trachea: No tracheal deviation  Cardiovascular:      Rate and Rhythm: Normal rate and regular rhythm  Heart sounds: Normal heart sounds  No murmur heard  Comments: Occasional early beats appreciated  Pulmonary:      Effort: Pulmonary effort is normal  No respiratory distress  Breath sounds: No wheezing or rales  Comments: Right lower lobe diminished breath sound, on room air, satting well  Abdominal:      General: Bowel sounds are normal  There is distension  Palpations: Abdomen is soft  Tenderness: There is no abdominal tenderness  There is no guarding  Musculoskeletal:         General: Swelling present  Cervical back: Neck supple  Right lower leg: Edema present  Left lower leg: Edema present  Comments: Nonpitting edema lower extremity  Skin:     General: Skin is warm and dry  Neurological:      General: No focal deficit present  Mental Status: He is alert and oriented to person, place, and time     Psychiatric:         Mood and Affect: Mood normal          Judgment: Judgment normal          Additional Data:     Labs:    Results from last 7 days   Lab Units 06/27/21  0615 06/25/21  0521   WBC Thousand/uL 8 26 4 27*   HEMOGLOBIN g/dL 10 3* 9 1*   HEMATOCRIT % 33 7* 29 7*   PLATELETS Thousands/uL 92* 90*   NEUTROS PCT %  --  73   LYMPHS PCT %  --  13*   MONOS PCT %  --  11   EOS PCT %  --  1     Results from last 7 days   Lab Units 06/27/21  0615 06/25/21  0521   POTASSIUM mmol/L 4 4 4 2   CHLORIDE mmol/L 100 101   CO2 mmol/L 31 29   BUN mg/dL 65* 63*   CREATININE mg/dL 2 50* 2 92*   CALCIUM mg/dL 8 4 8 0*   ALK PHOS U/L  --  54   ALT U/L  --  18   AST U/L  --  14     Results from last 7 days   Lab Units 06/22/21  0459   INR  2 11*       * I Have Reviewed All Lab Data Listed Above  * Additional Pertinent Lab Tests Reviewed:  [facility] 67 Admission Reviewed    Imaging:    Imaging Reports Reviewed Today Include:  None  Imaging Personally Reviewed by Myself Includes:  None    Recent Cultures (last 7 days):           Last 24 Hours Medication List:   Current Facility-Administered Medications   Medication Dose Route Frequency Provider Last Rate    albuterol  2 5 mg Nebulization Q6H PRN NERISSA Saunders      apixaban  5 mg Oral BID NERISSA Saunders      calcium carbonate-vitamin D  1 tablet Oral Daily With Breakfast Diane Vega MD      cholecalciferol  1,000 Units Oral Daily Diane Vega MD      cyanocobalamin  1,000 mcg Oral Daily Diane Vega MD      docusate sodium  100 mg Oral BID NERISSA Hagan      folic acid  8,943 mcg Oral Daily Diane Vega MD      furosemide  40 mg Oral Daily Roni Louis MD      guaiFENesin  600 mg Oral Q12H [address] NERISSA Saunders      insulin lispro  1-5 Units Subcutaneous HS NERISSA Hagan      insulin lispro  1-6 Units Subcutaneous TID AC Diane Vega MD      insulin lispro  3 Units Subcutaneous TID With Meals NERISSA Hagan      melatonin  3 mg Oral HS PRN Diane Vega MD      metoprolol tartrate  12 5 mg Oral Q12H NERISSA Gaming midodrine  10 mg Oral TID [facility] Shen Johnson MD      mirtazapine  15 mg Oral Daily Laury Wilson MD      ondansetron  4 mg Intravenous Q6H PRN NERISSA Winchester      oxyCODONE-acetaminophen  1 tablet Oral Q6H PRN Laury Wilson MD      pantoprazole  40 mg Oral QAM Laury Wilson MD      polyethylene glycol  17 g Oral Daily NERISSA Sandoval      pravastatin  20 mg Oral Daily With Amari Smith MD      saccharomyces boulardii  250 mg Oral BID Laury Wilson MD          Today, Patient Was Seen By: NERISSA Minor    ** Please Note: Dragon 360 Dictation voice to text software may have been used in the creation of this document   **

## 2021-06-27 NOTE — ASSESSMENT & PLAN NOTE
Combination of SIADH and pre renal  Na 132 -> 130 -> 130 -> 136 ->128-> 130->131-> 134->136 ->140  Normalized    Serum osmo low at 278  · Nephrology following, appreciate input  · Oral intake improved   · Restarted home Lasix, 40 mg daily   · Encourage nutritional supplements   · Monitor CMP

## 2021-06-27 NOTE — ASSESSMENT & PLAN NOTE
BP was persistently in the 80s and 90s  Random cortisol level low at 9 2  · Nephrology following, appreciate input  · Started stress-dosed steroids with Hydrocortisone 50 mg q8h on 6/20   Tapered off today    · Restarted home Lasix, 40 mg daily   · Continue Midodrine 10 mg TID  · YOLANDA stockings   · Monitor BP

## 2021-06-27 NOTE — ASSESSMENT & PLAN NOTE
IR paracentesis on 6/14/2021 with removal of 2 L clear yellow ascites  IR paracentesis on 6/18/2021 with removal of 3 9 L of clear yellow ascites  IR paracentesis on 6/21/2021 with removal of 3 8 L of cloudy yellow ascites  Fluid cytology on 6/14 positive for malignancy, compatible with lung primary  Fluid culture shows no growth - patient is status post 7 day course of Levaquin for possible pneumonia  · Patient received IV albumin following paracentesis  · Appreciate oncology input; indicated that patient follows with Dr Hyaes De Leon, outpatient, recommend stabilizing patient and have him follow up outpatient once medically stable  · Patient aware of malignant ascites  Patient would like to discuss different treatment options with oncology  He is not ready for hospice at this time but would transition if all other treatment options were exhausted  · Resumed home Lasix 40 mg po daily  · Daily weight and I&Os  Abdomen is distended, patient reports pressure in abdomen  · Consult IR for paracentesis on Monday prior to possible discharge  Would recommend arrange for weekly paracentesis prior to discharge  Patient made aware of plan,in agreement  · Continue fluid restriction on discharge

## 2021-06-27 NOTE — ASSESSMENT & PLAN NOTE
PT OT evaluated the patient earlier during stay and recommended short-term rehab  · Case management following  Patient has a bed available at [address]  · Patient now wants to go home with VNA as he is feeling much better now  Will need to notify CM in AM   · Plan to discharge tomorrow after paracentesis if remains stable

## 2021-06-27 NOTE — ASSESSMENT & PLAN NOTE
Ca 7 8  Improved  · S/p Calcium 2gm IV x1 6/20   · Vit D level; normal   · Continue vitamin-D and Oscal

## 2021-06-27 NOTE — ASSESSMENT & PLAN NOTE
History of non-small-cell lung cancer diagnosed in December 2019  Patient underwent VATS with pleural decortication and pleurodesis in 7/2020  Patient follows Dr Elysia Rodas  Not sure exactly of treatment plan  CT suggested possible advancement of metastatic disease with involvement of the lingula  Paracentesis cytology (+) malignancy with lung primary - patient and family made aware    Consulted oncology, appreciate input  Per oncology, "Recent paracentesis demonstrated TTF1 positive adenocarcinoma in the ascitic fluid  If the ascitic fluid reaccumulates persistently/significantly, patient will need to be evaluated for catheter placement  Additional information is needed  Progressive ascites would be consistent with disease progression - possibly patient's present regimen needs to be amended  Medical oncology will reach out to Dr Elysia Rodas for additional information including the most recent treatment plan  There are other options "  At this time, patient's prognosis is very guarded  His hypotension and CONCEPCIÓN are an issue  Multiple providers have discussed goals of care with patient and his wife  Multiple providers have suggested hospice at this time, however, patient remains treatment focused    · Plan for outpatient Oncology follow-up

## 2021-06-27 NOTE — ASSESSMENT & PLAN NOTE
Trouble swallowing solid food, thus, not eating much earlier during stay  Denies nausea, vomiting, chest pain currently  Denies dysphagia currently  · Initial speech swallow eval was limited due to prolonged retching during assessment  · Repeat speech swallow eval showed no s/s of oral or pharyngeal or esophageal dysphagia with soft solid food or thin liquids  Recommend upgrade diet as tolerated  · Consulted GI, appreciate input  · Underwent EGD: mild gastritis, gastric polyp   · Biopsy: "Mild chronic inactive gastritis  Negative for H  pylori by routine H&E  Negative for malignancy "  · Continue Protonix 40 mg daily   · Change diet to regular per patient request today  Appetite improving

## 2021-06-27 NOTE — PROGRESS NOTES
NEPHROLOGY PROGRESS NOTE   Liliana Castillo 77 y o  male MRN: [id number]  Unit/Bed#: 2 [place] [address] Encounter: [id number]    ASSESSMENT & PLAN:   77 y o  male  with history of non-small cell lung cancer status post VATS, diabetes mellitus, hypertension ho was admitted to [facility] on 6/14/21 after being transferred from [facility] where he presented with SOB  He was transferred to [address] [place] [address] after he was found to be hypotensive at [facility]   Nephrology consulted for acute kidney injury    Acute kidney injury, POA  -Baseline creatinine:  0 9-1 2 mg/dl, reviewed baseline creatinine from Care everywhere, creatinine was 1 2 in December 2020  -Admission creatinine:  1 59 mg/dl  - Work up:   · UA with microscopy:  0-1 RBC per high-power field, 10-20 RBC per high-power field, no protein  · Imaging:  Renal ultrasound no hydronephrosis, left kidney with upper pole cyst 2 9 cm in size along with scarring  -Etiology:  Acute kidney injury likely due to ATN in the setting of hypotension  also have a component of contrast nephropathy as patient received CTA with contrast on 06/13 but renal function started worsening since 06/19 which is slightly late onset but still possible   [facility]:  Renal function worsened to peak creatinine 3 8 on 06/21 and had worsening acidosis on 06/21 after which was placed on bicarb drip and received medical treatment for hyperkalemia on 06/21  Dialysis consent was obtained on the day   -was started on Lasix 40 mg daily on 06/23  -Renal function improved to creatinine 2 50 today   -Plan:   · Renal function improving, no indication for renal replacement therapy at this time  Continue to monitor renal function  · Continue Lasix 40 mg daily    Weight is trending down  · Discussed regarding goals of care with the patient in the setting of metastatic malignancy, patient wants everything done as per my discussion with him  · discussed about risk and benefit with the patient regarding dialysis, patient signed consent  which was placed in the chart on 06/21  · Overall stable for outpatient care from Nephrology side  · Avoid nephrotoxins and dose all medications per EGFR  · Avoid hypotension  Continue midodrine  Previously was treated with albumin but now off albumin  Blood pressure has improved  Would continue midodrine on discharge    BP/hypotension  -blood pressure acceptable and improving  -echocardiogram showed ejection fraction of 55-60% with indeterminate diastolic function  -status post IV albumin     -continue midodrine 10 mg t i d   -continue hydrocortisone per primary team as a random cortisol level was low at 9 2 on 06/20, dose was tapered per primary team and patient is now off hydrocortisone  -ascitic fluid was positive for wbc's -3387 but culture and  g stain negative for any growth and culture was negative  completed antibiotics for possible pneumonia    Hyponatremia- resolved :    -was Likely due to free water intake in the setting of acute kidney injury with poor free water excretion  -continue fluid restriction 1800 mL per day  -also TSH was elevated indicating hypothyroidism but free T4 within normal limit  -sodium level now within normal range at 138-139 mEq per L    Hyperphosphatemia:  Due to acute kidney injury, trending down 4 6  continue to monitor  Metabolic acidosis:   -lactic acid was within normal limit at 0 9, likely metabolic acidosis in the setting of acute kidney injury  -bicarb level improved with bicarbonate drip given on 06/21  Stable at level 31  -continue to monitor    Anemia:  Hemoglobin stable, continue to monitor per primary team    Hypocalcemia: resolved  Calcium level now improving and corrected calcium within normal range  Vitamin-D level within normal range at 49 9    Continue Oscal and vitamin-D at current dose    Ascites status post IR paracentesis on 06/14 and 6/18 and 6/21/lower extremity edema     -Ascitic fluid positive for malignancy compatible with lung as primary   -last paracentesis was on 06/21 for 3 8 L  Noted plan for repeat paracentesis on Monday prior to discharge for recurrent ascites  -continue diuretics as mentioned above    Hyperkalemia on 06/21:  Resolved with medical treatment    Others:  Right pleural effusion/malignant ascites/non-small cell lung cancer followed with Dr Moises Engel of DVT and PE on Eliquis as outpatient  Discussed with primary team advanced practitioner  Patient is stable for outpatient care from Nephrology side  Repeat BMP 1 week to monitor renal function      SUBJECTIVE:  Shortness of breath improving, lower extremity edema improving, no chest pain, has slight abdominal distension  No nausea vomiting    OBJECTIVE:  Current Weight: Weight - Scale: 96 kg (211 lb 10 3 oz)  Vitals:    06/26/21 2232   BP: 101/69   Pulse: 80   Resp: 16   Temp: (!) 97 3 °F (36 3 °C)   SpO2: 94%   /69   Pulse 80   Temp (!) 97 3 °F (36 3 °C)   Resp 16   Ht 5' 7" (1 702 m)   Wt 96 kg (211 lb 10 3 oz)   SpO2 94%   BMI 33 15 kg/m²       Intake/Output Summary (Last 24 hours) at 6/27/2021 0715  Last data filed at 6/27/2021 7973  Gross per 24 hour   Intake --   Output 300 ml   Net -300 ml       Physical Exam  General:  Ill looking, awake  Eyes: Conjunctivae pink,  Sclera anicteric  ENT: lips and mucous membranes moist  Neck: supple   Chest: Clear to Auscultation both lungs,  no crackles, ronchus or wheezing  CVS: S1 & S2 present, normal rate, regular rhythm, no murmur    Abdomen: soft, non-tender, slightly distended due to ascites, Bowel sounds normoactive  Extremities: 1 + edema of  legs  Skin: no rash  Neuro: awake, alert, oriented x 3   Psych: Mood and affect appropriate     Medications:    Current Facility-Administered Medications:     albuterol inhalation solution 2 5 mg, 2 5 mg, Nebulization, Q6H PRN, Lorelei Pry, CRNP    [facility]) tablet 5 mg, 5 mg, Oral, BID, Lorelei Pry, NERISSA, 5 mg at 06/26/21 1739    calcium carbonate-vitamin D (OSCAL-D) 500 mg-200 units per tablet 1 tablet, 1 tablet, Oral, Daily With Breakfast, Van Muñoz MD, 1 tablet at 06/26/21 1039    cholecalciferol (VITAMIN D3) tablet 1,000 Units, 1,000 Units, Oral, Daily, Van Muñoz MD, 1,000 Units at 06/26/21 1039    cyanocobalamin (VITAMIN B-12) tablet 1,000 mcg, 1,000 mcg, Oral, Daily, Van Muñoz MD, 1,000 mcg at 06/26/21 1039    docusate sodium (COLACE) capsule 100 mg, 100 mg, Oral, BID, NERISSA Hagan, 100 mg at 98/26/37 6707    folic acid (FOLVITE) tablet 1,000 mcg, 1,000 mcg, Oral, Daily, Van Muñoz MD, 1,000 mcg at 06/26/21 1039    furosemide (LASIX) tablet 40 mg, 40 mg, Oral, Daily, Abe Cornelius MD, 40 mg at 06/26/21 1039    guaiFENesin (MUCINEX) 12 hr tablet 600 mg, 600 mg, Oral, Q12H [address], NERISSA Alvarado, 600 mg at 06/26/21 2156    hydrocortisone sodium succinate (PF) (Solu-CORTEF) injection 25 mg, 25 mg, Intravenous, Daily, NERISSA Hagan, 25 mg at 06/26/21 1039    insulin lispro (HumaLOG) 100 units/mL subcutaneous injection 1-5 Units, 1-5 Units, Subcutaneous, HS, NERISSA Hagan, 1 Units at 06/26/21 2157    insulin lispro (HumaLOG) 100 units/mL subcutaneous injection 1-6 Units, 1-6 Units, Subcutaneous, TID AC, 4 Units at 06/26/21 1741 **AND** Fingerstick Glucose (POCT), , , TID AC, Van Muñoz MD    insulin lispro (HumaLOG) 100 units/mL subcutaneous injection 3 Units, 3 Units, Subcutaneous, TID With Meals, NERISSA Alvarado, 3 Units at 06/26/21 1742    melatonin tablet 3 mg, 3 mg, Oral, HS PRN, Van Muñoz MD, 3 mg at 06/15/21 2140    metoprolol tartrate (LOPRESSOR) partial tablet 12 5 mg, 12 5 mg, Oral, Q12H [address], Last Guzman, NERISSA, 12 5 mg at 06/26/21 1743    midodrine (PROAMATINE) tablet 10 mg, 10 mg, Oral, TID AC, Sony Magallanes MD, 10 mg at 06/27/21 0411    mirtazapine (REMERON) tablet 15 mg, 15 mg, Oral, Daily, Van Muñoz MD, 15 mg at 06/26/21 1039    ondansetron (ZOFRAN) injection 4 mg, 4 mg, Intravenous, Q6H PRN, Denise First, CRKRISTOPHER    oxyCODONE-acetaminophen (PERCOCET) 5-325 mg per tablet 1 tablet, 1 tablet, Oral, Q6H PRN, Nati Vieira MD, 1 tablet at 06/27/21 0610    pantoprazole (PROTONIX) EC tablet 40 mg, 40 mg, Oral, QAM, Nati Vieira MD, 40 mg at 06/27/21 9005    polyethylene glycol (MIRALAX) packet 17 g, 17 g, Oral, Daily, [employer], CRNP, 17 g at 06/26/21 1042    pravastatin (PRAVACHOL) tablet 20 mg, 20 mg, Oral, Daily With Jesi Albrecht MD, 20 mg at 06/26/21 1739    saccharomyces boulardii (FLORASTOR) capsule 250 mg, 250 mg, Oral, BID, Nati Vieira MD, 250 mg at 06/26/21 1739    Invasive Devices:        Lab Results:   Results from last 7 days   Lab Units 06/27/21  0615 06/26/21  0512 06/25/21  0521 06/24/21  0631 06/23/21  0542 06/22/21  0459 06/21/21  0512   WBC Thousand/uL 8 26  --  4 27* 3 96* 3 07* 2 92* 3 11*   HEMOGLOBIN g/dL 10 3*  --  9 1* 9 0* 8 4* 8 2* 8 8*   HEMATOCRIT % 33 7*  --  29 7* 29 1* 26 8* 26 0* 29 1*   PLATELETS Thousands/uL 92*  --  90* 97* 103* 104* 113*   POTASSIUM mmol/L 4 4 4 0 4 2 3 9 3 6 4 1 5 6*   CHLORIDE mmol/L 100 100 101 100 97* 94* 96*   CO2 mmol/L 31 27 29 26 26 26 17*   BUN mg/dL 65* 62* 63* 60* 56* 55* 47*   CREATININE mg/dL 2 50* 2 58* 2 92* 3 24* 3 33* 3 58* 3 51*   CALCIUM mg/dL 8 4 8 5 8 0* 8 0* 7 9* 7 6* 7 5*   MAGNESIUM mg/dL 2 0 1 9 2 0 2 0 2 0  --  2 2   PHOSPHORUS mg/dL  --   --   --  4 6* 5 2* 5 5* 5 4*   ALK PHOS U/L  --   --  54 42*  --   --  28*   ALT U/L  --   --  18 15  --   --  14   AST U/L  --   --  14 10  --   --  14       Previous work up:  Renal ultrasound pending    CTA chest, no PE, increased opacification right lung base suggesting worsening atelectasis or pneumonia with finding of complex right-sided loculated pleural effusion, finding of 4 mm lung nodule in the lingula suspicious for metastatic disease    Also finding of malignant mediastinal adenopathy and  Ascites    Portions of the record may have been created with voice recognition software  Occasional wrong word or "sound a like" substitutions may have occurred due to the inherent limitations of voice recognition software  Read the chart carefully and recognize, using context, where substitutions have occurred  If you have any questions, please contact the dictating provider

## 2021-06-27 NOTE — PLAN OF CARE
Problem: MOBILITY - ADULT  Goal: Maintain or return to baseline ADL function  Description: INTERVENTIONS:  -  Assess patient's ability to carry out ADLs; assess patient's baseline for ADL function and identify physical deficits which impact ability to perform ADLs (bathing, care of mouth/teeth, toileting, grooming, dressing, etc )  - Assess/evaluate cause of self-care deficits   - Assess range of motion  - Assess patient's mobility; develop plan if impaired  - Assess patient's need for assistive devices and provide as appropriate  - Encourage maximum independence but intervene and supervise when necessary  - Involve family in performance of ADLs  - Assess for home care needs following discharge   - Consider OT consult to assist with ADL evaluation and planning for discharge  - Provide patient education as appropriate  Outcome: Progressing     Problem: MOBILITY - ADULT  Goal: Maintains/Returns to pre admission functional level  Description: INTERVENTIONS:  - Perform BMAT or MOVE assessment daily    - Set and communicate daily mobility goal to care team and patient/family/caregiver  - Collaborate with rehabilitation services on mobility goals if consulted  - Perform Range of Motion 4-6 times a day  - Reposition patient every 2 hours    - Dangle patient 2 times a day  - Stand patient 2 times a day  - Ambulate patient 2 times a day  - Out of bed to chair 2 times a day   - Out of bed for meals 2 times a day  - Out of bed for toileting  - Record patient progress and toleration of activity level   Outcome: Progressing     Problem: Potential for Falls  Goal: Patient will remain free of falls  Description: INTERVENTIONS:  - Educate patient/family on patient safety including physical limitations  - Instruct patient to call for assistance with activity   - Consult OT/PT to assist with strengthening/mobility   - Keep Call bell within reach  - Keep bed low and locked with side rails adjusted as appropriate  - Keep care items and personal belongings within reach  - Initiate and maintain comfort rounds  - Make Fall Risk Sign visible to staff  - Offer Toileting every 2 Hours, in advance of need  - Initiate/Maintain bed/chair alarm  - Obtain necessary fall risk management equipment: chair alarm  - Apply yellow socks and bracelet for high fall risk patients  - Consider moving patient to room near nurses station  Outcome: Progressing

## 2021-06-27 NOTE — ASSESSMENT & PLAN NOTE
IR paracentesis on 6/14/2021 with removal of 2 L clear yellow ascites  IR paracentesis on 6/18/2021 with removal of 3 9 L of clear yellow ascites  IR paracentesis on 6/21/2021 with removal of 3 8 L of cloudy yellow ascites  IR paracentesis on 6/28/2021 with removal of 5 1 L  Fluid cytology on 6/14 positive for malignancy, compatible with lung primary  Fluid culture shows no growth - patient is status post 7 day course of Levaquin for possible pneumonia  · Patient received IV albumin following paracentesis  · Appreciate oncology input; indicated that patient follows with Dr Roni Park, outpatient, recommend stabilizing patient and have him follow up outpatient once medically stable  · Patient aware of malignant ascites  Patient would like to discuss different treatment options with oncology  He is not ready for hospice at this time but would transition if all other treatment options were exhausted  · Resumed home Lasix 40 mg po daily  · Daily weight and I&Os  · Arranged weekly paracentesis prior to discharge  Patient to be given albumin for any high volume paracenteses - orders provided to the infusion center for IV Albumin  · Continue fluid restriction on discharge

## 2021-06-27 NOTE — ASSESSMENT & PLAN NOTE
Lab Results   Component Value Date    HGBA1C 6 4 (H) 06/16/2021       Recent Labs     06/26/21  2042 06/27/21  0756 06/27/21  1136 06/27/21  1610   POCGLU 173* 141* 159* 184*       Blood Sugar Average: Last 72 hrs:  (P) 222 2   Well controlled type 2 diabetes  Holding Amaryl  Glucose elevated likely due to hydrocortisone  Started Humalog 3 units 3 times daily with meals, will discontinue as hydrocortisone is tapered off today    Continue patient on low-dose sliding scale insulin with Accu-Chek QID

## 2021-06-28 ENCOUNTER — TELEPHONE (OUTPATIENT)
Dept: NEPHROLOGY | Facility: CLINIC | Age: 76
End: 2021-06-28

## 2021-06-28 ENCOUNTER — APPOINTMENT (INPATIENT)
Dept: NON INVASIVE DIAGNOSTICS | Facility: HOSPITAL | Age: 76
DRG: 180 | End: 2021-06-28
Payer: COMMERCIAL

## 2021-06-28 VITALS
HEART RATE: 107 BPM | RESPIRATION RATE: 22 BRPM | WEIGHT: 215.5 LBS | DIASTOLIC BLOOD PRESSURE: 76 MMHG | OXYGEN SATURATION: 93 % | SYSTOLIC BLOOD PRESSURE: 118 MMHG | BODY MASS INDEX: 33.82 KG/M2 | TEMPERATURE: 97.5 F | HEIGHT: 67 IN

## 2021-06-28 PROBLEM — E87.1 HYPONATREMIA: Status: RESOLVED | Noted: 2021-06-13 | Resolved: 2021-06-28

## 2021-06-28 PROBLEM — R18.0 MALIGNANT ASCITES: Status: ACTIVE | Noted: 2021-06-14

## 2021-06-28 LAB
ANION GAP SERPL CALCULATED.3IONS-SCNC: 8 MMOL/L (ref 4–13)
BUN SERPL-MCNC: 68 MG/DL (ref 5–25)
CALCIUM SERPL-MCNC: 8.5 MG/DL (ref 8.3–10.1)
CHLORIDE SERPL-SCNC: 99 MMOL/L (ref 100–108)
CO2 SERPL-SCNC: 30 MMOL/L (ref 21–32)
CREAT SERPL-MCNC: 2.25 MG/DL (ref 0.6–1.3)
GFR SERPL CREATININE-BSD FRML MDRD: 28 ML/MIN/1.73SQ M
GLUCOSE SERPL-MCNC: 123 MG/DL (ref 65–140)
GLUCOSE SERPL-MCNC: 126 MG/DL (ref 65–140)
GLUCOSE SERPL-MCNC: 184 MG/DL (ref 65–140)
MAGNESIUM SERPL-MCNC: 2 MG/DL (ref 1.6–2.6)
POTASSIUM SERPL-SCNC: 4.8 MMOL/L (ref 3.5–5.3)
SODIUM SERPL-SCNC: 137 MMOL/L (ref 136–145)

## 2021-06-28 PROCEDURE — 49083 ABD PARACENTESIS W/IMAGING: CPT

## 2021-06-28 PROCEDURE — 83735 ASSAY OF MAGNESIUM: CPT | Performed by: NURSE PRACTITIONER

## 2021-06-28 PROCEDURE — 82948 REAGENT STRIP/BLOOD GLUCOSE: CPT

## 2021-06-28 PROCEDURE — 0W9G3ZZ DRAINAGE OF PERITONEAL CAVITY, PERCUTANEOUS APPROACH: ICD-10-PCS | Performed by: INTERNAL MEDICINE

## 2021-06-28 PROCEDURE — 49083 ABD PARACENTESIS W/IMAGING: CPT | Performed by: RADIOLOGY

## 2021-06-28 PROCEDURE — 99239 HOSP IP/OBS DSCHRG MGMT >30: CPT | Performed by: NURSE PRACTITIONER

## 2021-06-28 PROCEDURE — 80048 BASIC METABOLIC PNL TOTAL CA: CPT | Performed by: INTERNAL MEDICINE

## 2021-06-28 RX ORDER — LIDOCAINE WITH 8.4% SOD BICARB 0.9%(10ML)
SYRINGE (ML) INJECTION CODE/TRAUMA/SEDATION MEDICATION
Status: COMPLETED | OUTPATIENT
Start: 2021-06-28 | End: 2021-06-28

## 2021-06-28 RX ORDER — SENNA PLUS 8.6 MG/1
1 TABLET ORAL 2 TIMES DAILY PRN
Refills: 0
Start: 2021-06-28 | End: 2021-07-01

## 2021-06-28 RX ORDER — ALBUMIN (HUMAN) 12.5 G/50ML
12.5 SOLUTION INTRAVENOUS ONCE
Status: CANCELLED | OUTPATIENT
Start: 2021-06-28 | End: 2021-06-28

## 2021-06-28 RX ORDER — DOCUSATE SODIUM 100 MG/1
100 CAPSULE, LIQUID FILLED ORAL 2 TIMES DAILY
Qty: 60 CAPSULE | Refills: 0 | Status: SHIPPED | OUTPATIENT
Start: 2021-06-28

## 2021-06-28 RX ORDER — ALBUMIN (HUMAN) 12.5 G/50ML
50 SOLUTION INTRAVENOUS ONCE
Status: COMPLETED | OUTPATIENT
Start: 2021-06-28 | End: 2021-06-28

## 2021-06-28 RX ORDER — MIDODRINE HYDROCHLORIDE 10 MG/1
10 TABLET ORAL
Qty: 60 TABLET | Refills: 0 | Status: SHIPPED | OUTPATIENT
Start: 2021-06-28 | End: 2021-07-08 | Stop reason: HOSPADM

## 2021-06-28 RX ADMIN — INSULIN LISPRO 1 UNITS: 100 INJECTION, SOLUTION INTRAVENOUS; SUBCUTANEOUS at 11:44

## 2021-06-28 RX ADMIN — CYANOCOBALAMIN TAB 500 MCG 1000 MCG: 500 TAB at 08:53

## 2021-06-28 RX ADMIN — Medication 10 ML: at 09:42

## 2021-06-28 RX ADMIN — Medication 12.5 MG: at 08:52

## 2021-06-28 RX ADMIN — ALBUMIN (HUMAN) 50 G: 0.25 INJECTION, SOLUTION INTRAVENOUS at 10:56

## 2021-06-28 RX ADMIN — Medication 250 MG: at 08:53

## 2021-06-28 RX ADMIN — Medication 1000 UNITS: at 08:53

## 2021-06-28 RX ADMIN — GUAIFENESIN 600 MG: 600 TABLET, EXTENDED RELEASE ORAL at 08:53

## 2021-06-28 RX ADMIN — MIDODRINE HYDROCHLORIDE 10 MG: 5 TABLET ORAL at 10:56

## 2021-06-28 RX ADMIN — FOLIC ACID 1000 MCG: 1 TABLET ORAL at 08:52

## 2021-06-28 RX ADMIN — CALCIUM CARBONATE 500 MG (1,250 MG)-VITAMIN D3 200 UNIT TABLET 1 TABLET: at 08:52

## 2021-06-28 RX ADMIN — DOCUSATE SODIUM 100 MG: 100 CAPSULE, LIQUID FILLED ORAL at 08:53

## 2021-06-28 RX ADMIN — MIDODRINE HYDROCHLORIDE 10 MG: 5 TABLET ORAL at 06:13

## 2021-06-28 RX ADMIN — FUROSEMIDE 40 MG: 40 TABLET ORAL at 09:01

## 2021-06-28 RX ADMIN — MIRTAZAPINE 15 MG: 15 TABLET, FILM COATED ORAL at 08:52

## 2021-06-28 RX ADMIN — PANTOPRAZOLE SODIUM 40 MG: 40 TABLET, DELAYED RELEASE ORAL at 06:13

## 2021-06-28 RX ADMIN — APIXABAN 5 MG: 5 TABLET, FILM COATED ORAL at 08:53

## 2021-06-28 NOTE — NURSING NOTE
Port de-accessed, belongings gathered by family, discharge instructions provided to patient, patient stated understanding, medications sent to pharmacy, left floor via wheelchair by PCA, accompanied by family, discharged to home

## 2021-06-28 NOTE — ASSESSMENT & PLAN NOTE
Lab Results   Component Value Date    HGBA1C 6 4 (H) 06/16/2021       Recent Labs     06/27/21  1610 06/27/21  2147 06/28/21  0717 06/28/21  1116   POCGLU 184* 130 126 184*       Blood Sugar Average: Last 72 hrs:  (P) 261 [id number]   · Well controlled type 2 diabetes  · Resume Amaryl on discharge

## 2021-06-28 NOTE — DISCHARGE INSTR - AVS FIRST PAGE
· Stop Atenolol  · Start Metoprolol 12 5 mg every 12 hours - hold if SBP < 95 mmHg or HR < 50 bpm   · Start Midodrine 10 mg twice daily before breakfast and dinner     · Please go to [facility] for weekly paracentesis     · Check a BMP in 1 week to monitor your kidney function  Follow-up with nephrology       · Follow-up with Dr Berna Morgan as soon as possible

## 2021-06-28 NOTE — DISCHARGE INSTRUCTIONS
Abdominal Paracentesis     WHAT YOU NEED TO KNOW:   Abdominal paracentesis is a procedure to remove abnormal fluid buildup in your abdomen  Fluid builds up because of liver problems, such as swelling and scarring  Heart failure, kidney disease, a mass, or problems with your pancreas may also cause fluid buildup  DISCHARGE INSTRUCTIONS:     Follow up with your healthcare provider as directed: Write down your questions so you remember to ask them during your visits  Wound care: Remove dressing after 24 hours  Leave glue in place  Return to your normal activities    Contact Interventional Radiology at [phone] Abhilash PATIENTS: Contact Interventional Radiology at [phone]) Roberta Choi PATIENTS: Contact Interventional Radiology at [phone]) if:  · You have a fever and your wound is red and swollen  · You have yellow, green, or bad-smelling discharge coming from your wound  · You have pain or swelling in your abdomen  · You have an upset stomach or you vomit  · You have sudden, sharp pain in your abdomen  · You urinate very little or not at all  · You feel confused and more tired than usual    · Your arm or leg feels warm, tender, and painful  It may look swollen and red  · You suddenly feel lightheaded and have trouble breathing  Acute Kidney Injury (CONCEPCIÓN)  You have been diagnosed with Acute Kidney Injury (CONCEPCIÓN)  The following information has been developed to provide you with information about CONCEPCIÓN and treatment  What is Acute Kidney Injury (CONCEPCIÓN)? CONCEPCIÓN occurs when kidney function decreases over a short period of time  This condition causes a buildup of waste products in the blood and can cause fluid to build up causing swelling in the legs and shortness of breath  Sometimes called Acute Kidney Failure or Acute Renal Failure, CONCEPCIÓN is often reversible if it is found and treated quickly  How do you know if you have CONCEPCIÓN? CONCEPCIÓN is diagnosed by assessing kidney function   This is done by obtaining a blood test to measure the blood level of creatinine  Decreased urine output can sometimes also indicate CONCEPCIÓN  Who is at risk for CONCEPCIÓN? CONCEPCIÓN can happen to anyone but usually happens to people who are already sick and may be in the hospital  People are at higher risk for CONCEPCIÓN if they have any of the following:   age 72 years or older   high or low blood pressure   underlying kidney disease (e g , Chronic Kidney Disease (CKD)   peripheral vascular disease (hardening of arteries)   chronic diseases such as liver disease, heart disease and diabetes   a single kidney    What are the symptoms of CONCEPCIÓN? You may or may not have the symptoms to suggest you have kidney injury until the CONCEPCIÓN has progressed  Some of the symptoms are listed below:   Not making enough urine   Increased swelling in legs    Feeling tired   Trouble breathing or shortness of breath   Nausea   New or worsening confusion    What causes CONCEPCIÓN? The causes are divided into three categories:   Not enough blood flowing to the kidneys (e g , low blood pressure, bleeding, diarrhea, dehydration)   Injury directly to the kidneys (e g , blood clots, severe infections such as sepsis, medicine toxicity, IV contrast dye used for cardiac catheterization or CT scans)   Blockage to the tubes (ureters) that drain the urine from the kidneys (e g , enlarged prostate, kidney stones, blood clots)    What is the treatment for CONCEPCIÓN? The treatment for CONCEPCIÓN depends on correcting what caused it  Treatment usually involves removing the cause and measures to prevent further injury to the kidneys  This may require the use of intravenous fluids or medications and/or temporary dialysis  Dialysis is a process using a machine that does the job of the kidneys to remove waste and help correct the electrolyte and fluid balance while the kidneys are recovering    If dialysis is needed to treat CONCEPCIÓN, the doctor will assess daily to see if the kidneys are showing signs of recovery  The daily assessments determine how long dialysis needs to continue  Depending on the cause and the extent of damage, an episode of CONCEPCIÓN may resolve in a few days to several weeks to several months  What are the long term effects of having an episode of CONCEPCIÓN?  People who have one episode of CONCEPCIÓN are at an increased risk of having another episode of CONCEPCIÓN as well as other health problems such as kidney disease, stroke, and heart disease   In a small number of people who had unrecognized kidney disease, an CONCEPCIÓN episode may result in Chronic Kidney Disease (CKD) which requires lifelong monitoring and treatment  How do you prevent future episodes of CONCEPCIÓN?  Make sure to follow up with the kidney doctor after hospital discharge and obtain blood work to reassess and monitor kidney function   If you have diabetes, keep your blood sugar in goal range and keep appointments with your diabetes specialist    Fabián If you have high blood pressure, have your blood pressure checked regularly to make sure it is in target range  o If you take blood pressure medicine called ACEIs or ARBs (e g , Lisinopril, Enalapril, Diovan, Losartan), your doctor may tell you to skip a dose or two if you have severe dehydration and your blood pressure is running low   Avoid using medicines such as NSAIDs (Nonsteroidal Anti-inflammatory Drugs) and Mendoza-2 Inhibitors (a type of NSAID) that may be harmful to kidney function  These may include the medicines listed in the table that follows  Examples of NSAIDS and Mendoza-2 Inhibitors   Talk to your doctor or healthcare provider before stopping any medicine ordered for you     Celecoxib (CELEBREX) Ketoprofen (ORUDIS, ORUVAIL)   Diclofenac (VOLTAREN, CATAFLAM) Ketorolac (TORADOL)   Diflunisal (DOLOBID) Meloxicam (MOBIC)   Etodolac (LODINE) Nabumetone (RELAFEN)   Fenoprofen (NALFON) Naproxen (ALEVE, NAPROSYN,    NAPRELAN, ANAPROX)   Flurbiprofen (ANSAID) Oxaprozin (DAYPRO) Ibuprofen (MOTRIN, ADVIL) Piroxicam (FELDENE)   Indomethacin (INDOCIN) Sulindac (CLINORIL)    Tolmetin (Hattie Arizmendi)     Is there a special diet for people with CONCEPCIÓN? People with CONCEPCIÓN and/or other kidney disease often have high potassium and phosphorus levels in their blood  To protect the kidneys from further injury and to avoid complications, most doctors recommend following a healthy diet choosing foods low potassium and low phosphorus   Limiting dietary potassium to 2 5 grams/day and phosphorus to 800 milligrams/day is recommended   A dietitian can help you with learning more about this type of diet   The tables on the following page may help you to choose lower potassium and phosphorus foods  The following websites are also good sources of information:  Zaheer at  org/nutrition/Kidney-Disease-Stages-1-4   [employer]   https://www niddk nih gov/health-information/kidney-disease/chronic-kidney-disease-ckd/eating-nutrition  https://[employer]o/health/articles/15641-renal-diet-basics  [employer] com cy    If you have any questions or concerns about your condition, please contact your doctor or healthcare provider  These tables may help you to choose lower potassium and phosphorus foods    AVOID these higher phosphorus* foods: CHOOSE these lower phosphorus* foods:   Milk, pudding , yogurt made from animals and from many soy varieties Rice milk (unfortified), nondairy creamer   Hard cheeses, ricotta, cottage cheese, fat-free cream cheese Regular and low-fat cream cheese   Ice cream, frozen yogurt Sherbet, frozen fruit pops, sorbet   Soups made with milk, dried peas, beans, lentils or other high phosphorus ingredients Soups made with broth, are water-based, or other lower phosphorus ingredients   Whole grains, including whole-grain breads, crackers, cereal, rice and pasta, corn tortillas Refined grains, including white bread, crackers, cereals, rice and pasta   Quick breads, biscuits, cornbread, muffins, pancakes, waffles, granola, wheat germ Homemade refined (white) dinner rolls, bagels, English muffins, sugar cookies, shortbread cookies, axel food cake   Dried peas (split, black-eyed), beans (black, garbanzo, lima, kidney, navy, bowles), lentils Green peas (canned, frozen), green beans, wax beans   Organ meats, walleye, [place], sardines Lean beef, pork, lamb, poultry, other fish   Nuts and seeds Popcorn   Peanut butter, other nut butters; tofu, veggie or soy burgers Jam, jelly, honey   Chocolate, including chocolate drinks Carob (chocolate-flavored) candy, hard candy,  gumdrops   Yue, pepper-type sodas, flavored cevallos, bottled teas, beer Lemon-lime soda, ginger ale or root beer, plain water, cream soda, grape soda   *Always read labels and avoid foods with ingredients containing "phos"  AVOID these higher potassium foods: CHOOSE these lower potassium foods:      Milk (fat free, low fat, whole, buttermilk, Soy), yogurt    Regular and low-fat cream cheese      Beans (white, [place]), [place] sprouts, spinach Swiss       chard, broccoli, avocado, artichoke, potatoes, sweet      potatoes, tomatoes/tomato sauce, beet greens      Green beans, alfalfa sprouts, bamboo shoots (canned),       cabbage, carrots, cauliflower, corn, cucumber, eggplant,      endive, lettuce, mushrooms, onions, radishes,  watercress,       water chestnuts (canned), rice, peas      Halibut, tuna, cod, snapper, tuna fish, turkey    Egg, lean beef, pork, lamb, shellfish, chicken       Banana, papaya, orange, cantaloupe, dates, raisins and      other dried fruit, pomegranate, avocado      Apple, applesauce, blackberries, raspberries, pears,     watermelon, cucumbers, blueberries, cranberries,       peaches      Almonds, peanuts, hazelnuts, [place], cashew, mixed,      seeds (sunflower, pumpkin)     Homemade refined (white) dinner rolls, bagels,      English muffins, flour tortilla, crackers, alfonzo      crackers, popcorn, pretzels, spaghetti or macaroni,       hummus      Tomato or vegetable juice, prune juice    Papaya, glenda, or pear nectar, cranberry juice cocktail      Molasses                                                               Abdominal Paracentesis     WHAT YOU NEED TO KNOW:   Abdominal paracentesis is a procedure to remove abnormal fluid buildup in your abdomen  Fluid builds up because of liver problems, such as swelling and scarring  Heart failure, kidney disease, a mass, or problems with your pancreas may also cause fluid buildup  DISCHARGE INSTRUCTIONS:     Follow up with your healthcare provider as directed: Write down your questions so you remember to ask them during your visits  Wound care: Remove dressing after 24 hours  Leave glue in place  Return to your normal activities    Contact Interventional Radiology at [phone] Abhilash PATIENTS: Contact Interventional Radiology at [phone]) Reyes Cantu PATIENTS: Contact Interventional Radiology at [phone]) if:  · You have a fever and your wound is red and swollen  · You have yellow, green, or bad-smelling discharge coming from your wound  · You have pain or swelling in your abdomen  · You have an upset stomach or you vomit  · You have sudden, sharp pain in your abdomen  · You urinate very little or not at all  · You feel confused and more tired than usual    · Your arm or leg feels warm, tender, and painful  It may look swollen and red  · You suddenly feel lightheaded and have trouble breathing

## 2021-06-28 NOTE — ASSESSMENT & PLAN NOTE
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  If the ascitic fluid reaccumulates persistently/significantly, patient will need to be evaluated for catheter placement  Additional information is needed  Progressive ascites would be consistent with disease progression - possibly patient's present regimen needs to be amended  Medical oncology will reach out to Dr Nova Lainez for additional information including the most recent treatment plan  There are other options "  At this time, patient's prognosis is very guarded  His hypotension and CONCEPCIÓN are an issue  Multiple providers have discussed goals of care with patient and his wife  Multiple providers have suggested hospice at this time, however, patient remains treatment focused    · Plan for outpatient Oncology follow-up

## 2021-06-28 NOTE — ASSESSMENT & PLAN NOTE
PT OT evaluated the patient earlier during stay and recommended short-term rehab  · Case management following  Patient has a bed available at [facility]  · Patient now wants to go home with VNA as he is feeling much better now

## 2021-06-28 NOTE — CASE MANAGEMENT
LOS - 14 days    SW following to assist with DCP  Initial plan was for STR placement at [facility]  SW was notified by [address] that pt is now requesting to return home with home care services  SW met with pt to discuss plans and home care agency options  Pt did not express a specific agency preference  Pt open to referrals being made to area agencies to determine availability and ability to accept his [place]  Referrals have been made to [address] [place] [address] and [address] [place] [address]  Waiting for referrals to be processed and to determine acceptance  Discharge is planned for today  IMM reviewed with patient  Patient agrees with discharge determination  Patient signed form and copy declined by pt  Copy placed in scan bin for chart  SW will continue to follow to monitor progress and assist with transition home when ready

## 2021-06-28 NOTE — TELEPHONE ENCOUNTER
----- Message from Glen Nunez MD sent at 6/28/2021  8:16 AM EDT -----  Hello    Can patient have post hosp f/u with Dr Sudha Lewis or AP in 1-2 weeks with BMP prior to appt    Thank you    np

## 2021-06-28 NOTE — ASSESSMENT & PLAN NOTE
CTA showed - Increasing opacification in the right lung base suggesting worsening atelectasis or pneumonia    Patient afebrile, denies cough SOB  Blood cultures negative to date  On room air   Procalcitonin mild elevated to 0 36 -> 0 29 -> 0 3 but likely unreliable given hst of Ca  · Completed 7-day course of Levaquin for suspected PNA       Results from last 7 days   Lab Units 06/27/21  0615 06/25/21  0521 06/24/21  0631 06/23/21  0542 06/22/21  0459   WBC [place]/uL 8 26 4 27* 3 96* 3 07* 2 92*

## 2021-06-28 NOTE — CASE MANAGEMENT
Discharge ordered  Pt will be returning home with family and home care services through [facility] of [facility] spoke with Mis Diaz from [facility] who confirmed acceptance of case and anticipated [facility] would be Wednesday, 6/30/21  Information placed on pt's AVS for pt's reference  AVS faxed to [facility] through [address] [place] [address] Rd  Family will be transporting pt home

## 2021-06-28 NOTE — ASSESSMENT & PLAN NOTE
Trouble swallowing solid food, thus, not eating much earlier during stay  Denies nausea, vomiting, chest pain currently  Denies dysphagia currently  · Initial speech swallow eval was limited due to prolonged retching during assessment  · Repeat speech swallow eval showed no s/s of oral or pharyngeal or esophageal dysphagia with soft solid food or thin liquids  Recommend upgrade diet as tolerated  · Consulted GI, appreciate input  · Underwent EGD: mild gastritis, gastric polyp   · Biopsy: "Mild chronic inactive gastritis  Negative for H  pylori by routine H&E  Negative for malignancy "  · Continue Protonix 40 mg daily   · Appetite improved  Eating 75% of meals

## 2021-06-28 NOTE — ASSESSMENT & PLAN NOTE
BP was persistently in the 80s and 90s  Random cortisol level low at 9 2  · Nephrology following, appreciate input  · Started stress-dosed steroids with Hydrocortisone 50 mg q8h on 6/20   Tapered off Saturday into Sunday    · Stopped Atenolol  · Started Metoprolol 12 5 mg BID   · Restarted home Lasix, 40 mg daily   · Decreased Midodrine to 10 mg BID at home to avoid supine HTN at HS  · YOLANDA stockings   · Monitor BP at home

## 2021-06-28 NOTE — DISCHARGE SUMMARY
Sadia 46  Discharge- Rosy Stiles 1945, 77 y o  male MRN: [id number]  Unit/Bed#: [address] [place] [address] Encounter: [id number]  Primary Care Provider: Mary Castro MD   Date and time admitted to hospital: 6/14/2021 10:31 AM    * Malignant ascites  Assessment & Plan  IR paracentesis on 6/14/2021 with removal of 2 L clear yellow ascites  IR paracentesis on 6/18/2021 with removal of 3 9 L of clear yellow ascites  IR paracentesis on 6/21/2021 with removal of 3 8 L of cloudy yellow ascites  IR paracentesis on 6/28/2021 with removal of 5 1 L  Fluid cytology on 6/14 positive for malignancy, compatible with lung primary  Fluid culture shows no growth - patient is status post 7 day course of Levaquin for possible pneumonia  · Patient received IV albumin following paracentesis  · Appreciate oncology input; indicated that patient follows with Dr Andriy Simon, outpatient, recommend stabilizing patient and have him follow up outpatient once medically stable  · Patient aware of malignant ascites  Patient would like to discuss different treatment options with oncology  He is not ready for hospice at this time but would transition if all other treatment options were exhausted  · Resumed home Lasix 40 mg po daily  · Daily weight and I&Os  · Arranged weekly paracentesis prior to discharge  Patient to be given albumin for any high volume paracenteses - orders provided to the infusion center for IV Albumin  · Continue fluid restriction on discharge  Hypotension  Assessment & Plan  BP was persistently in the 80s and 90s  Random cortisol level low at 9 2  · Nephrology following, appreciate input  · Started stress-dosed steroids with Hydrocortisone 50 mg q8h on 6/20   Tapered off Saturday into Sunday    · Stopped Atenolol  · Started Metoprolol 12 5 mg BID   · Restarted home Lasix, 40 mg daily   · Decreased Midodrine to 10 mg BID at home to avoid supine HTN at HS  · YOLANDA stockings   · Monitor BP at home     Acute renal failure superimposed on stage 3 chronic kidney disease [facility])  Assessment & Plan  Lab Results   Component Value Date    EGFR 28 06/28/2021    EGFR 24 06/27/2021    EGFR 23 06/26/2021    CREATININE 2 25 (H) 06/28/2021    CREATININE 2 50 (H) 06/27/2021    CREATININE 2 58 (H) 06/26/2021     Baseline creatinine appears to be around 0 9-1 2  Creatinine 1 55 on admission  Patient is on Lasix 40 mg daily at home, was held due to hypotension initially  Cr worsening during admission  Creatinine peaked at 3 84 on 06/21  Creatinine today 2 25  Likely due to ATN in the setting of hypotension, and possible contrast dye  Renal US: "No hydronephrosis benign-appearing simple cyst noted, ascites seen within the pelvis "   · Appreciate nephrology input  · S/p aggressive IV albumin and IVF  · Resumed home Lasix 40 mg daily   · Avoid nephrotoxins and hypotension  · Monitor BMP    Carcinoma of lung, right [facility])  Assessment & Plan  History of non-small-cell lung cancer diagnosed in December 2019  Patient underwent VATS with pleural decortication and pleurodesis in 7/2020  Patient follows Dr Ruth Nesbitt  Not sure exactly of treatment plan  CT suggested possible advancement of metastatic disease with involvement of the lingula  Paracentesis cytology (+) malignancy with lung primary - patient and family made aware    Consulted oncology, appreciate input  Per oncology, "Recent paracentesis demonstrated TTF1 positive adenocarcinoma in the ascitic fluid  If the ascitic fluid reaccumulates persistently/significantly, patient will need to be evaluated for catheter placement  Additional information is needed  Progressive ascites would be consistent with disease progression - possibly patient's present regimen needs to be amended  Medical oncology will reach out to Dr Ruth Nesbitt for additional information including the most recent treatment plan  There are other options "  At this time, patient's prognosis is very guarded   His hypotension and CONCEPCIÓN are an issue  Multiple providers have discussed goals of care with patient and his wife  Multiple providers have suggested hospice at this time, however, patient remains treatment focused  · Plan for outpatient Oncology follow-up    Hypocalcemia  Assessment & Plan  Ca 7 8  Improved  · S/p Calcium 2gm IV x1 6/20   · Vit D level; normal   · Continue vitamin-D and Oscal    Dysphagia  Assessment & Plan  Trouble swallowing solid food, thus, not eating much earlier during stay  Denies nausea, vomiting, chest pain currently  Denies dysphagia currently  · Initial speech swallow eval was limited due to prolonged retching during assessment  · Repeat speech swallow eval showed no s/s of oral or pharyngeal or esophageal dysphagia with soft solid food or thin liquids  Recommend upgrade diet as tolerated  · Consulted GI, appreciate input  · Underwent EGD: mild gastritis, gastric polyp   · Biopsy: "Mild chronic inactive gastritis  Negative for H  pylori by routine H&E  Negative for malignancy "  · Continue Protonix 40 mg daily   · Appetite improved  Eating 75% of meals  Pleural effusion  Assessment & Plan  Loculated right-sided pleural effusion  · IR evaluated the patient and not enough fluid for drainage  · Discontinued IVF 6/23  · Restarted Lasix 40 mg po daily 6/23     Ambulatory dysfunction  Assessment & Plan  PT OT evaluated the patient earlier during stay and recommended short-term rehab  · Case management following  Patient has a bed available at [facility]  · Patient now wants to go home with VNA as he is feeling much better now      Type 2 diabetes mellitus [facility])  Assessment & Plan  Lab Results   Component Value Date    HGBA1C 6 4 (H) 06/16/2021       Recent Labs     06/27/21  1610 06/27/21  2147 06/28/21  0717 06/28/21  1116   POCGLU 184* 130 126 184*       Blood Sugar Average: Last 72 hrs:  (P) 452 [id number]   · Well controlled type 2 diabetes  · Resume Amaryl on discharge AV block  Assessment & Plan  Status post pacemaker insertion  · Continue holding Atenolol 2/2 hypotension  · Started low-dose metoprolol due to tachycardia with good response  Recommend to continue on discharge  · Optimize electrolytes    History of pulmonary embolism  Assessment & Plan  History of DVT and PE  On Eliquis as outpatient  No acute PE on CTA  · Continue Eliquis 5 mg BID     Sepsis (HCC)-resolved as of 6/23/2021  Assessment & Plan  CTA showed - Increasing opacification in the right lung base suggesting worsening atelectasis or pneumonia  Patient afebrile, denies cough SOB  Blood cultures negative to date  On room air   Procalcitonin mild elevated to 0 36 -> 0 29 -> 0 3 but likely unreliable given hst of Ca  · Completed 7-day course of Levaquin for suspected PNA       Results from last 7 days   Lab Units 06/27/21  0615 06/25/21  0521 06/24/21  0631 06/23/21  0542 06/22/21  0459   WBC Thousand/uL 8 26 4 27* 3 96* 3 07* 2 92*       Hyponatremia-resolved as of 6/28/2021  Assessment & Plan  Combination of SIADH and pre renal  Na 132 -> 130 -> 130 -> 136 ->128-> 130->131-> 134->136 ->140  Normalized  Serum osmo low at 278  · Nephrology following, appreciate input  · Oral intake improved   · Restarted home Lasix, 40 mg daily   · Encourage nutritional supplements   · Monitor CMP    Acute respiratory failure with hypoxia (HCC)-resolved as of 6/23/2021  Assessment & Plan  Multifactorial, most likely secondary to significant ascites causing hypoventilation, right-sided loculated pleural effusion, and lung cancer along with suspected pneumonia  Initially patient was hypoxic requiring 2-4 L of oxygen via nasal cannula which worsened to 5 L and hence required brief stay in ICU    · Patient now on room air     Discharging Physician / Practitioner: Georgiana Lara, [address]  PCP: Chris Wilson MD  Admission Date:   Admission Orders (From admission, onward)     Ordered        06/14/21 1115  Inpatient Admission  Once                   Discharge Date: 06/28/21    Medical Problems     Resolved Problems  Date Reviewed: 6/28/2021        Resolved    Acute respiratory failure with hypoxia ([address] ) 6/23/2021     Resolved by  NERISSA Thao    Hyponatremia 6/28/2021     Resolved by  NERISSA Thao    Sepsis [facility]) 6/23/2021     Resolved by  Aminta Washington, [address] [facility] Stay:  · GI  · Oncology  · Nephrology     Procedures Performed:   · Paracentesis x4  · CTA chest: 1  No evidence of acute pulmonary embolism  Several thin linear defects within the pulmonary arterial system, may represent fibrin strands from a previous episode of pulmonary embolism  2  Increasing opacification in the right lung base suggesting worsening atelectasis or pneumonia  3  Complex right-sided loculated pleural effusion, the lateral inferior component containing bubbles of air as on numerous prior studies  4  New the lingula nodule suspicious for metastatic disease  5  Development of a large amount of ascites within the upper abdomen  This is concerning for malignant ascites  6  In conjunction with prior PET/CT, evidence of malignant mediastinal adenopathy, and right scapular metastatic lesion  · RUQ US: Moderate quantity of diffuse abdominal ascites  · CXR: Persistent loculated right pleural effusion and right base atelectasis  · Renal US: No hydronephrosis  Benign-appearing simple cysts noted  Ascites seen within the pelvis  · ECHO: LVEF 60%, trileaflet aortic valve     Significant Findings / Test Results:   · Ascites  · Acute on chronic renal failure  · Hypotension  · Carcinoma the lung with metastatic disease    Incidental Findings:   · Metastatic cancer with malignant ascites    Test Results Pending at Discharge (will require follow up):    · None     Outpatient Tests Requested:  · BMP    Complications:  None    Reason for Admission:  Acute respiratory failure with hypoxia    Hospital Course: Enid Domínguez is a 77 y o  male patient with multiple comorbidities including CAD, diabetes mellitus type 2, hypertension, av block, pacemaker implant, DVT, morbid obesity, metastatic lung cancer, and CHF on Lasix who originally presented to the hospital on 6/14/2021 due to shortness of breath and generalized weakness associated with cough  Patient was noted to be tachycardic and hypoxic requiring 4-5 L of oxygen via nasal cannula  Patient tested negative for COVID-19  Patient had a CTA of the chest which showed no evidence of PE but right-sided pleural effusion with possible right-sided pneumonia as well as abdominal ascites and metastatic cancer  Patient was transferred [facility] to [facility] for further evaluation and treatment  Patient underwent for paracentesis during hospitalization  Fluid cytology on 6/14/2021 was positive for malignancy, compatible with lung primary  Fluid culture showed no growth  Patient completed a 7 day course of Levaquin for suspected pneumonia  Patient was evaluated by oncology who recommended the patient follow-up with his primary oncologist as an outpatient to discuss a different treatment options  Patient will be discharged home on his home dose of Lasix 40 mg daily  Patient is to follow-up with IR for weekly paracentesis  Please see above list of diagnoses and related plan for additional information  Condition at Discharge: stable     Discharge Day Visit / Exam:     Subjective:  Resting comfortably out of bed in chair  Appetite is good  He is eating at least 75% of all meals  He denies any abdominal pain  Tolerated paracentesis today  Abdominal swelling much improved  Denies headache, dizziness, chest pain, shortness of breath  Continues with lower extremity edema  Tolerating Amaury stockings  Anxious for discharge home today    States he is going to see Dr Aarno rodriguez today   Vitals: Blood Pressure: 118/76 (06/28/21 1007)  Pulse: (!) 107 (06/28/21 1007)  Temperature: 97 5 °F (36 4 °C) (06/27/21 2338)  Temp Source: Oral (06/27/21 2338)  Respirations: 22 (06/28/21 0940)  Height: 5' 7" (170 2 cm) (06/22/21 1455)  Weight - Scale: 97 8 kg (215 lb 8 oz) (06/28/21 0600)  SpO2: 93 % (06/28/21 1007)  Exam:   Physical Exam  Vitals and nursing note reviewed  Constitutional:       General: He is not in acute distress  Appearance: He is well-developed  He is ill-appearing  He is not toxic-appearing or diaphoretic  HENT:      Head: Normocephalic and atraumatic  Eyes:      Conjunctiva/sclera: Conjunctivae normal    Cardiovascular:      Rate and Rhythm: Normal rate  Pulmonary:      Effort: Pulmonary effort is normal  No tachypnea or respiratory distress  Breath sounds: Examination of the right-lower field reveals decreased breath sounds  Decreased breath sounds present  No wheezing, rhonchi or rales  Abdominal:      General: Bowel sounds are normal  There is distension (mild, soft)  Palpations: Abdomen is soft  Tenderness: There is no abdominal tenderness  Musculoskeletal:         General: Normal range of motion  Cervical back: Normal range of motion  Right lower leg: Edema present  Left lower leg: Edema present  Skin:     General: Skin is warm and dry  Neurological:      Mental Status: He is alert and oriented to person, place, and time  Mental status is at baseline  Motor: Weakness (generalized) present  Psychiatric:         Mood and Affect: Mood normal          Speech: Speech normal          Behavior: Behavior normal          Thought Content: Thought content normal          Cognition and Memory: Cognition normal          Discussion with Family: Spoke with wife earlier     Discharge instructions/Information to patient and family:   See after visit summary for information provided to patient and family        Provisions for Follow-Up Care:  See after visit summary for information related to follow-up care and any pertinent home health orders  Disposition:     Home with VNA Services (Reminder: Complete face to face encounter)    For Discharges to [address]:   · Not Applicable to this Patient - Not Applicable to this Patient    Planned Readmission: None     Discharge Statement:  I spent > 30 minutes discharging the patient  This time was spent on the day of discharge  I had direct contact with the patient on the day of discharge  Greater than 50% of the total time was spent examining patient, answering all patient questions, arranging and discussing plan of care with patient as well as directly providing post-discharge instructions  Additional time then spent on discharge activities  Discharge Medications:  See after visit summary for reconciled discharge medications provided to patient and family        ** Please Note: This note has been constructed using a voice recognition system **

## 2021-06-28 NOTE — ASSESSMENT & PLAN NOTE
Lab Results   Component Value Date    EGFR 28 06/28/2021    EGFR 24 06/27/2021    EGFR 23 06/26/2021    CREATININE 2 25 (H) 06/28/2021    CREATININE 2 50 (H) 06/27/2021    CREATININE 2 58 (H) 06/26/2021     Baseline creatinine appears to be around 0 9-1 2  Creatinine 1 55 on admission  Patient is on Lasix 40 mg daily at home, was held due to hypotension initially    Cr worsening during admission  Creatinine peaked at 3 84 on 06/21  Creatinine today 2 25  Likely due to ATN in the setting of hypotension, and possible contrast dye  Renal US: "No hydronephrosis benign-appearing simple cyst noted, ascites seen within the pelvis "   · Appreciate nephrology input  · S/p aggressive IV albumin and IVF  · Resumed home Lasix 40 mg daily   · Avoid nephrotoxins and hypotension  · Monitor BMP

## 2021-06-28 NOTE — ASSESSMENT & PLAN NOTE
Assumed care 1900. Pt A&Ox3. VSS. Neuro q shift obtained. No acute changes. Denies pain  Up ambulating in room with walker. Voiding. Plan for EMG outpatient. Waiting for insurance approval for . [facility]. Pt resting in bed. Monitoring.  Needs atte Ca 7 8  Improved  · S/p Calcium 2gm IV x1 6/20   · Vit D level; normal   · Continue vitamin-D and Oscal

## 2021-06-29 ENCOUNTER — TELEPHONE (OUTPATIENT)
Dept: NEPHROLOGY | Facility: CLINIC | Age: 76
End: 2021-06-29

## 2021-06-29 NOTE — TELEPHONE ENCOUNTER
----- Message from Boni Patel MD sent at 6/26/2021  8:52 AM EDT -----  Patient to be discharged today and tomorrow from [facility], was seen for acute kidney injury  Renal function is improving but creatinine still elevated  Will need follow-up in 7-10 days with an advanced practitioner or with me  Please ask patient to do a BMP in 1 week for acute kidney injury

## 2021-06-29 NOTE — TELEPHONE ENCOUNTER
Pt has no strength  He will call back to schedule when he is feeling better  I will try calling again in 1 month to see if he feels better and wants to schedule

## 2021-07-01 ENCOUNTER — HOSPITAL ENCOUNTER (INPATIENT)
Facility: HOSPITAL | Age: 76
LOS: 7 days | Discharge: HOSPICE | DRG: 853 | End: 2021-07-08
Attending: EMERGENCY MEDICINE | Admitting: INTERNAL MEDICINE
Payer: COMMERCIAL

## 2021-07-01 ENCOUNTER — APPOINTMENT (EMERGENCY)
Dept: RADIOLOGY | Facility: HOSPITAL | Age: 76
DRG: 853 | End: 2021-07-01
Payer: COMMERCIAL

## 2021-07-01 DIAGNOSIS — R13.10 DYSPHAGIA: ICD-10-CM

## 2021-07-01 DIAGNOSIS — R11.0 NAUSEA: ICD-10-CM

## 2021-07-01 DIAGNOSIS — C34.91 PRIMARY MALIGNANT NEOPLASM OF RIGHT LUNG METASTATIC TO OTHER SITE (HCC): ICD-10-CM

## 2021-07-01 DIAGNOSIS — G89.3 CANCER RELATED PAIN: ICD-10-CM

## 2021-07-01 DIAGNOSIS — R06.02 SHORTNESS OF BREATH: ICD-10-CM

## 2021-07-01 DIAGNOSIS — C34.91 CARCINOMA OF LUNG, RIGHT (HCC): ICD-10-CM

## 2021-07-01 DIAGNOSIS — R18.8 OTHER ASCITES: ICD-10-CM

## 2021-07-01 DIAGNOSIS — R06.02 SOB (SHORTNESS OF BREATH) ON EXERTION: Primary | ICD-10-CM

## 2021-07-01 LAB
ALBUMIN SERPL BCP-MCNC: 2.7 G/DL (ref 3.5–5)
ALP SERPL-CCNC: 74 U/L (ref 46–116)
ALT SERPL W P-5'-P-CCNC: 11 U/L (ref 12–78)
ANION GAP SERPL CALCULATED.3IONS-SCNC: 9 MMOL/L (ref 4–13)
APTT PPP: 38 SECONDS (ref 23–37)
AST SERPL W P-5'-P-CCNC: 15 U/L (ref 5–45)
BASOPHILS # BLD AUTO: 0.08 THOUSANDS/ΜL (ref 0–0.1)
BASOPHILS NFR BLD AUTO: 1 % (ref 0–1)
BILIRUB SERPL-MCNC: 0.76 MG/DL (ref 0.2–1)
BUN SERPL-MCNC: 80 MG/DL (ref 5–25)
CALCIUM ALBUM COR SERPL-MCNC: 9.3 MG/DL (ref 8.3–10.1)
CALCIUM SERPL-MCNC: 8.3 MG/DL (ref 8.3–10.1)
CHLORIDE SERPL-SCNC: 94 MMOL/L (ref 100–108)
CO2 SERPL-SCNC: 28 MMOL/L (ref 21–32)
CREAT SERPL-MCNC: 2.97 MG/DL (ref 0.6–1.3)
EOSINOPHIL # BLD AUTO: 0.26 THOUSAND/ΜL (ref 0–0.61)
EOSINOPHIL NFR BLD AUTO: 2 % (ref 0–6)
ERYTHROCYTE [DISTWIDTH] IN BLOOD BY AUTOMATED COUNT: 20.4 % (ref 11.6–15.1)
GFR SERPL CREATININE-BSD FRML MDRD: 20 ML/MIN/1.73SQ M
GLUCOSE SERPL-MCNC: 110 MG/DL (ref 65–140)
GLUCOSE SERPL-MCNC: 90 MG/DL (ref 65–140)
HCT VFR BLD AUTO: 38.1 % (ref 36.5–49.3)
HGB BLD-MCNC: 12.3 G/DL (ref 12–17)
IMM GRANULOCYTES # BLD AUTO: 0.09 THOUSAND/UL (ref 0–0.2)
IMM GRANULOCYTES NFR BLD AUTO: 1 % (ref 0–2)
INR PPP: 1.65 (ref 0.84–1.19)
LACTATE SERPL-SCNC: 1.5 MMOL/L (ref 0.5–2)
LACTATE SERPL-SCNC: 2.2 MMOL/L (ref 0.5–2)
LACTATE SERPL-SCNC: 2.2 MMOL/L (ref 0.5–2)
LYMPHOCYTES # BLD AUTO: 1.4 THOUSANDS/ΜL (ref 0.6–4.47)
LYMPHOCYTES NFR BLD AUTO: 11 % (ref 14–44)
MCH RBC QN AUTO: 28.9 PG (ref 26.8–34.3)
MCHC RBC AUTO-ENTMCNC: 32.3 G/DL (ref 31.4–37.4)
MCV RBC AUTO: 90 FL (ref 82–98)
MONOCYTES # BLD AUTO: 1.72 THOUSAND/ΜL (ref 0.17–1.22)
MONOCYTES NFR BLD AUTO: 13 % (ref 4–12)
NEUTROPHILS # BLD AUTO: 9.62 THOUSANDS/ΜL (ref 1.85–7.62)
NEUTS SEG NFR BLD AUTO: 72 % (ref 43–75)
NRBC BLD AUTO-RTO: 0 /100 WBCS
NT-PROBNP SERPL-MCNC: 914 PG/ML
PLATELET # BLD AUTO: 159 THOUSANDS/UL (ref 149–390)
PMV BLD AUTO: 10.8 FL (ref 8.9–12.7)
POTASSIUM SERPL-SCNC: 5 MMOL/L (ref 3.5–5.3)
PROCALCITONIN SERPL-MCNC: 0.67 NG/ML
PROT SERPL-MCNC: 5.7 G/DL (ref 6.4–8.2)
PROTHROMBIN TIME: 19.6 SECONDS (ref 11.6–14.5)
RBC # BLD AUTO: 4.25 MILLION/UL (ref 3.88–5.62)
SODIUM SERPL-SCNC: 131 MMOL/L (ref 136–145)
TROPONIN I SERPL-MCNC: <0.02 NG/ML
WBC # BLD AUTO: 13.17 THOUSAND/UL (ref 4.31–10.16)

## 2021-07-01 PROCEDURE — 85730 THROMBOPLASTIN TIME PARTIAL: CPT | Performed by: EMERGENCY MEDICINE

## 2021-07-01 PROCEDURE — 99285 EMERGENCY DEPT VISIT HI MDM: CPT

## 2021-07-01 PROCEDURE — 99285 EMERGENCY DEPT VISIT HI MDM: CPT | Performed by: EMERGENCY MEDICINE

## 2021-07-01 PROCEDURE — 83605 ASSAY OF LACTIC ACID: CPT | Performed by: INTERNAL MEDICINE

## 2021-07-01 PROCEDURE — 87505 NFCT AGENT DETECTION GI: CPT | Performed by: INTERNAL MEDICINE

## 2021-07-01 PROCEDURE — 84145 PROCALCITONIN (PCT): CPT | Performed by: INTERNAL MEDICINE

## 2021-07-01 PROCEDURE — 87449 NOS EACH ORGANISM AG IA: CPT | Performed by: INTERNAL MEDICINE

## 2021-07-01 PROCEDURE — 84484 ASSAY OF TROPONIN QUANT: CPT | Performed by: EMERGENCY MEDICINE

## 2021-07-01 PROCEDURE — 80053 COMPREHEN METABOLIC PANEL: CPT | Performed by: EMERGENCY MEDICINE

## 2021-07-01 PROCEDURE — 82948 REAGENT STRIP/BLOOD GLUCOSE: CPT

## 2021-07-01 PROCEDURE — 99223 1ST HOSP IP/OBS HIGH 75: CPT | Performed by: INTERNAL MEDICINE

## 2021-07-01 PROCEDURE — 85610 PROTHROMBIN TIME: CPT | Performed by: EMERGENCY MEDICINE

## 2021-07-01 PROCEDURE — 36415 COLL VENOUS BLD VENIPUNCTURE: CPT | Performed by: EMERGENCY MEDICINE

## 2021-07-01 PROCEDURE — 83605 ASSAY OF LACTIC ACID: CPT | Performed by: EMERGENCY MEDICINE

## 2021-07-01 PROCEDURE — 85025 COMPLETE CBC W/AUTO DIFF WBC: CPT | Performed by: EMERGENCY MEDICINE

## 2021-07-01 PROCEDURE — 87493 C DIFF AMPLIFIED PROBE: CPT | Performed by: INTERNAL MEDICINE

## 2021-07-01 PROCEDURE — 71045 X-RAY EXAM CHEST 1 VIEW: CPT

## 2021-07-01 PROCEDURE — 83880 ASSAY OF NATRIURETIC PEPTIDE: CPT | Performed by: EMERGENCY MEDICINE

## 2021-07-01 PROCEDURE — 93005 ELECTROCARDIOGRAM TRACING: CPT

## 2021-07-01 RX ORDER — LEVOFLOXACIN 5 MG/ML
750 INJECTION, SOLUTION INTRAVENOUS
Status: DISCONTINUED | OUTPATIENT
Start: 2021-07-01 | End: 2021-07-03

## 2021-07-01 RX ORDER — PANTOPRAZOLE SODIUM 40 MG/1
40 TABLET, DELAYED RELEASE ORAL EVERY MORNING
Status: DISCONTINUED | OUTPATIENT
Start: 2021-07-02 | End: 2021-07-08 | Stop reason: HOSPADM

## 2021-07-01 RX ORDER — MIDODRINE HYDROCHLORIDE 5 MG/1
10 TABLET ORAL
Status: DISCONTINUED | OUTPATIENT
Start: 2021-07-02 | End: 2021-07-08 | Stop reason: HOSPADM

## 2021-07-01 RX ORDER — MELATONIN
1000 DAILY
Status: DISCONTINUED | OUTPATIENT
Start: 2021-07-02 | End: 2021-07-08 | Stop reason: HOSPADM

## 2021-07-01 RX ORDER — LIDOCAINE 50 MG/G
1 PATCH TOPICAL DAILY
Status: DISCONTINUED | OUTPATIENT
Start: 2021-07-02 | End: 2021-07-08 | Stop reason: HOSPADM

## 2021-07-01 RX ORDER — PRAVASTATIN SODIUM 20 MG
20 TABLET ORAL
Status: DISCONTINUED | OUTPATIENT
Start: 2021-07-02 | End: 2021-07-04

## 2021-07-01 RX ORDER — ALBUMIN, HUMAN INJ 5% 5 %
25 SOLUTION INTRAVENOUS 3 TIMES DAILY
Status: COMPLETED | OUTPATIENT
Start: 2021-07-01 | End: 2021-07-02

## 2021-07-01 RX ORDER — ACETAMINOPHEN 325 MG/1
650 TABLET ORAL EVERY 6 HOURS PRN
Status: DISCONTINUED | OUTPATIENT
Start: 2021-07-01 | End: 2021-07-08 | Stop reason: HOSPADM

## 2021-07-01 RX ORDER — FOLIC ACID 1 MG/1
1000 TABLET ORAL DAILY
Status: DISCONTINUED | OUTPATIENT
Start: 2021-07-02 | End: 2021-07-08 | Stop reason: HOSPADM

## 2021-07-01 RX ORDER — MIRTAZAPINE 15 MG/1
15 TABLET, FILM COATED ORAL DAILY
Status: DISCONTINUED | OUTPATIENT
Start: 2021-07-02 | End: 2021-07-08 | Stop reason: HOSPADM

## 2021-07-01 RX ADMIN — VANCOMYCIN HYDROCHLORIDE 1750 MG: 1 INJECTION, POWDER, LYOPHILIZED, FOR SOLUTION INTRAVENOUS at 19:46

## 2021-07-01 RX ADMIN — ALBUMIN (HUMAN) 25 G: 12.5 INJECTION, SOLUTION INTRAVENOUS at 22:13

## 2021-07-01 RX ADMIN — ACETAMINOPHEN 650 MG: 325 TABLET, FILM COATED ORAL at 22:06

## 2021-07-01 RX ADMIN — LEVOFLOXACIN 750 MG: 5 INJECTION, SOLUTION INTRAVENOUS at 22:34

## 2021-07-01 NOTE — ASSESSMENT & PLAN NOTE
Likely multifactorial in the setting of lung cancer, pleural effusion, ascites, deconditioning  Monitor oxygen saturation  Plan as below

## 2021-07-01 NOTE — UTILIZATION REVIEW
Notification of Discharge   This is a Notification of Discharge from our facility [address]  Please be advised that this patient has been discharge from our facility  Below you will find the admission and discharge date and time including the patients disposition  UTILIZATION REVIEW CONTACT:  Alex Ramirez  Utilization   Network Utilization Review Department  Phone: [phone] x carefully listen to the prompts  All voicemails are confidential   Email: [email or web address]     PHYSICIAN ADVISORY SERVICES:  FOR [id number] REVIEW - MEDICAL NECESSITY DENIAL  Phone: [phone]  Fax: [phone]  Email: [email or web address]     PRESENTATION DATE: 6/14/2021 10:31 AM  OBERVATION ADMISSION DATE:   INPATIENT ADMISSION DATE: 6/14/21 10:31 AM   DISCHARGE DATE: 6/28/2021  1:24 PM  DISPOSITION: Home with [place] with [address] [place] [address] Road INFORMATION:  Send all requests for admission clinical reviews, approved or denied determinations and any other requests to dedicated fax number below belonging to the campus where the patient is receiving treatment   List of dedicated fax numbers:  [address] [place] [address] DENIALS (Administrative/Medical Necessity) [phone]   [address]  (Maternity/NICU/Pediatrics) [phone]   Michelle Regalado [phone]   Migel Lunsford [phone]   Red Pollack [phone]   [facility] [address] [facility] [phone]   [facility]  [phone]   [address] [place] [address], [address]  [address] [facility] [address] [facility] [phone]

## 2021-07-01 NOTE — ASSESSMENT & PLAN NOTE
Lab Results   Component Value Date    HGBA1C 6 4 (H) 06/16/2021       No results for input(s): POCGLU in the last 72 hours  Blood Sugar Average: Last 72 hrs:  ·  hold home Amaryl  · Insulin sliding scale  · Q 4 Accu-Cheks  · Goal 140-180

## 2021-07-01 NOTE — ASSESSMENT & PLAN NOTE
· Patient presenting with abdominal discomfort and abdominal distention with associated shortness of breath  · Recently underwent a paracentesis about 2 days ago during his last admission that yielded about 5000 mL of fluid that was positive for malignancy  · Consult IR for diagnostic and therapeutic paracentesis, may require pigtail catheter once infection is ruled out

## 2021-07-01 NOTE — ASSESSMENT & PLAN NOTE
· Blood pressure reviewed and acceptable  · Continue midodrine  Albumin infusions  · Continue to monitor blood pressure

## 2021-07-01 NOTE — ASSESSMENT & PLAN NOTE
· Patient met SIRS criteria with hypertension, leukocytosis and tachycardia  · Lactic acid 2 2  · Chest x-ray demonstrating persistent loculated right pleural effusion and right base atelectasis unchanged from prior by my read  · With complaints of diarrhea  this morning  · Recently completed a 7 day antibiotic course of Levaquin for pneumonia  · Etiology unclear  · Differential diagnosis include hospital associated pneumonia, spontaneous bacterial peritonitis, C diff, dehydration/hypotension    Plan  · Empiric antibiotics:  Vancomycin and and Levaquin (anaphylactic allergic reaction to penicillin)  · CT chest noncontrast   · Procalcitonin  · Strep/Legionella  · Stool studies, C diff  · Diagnostic and therapeutic Paracentesis with fluid studies    · Albumin for dehydration/hypotension

## 2021-07-01 NOTE — H&P
Duy 67 1945, 77 y o  male MRN: [id number]  Unit/Bed#: ED 25 Encounter: [id number]  Primary Care Provider: Zachary Samuels MD   Date and time admitted to hospital: 7/1/2021  3:42 PM    * Sepsis [facility])  Assessment & Plan  · Patient met SIRS criteria with hypertension, leukocytosis and tachycardia  · Lactic acid 2 2  · Chest x-ray demonstrating persistent loculated right pleural effusion and right base atelectasis unchanged from prior by my read  · With complaints of diarrhea  this morning  · Recently completed a 7 day antibiotic course of Levaquin for pneumonia  · Etiology unclear  · Differential diagnosis include hospital associated pneumonia, spontaneous bacterial peritonitis, C diff, dehydration/hypotension    Plan  · Empiric antibiotics:  Vancomycin and and Levaquin (anaphylactic allergic reaction to penicillin)  · CT chest noncontrast   · Procalcitonin  · Strep/Legionella  · Stool studies, C diff  · Diagnostic and therapeutic Paracentesis with fluid studies  · Albumin for dehydration/hypotension      Shortness of breath  Assessment & Plan  Likely multifactorial in the setting of lung cancer, pleural effusion, ascites, deconditioning  Monitor oxygen saturation  Plan as below  Malignant ascites  Assessment & Plan  · Patient presenting with abdominal discomfort and abdominal distention with associated shortness of breath  · Recently underwent a paracentesis about 2 days ago during his last admission that yielded about 5000 mL of fluid that was positive for malignancy  · Consult IR for diagnostic and therapeutic paracentesis, may require pigtail catheter once infection is ruled out      Pleural effusion  Assessment & Plan  · Chest x-ray demonstrating persistent loculated right pleural effusion and right base atelectasis unchanged from prior by my read  · Reports that he has been getting weekly pleurocentesis  · Was noted during prior admission in evaluated by IR, at the time was deemed insufficient to warrant pleurocentesis  · Given shortness of breath in need for paracentesis, will have IR evaluate patient possible pleurocentesis    Hypotension  Assessment & Plan  · Blood pressure reviewed and acceptable  · Continue midodrine  Albumin infusions  · Continue to monitor blood pressure  Acute renal failure superimposed on stage 3 chronic kidney disease [facility])  Assessment & Plan  Lab Results   Component Value Date    EGFR 20 07/01/2021    EGFR 28 06/28/2021    EGFR 24 06/27/2021    CREATININE 2 97 (H) 07/01/2021    CREATININE 2 25 (H) 06/28/2021    CREATININE 2 50 (H) 06/27/2021     · Baseline 1 5-2 2  · Likely prerenal in the setting of diuretics, poor p o  Intake, soft BP  · Hold home Lasix  · Continue midodrine  · Albumin for intravascular support in the setting of 3rd spacing  · Monitor creatinine    Metastatic lung cancer (metastasis from lung to other site) [facility])  Assessment & Plan  · On chart review, patient has a history of non-small cell lung cancer diagnosed in December 2019  · Underwent VATS with pleural decortication and pleurodesis 7/20  · PET scan 5/4 concerning for disease progression  Notable for left intraperitoneal metastasis  · Last chemotherapy was about a month ago  · Discussed with patient that his overall prognosis is poor given that he has stage IV cancer  However, he remains optimistic that he can be this and is looking for to getting a new chemotherapy medication from [id number] next week  · He is in agreement with consultation of palliative care for symptom management at this stage and possibly hospice down the line should the situation worsened    Plan  · Call Dr Ryann Dykes with updates  · Palliative care consult  Type 2 diabetes mellitus ([facility] [address] )  Assessment & Plan  Lab Results   Component Value Date    HGBA1C 6 4 (H) 06/16/2021       No results for input(s): POCGLU in the last 72 hours      Blood Sugar Average: Last 72 hrs:  ·  hold home Amaryl  · Insulin sliding scale  · Q 4 Accu-Cheks  · Goal 140-180  History of pulmonary embolism  Assessment & Plan  · Continue Eliquis  · Will dose adjust for kidney disease    VTE Pharmacologic Prophylaxis: VTE Score: 8 High Risk (Score >/= 5) - Pharmacological DVT Prophylaxis Ordered: apixaban (Eliquis)  Sequential Compression Devices Ordered  Code Status: Prior level 3  Discussion with family: Updated  (wife and sister) at bedside  Anticipated Length of Stay: Patient will be admitted on an inpatient basis with an anticipated length of stay of greater than 2 midnights secondary to Sepsis  Chief Complaint:  Shortness of breath in generalized weakness    History of Present Illness:  Rosy Stiles is a 77 y o  male with a history of coronary artery disease, diabetes type 2, hypertension, av block post pacemaker implant, DVT, metastatic lung cancer and CHF who presents with shortness of breath and generalized weakness  He was recently hospitalized on 06/14/2021 acute respiratory failure with hypoxia  During this hospitalization, he underwent paracentesis yelling 5000 mL of cloudy yellow fluid  Fluid cytology was positive for malignancy  Also completed a 7 day course of Levaquin source suspected pneumonia  CT at that time showed no evidence of acute pulmonary embolism but showed increasing opacification concerning for pneumonia  Patient was discharged on 06/28  Since then, patient has experience gradually worsening shortness of breath with increased abdominal distention  He denies any fever, chills or sweats  Does endorse increased shortness of breathing  Denies any chest pain but does admit to an occasional cough productive of clear sputum  He denies any swallowing difficulties or choking  Does admit to a poor appetite with reduced oral intake  Denies any nausea or vomiting but does endorse some abdominal discomfort    He did complain of loose watery stools this morning  Also reports increased urine output  He does follow-up with an oncologist for his cancer  Last chemotherapy was 3 weeks ago  On presentation, patient was noted to be afebrile with soft blood pressures and elevated heart rates  Blood work was notable for worsening kidney function and leukocytosis  Patient was admitted under Internal Medicine Service for further evaluation and management of sepsis  Review of Systems:  Review of Systems   Constitutional: Positive for activity change, appetite change and fatigue  Negative for chills, diaphoresis and fever  Respiratory: Positive for cough and shortness of breath  Negative for wheezing  Cardiovascular: Positive for leg swelling  Negative for chest pain and palpitations  Gastrointestinal: Positive for abdominal distention, abdominal pain and diarrhea  Negative for constipation, nausea and vomiting  Genitourinary: Negative for difficulty urinating and dysuria  Musculoskeletal: Negative for arthralgias  Neurological: Negative for dizziness, weakness, light-headedness, numbness and headaches  Past Medical and Surgical History:   Past Medical History:   Diagnosis Date    Cancer ([facility] )     RT Lung    Diabetes mellitus ([place] [address] )     Hypertension        Past Surgical History:   Procedure Laterality Date    CARDIAC PACEMAKER PLACEMENT  09/2019    CHOLECYSTECTOMY      kidney damage from gallbladder removal     FOOT SURGERY      tendon    IR PARACENTESIS  6/14/2021    IR PARACENTESIS  6/18/2021    IR PARACENTESIS  6/21/2021    IR PARACENTESIS  6/28/2021    PORTACATH PLACEMENT      PROSTATE SURGERY         Meds/Allergies:  Prior to Admission medications    Medication Sig Start Date End Date Taking?  Authorizing Provider   apixaban (Eliquis) 5 mg Take 5 mg by mouth 2 (two) times a day   Yes Historical Provider, MD   Calcium Carbonate-Vit D-Min (CALCIUM 1200 PO) Take 1 tablet by mouth daily    Yes Historical Provider, MD cholecalciferol (VITAMIN D3) 1,000 units tablet Take 1,000 Units by mouth daily   Yes Historical Provider, MD   [facility] Vitamin B-12 1000 MCG tablet Take 1,000 mcg by mouth daily 1/22/21  Yes Historical Provider, MD   docusate sodium (COLACE) 100 mg capsule Take 1 capsule (100 mg total) by mouth 2 (two) times a day 6/28/21  Yes NERISSA Lane   folic acid (FOLVITE) 1 mg tablet Take 1,000 mcg by mouth daily 1/22/21  Yes Historical Provider, MD   furosemide (LASIX) 40 mg tablet Take 40 mg by mouth daily    Yes Historical Provider, MD   glimepiride (AMARYL) 1 mg tablet Take 1 mg by mouth daily with breakfast    Yes Historical Provider, MD   lovastatin (MEVACOR) 20 mg tablet Take 20 mg by mouth daily at bedtime   Yes Historical Provider, MD   metoprolol tartrate (LOPRESSOR) 25 mg tablet Take 0 5 tablets (12 5 mg total) by mouth every 12 (twelve) hours 6/28/21  Yes NERISSA Lane   midodrine (PROAMATINE) 10 MG tablet Take 1 tablet (10 mg total) by mouth 2 (two) times a day before meals Before breakfast and dinner 6/28/21  Yes NERISSA Lane   mirtazapine (REMERON) 15 mg tablet Take 15 mg by mouth daily  10/12/20  Yes Historical Provider, MD   ondansetron (ZOFRAN) 4 mg tablet Take 4 mg by mouth every 8 (eight) hours as needed   Yes Historical Provider, MD   pantoprazole (PROTONIX) 40 mg tablet Take 40 mg by mouth every morning 8/6/20  Yes Historical Provider, MD   oxyCODONE-acetaminophen (PERCOCET) 5-325 mg per tablet Take 1 tablet by mouth every 6 (six) hours as needed  Patient not taking: Reported on 6/18/2021 2/15/21 7/1/21  Historical Provider, MD   senna (SENOKOT) 8 6 MG tablet Take 1 tablet (8 6 mg total) by mouth 2 (two) times a day as needed for constipation 6/28/21 7/1/21  NERISSA Lane     I have reviewed home medications with patient family member  Allergies:    Allergies   Allergen Reactions    Diovan [Valsartan] Angioedema    Penicillins Anaphylaxis    Lisinopril     Oxytocin        Social History:  Marital Status: /Civil Union   Occupation:  Retired  Patient Pre-hospital Living Situation: Home  Patient Pre-hospital Level of Mobility: walks with walker  Patient Pre-hospital Diet Restrictions:  Unrestricted  Substance Use History:   Social History     Substance and Sexual Activity   Alcohol Use Not Currently     Social History     Tobacco Use   Smoking Status Former Smoker    Packs/day: 2 00    Years: 9 00    Pack years: 18 00    Types: Cigarettes   Smokeless Tobacco Never Used     Social History     Substance and Sexual Activity   Drug Use Never       Family History:  History of breast cancer in the mother  Father  of an aneurysm minutes 48  Physical Exam:     Vitals:   Blood Pressure: 97/64 (21 1800)  Pulse: 100 (21 1800)  Temperature: 97 5 °F (36 4 °C) (21)  Temp Source: Axillary (21)  Respirations: (!) 24 (21 1800)  Weight - Scale: 105 kg (231 lb 4 2 oz) (21 1548)  SpO2: 98 % (21 1800)    Physical Exam  Vitals and nursing note reviewed  Constitutional:       General: He is in acute distress (Mild)  Appearance: He is well-developed  He is not diaphoretic  HENT:      Head: Normocephalic and atraumatic  Mouth/Throat:      Mouth: Mucous membranes are dry  Eyes:      Extraocular Movements: Extraocular movements intact  Conjunctiva/sclera: Conjunctivae normal    Cardiovascular:      Rate and Rhythm: Normal rate and regular rhythm  Heart sounds: Normal heart sounds  No murmur heard  Pulmonary:      Effort: Pulmonary effort is normal       Breath sounds: No wheezing or rales  Comments: Decreased breath sounds on the right lower lung field  Abdominal:      General: Bowel sounds are normal  There is distension  Palpations: Abdomen is soft  Tenderness: There is no abdominal tenderness  There is no guarding or rebound     Musculoskeletal:      Right lower leg: Edema present  Left lower leg: Edema present  Skin:     General: Skin is warm and dry  Neurological:      Mental Status: He is alert and oriented to person, place, and time  Psychiatric:         Mood and Affect: Mood normal          Behavior: Behavior normal          Additional Data:     Lab Results:  Results from last 7 days   Lab Units 07/01/21  1556   WBC Thousand/uL 13 17*   HEMOGLOBIN g/dL 12 3   HEMATOCRIT % 38 1   PLATELETS Thousands/uL 159   NEUTROS PCT % 72   LYMPHS PCT % 11*   MONOS PCT % 13*   EOS PCT % 2     Results from last 7 days   Lab Units 07/01/21  1556   SODIUM mmol/L 131*   POTASSIUM mmol/L 5 0   CHLORIDE mmol/L 94*   CO2 mmol/L 28   BUN mg/dL 80*   CREATININE mg/dL 2 97*   ANION GAP mmol/L 9   CALCIUM mg/dL 8 3   ALBUMIN g/dL 2 7*   TOTAL BILIRUBIN mg/dL 0 76   ALK PHOS U/L 74   ALT U/L 11*   AST U/L 15   GLUCOSE RANDOM mg/dL 110     Results from last 7 days   Lab Units 07/01/21  1556   INR  1 65*     Results from last 7 days   Lab Units 06/28/21  1116 06/28/21  0717 06/27/21  2147 06/27/21  1610 06/27/21  1136 06/27/21  0756 06/26/21  2042 06/26/21  1525 06/26/21  1125 06/26/21  0946 06/25/21  2051 06/25/21  1643   POC GLUCOSE mg/dl 184* 126 130 184* 159* 141* 173* 309* 272* 307* 256* 155*         Results from last 7 days   Lab Units 07/01/21  1556   LACTIC ACID mmol/L 2 2*       Imaging: Reviewed radiology reports from this admission including: chest xray and ECHO  XR chest 1 view portable   ED Interpretation by Jabari Mendoza DO (07/01 1613)   Persistent loculated right-sided pleural effusion unchanged from previous x-rays      CT chest wo contrast    (Results Pending)       EKG and Other Studies Reviewed on Admission:   · EKG: Sinus Tachycardia  HR Sinus tachycardia, occasional PVCs, left anterior fascicular block  Low QRS voltage  ** Please Note: This note has been constructed using a voice recognition system   **

## 2021-07-01 NOTE — ASSESSMENT & PLAN NOTE
· On chart review, patient has a history of non-small cell lung cancer diagnosed in December 2019  · Underwent VATS with pleural decortication and pleurodesis 7/20  · PET scan 5/4 concerning for disease progression  Notable for left intraperitoneal metastasis  · Last chemotherapy was about a month ago  · Discussed with patient that his overall prognosis is poor given that he has stage IV cancer  However, he remains optimistic that he can be this and is looking for to getting a new chemotherapy medication from [id number] next week  · He is in agreement with consultation of palliative care for symptom management at this stage and possibly hospice down the line should the situation worsened    Plan  · Call Dr Xena Herbert with updates  · Palliative care consult

## 2021-07-01 NOTE — ED PROVIDER NOTES
History  Chief Complaint   Patient presents with    Shortness of Breath     Per EMS family noticed patient was SOB on exertion  hx of R sided lung cancer and recent diagnosed with L sided pneumonia       History provided by:  Patient  Shortness of Breath  Severity:  Moderate  Onset quality:  Gradual  Duration:  1 week  Timing:  Constant  Progression:  Unchanged  Chronicity:  New  Context: not URI    Relieved by:  None tried  Worsened by:  Nothing  Ineffective treatments:  None tried  Associated symptoms: no abdominal pain, no chest pain, no cough, no diaphoresis, no fever, no headaches, no neck pain, no rash, no sore throat, no sputum production, no vomiting and no wheezing        Prior to Admission Medications   Prescriptions Last Dose Informant Patient Reported? Taking?    [facility] Vitamin B-12 1000 MCG tablet 7/1/2021 at Unknown time Self Yes Yes   Sig: Take 1,000 mcg by mouth daily   Calcium Carbonate-Vit D-Min (CALCIUM 1200 PO) 7/1/2021 at Unknown time Self Yes Yes   Sig: Take 1 tablet by mouth daily    apixaban (Eliquis) 5 mg 7/1/2021 at Unknown time Self Yes Yes   Sig: Take 5 mg by mouth 2 (two) times a day   cholecalciferol (VITAMIN D3) 1,000 units tablet 7/1/2021 at Unknown time Self Yes Yes   Sig: Take 1,000 Units by mouth daily   docusate sodium (COLACE) 100 mg capsule 7/1/2021 at Unknown time  No Yes   Sig: Take 1 capsule (100 mg total) by mouth 2 (two) times a day   folic acid (FOLVITE) 1 mg tablet 7/1/2021 at Unknown time Self Yes Yes   Sig: Take 1,000 mcg by mouth daily   furosemide (LASIX) 40 mg tablet 7/1/2021 at Unknown time Self Yes Yes   Sig: Take 40 mg by mouth daily    glimepiride (AMARYL) 1 mg tablet 7/1/2021 at Unknown time Self Yes Yes   Sig: Take 1 mg by mouth daily with breakfast    lovastatin (MEVACOR) 20 mg tablet 6/30/2021 at Unknown time Self Yes Yes   Sig: Take 20 mg by mouth daily at bedtime   metoprolol tartrate (LOPRESSOR) 25 mg tablet 7/1/2021 at Unknown time  No Yes   Sig: Take 0 5 tablets (12 5 mg total) by mouth every 12 (twelve) hours   midodrine (PROAMATINE) 10 MG tablet 7/1/2021 at Unknown time  No Yes   Sig: Take 1 tablet (10 mg total) by mouth 2 (two) times a day before meals Before breakfast and dinner   mirtazapine (REMERON) 15 mg tablet 6/30/2021 at Unknown time Self Yes Yes   Sig: Take 15 mg by mouth daily    ondansetron (ZOFRAN) 4 mg tablet Past Month at Unknown time Self Yes Yes   Sig: Take 4 mg by mouth every 8 (eight) hours as needed   pantoprazole (PROTONIX) 40 mg tablet 7/1/2021 at Unknown time Self Yes Yes   Sig: Take 40 mg by mouth every morning      Facility-Administered Medications: None       Past Medical History:   Diagnosis Date    Cancer ([facility] )     RT Lung    Diabetes mellitus ([facility] )     Hypertension        Past Surgical History:   Procedure Laterality Date    CARDIAC PACEMAKER PLACEMENT  09/2019    CHOLECYSTECTOMY      kidney damage from gallbladder removal     FOOT SURGERY      tendon    IR PARACENTESIS  6/14/2021    IR PARACENTESIS  6/18/2021    IR PARACENTESIS  6/21/2021    IR PARACENTESIS  6/28/2021    PORTACATH PLACEMENT      PROSTATE SURGERY         Family History   Family history unknown: Yes     I have reviewed and agree with the history as documented  E-Cigarette/Vaping    E-Cigarette Use Never User      E-Cigarette/Vaping Substances     Social History     Tobacco Use    Smoking status: Former Smoker     Packs/day: 2 00     Years: 9 00     Pack years: 18 00     Types: Cigarettes    Smokeless tobacco: Never Used   Vaping Use    Vaping Use: Never used   Substance Use Topics    Alcohol use: Not Currently    Drug use: Never       Review of Systems   Constitutional: Negative for activity change, chills, diaphoresis and fever  HENT: Negative for congestion, sinus pressure and sore throat  Eyes: Negative for pain and visual disturbance  Respiratory: Positive for shortness of breath   Negative for cough, sputum production, chest tightness, wheezing and stridor  Cardiovascular: Negative for chest pain and palpitations  Gastrointestinal: Positive for abdominal distention  Negative for abdominal pain, constipation, diarrhea, nausea and vomiting  Genitourinary: Negative for dysuria and frequency  Musculoskeletal: Negative for neck pain and neck stiffness  Skin: Negative for rash  Neurological: Negative for dizziness, speech difficulty, light-headedness, numbness and headaches  Physical Exam  Physical Exam  Vitals reviewed  Constitutional:       General: He is not in acute distress  Appearance: He is well-developed  He is not diaphoretic  HENT:      Head: Normocephalic and atraumatic  Right Ear: External ear normal       Left Ear: External ear normal       Nose: Nose normal    Eyes:      General:         Right eye: No discharge  Left eye: No discharge  Pupils: Pupils are equal, round, and reactive to light  Neck:      Trachea: No tracheal deviation  Cardiovascular:      Rate and Rhythm: Normal rate and regular rhythm  Heart sounds: Normal heart sounds  No murmur heard  Pulmonary:      Effort: Pulmonary effort is normal       Breath sounds: Normal breath sounds  No stridor  Abdominal:      General: There is distension  Palpations: Abdomen is soft  Tenderness: There is no abdominal tenderness  There is no guarding or rebound  Musculoskeletal:         General: Normal range of motion  Cervical back: Normal range of motion and neck supple  Skin:     General: Skin is warm and dry  Coloration: Skin is not pale  Findings: No erythema  Neurological:      General: No focal deficit present  Mental Status: He is alert and oriented to person, place, and time           Vital Signs  ED Triage Vitals   Temperature Pulse Respirations Blood Pressure SpO2   07/01/21 1650 07/01/21 1548 07/01/21 1548 07/01/21 1548 07/01/21 1548   97 5 °F (36 4 °C) 103 22 103/73 98 %      Temp Source Heart Rate Source Patient Position - Orthostatic VS BP Location FiO2 (%)   07/01/21 1650 07/01/21 1548 07/01/21 1548 07/01/21 1548 --   Axillary Monitor Sitting Right arm       Pain Score       07/01/21 1548       5           Vitals:    07/01/21 1913 07/01/21 1932 07/01/21 2033 07/01/21 2255   BP: 103/68 107/61 116/70 107/63   Pulse: 99 101 100 97   Patient Position - Orthostatic VS: Lying Lying Lying Lying         Visual Acuity      ED Medications  Medications   cyanocobalamin (VITAMIN B-12) tablet 1,000 mcg (has no administration in time range)   cholecalciferol (VITAMIN D3) tablet 1,000 Units (has no administration in time range)   folic acid (FOLVITE) tablet 1,000 mcg (has no administration in time range)   pravastatin (PRAVACHOL) tablet 20 mg (has no administration in time range)   metoprolol tartrate (LOPRESSOR) partial tablet 12 5 mg (12 5 mg Oral Not Given 7/1/21 2255)   midodrine (PROAMATINE) tablet 10 mg (has no administration in time range)   mirtazapine (REMERON) tablet 15 mg (has no administration in time range)   pantoprazole (PROTONIX) EC tablet 40 mg (has no administration in time range)   levofloxacin (LEVAQUIN) IVPB (premix in dextrose) 750 mg 150 mL (750 mg Intravenous New Bag 7/1/21 2234)   albumin human (FLEXBUMIN) 5 % injection 25 g (25 g Intravenous New Bag 7/1/21 2213)   insulin lispro (HumaLOG) 100 units/mL subcutaneous injection 1-6 Units (has no administration in time range)   vancomycin (VANCOCIN) 1,250 mg in sodium chloride 0 9 % 250 mL IVPB (has no administration in time range)   acetaminophen (TYLENOL) tablet 650 mg (650 mg Oral Given 7/1/21 2206)   lidocaine (LIDODERM) 5 % patch 1 patch (has no administration in time range)   vancomycin (VANCOCIN) 1,750 mg in sodium chloride 0 9 % 500 mL IVPB (1,750 mg Intravenous New Bag 7/1946)       Diagnostic Studies  Results Reviewed     Procedure Component Value Units Date/Time    Procalcitonin with AM Reflex [[id number]]  (Abnormal) Collected: 07/01/21 2003    Lab Status: Final result Specimen: Blood from Arm, Left Updated: 07/01/21 2356     Procalcitonin 0 67 ng/ml     Stool Enteric Bacterial Panel by PCR [[id number]] Collected: 07/01/21 2150    Lab Status: In process Specimen: Stool from Per Rectum Updated: 07/01/21 2158    Clostridium difficile toxin by PCR with EIA [[id number]] Collected: 07/01/21 2150    Lab Status: In process Specimen: Stool from Per Rectum Updated: 07/01/21 2158    Lactic acid 2 Hours [[id number]]  (Abnormal) Collected: 07/01/21 2002    Lab Status: Final result Specimen: Blood from Arm, Left Updated: 07/01/21 2042     LACTIC ACID 2 2 mmol/L     Narrative:      Result may be elevated if tourniquet was used during collection  Legionella antigen, urine [[id number]] Collected: 07/01/21 2003    Lab Status: In process Specimen: Urine, Clean Catch Updated: 07/01/21 2010    Strep Pneumoniae, Urine [[id number]] Collected: 07/01/21 2003    Lab Status: In process Specimen: Urine, Clean Catch Updated: 07/01/21 2010    NT-BNP PRO [[id number]]  (Abnormal) Collected: 07/01/21 1556    Lab Status: Final result Specimen: Blood from Arm, Left Updated: 07/01/21 1639     NT-proBNP 914 pg/mL     Lactic acid [[id number]]  (Abnormal) Collected: 07/01/21 1556    Lab Status: Final result Specimen: Blood from Arm, Left Updated: 07/01/21 1637     LACTIC ACID 2 2 mmol/L     Narrative:      Result may be elevated if tourniquet was used during collection      Troponin I [[id number]]  (Normal) Collected: 07/01/21 1556    Lab Status: Final result Specimen: Blood from Arm, Left Updated: 07/01/21 1632     Troponin I <0 02 ng/mL     Comprehensive metabolic panel [[id number]]  (Abnormal) Collected: 07/01/21 1556    Lab Status: Final result Specimen: Blood from Arm, Left Updated: 07/01/21 1626     Sodium 131 mmol/L      Potassium 5 0 mmol/L      Chloride 94 mmol/L      CO2 28 mmol/L      ANION GAP 9 mmol/L      BUN 80 mg/dL      Creatinine 2 97 mg/dL      Glucose 110 mg/dL      Calcium 8 3 mg/dL      Corrected Calcium 9 3 mg/dL      AST 15 U/L      ALT 11 U/L      Alkaline Phosphatase 74 U/L      Total Protein 5 7 g/dL      Albumin 2 7 g/dL      Total Bilirubin 0 76 mg/dL      eGFR 20 ml/min/1 73sq m     Narrative:      National Kid[employer] guidelines for Chronic Kidney Disease (CKD):     Stage 1 with normal or high GFR (GFR > 90 mL/min/1 73 square meters)    Stage 2 Mild CKD (GFR = 60-89 mL/min/1 73 square meters)    Stage 3A Moderate CKD (GFR = 45-59 mL/min/1 73 square meters)    Stage 3B Moderate CKD (GFR = 30-44 mL/min/1 73 square meters)    Stage 4 Severe CKD (GFR = 15-29 mL/min/1 73 square meters)    Stage 5 End Stage CKD (GFR <15 mL/min/1 73 square meters)  Note: GFR calculation is accurate only with a steady state creatinine    Protime-INR [[id number]]  (Abnormal) Collected: 07/01/21 1556    Lab Status: Final result Specimen: Blood from Arm, Left Updated: 07/01/21 1625     Protime 19 6 seconds      INR 1 65    APTT [[id number]]  (Abnormal) Collected: 07/01/21 1556    Lab Status: Final result Specimen: Blood from Arm, Left Updated: 07/01/21 1625     PTT 38 seconds     CBC and differential [[id number]]  (Abnormal) Collected: 07/01/21 1556    Lab Status: Final result Specimen: Blood from Arm, Left Updated: 07/01/21 1611     WBC 13 17 Thousand/uL      RBC 4 25 Million/uL      Hemoglobin 12 3 g/dL      Hematocrit 38 1 %      MCV 90 fL      MCH 28 9 pg      MCHC 32 3 g/dL      RDW 20 4 %      MPV 10 8 fL      Platelets 166 Thousands/uL      nRBC 0 /100 WBCs      Neutrophils Relative 72 %      Immat GRANS % 1 %      Lymphocytes Relative 11 %      Monocytes Relative 13 %      Eosinophils Relative 2 %      Basophils Relative 1 %      Neutrophils Absolute 9 62 Thousands/µL      Immature Grans Absolute 0 09 Thousand/uL      Lymphocytes Absolute 1 40 Thousands/µL      Monocytes Absolute 1 72 Thousand/µL      Eosinophils Absolute 0 26 Thousand/µL      Basophils Absolute 0 08 Thousands/µL                  XR chest 1 view portable   ED Interpretation by Carrie Wood DO (07/01 1613)   Persistent loculated right-sided pleural effusion unchanged from previous x-rays      Final Result by Bryce Grant DO (07/01 2216)      Stable chest       Persistent loculated right pleural effusion, right basilar atelectasis and central vascular crowding  Workstation performed: [id number]         CT chest wo contrast    (Results Pending)   IR INPATIENT Paracentesis    (Results Pending)   IR IN-Patient Thoracentesis    (Results Pending)              Procedures  ECG 12 Lead Documentation Only    Date/Time: 7/1/2021 3:53 PM  Performed by: Carrie Wood DO  Authorized by: Carrie Wood DO     ECG reviewed by me, the ED Provider: yes    Patient location:  ED  Previous ECG:     Previous ECG:  Compared to current    Comparison ECG info:  6 13 2021    Similarity:  No change  Interpretation:     Interpretation: non-specific    Rate:     ECG rate:  106    ECG rate assessment: tachycardic    Rhythm:     Rhythm: sinus rhythm    Ectopy:     Ectopy: none    QRS:     QRS axis:  Normal    QRS intervals:  Normal  Conduction:     Conduction: normal    ST segments:     ST segments:  Non-specific  T waves:     T waves: non-specific               ED Course                             SBIRT 22yo+      Most Recent Value   SBIRT (22 yo +)   In order to provide better care to our patients, we are screening all of our patients for alcohol and drug use  Would it be okay to ask you these screening questions? No Filed at: 07/01/2021 1644                    MDM  Number of Diagnoses or Management Options  Other ascites: new and requires workup  SOB (shortness of breath) on exertion: new and requires workup  Diagnosis management comments: Shortness of breath abdominal distention likely secondary to ascites  From malignancy  Will admit to hospital further workup evaluation         Amount and/or Complexity of Data Reviewed  Clinical lab tests: ordered and reviewed  Tests in the radiology section of CPT®: ordered and reviewed  Decide to obtain previous medical records or to obtain history from someone other than the patient: yes  Obtain history from someone other than the patient: yes  Review and summarize past medical records: yes  Discuss the patient with other providers: yes  Independent visualization of images, tracings, or specimens: yes        Disposition  Final diagnoses:   SOB (shortness of breath) on exertion   Other ascites     Time reflects when diagnosis was documented in both MDM as applicable and the Disposition within this note     Time User Action Codes Description Comment    7/1/2021  4:53 PM Vishal Laguna Add [R06 02] SOB (shortness of breath) on exertion     7/1/2021  4:54 PM Becky ORTA Add [R18 8] Other ascites     7/1/2021  6:45 PM Shawna Cantrell Add [C34 91] Carcinoma of lung, right ([address] )     7/1/2021  6:45 PM Shawna Cantrell Add [C34 91] 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 [facility])       ED Disposition     ED Disposition Condition Date/Time Comment    Admit Stable Thu Jul 1, 2021  4:13 PM Case was discussed with Dr Chris Adorno and the patient's admission status was agreed to be Admission Status: inpatient status to the service of Dr Chris Adorno      Follow-up Information    None         Current Discharge Medication List      CONTINUE these medications which have NOT CHANGED    Details   apixaban (Eliquis) 5 mg Take 5 mg by mouth 2 (two) times a day      Calcium Carbonate-Vit D-Min (CALCIUM 1200 PO) Take 1 tablet by mouth daily       cholecalciferol (VITAMIN D3) 1,000 units tablet Take 1,000 Units by mouth daily      [facility] Vitamin B-12 1000 MCG tablet Take 1,000 mcg by mouth daily      docusate sodium (COLACE) 100 mg capsule Take 1 capsule (100 mg total) by mouth 2 (two) times a day  Qty: 60 capsule, Refills: 0    Associated Diagnoses: Constipation      folic acid (FOLVITE) 1 mg tablet Take 1,000 mcg by mouth daily      furosemide (LASIX) 40 mg tablet Take 40 mg by mouth daily       glimepiride (AMARYL) 1 mg tablet Take 1 mg by mouth daily with breakfast       lovastatin (MEVACOR) 20 mg tablet Take 20 mg by mouth daily at bedtime      metoprolol tartrate (LOPRESSOR) 25 mg tablet Take 0 5 tablets (12 5 mg total) by mouth every 12 (twelve) hours  Qty: 30 tablet, Refills: 0    Associated Diagnoses: Tachycardia      midodrine (PROAMATINE) 10 MG tablet Take 1 tablet (10 mg total) by mouth 2 (two) times a day before meals Before breakfast and dinner  Qty: 60 tablet, Refills: 0    Associated Diagnoses: Hypotension      mirtazapine (REMERON) 15 mg tablet Take 15 mg by mouth daily       ondansetron (ZOFRAN) 4 mg tablet Take 4 mg by mouth every 8 (eight) hours as needed      pantoprazole (PROTONIX) 40 mg tablet Take 40 mg by mouth every morning           No discharge procedures on file      PDMP Review     None          ED Provider  Electronically Signed by           Fanny Arellano DO  07/02/21 0015

## 2021-07-01 NOTE — TELEPHONE ENCOUNTER
I called patient to schedule a hospital follow up  Called was answered but patient hung up  Tried to reach you letter was sent to patient

## 2021-07-01 NOTE — ASSESSMENT & PLAN NOTE
· Chest x-ray demonstrating persistent loculated right pleural effusion and right base atelectasis unchanged from prior by my read  · Reports that he has been getting weekly pleurocentesis  · Was noted during prior admission in evaluated by IR, at the time was deemed insufficient to warrant pleurocentesis    · Given shortness of breath in need for paracentesis, will have IR evaluate patient possible pleurocentesis

## 2021-07-01 NOTE — ASSESSMENT & PLAN NOTE
Lab Results   Component Value Date    EGFR 20 07/01/2021    EGFR 28 06/28/2021    EGFR 24 06/27/2021    CREATININE 2 97 (H) 07/01/2021    CREATININE 2 25 (H) 06/28/2021    CREATININE 2 50 (H) 06/27/2021     · Baseline 1 5-2 2  · Likely prerenal in the setting of diuretics, poor p o  Intake, soft BP  · Hold home Lasix  · Continue midodrine    · Albumin for intravascular support in the setting of 3rd spacing  · Monitor creatinine

## 2021-07-02 ENCOUNTER — APPOINTMENT (INPATIENT)
Dept: RADIOLOGY | Facility: HOSPITAL | Age: 76
DRG: 853 | End: 2021-07-02
Payer: COMMERCIAL

## 2021-07-02 ENCOUNTER — APPOINTMENT (INPATIENT)
Dept: CT IMAGING | Facility: HOSPITAL | Age: 76
DRG: 853 | End: 2021-07-02
Payer: COMMERCIAL

## 2021-07-02 ENCOUNTER — TELEPHONE (OUTPATIENT)
Dept: CT IMAGING | Facility: HOSPITAL | Age: 76
End: 2021-07-02

## 2021-07-02 PROBLEM — A41.9 SEPSIS (HCC): Status: RESOLVED | Noted: 2021-06-14 | Resolved: 2021-07-02

## 2021-07-02 LAB
ALBUMIN FLD-MCNC: 1.7 G/DL
ALBUMIN SERPL BCP-MCNC: 2.8 G/DL (ref 3.5–5)
ALP SERPL-CCNC: 57 U/L (ref 46–116)
ALT SERPL W P-5'-P-CCNC: 8 U/L (ref 12–78)
ANION GAP SERPL CALCULATED.3IONS-SCNC: 10 MMOL/L (ref 4–13)
AST SERPL W P-5'-P-CCNC: 11 U/L (ref 5–45)
ATRIAL RATE: 106 BPM
BILIRUB SERPL-MCNC: 1.06 MG/DL (ref 0.2–1)
BILIRUB UR QL STRIP: NEGATIVE
BUN SERPL-MCNC: 74 MG/DL (ref 5–25)
C DIFF TOX B TCDB STL QL NAA+PROBE: NEGATIVE
CALCIUM ALBUM COR SERPL-MCNC: 8.9 MG/DL (ref 8.3–10.1)
CALCIUM SERPL-MCNC: 7.9 MG/DL (ref 8.3–10.1)
CAMPYLOBACTER DNA SPEC NAA+PROBE: NORMAL
CHLORIDE SERPL-SCNC: 96 MMOL/L (ref 100–108)
CLARITY UR: CLEAR
CO2 SERPL-SCNC: 26 MMOL/L (ref 21–32)
COLOR UR: YELLOW
CREAT SERPL-MCNC: 2.82 MG/DL (ref 0.6–1.3)
ERYTHROCYTE [DISTWIDTH] IN BLOOD BY AUTOMATED COUNT: 20.4 % (ref 11.6–15.1)
GFR SERPL CREATININE-BSD FRML MDRD: 21 ML/MIN/1.73SQ M
GLUCOSE SERPL-MCNC: 70 MG/DL (ref 65–140)
GLUCOSE SERPL-MCNC: 77 MG/DL (ref 65–140)
GLUCOSE SERPL-MCNC: 78 MG/DL (ref 65–140)
GLUCOSE SERPL-MCNC: 94 MG/DL (ref 65–140)
GLUCOSE SERPL-MCNC: 95 MG/DL (ref 65–140)
GLUCOSE UR STRIP-MCNC: NEGATIVE MG/DL
HCT VFR BLD AUTO: 30.8 % (ref 36.5–49.3)
HGB BLD-MCNC: 9.7 G/DL (ref 12–17)
HGB UR QL STRIP.AUTO: NEGATIVE
HISTIOCYTES NFR FLD: 10 %
INR PPP: 1.71 (ref 0.84–1.19)
KETONES UR STRIP-MCNC: NEGATIVE MG/DL
L PNEUMO1 AG UR QL IA.RAPID: NEGATIVE
LDH FLD L TO P-CCNC: 289 U/L
LEUKOCYTE ESTERASE UR QL STRIP: NEGATIVE
LYMPHOCYTES NFR BLD AUTO: 9 %
MAGNESIUM SERPL-MCNC: 1.8 MG/DL (ref 1.6–2.6)
MCH RBC QN AUTO: 28.6 PG (ref 26.8–34.3)
MCHC RBC AUTO-ENTMCNC: 31.5 G/DL (ref 31.4–37.4)
MCV RBC AUTO: 91 FL (ref 82–98)
MONO+MESO NFR FLD MANUAL: 1 %
MONOCYTES NFR BLD AUTO: 35 %
NEUTS SEG NFR BLD AUTO: 45 %
NITRITE UR QL STRIP: NEGATIVE
P AXIS: 53 DEGREES
PH BODY FLUID: 7.6
PH UR STRIP.AUTO: 5.5 [PH]
PHOSPHATE SERPL-MCNC: 5.7 MG/DL (ref 2.3–4.1)
PLATELET # BLD AUTO: 95 THOUSANDS/UL (ref 149–390)
PMV BLD AUTO: 10 FL (ref 8.9–12.7)
POTASSIUM SERPL-SCNC: 4.6 MMOL/L (ref 3.5–5.3)
PR INTERVAL: 166 MS
PROCALCITONIN SERPL-MCNC: 0.7 NG/ML
PROT FLD-MCNC: 2.8 G/DL
PROT SERPL-MCNC: 5.2 G/DL (ref 6.4–8.2)
PROT UR STRIP-MCNC: NEGATIVE MG/DL
PROTHROMBIN TIME: 20.1 SECONDS (ref 11.6–14.5)
QRS AXIS: -65 DEGREES
QRSD INTERVAL: 116 MS
QT INTERVAL: 346 MS
QTC INTERVAL: 459 MS
RBC # BLD AUTO: 3.39 MILLION/UL (ref 3.88–5.62)
S PNEUM AG UR QL: NEGATIVE
SALMONELLA DNA SPEC QL NAA+PROBE: NORMAL
SHIGA TOXIN STX GENE SPEC NAA+PROBE: NORMAL
SHIGELLA DNA SPEC QL NAA+PROBE: NORMAL
SODIUM SERPL-SCNC: 132 MMOL/L (ref 136–145)
SP GR UR STRIP.AUTO: 1.02 (ref 1–1.03)
T WAVE AXIS: 54 DEGREES
TOTAL CELLS COUNTED SPEC: 100
UROBILINOGEN UR QL STRIP.AUTO: 0.2 E.U./DL
VANCOMYCIN SERPL-MCNC: 13.4 UG/ML
VENTRICULAR RATE: 106 BPM
WBC # BLD AUTO: 8.51 THOUSAND/UL (ref 4.31–10.16)
WBC # FLD MANUAL: 829 /UL

## 2021-07-02 PROCEDURE — 89051 BODY FLUID CELL COUNT: CPT | Performed by: INTERNAL MEDICINE

## 2021-07-02 PROCEDURE — 80202 ASSAY OF VANCOMYCIN: CPT | Performed by: INTERNAL MEDICINE

## 2021-07-02 PROCEDURE — 93010 ELECTROCARDIOGRAM REPORT: CPT | Performed by: INTERNAL MEDICINE

## 2021-07-02 PROCEDURE — 83735 ASSAY OF MAGNESIUM: CPT | Performed by: INTERNAL MEDICINE

## 2021-07-02 PROCEDURE — G1004 CDSM NDSC: HCPCS

## 2021-07-02 PROCEDURE — 80053 COMPREHEN METABOLIC PANEL: CPT | Performed by: INTERNAL MEDICINE

## 2021-07-02 PROCEDURE — 84100 ASSAY OF PHOSPHORUS: CPT | Performed by: INTERNAL MEDICINE

## 2021-07-02 PROCEDURE — 83986 ASSAY PH BODY FLUID NOS: CPT | Performed by: INTERNAL MEDICINE

## 2021-07-02 PROCEDURE — 71250 CT THORAX DX C-: CPT

## 2021-07-02 PROCEDURE — 82948 REAGENT STRIP/BLOOD GLUCOSE: CPT

## 2021-07-02 PROCEDURE — 85610 PROTHROMBIN TIME: CPT | Performed by: INTERNAL MEDICINE

## 2021-07-02 PROCEDURE — 49083 ABD PARACENTESIS W/IMAGING: CPT | Performed by: RADIOLOGY

## 2021-07-02 PROCEDURE — 83615 LACTATE (LD) (LDH) ENZYME: CPT | Performed by: INTERNAL MEDICINE

## 2021-07-02 PROCEDURE — 85027 COMPLETE CBC AUTOMATED: CPT | Performed by: INTERNAL MEDICINE

## 2021-07-02 PROCEDURE — 87075 CULTR BACTERIA EXCEPT BLOOD: CPT | Performed by: INTERNAL MEDICINE

## 2021-07-02 PROCEDURE — 82042 OTHER SOURCE ALBUMIN QUAN EA: CPT | Performed by: INTERNAL MEDICINE

## 2021-07-02 PROCEDURE — 87070 CULTURE OTHR SPECIMN AEROBIC: CPT | Performed by: INTERNAL MEDICINE

## 2021-07-02 PROCEDURE — 32555 ASPIRATE PLEURA W/ IMAGING: CPT

## 2021-07-02 PROCEDURE — 76604 US EXAM CHEST: CPT | Performed by: RADIOLOGY

## 2021-07-02 PROCEDURE — 0W9G3ZZ DRAINAGE OF PERITONEAL CAVITY, PERCUTANEOUS APPROACH: ICD-10-PCS | Performed by: RADIOLOGY

## 2021-07-02 PROCEDURE — 87205 SMEAR GRAM STAIN: CPT | Performed by: INTERNAL MEDICINE

## 2021-07-02 PROCEDURE — 99223 1ST HOSP IP/OBS HIGH 75: CPT | Performed by: INTERNAL MEDICINE

## 2021-07-02 PROCEDURE — 49083 ABD PARACENTESIS W/IMAGING: CPT

## 2021-07-02 PROCEDURE — 81003 URINALYSIS AUTO W/O SCOPE: CPT | Performed by: INTERNAL MEDICINE

## 2021-07-02 PROCEDURE — 84157 ASSAY OF PROTEIN OTHER: CPT | Performed by: INTERNAL MEDICINE

## 2021-07-02 PROCEDURE — 84145 PROCALCITONIN (PCT): CPT | Performed by: INTERNAL MEDICINE

## 2021-07-02 RX ORDER — LIDOCAINE WITH 8.4% SOD BICARB 0.9%(10ML)
SYRINGE (ML) INJECTION CODE/TRAUMA/SEDATION MEDICATION
Status: COMPLETED | OUTPATIENT
Start: 2021-07-02 | End: 2021-07-02

## 2021-07-02 RX ORDER — AMOXICILLIN 250 MG
1 CAPSULE ORAL 2 TIMES DAILY
Status: DISCONTINUED | OUTPATIENT
Start: 2021-07-02 | End: 2021-07-08 | Stop reason: HOSPADM

## 2021-07-02 RX ORDER — HYDROMORPHONE HCL/PF 1 MG/ML
0.2 SYRINGE (ML) INJECTION EVERY 4 HOURS PRN
Status: DISCONTINUED | OUTPATIENT
Start: 2021-07-02 | End: 2021-07-08 | Stop reason: HOSPADM

## 2021-07-02 RX ORDER — HEPARIN SODIUM 5000 [USP'U]/ML
5000 INJECTION, SOLUTION INTRAVENOUS; SUBCUTANEOUS EVERY 8 HOURS SCHEDULED
Status: DISCONTINUED | OUTPATIENT
Start: 2021-07-02 | End: 2021-07-02

## 2021-07-02 RX ORDER — HYDROMORPHONE HCL/PF 1 MG/ML
0.5 SYRINGE (ML) INJECTION EVERY 4 HOURS PRN
Status: DISCONTINUED | OUTPATIENT
Start: 2021-07-02 | End: 2021-07-02

## 2021-07-02 RX ORDER — ALBUMIN, HUMAN INJ 5% 5 %
12.5 SOLUTION INTRAVENOUS ONCE
Status: COMPLETED | OUTPATIENT
Start: 2021-07-02 | End: 2021-07-02

## 2021-07-02 RX ORDER — OXYCODONE HYDROCHLORIDE 5 MG/1
2.5 TABLET ORAL EVERY 4 HOURS PRN
Status: DISCONTINUED | OUTPATIENT
Start: 2021-07-02 | End: 2021-07-08 | Stop reason: HOSPADM

## 2021-07-02 RX ORDER — OXYCODONE HYDROCHLORIDE 5 MG/1
5 TABLET ORAL EVERY 4 HOURS PRN
Status: DISCONTINUED | OUTPATIENT
Start: 2021-07-02 | End: 2021-07-08 | Stop reason: HOSPADM

## 2021-07-02 RX ADMIN — MIDODRINE HYDROCHLORIDE 10 MG: 5 TABLET ORAL at 18:39

## 2021-07-02 RX ADMIN — CYANOCOBALAMIN TAB 500 MCG 1000 MCG: 500 TAB at 08:38

## 2021-07-02 RX ADMIN — ALBUMIN (HUMAN) 25 G: 12.5 INJECTION, SOLUTION INTRAVENOUS at 08:45

## 2021-07-02 RX ADMIN — VANCOMYCIN HYDROCHLORIDE 1750 MG: 1 INJECTION, POWDER, LYOPHILIZED, FOR SOLUTION INTRAVENOUS at 23:57

## 2021-07-02 RX ADMIN — Medication 1000 UNITS: at 08:38

## 2021-07-02 RX ADMIN — OXYCODONE HYDROCHLORIDE 2.5 MG: 5 TABLET ORAL at 11:25

## 2021-07-02 RX ADMIN — Medication 6 ML: at 09:47

## 2021-07-02 RX ADMIN — ALBUMIN (HUMAN) 12.5 G: 12.5 INJECTION, SOLUTION INTRAVENOUS at 02:50

## 2021-07-02 RX ADMIN — MIDODRINE HYDROCHLORIDE 10 MG: 5 TABLET ORAL at 06:22

## 2021-07-02 RX ADMIN — FOLIC ACID 1000 MCG: 1 TABLET ORAL at 08:39

## 2021-07-02 RX ADMIN — PANTOPRAZOLE SODIUM 40 MG: 40 TABLET, DELAYED RELEASE ORAL at 08:39

## 2021-07-02 RX ADMIN — APIXABAN 5 MG: 5 TABLET, FILM COATED ORAL at 18:39

## 2021-07-02 RX ADMIN — OXYCODONE HYDROCHLORIDE 5 MG: 5 TABLET ORAL at 18:39

## 2021-07-02 RX ADMIN — LIDOCAINE 5% 1 PATCH: 700 PATCH TOPICAL at 08:39

## 2021-07-02 RX ADMIN — ALBUMIN (HUMAN) 25 G: 12.5 INJECTION, SOLUTION INTRAVENOUS at 22:14

## 2021-07-02 RX ADMIN — PRAVASTATIN SODIUM 20 MG: 20 TABLET ORAL at 18:39

## 2021-07-02 RX ADMIN — MIRTAZAPINE 15 MG: 15 TABLET, FILM COATED ORAL at 08:39

## 2021-07-02 RX ADMIN — Medication 6 ML: at 10:30

## 2021-07-02 RX ADMIN — HYDROMORPHONE HYDROCHLORIDE 0.2 MG: 1 INJECTION, SOLUTION INTRAMUSCULAR; INTRAVENOUS; SUBCUTANEOUS at 14:16

## 2021-07-02 RX ADMIN — ALBUMIN (HUMAN) 25 G: 12.5 INJECTION, SOLUTION INTRAVENOUS at 18:45

## 2021-07-02 RX ADMIN — Medication 12.5 MG: at 08:37

## 2021-07-02 NOTE — CONSULTS
Consultation - Palliative & Supportive Care   Sterling Justen  77 y o   male  S /S -01   MRN: [id number]  Encounter: [id number]    ASSESSMENT:    Patient Active Problem List   Diagnosis    Carcinoma of lung, right ([facility] )    Pleural effusion    COPD (chronic obstructive pulmonary disease) ([facility] )    History of pulmonary embolism    AV block    Type 2 diabetes mellitus ([place] [address] )    Metastatic lung [facility] ([facility]ast[facility] lung to [facility]) [facility])    Acute renal failure superimposed on stage 3 chronic kidney disease ([facility] )    Malignant ascites    Sepsis ([place] [address] )    Ambulatory dysfunction    Dysphagia    Hypotension    Hypocalcemia    Shortness of breath     Active issues specifically addressed today include: metastatic lung cancer, recurrent malignant ascites, dyspnea, abdominal distention, COPD, goals of care, advanced directives    75M w/ metastatic non-small cell lung cancer, recurrent malignant ascites, CHF admitted for sepsis (recurrent PNA vs SBP vs Cdiff), diarrhea, dyspnea, generalized weakness, CONCEPCIÓN on CKD3, decreased PO intake, protein calorie malnutrition  He has required 4 paracenteses in the past month  [facility] consulted for goals, symptom management  PLAN:    1  Goals:    Patient states he wishes to improve, get stronger, so he can get home and then resume disease-directed life-extending treatments (Dr Laquita Mallory had intended to change his cancer regimen w/ new medication to be added in an infusion planned for today)   Patient confirms that hospice / comfort care is not appropriate for him  He understands that he can choose comfort care at any time should his goals change   Recommend ambulatory Palliative follow-up for symptom management, future goals of care discussions as situation evolves  2  Social Support:   Supportive listening provided   Normalized experience of patient/family    3   Symptom management:   Patient feels "much better" now that he has had fluid drained via paracentesis  He denies pain, denies significant symptoms other than diarrhea (etiology being evaluated)   Continue symptom management regimen put in place by primary team, but decreased hydromorphone to 0 2mg q4h PRN breakthrough pain  Code status: Level 3 - DNAR and DNI   Decisional apparatus:  Patient does have capacity to make medical decisions on my exam today  If such capacity is lost, patient's substitute decision maker would default to spouse by [place] 169  Advance Directive / Living Will / POLST:  Nothing in EMR, but he reports he has a living will naming his wife then his SHAYY as surrogate decision-makers  I have reviewed the patient's controlled substance dispensing history in the Prescription Drug Monitoring Program in compliance with the [facility] regulations before prescribing any controlled substances  We appreciate the opportunity to participate in this patient's care  We will continue to follow  Please do not hesitate to contact our on-call provider through our clinic answering service at [phone] should you have acute symptom control concerns  IDENTIFICATION:  Inpatient consult to Palliative Care  Consult performed by: Joe Lovett MD  Consult ordered by: Kris Sebastian MD      Reason for Consult / Principal Problem: goals of care, symptom management    HISTORY OF PRESENT ILLNESS:    Jamie Campuzano is a 77 y o  male with metastatic non-small cell lung cancer s/p VATS (diagnosed 12/2019; he follows w/ Dr Laquita Mallory), recurrent malignant ascites, CHF, DM2, CAD, AV block s/p PPM admitted 7/3/21 for sepsis (concern for recurrent PNA vs SBP vs Cdiff), diarrhea, dyspnea, generalized weakness, CONCEPCIÓN on CKD3, decreased PO intake, protein calorie malnutrition  Patient has had multiple paracentesis in the past month, most recently 5L drained 6/28/21 but again had tense fluid-filled abdomen  [facility] [place], operated by [facility] agrees that he could benefit from indwelling catheter placement for drainage of ascites   Patient was admitted to [facility] 6/14-28/21 for malignant ascites, hypotension, CONCEPCIÓN on CKD  Patient follows w/ Dr Marianne Shafer from [facility] but unfortunately there are no notes in the EMR from that practice  [facility] DC Summary note from 6/28/21 states:  Per oncology, "Recent paracentesis demonstrated TTF1 positive adenocarcinoma in the ascitic fluid  If the ascitic fluid reaccumulates persistently/significantly, patient will need to be evaluated for catheter placement  Additional information is needed  Progressive ascites would be consistent with disease progression - possibly patient's present regimen needs to be amended  Medical oncology will reach out to Dr Marianne Shafer for additional information including the most recent treatment plan  There are other options "  At this time, patient's prognosis is very guarded  His hypotension and CONCEPCIÓN are an issue  Multiple providers have discussed goals of care with patient and his wife  Multiple providers have suggested hospice at this time, however, patient remains treatment focused  Review of Systems   Constitutional: Positive for activity change and fatigue  Eyes: Negative for pain and redness  Respiratory: Positive for shortness of breath (improved since paracentesis)  Gastrointestinal: Positive for abdominal distention (improved since paracentesis), abdominal pain (resolved after paracentesis) and diarrhea  Negative for constipation, nausea and vomiting  Endocrine: Negative for polydipsia and polyphagia  Skin: Positive for pallor  Neurological: Negative for facial asymmetry  Psychiatric/Behavioral: Positive for sleep disturbance (d/t hospital noises, etc)  Negative for dysphoric mood  The patient is not nervous/anxious          Past Medical History:   Diagnosis Date    Cancer [facility] )     RT Lung    [facility]     Hypertension      Past Surgical History:   Procedure Laterality Date    CARDIAC PACEMAKER PLACEMENT  09/2019    CHOLECYSTECTOMY      kidney damage from gallbladder removal     FOOT SURGERY      tendon    IR PARACENTESIS  2021    IR PARACENTESIS  2021    IR PARACENTESIS  2021    IR PARACENTESIS  2021    PORTACATH PLACEMENT      PROSTATE SURGERY       Social History     Socioeconomic History    Marital status: /Civil Union     Spouse name: Not on file    Number of children: Not on file    Years of education: Not on file    Highest education level: Not on file   Occupational History    Not on file   Tobacco Use    Smoking status: Former Smoker     Packs/day: 2 00     Years: 9 00     Pack years: 18 00     Types: Cigarettes    Smokeless tobacco: Never Used   Vaping Use    Vaping Use: Never used   Substance and Sexual Activity    Alcohol use: Not Currently    Drug use: Never    Sexual activity: Not on file   Other Topics Concern    Not on file   Social History Narrative    · Most recent tobacco use screenin2020      · Live alone or with others:   with others      · Asbestos exposure:   No      · TB exposure:   No      · Environmental exposure:   No      · Animal exposure:   No     - As per Rani Almeida      Social Determinants of Health     Financial Resource Strain:     Difficulty of Paying Living Expenses:    Food Insecurity:     Worried About Running Out of Food in the Last Year:     Ran Out of Food in the Last Year:    Transportation Needs:     Lack of Transportation (Medical):      Lack of Transportation (Non-Medical):    Physical Activity:     Days of Exercise per Week:     Minutes of Exercise per Session:    Stress:     Feeling of Stress :    Social Connections:     Frequency of Communication with Friends and Family:     Frequency of Social Gatherings with Friends and Family:     Attends Taoism Services:     Active Member of Clubs or Organizations:     Attends Club or Organization Meetings:     Marital Status:    Intimate Partner Violence:     Fear of Current or Ex-Partner:     Emotionally Abused:     Physically Abused:     Sexually Abused:      Family History   Family history unknown: Yes       MEDICATIONS / ALLERGIES:  all current active meds have been reviewed and current meds:   Current Facility-Administered Medications   Medication Dose Route Frequency    acetaminophen (TYLENOL) tablet 650 mg  650 mg Oral Q6H PRN    albumin human (FLEXBUMIN) 5 % injection 25 g  25 g Intravenous TID    cholecalciferol (VITAMIN D3) tablet 1,000 Units  1,000 Units Oral Daily    cyanocobalamin (VITAMIN B-12) tablet 1,000 mcg  1,000 mcg Oral Daily    folic acid (FOLVITE) tablet 1,000 mcg  1,000 mcg Oral Daily    HYDROmorphone (DILAUDID) injection 0 2 mg  0 2 mg Intravenous Q4H PRN    insulin lispro (HumaLOG) 100 units/mL subcutaneous injection 1-6 Units  1-6 Units Subcutaneous TID AC    levofloxacin (LEVAQUIN) IVPB (premix in dextrose) 750 mg 150 mL  750 mg Intravenous Q48H    lidocaine (LIDODERM) 5 % patch 1 patch  1 patch Topical Daily    metoprolol tartrate (LOPRESSOR) partial tablet 12 5 mg  12 5 mg Oral Q12H [address]    midodrine (PROAMATINE) tablet 10 mg  10 mg Oral BID AC    mirtazapine (REMERON) tablet 15 mg  15 mg Oral Daily    oxyCODONE (ROXICODONE) IR tablet 2 5 mg  2 5 mg Oral Q4H PRN    oxyCODONE (ROXICODONE) IR tablet 5 mg  5 mg Oral Q4H PRN    pantoprazole (PROTONIX) EC tablet 40 mg  40 mg Oral QAM    pravastatin (PRAVACHOL) tablet 20 mg  20 mg Oral Daily With Dinner    senna-docusate sodium (SENOKOT S) 8 6-50 mg per tablet 1 tablet  1 tablet Oral BID    vancomycin (VANCOCIN) 1,250 mg in sodium chloride 0 9 % 250 mL IVPB  15 mg/kg (Adjusted) Intravenous Daily PRN       Allergies   Allergen Reactions    Diovan [Valsartan] Angioedema    Penicillins Anaphylaxis    Lisinopril     Oxytocin        OBJECTIVE:  /60 (BP Location: Right arm)   Pulse 96   Temp 98 2 °F (36 8 °C) (Rectal)   Resp 18   Wt 105 kg (231 lb 4 2 oz)   SpO2 96%   BMI 36 22 kg/m²   Nursing notes and vital signs reviewed  Physical Exam:  Constitutional: Pale, frail, chronically ill-appearing elderly male  Overweight  In no acute physical or emotional distress  Head: Normocephalic and atraumatic  Eyes: EOM are normal  No ocular discharge  No scleral icterus  Neck: No visible adenopathy or masses  Respiratory: Effort normal  No stridor  No respiratory distress on room air  Speaking comfortably in complete sentences  Gastrointestinal: Abdominal distension noted  Abdomen soft (he has had paracentesis)  No tenderness to light palpation  Neurological: Alert, oriented and appropriately conversant  No focal deficits  Follows commands appropriately  Skin: Dry, no diaphoresis  Psychiatric: Displays a normal mood and affect  Behavior, judgment and thought content appear normal      Lab Results: I have personally reviewed pertinent labs  CBC:  Lab Results   Component Value Date    WBC 8 51 07/02/2021    HGB 9 7 (L) 07/02/2021    HCT 30 8 (L) 07/02/2021    MCV 91 07/02/2021    PLT 95 (L) 07/02/2021    MCH 28 6 07/02/2021    MCHC 31 5 07/02/2021    RDW 20 4 (H) 07/02/2021    MPV 10 0 07/02/2021    NRBC 0 07/01/2021     CMP:  Lab Results   Component Value Date    SODIUM 132 (L) 07/02/2021    K 4 6 07/02/2021    CL 96 (L) 07/02/2021    CO2 26 07/02/2021    BUN 74 (H) 07/02/2021    CREATININE 2 82 (H) 07/02/2021    CALCIUM 7 9 (L) 07/02/2021    AST 11 07/02/2021    ALT 8 (L) 07/02/2021    ALKPHOS 57 07/02/2021    EGFR 21 07/02/2021     PT/PTT:  Lab Results   Component Value Date    PTT 38 (H) 07/01/2021     Albumin:  0   Lab Value Date/Time    ALB 2 8 (L) 07/02/2021 0552     Imaging Studies: I have personally reviewed pertinent reports  EKG, Pathology, and Other Studies: I have personally reviewed pertinent reports  Counseling / Coordination of Care: Total floor / unit time spent today 25+ minutes   Greater than 50% of total time was spent with the patient and / or family counseling and / or coordination of care  A description of the counseling / coordination of care: symptom assessment and management, medication review and adjustment, psychosocial support, chart review, imaging review, lab review, advanced directives and goals of care  Nigel Hurtado MD  [facility] Palliative and Supportive Care      Portions of this document may have been created using dictation software and as such some "sound alike" terms may have been generated by the system  Do not hesitate to contact me with any questions or clarifications

## 2021-07-02 NOTE — ASSESSMENT & PLAN NOTE
· Patient presenting with abdominal discomfort and abdominal distention with associated shortness of breath  · Recently underwent a paracentesis about 2 days ago during his last admission that yielded about 5000 mL of fluid that was positive for malignancy    · IR performed paracentesis-unclear as to how much fluid was removed  · Goals of care discussion and palliative care on board-patient wishes for full treatment at this time in does not opt for hospice or palliative care

## 2021-07-02 NOTE — ASSESSMENT & PLAN NOTE
Lab Results   Component Value Date    HGBA1C 6 4 (H) 06/16/2021       Recent Labs     07/01/21 2059 07/02/21 0823 07/02/21  1119   POCGLU 90 95 94       Blood Sugar Average: Last 72 hrs:  · (P) 93 hold home Amaryl  · Insulin sliding scale  · Q 4 Accu-Cheks  · Goal 140-180

## 2021-07-02 NOTE — SEDATION DOCUMENTATION
Thoracentesis not indicated at this time  U/S performed by Dr Joaquin Martinez  Will transfer patient back room via bed

## 2021-07-02 NOTE — BRIEF OP NOTE (RAD/CATH)
INTERVENTIONAL RADIOLOGY PROCEDURE NOTE    Date: 7/2/2021    Procedure: Paracentesis and limited US of right chest     Preoperative diagnosis:   1  SOB (shortness of breath) on exertion    2  Other ascites    3  Carcinoma of lung, right ([address] )    4  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 [facility])         Postoperative diagnosis: Same  Surgeon: Perry Goodson MD     Assistant: None  No qualified resident was available  Blood loss: Minimal    Specimens: Ascites     Findings: Ascites, paracentesis performed  Chronic right pleural effusion dating back to at least Oct  2020  Not well visualized do to gas in dependent location  Thoracentesis not performed  Complications: None immediate      Anesthesia: local

## 2021-07-02 NOTE — QUICK NOTE
07/01/2021 2000 Will hold Eliquis for now for possible paracentesis in the morning    07/02/2021 cold for low blood pressure as symptomatic will give albumin x1

## 2021-07-02 NOTE — ASSESSMENT & PLAN NOTE
Recent Labs     07/01/21  1556 07/02/21  0552   CREATININE 2 97* 2 82*   EGFR 20 21     Estimated Creatinine Clearance: 26 6 mL/min (A) (by C-G formula based on SCr of 2 82 mg/dL (H))  · Baseline 1 5-2 2  · Likely prerenal versus ATN given ascites; will monitor now that patient underwent paracentesis  · Continue to hold home Lasix  · Continue midodrine    · Albumin for intravascular support in the setting of 3rd spacing  · Monitor creatinine

## 2021-07-02 NOTE — ASSESSMENT & PLAN NOTE
· Patient met SIRS criteria with hypertension, leukocytosis and tachycardia - now resolved    Recent Labs     07/01/21  1556 07/01/21 2002 07/01/21  2234   LACTICACID 2 2* 2 2* 1 5       · Chest x-ray demonstrating persistent loculated right pleural effusion and right base atelectasis unchanged from prior by my read  · Recently completed a 7 day antibiotic course of Levaquin for pneumonia  · Etiology unclear  · Differential diagnosis include hospital associated pneumonia, spontaneous bacterial peritonitis, C diff, dehydration/hypotension  · Strep/Legionella negative  · Stool studies, C diff negative    Plan    · Empiric antibiotics:  Vancomycin and and Levaquin (anaphylactic allergic reaction to penicillin); follow cultures and narrow as appropriate  · Monitor Fever/WBC Curve  · CT chest noncontrast - pending official read  · Procalcitonin tomorrow AM  · Follow Paracentesis fluid studies  · Albumin TID for dehydration/hypotension

## 2021-07-02 NOTE — ASSESSMENT & PLAN NOTE
Likely multifactorial in the setting of lung cancer, pleural effusion, ascites, deconditioning  Monitor oxygen saturation    Follow CT Chest results  Plan as above

## 2021-07-02 NOTE — ASSESSMENT & PLAN NOTE
· Chest x-ray demonstrating persistent loculated right pleural effusion and right base atelectasis unchanged from prior by my read  · Reports that he has been getting weekly pleurocentesis  · Was noted during prior admission in evaluated by IR, at the time was deemed insufficient to warrant pleurocentesis    · Given shortness of breath in need for paracentesis, IR evaluate patient possible pleurocentesis, but IR felt that chronic pleural effusion does not need to be drained at this time

## 2021-07-02 NOTE — SEDATION DOCUMENTATION
Diagnostic paracentesis completed  Specimens sent to lab  Band aid to site  Will proceed with thoracentesis

## 2021-07-02 NOTE — ASSESSMENT & PLAN NOTE
· On chart review, patient has a history of non-small cell lung cancer diagnosed in December 2019  · Underwent VATS with pleural decortication and pleurodesis 7/20  · PET scan 5/4 concerning for disease progression  Notable for left intraperitoneal metastasis  · Last chemotherapy was about a month ago  · Discussed with patient that his overall prognosis is poor given that he has stage IV cancer  However, he remains optimistic that he can be this and is looking for to getting a new chemotherapy medication from [facility] next week  · He is in agreement with consultation of palliative care for symptom management at this stage and possibly hospice down the line should the situation worsened  · Dr Cindy Figueroa is primary oncologist    Plan    · Palliative care consult appreciated - maintain on DNR/DNI, but patient does not wish to pursue hospice as this time and wishes to continue treatment with Dr Cindy Figueroa  · Advanced care planning - discussion with family  · Geriatric Pain management

## 2021-07-02 NOTE — ASSESSMENT & PLAN NOTE
Blood Pressure: 111/67    · Blood pressure reviewed and acceptable  · Continue midodrine  Albumin infusions  · Continue to monitor blood pressure

## 2021-07-02 NOTE — PROGRESS NOTES
[facility]  Progress Note Jere Grigsby 1945, 77 y o  male MRN: [id number]  Unit/Bed#: S -01 Encounter: [id number]  Primary Care Provider: Domenico Thomas MD   Date and time admitted to hospital: 7/1/2021  3:42 PM    * Sepsis (HCC)-resolved as of 7/2/2021  Assessment & Plan  · Patient met SIRS criteria with hypertension, leukocytosis and tachycardia - now resolved    Recent Labs     07/01/21  1556 07/01/21  2002 07/01/21  2234   LACTICACID 2 2* 2 2* 1 5       · Chest x-ray demonstrating persistent loculated right pleural effusion and right base atelectasis unchanged from prior by my read  · Recently completed a 7 day antibiotic course of Levaquin for pneumonia  · Etiology unclear  · Differential diagnosis include hospital associated pneumonia, spontaneous bacterial peritonitis, C diff, dehydration/hypotension  · Strep/Legionella negative  · Stool studies, C diff negative    Plan  · Empiric antibiotics:  Vancomycin and and Levaquin (anaphylactic allergic reaction to penicillin); follow cultures and narrow as appropriate  · Monitor Fever/WBC Curve  · CT chest noncontrast - pending official read  · Procalcitonin tomorrow AM  · Follow Paracentesis fluid studies  · Albumin TID for dehydration/hypotension    Metastatic lung cancer (metastasis from lung to other site) [facility])  Assessment & Plan  · On chart review, patient has a history of non-small cell lung cancer diagnosed in December 2019  · Underwent VATS with pleural decortication and pleurodesis 7/20  · PET scan 5/4 concerning for disease progression  Notable for left intraperitoneal metastasis  · Last chemotherapy was about a month ago  · Discussed with patient that his overall prognosis is poor given that he has stage IV cancer  However, he remains optimistic that he can be this and is looking for to getting a new chemotherapy medication from [place] [id number] next week    · He is in agreement with consultation of palliative care for symptom management at this stage and possibly hospice down the line should the situation worsened  · Dr Laquita Mallory is primary oncologist    Plan  · Palliative care consult appreciated - maintain on DNR/DNI, but patient does not wish to pursue hospice as this time and wishes to continue treatment with Dr Laquita Mallory  · Advanced care planning - discussion with family  · Geriatric Pain management    Shortness of breath  Assessment & Plan  Likely multifactorial in the setting of lung cancer, pleural effusion, ascites, deconditioning  Monitor oxygen saturation  Follow CT Chest results  Plan as above    Hypotension  Assessment & Plan  Blood Pressure: 111/67    · Blood pressure reviewed and acceptable  · Continue midodrine  Albumin infusions  · Continue to monitor blood pressure  Malignant ascites  Assessment & Plan  · Patient presenting with abdominal discomfort and abdominal distention with associated shortness of breath  · Recently underwent a paracentesis about 2 days ago during his last admission that yielded about 5000 mL of fluid that was positive for malignancy  · IR performed paracentesis-unclear as to how much fluid was removed  · Goals of care discussion and palliative care on board-patient wishes for full treatment at this time in does not opt for hospice or palliative care    Acute renal failure superimposed on stage 3 chronic kidney disease [facility])  Assessment & Plan  Recent Labs     07/01/21  1556 07/02/21  0552   CREATININE 2 97* 2 82*   EGFR 20 21     Estimated Creatinine Clearance: 26 6 mL/min (A) (by C-G formula based on SCr of 2 82 mg/dL (H))  · Baseline 1 5-2 2  · Likely prerenal versus ATN given ascites; will monitor now that patient underwent paracentesis  · Continue to hold home Lasix  · Continue midodrine    · Albumin for intravascular support in the setting of 3rd spacing  · Monitor creatinine    Type 2 diabetes mellitus [facility])  Assessment & Plan  Lab Results   Component Value Date HGBA1C 6 4 (H) 2021       Recent Labs     21  0823 21  1119   POCGLU 90 95 94       Blood Sugar Average: Last 72 hrs:  · (P) 93 hold home Amaryl  · Insulin sliding scale  · Q 4 Accu-Cheks  · Goal 140-180      History of pulmonary embolism  Assessment & Plan  · Continue Eliquis  · Will dose adjust for kidney disease    Pleural effusion  Assessment & Plan  · Chest x-ray demonstrating persistent loculated right pleural effusion and right base atelectasis unchanged from prior by my read  · Reports that he has been getting weekly pleurocentesis  · Was noted during prior admission in evaluated by IR, at the time was deemed insufficient to warrant pleurocentesis  · Given shortness of breath in need for paracentesis, IR evaluate patient possible pleurocentesis, but IR felt that chronic pleural effusion does not need to be drained at this time        VTE Pharmacologic Prophylaxis: VTE Score: 8 High Risk (Score >/= 5) - Pharmacological DVT Prophylaxis Ordered: apixaban (Eliquis)  Sequential Compression Devices Ordered  Patient Centered Rounds: I performed bedside rounds with nursing staff today  Discussions with Specialists or Other Care Team Provider:  Palliative care    Education and Discussions with Family / Patient: Updated  (wife) at bedside  Current Length of Stay: 1 day(s)  Current Patient Status: Inpatient   Discharge Plan: Anticipate discharge in 24-48 hrs to home with home services  Code Status: Level 3 - DNAR and DNI    Subjective: Today , patient states he has some discomfort while sleeping  Also pain on right side that has been chronic  Discussed with patient our plan to treat patient's pain and he is understanding of and agreeable  Also discussed goals of care discussion but patient stated that he would like to be aggressive in terms of treatment      Objective:     Vitals:   Temp (24hrs), Av °F (36 1 °C), Min:96 °F (35 6 °C), Max:98 2 °F (36 8 °C)    Temp:  [96 °F (35 6 °C)-98 2 °F (36 8 °C)] 97 5 °F (36 4 °C)  HR:  [] 85  Resp:  [18-24] 20  BP: ()/(50-70) 111/67  SpO2:  [93 %-99 %] 99 %  Body mass index is 36 22 kg/m²  Input and Output Summary (last 24 hours): Intake/Output Summary (Last 24 hours) at 7/2/2021 1548  Last data filed at 7/2/2021 1101  Gross per 24 hour   Intake --   Output 6450 ml   Net -6450 ml       Physical Exam:   Physical Exam  Vitals and nursing note reviewed  Constitutional:       General: He is in acute distress (Mild)  Appearance: He is well-developed  He is not diaphoretic  HENT:      Head: Normocephalic and atraumatic  Mouth/Throat:      Mouth: Mucous membranes are dry  Eyes:      Extraocular Movements: Extraocular movements intact  Conjunctiva/sclera: Conjunctivae normal    Cardiovascular:      Rate and Rhythm: Normal rate and regular rhythm  Heart sounds: Normal heart sounds  No murmur heard  Pulmonary:      Effort: Pulmonary effort is normal       Breath sounds: Rales present  No wheezing  Comments: Decreased breath sounds on the right lower lung field  Abdominal:      General: Bowel sounds are normal  There is distension  Palpations: Abdomen is soft  Tenderness: There is no abdominal tenderness  There is no guarding or rebound  Musculoskeletal:      Right lower leg: Edema present  Left lower leg: Edema present  Skin:     General: Skin is warm and dry  Neurological:      Mental Status: He is alert and oriented to person, place, and time     Psychiatric:         Mood and Affect: Mood normal          Behavior: Behavior normal         Additional Data:     Labs:  Results from last 7 days   Lab Units 07/02/21  0552 07/01/21  1556   WBC Thousand/uL 8 51 13 17*   HEMOGLOBIN g/dL 9 7* 12 3   HEMATOCRIT % 30 8* 38 1   PLATELETS Thousands/uL 95* 159   NEUTROS PCT %  --  72   LYMPHS PCT %  --  11*   MONOS PCT %  --  13*   EOS PCT %  --  2     Results from last 7 days   Lab Units 07/02/21  0552   SODIUM mmol/L 132*   POTASSIUM mmol/L 4 6   CHLORIDE mmol/L 96*   CO2 mmol/L 26   BUN mg/dL 74*   CREATININE mg/dL 2 82*   ANION GAP mmol/L 10   CALCIUM mg/dL 7 9*   ALBUMIN g/dL 2 8*   TOTAL BILIRUBIN mg/dL 1 06*   ALK PHOS U/L 57   ALT U/L 8*   AST U/L 11   GLUCOSE RANDOM mg/dL 70     Results from last 7 days   Lab Units 07/02/21  0552   INR  1 71*     Results from last 7 days   Lab Units 07/02/21  1514 07/02/21  1119 07/02/21  0823 07/01/21  2059 06/28/21  1116 06/28/21  0717 06/27/21  2147 06/27/21  1610 06/27/21  1136 06/27/21  0756 06/26/21  2042 06/26/21  1525   POC GLUCOSE mg/dl 78 94 95 90 184* 126 130 184* 159* 141* 173* 309*         Results from last 7 days   Lab Units 07/02/21  0552 07/01/21  2234 07/01/21 2003 07/01/21 2002 07/01/21  1556   LACTIC ACID mmol/L  --  1 5  --  2 2* 2 2*   PROCALCITONIN ng/ml 0 70*  --  0 67*  --   --        Lines/Drains:  Invasive Devices     Central Venous Catheter Line            Port A Cath Right Subclavian -- days          Peripheral Intravenous Line            Peripheral IV 07/01/21 Left Antecubital <1 day                Central Line:  Goal for removal: N/A - Chronic PICC             Imaging: Reviewed radiology reports from this admission including: chest CT scan    Recent Cultures (last 7 days):   Results from last 7 days   Lab Units 07/02/21  0950 07/01/21  2150 07/01/21 2003   GRAM STAIN RESULT  Rare Polys  No bacteria seen  --   --    LEGIONELLA URINARY ANTIGEN   --   --  Negative   C DIFF TOXIN B BY PCR   --  Negative  --        Last 24 Hours Medication List:   Current Facility-Administered Medications   Medication Dose Route Frequency Provider Last Rate    acetaminophen  650 mg Oral Q6H PRN Morena Ponce MD      albumin human  25 g Intravenous TID Talib Gifford MD      cholecalciferol  1,000 Units Oral Daily Talib Gifford MD      cyanocobalamin  1,000 mcg Oral Daily Talib Gifford MD      folic acid  5,724 mcg Oral Daily MD Chago Laughlin HYDROmorphone  0 2 mg Intravenous Q4H PRN Joe Lovett MD      insulin lispro  1-6 Units Subcutaneous TID [facility] Kris Sebastian MD      levofloxacin  750 mg Intravenous Q48H Kris Sebastian  mg (07/01/21 6638)    lidocaine  1 patch Topical Daily Davi Castillo MD      metoprolol tartrate  12 5 mg Oral Q12H [address] Kris Sebastian MD      midodrine  10 mg Oral BID AC Kris Sebastian MD      mirtazapine  15 mg Oral Daily Kris Sebastian MD      oxyCODONE  2 5 mg Oral Q4H PRN Michael Diallo DO      oxyCODONE  5 mg Oral Q4H PRN Michael Diallo DO      pantoprazole  40 mg Oral QAM Kris Sebastian MD      pravastatin  20 mg Oral Daily With Larisa Romero MD      senna-docusate sodium  1 tablet Oral BID Michael Diallo DO      vancomycin  15 mg/kg (Adjusted) Intravenous Daily PRN Kris Sebastian MD          Today, Patient Was Seen By: Ebony Opitz, DO    **Please Note: This note may have been constructed using a voice recognition system  **

## 2021-07-02 NOTE — UTILIZATION REVIEW
Initial Clinical Review    Admission: Date/Time/Statement:   Admission Orders (From admission, onward)     Ordered        07/01/21 1657  INPATIENT ADMISSION  Once                   Orders Placed This Encounter   Procedures    INPATIENT ADMISSION     Standing Status:   Standing     Number of Occurrences:   1     Order Specific Question:   Level of Care     Answer:   Med Surg [16]     Order Specific Question:   Estimated length of stay     Answer:   More than 2 Midnights     Order Specific Question:   Certification     Answer:   I certify that inpatient services are medically necessary for this patient for a duration of greater than two midnights  See H&P and MD Progress Notes for additional information about the patient's course of treatment  ED Arrival Information     Expected Arrival Acuity    - 7/1/2021 15:42 Urgent         Means of arrival Escorted by Service Admission type    Ambulance [place] [facility] Hospitalist Urgent         Arrival complaint    SOB        Chief Complaint   Patient presents with    Shortness of Breath     Per EMS family noticed patient was SOB on exertion  hx of R sided lung cancer and recent diagnosed with L sided pneumonia       Initial Presentation:  this is a 77year old male from home to ED via ems admitted inpatient due to Sepsis/shortenss of breath due to lung cancer, pleural effusion,, ascites and deconditioning/Malignant ascites/ARF on CKD  History of metastatic lung cancer, last chemo about 1 month ago  Presented due to worsening shortness of breath starting week prior to arrival   Day of arrival with diarrhea  On exam abdominal distention  Lactic acid 2 2  NT-proBNP 914  Bun 80, creatinine 2 97 with baseline of 1 5 - 2 2  Wbc 13 17  CxR showed pleural effusion  Plan is empiric antibiotics, check stool studies, C Diff, IR for paracentesis, albumin, monitor oxygen sat  Hold Lasix, trend BMP  Consult Palliative care       Date: 7/2/2021   Day 2:  Patient fatigues, shortness of breath improved post paracentesis and abdominal pain resolved  One exam frail  Abdominal distention  Continue antibiotics  7/2/2021 per Palliative care:  Patient has metastatic non-small cell lung cancer, recurrent malignant ascites, CHF admitted for sepsis, CONCEPCIÓN on CKD  Patient wants to get stronger to resume treatment, he refuses hospice  7/2/2020 IR - paracentesis and limited US of right chest   Findings:  Ascites, paracentesis performed    Chronic right pleural effusion dating back to at least Oct  2020  Not well visualized do to gas in dependent location  Thoracentesis not performed      ED Triage Vitals   Temperature Pulse Respirations Blood Pressure SpO2   07/01/21 1650 07/01/21 1548 07/01/21 1548 07/01/21 1548 07/01/21 1548   97 5 °F (36 4 °C) 103 22 103/73 98 %      Temp Source Heart Rate Source Patient Position - Orthostatic VS BP Location FiO2 (%)   07/01/21 1650 07/01/21 1548 07/01/21 1548 07/01/21 1548 --   Axillary Monitor Sitting Right arm       Pain Score       07/01/21 1548       5          Wt Readings from Last 1 Encounters:   07/01/21 105 kg (231 lb 4 2 oz)     Additional Vital Signs:   07/02/21 09:39:23  --  66  22  111/67  --  99 %  --  --  --  --   07/02/21 0700  --  96  18  110/60  --  96 %  --  --  None (Room air)  Lying   07/02/21 0620  --  93  --  102/66  --  --  --  --  --  --   07/02/21 0415  --  --  --  90/58  --  --  --  --  --  Lying   07/02/21 0355  --  90  --  94/57  --  99 %  28  2 L/min  --  --   07/02/21 0324  --  95  --  89/56Abnormal   --  99 %  28  2 L/min  Nasal cannula  --   07/02/21 0306  --  93  --  84/56Abnormal   --  98 %  28  2 L/min  Nasal cannula  Lying   07/02/21 0232  --  --  --  76/54Abnormal   --  --  --  --  --  --   07/02/21 0225  98 2 °F (36 8 °C)  95  20  87/50Abnormal   --  96 %  --  --  --  Lying   07/01/21 2255  96 6 °F (35 9 °C)Abnormal   97  20  107/63  --  96 %  28  2 L/min  Nasal cannula  Lying   07/01/21 2033  96 8 °F (36 °C)Abnormal   100  20  116/70  82  97 %  --  --  None (Room air)  Lying   07/01/21 1932  96 3 °F (35 7 °C)Abnormal   101  20  107/61  78  93 %  --  --  None (Room air)  Lying   07/01/21 1913  96 °F (35 6 °C)Abnormal    99  19  103/68  80  93 %  --  --  None (Room air)  Lying      07/01/21 1800  --  100  24Abnormal   97/64  74  98 %  --  --  None (Room air)         Pertinent Labs/Diagnostic Test Results:   7/1/2021 CxR Stable chest    Persistent loculated right pleural effusion, right basilar atelectasis and central vascular crowding    7/1/2021 ECG - sinus    Non specific ST and T  Results from last 7 days   Lab Units 07/02/21  0552 07/01/21  1556 06/27/21  0615   WBC Thousand/uL 8 51 13 17* 8 26   HEMOGLOBIN g/dL 9 7* 12 3 10 3*   HEMATOCRIT % 30 8* 38 1 33 7*   PLATELETS Thousands/uL 95* 159 92*   NEUTROS ABS Thousands/µL  --  9 62*  --          Results from last 7 days   Lab Units 07/02/21  0552 07/01/21  1556 06/28/21  0618 06/27/21  0615 06/26/21  0512   SODIUM mmol/L 132* 131* 137 139 138   POTASSIUM mmol/L 4 6 5 0 4 8 4 4 4 0   CHLORIDE mmol/L 96* 94* 99* 100 100   CO2 mmol/L 26 28 30 31 27   ANION GAP mmol/L 10 9 8 8 11   BUN mg/dL 74* 80* 68* 65* 62*   CREATININE mg/dL 2 82* 2 97* 2 25* 2 50* 2 58*   EGFR ml/min/1 73sq m 21 20 28 24 23   CALCIUM mg/dL 7 9* 8 3 8 5 8 4 8 5   MAGNESIUM mg/dL 1 8  --  2 0 2 0 1 9   PHOSPHORUS mg/dL 5 7*  --   --   --   --      Results from last 7 days   Lab Units 07/02/21  0552 07/01/21  1556   AST U/L 11 15   ALT U/L 8* 11*   ALK PHOS U/L 57 74   TOTAL PROTEIN g/dL 5 2* 5 7*   ALBUMIN g/dL 2 8* 2 7*   TOTAL BILIRUBIN mg/dL 1 06* 0 76     Results from last 7 days   Lab Units 07/02/21  1119 07/02/21  0823 07/01/21  2059 06/28/21  1116 06/28/21  0717 06/27/21  2147 06/27/21  1610 06/27/21  1136 06/27/21  0756 06/26/21  2042 06/26/21  1525 06/26/21  1125   POC GLUCOSE mg/dl 94 95 90 184* 126 130 184* 159* 141* 173* 309* 272*     Results from last 7 days   Lab Units 07/02/21  6679 07/01/21  1556 06/28/21  0618 06/27/21  0615 06/26/21  0512   GLUCOSE RANDOM mg/dL 70 110 123 107 168*     Results from last 7 days   Lab Units 07/01/21  1556   TROPONIN I ng/mL <0 02     Results from last 7 days   Lab Units 07/02/21  0552 07/01/21  1556   PROTIME seconds 20 1* 19 6*   INR  1 71* 1 65*   PTT seconds  --  38*     Results from last 7 days   Lab Units 07/02/21  0552 07/01/21 2003   PROCALCITONIN ng/ml 0 70* 0 67*     Results from last 7 days   Lab Units 07/01/21  2234 07/01/21 2002 07/01/21  1556   LACTIC ACID mmol/L 1 5 2 2* 2 2*     Results from last 7 days   Lab Units 07/01/21  1556   NT-PRO BNP pg/mL 914*     Results from last 7 days   Lab Units 07/01/21 2003   STREP PNEUMONIAE ANTIGEN, URINE  Negative   LEGIONELLA URINARY ANTIGEN  Negative     Results from last 7 days   Lab Units 07/01/21  2150   C DIFF TOXIN B BY PCR  Negative     Results from last 7 days   Lab Units 07/02/21  0950   WBC FLUID /ul 829     ED Treatment:   Medication Administration from 07/01/2021 1542 to 07/01/2021 1919     None        Past Medical History:   Diagnosis Date    Cancer ([facility] )     RT Lung    Diabetes mellitus ([facility] )     Hypertension      Present on Admission:   Type 2 diabetes mellitus ([facility] )   Pleural effusion   Metastatic lung cancer (metastasis from lung to other site) ([facility] )   Malignant ascites   Hypotension   Acute renal failure superimposed on stage 3 chronic kidney disease (HCC)   History of pulmonary embolism      Admitting Diagnosis: SOB (shortness of breath) [R06 02]  Other ascites [R18 8]  SOB (shortness of breath) on exertion [R06 02]  Carcinoma of lung, right (HCC) [C34 91]  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 [facility]) [C34 91]  Age/Sex: 77 y o  male  Admission Orders:  7/1/2021 1657 inpatient   Scheduled Medications:  albumin human, 25 g, Intravenous, TID  cholecalciferol, 1,000 Units, Oral, Daily  cyanocobalamin, 1,000 mcg, Oral, Daily  folic acid, 4,719 mcg, Oral, Daily  insulin lispro, 1-6 Units, Subcutaneous, TID AC  levofloxacin, 750 mg, Intravenous, Q48H  lidocaine, 1 patch, Topical, Daily  metoprolol tartrate, 12 5 mg, Oral, Q12H LOLY  midodrine, 10 mg, Oral, BID AC  mirtazapine, 15 mg, Oral, Daily  pantoprazole, 40 mg, Oral, QAM  pravastatin, 20 mg, Oral, Daily With Dinner  senna-docusate sodium, 1 tablet, Oral, BID    vancomycin (VANCOCIN) 1,750 mg in sodium chloride 0 9 % 500 mL IVPB   Dose: 20 mg/kg  Weight Dosing Info: 81 7 kg (Adjusted)  Freq: Once Route: IV  Last Dose: 1,750 mg (07/1945)  Start: 07/01/21 1900 End: 07/01/21 2146    albumin human (FLEXBUMIN) 5 % injection 12 5 g   Dose: 12 5 g  Freq: Once Route: IV  Start: 07/02/21 0245 End: 07/02/21 0250      Continuous IV Infusions: none      PRN Meds:  acetaminophen, 650 mg, Oral, Q6H PRN  HYDROmorphone, 0 5 mg, Intravenous, Q4H PRN  oxyCODONE, 2 5 mg, Oral, Q4H PRN - used x 1 7/2/2021   oxyCODONE, 5 mg, Oral, Q4H PRN  vancomycin, 15 mg/kg (Adjusted), Intravenous, Daily PRN      IP CONSULT TO PALLIATIVE CARE    Network Utilization Review Department  ATTENTION: Please call with any questions or concerns to [phone] and carefully listen to the prompts so that you are directed to the right person  All voicemails are confidential   [place] Drop all requests for admission clinical reviews, approved or denied determinations and any other requests to dedicated fax number below belonging to the campus where the patient is receiving treatment   List of dedicated fax numbers for the Facilities:  [address] DENIALS (Administrative/Medical Necessity) [phone]   [address] (Maternity/NICU/Pediatrics) [phone]   [address] [facility] [address] [place] [address] [facility]  [address]  [address] Darden [address] [facility] [address] [phone]

## 2021-07-03 LAB
ALBUMIN SERPL BCP-MCNC: 3 G/DL (ref 3.5–5)
ALP SERPL-CCNC: 51 U/L (ref 46–116)
ALT SERPL W P-5'-P-CCNC: 7 U/L (ref 12–78)
ANION GAP SERPL CALCULATED.3IONS-SCNC: 8 MMOL/L (ref 4–13)
AST SERPL W P-5'-P-CCNC: 12 U/L (ref 5–45)
BASOPHILS # BLD AUTO: 0.04 THOUSANDS/ΜL (ref 0–0.1)
BASOPHILS NFR BLD AUTO: 1 % (ref 0–1)
BILIRUB SERPL-MCNC: 1.13 MG/DL (ref 0.2–1)
BUN SERPL-MCNC: 65 MG/DL (ref 5–25)
CALCIUM ALBUM COR SERPL-MCNC: 8.6 MG/DL (ref 8.3–10.1)
CALCIUM SERPL-MCNC: 7.8 MG/DL (ref 8.3–10.1)
CHLORIDE SERPL-SCNC: 98 MMOL/L (ref 100–108)
CO2 SERPL-SCNC: 25 MMOL/L (ref 21–32)
CREAT SERPL-MCNC: 2.5 MG/DL (ref 0.6–1.3)
EOSINOPHIL # BLD AUTO: 0.26 THOUSAND/ΜL (ref 0–0.61)
EOSINOPHIL NFR BLD AUTO: 3 % (ref 0–6)
ERYTHROCYTE [DISTWIDTH] IN BLOOD BY AUTOMATED COUNT: 20.2 % (ref 11.6–15.1)
GFR SERPL CREATININE-BSD FRML MDRD: 24 ML/MIN/1.73SQ M
GLUCOSE SERPL-MCNC: 76 MG/DL (ref 65–140)
GLUCOSE SERPL-MCNC: 84 MG/DL (ref 65–140)
GLUCOSE SERPL-MCNC: 84 MG/DL (ref 65–140)
HCT VFR BLD AUTO: 30.1 % (ref 36.5–49.3)
HGB BLD-MCNC: 9.6 G/DL (ref 12–17)
IMM GRANULOCYTES # BLD AUTO: 0.05 THOUSAND/UL (ref 0–0.2)
IMM GRANULOCYTES NFR BLD AUTO: 1 % (ref 0–2)
LYMPHOCYTES # BLD AUTO: 0.83 THOUSANDS/ΜL (ref 0.6–4.47)
LYMPHOCYTES NFR BLD AUTO: 10 % (ref 14–44)
MCH RBC QN AUTO: 28.8 PG (ref 26.8–34.3)
MCHC RBC AUTO-ENTMCNC: 31.9 G/DL (ref 31.4–37.4)
MCV RBC AUTO: 90 FL (ref 82–98)
MONOCYTES # BLD AUTO: 1.2 THOUSAND/ΜL (ref 0.17–1.22)
MONOCYTES NFR BLD AUTO: 15 % (ref 4–12)
NEUTROPHILS # BLD AUTO: 5.8 THOUSANDS/ΜL (ref 1.85–7.62)
NEUTS SEG NFR BLD AUTO: 70 % (ref 43–75)
NRBC BLD AUTO-RTO: 0 /100 WBCS
PLATELET # BLD AUTO: 88 THOUSANDS/UL (ref 149–390)
PMV BLD AUTO: 11.3 FL (ref 8.9–12.7)
POTASSIUM SERPL-SCNC: 4.6 MMOL/L (ref 3.5–5.3)
PROCALCITONIN SERPL-MCNC: 0.63 NG/ML
PROT SERPL-MCNC: 5.2 G/DL (ref 6.4–8.2)
RBC # BLD AUTO: 3.33 MILLION/UL (ref 3.88–5.62)
SODIUM SERPL-SCNC: 131 MMOL/L (ref 136–145)
WBC # BLD AUTO: 8.18 THOUSAND/UL (ref 4.31–10.16)

## 2021-07-03 PROCEDURE — 99497 ADVNCD CARE PLAN 30 MIN: CPT | Performed by: INTERNAL MEDICINE

## 2021-07-03 PROCEDURE — 82948 REAGENT STRIP/BLOOD GLUCOSE: CPT

## 2021-07-03 PROCEDURE — 80053 COMPREHEN METABOLIC PANEL: CPT | Performed by: INTERNAL MEDICINE

## 2021-07-03 PROCEDURE — 85025 COMPLETE CBC W/AUTO DIFF WBC: CPT | Performed by: INTERNAL MEDICINE

## 2021-07-03 PROCEDURE — 99232 SBSQ HOSP IP/OBS MODERATE 35: CPT | Performed by: INTERNAL MEDICINE

## 2021-07-03 PROCEDURE — 84145 PROCALCITONIN (PCT): CPT | Performed by: INTERNAL MEDICINE

## 2021-07-03 RX ADMIN — MIRTAZAPINE 15 MG: 15 TABLET, FILM COATED ORAL at 09:19

## 2021-07-03 RX ADMIN — PRAVASTATIN SODIUM 20 MG: 20 TABLET ORAL at 16:09

## 2021-07-03 RX ADMIN — APIXABAN 5 MG: 5 TABLET, FILM COATED ORAL at 09:19

## 2021-07-03 RX ADMIN — OXYCODONE HYDROCHLORIDE 5 MG: 5 TABLET ORAL at 05:30

## 2021-07-03 RX ADMIN — OXYCODONE HYDROCHLORIDE 5 MG: 5 TABLET ORAL at 12:38

## 2021-07-03 RX ADMIN — FOLIC ACID 1000 MCG: 1 TABLET ORAL at 09:19

## 2021-07-03 RX ADMIN — Medication 1000 UNITS: at 09:19

## 2021-07-03 RX ADMIN — APIXABAN 5 MG: 5 TABLET, FILM COATED ORAL at 17:22

## 2021-07-03 RX ADMIN — MIDODRINE HYDROCHLORIDE 10 MG: 5 TABLET ORAL at 06:20

## 2021-07-03 RX ADMIN — CYANOCOBALAMIN TAB 500 MCG 1000 MCG: 500 TAB at 09:18

## 2021-07-03 RX ADMIN — Medication 12.5 MG: at 09:18

## 2021-07-03 RX ADMIN — OXYCODONE HYDROCHLORIDE 5 MG: 5 TABLET ORAL at 17:22

## 2021-07-03 RX ADMIN — PANTOPRAZOLE SODIUM 40 MG: 40 TABLET, DELAYED RELEASE ORAL at 09:19

## 2021-07-03 RX ADMIN — MIDODRINE HYDROCHLORIDE 10 MG: 5 TABLET ORAL at 16:09

## 2021-07-03 NOTE — ASSESSMENT & PLAN NOTE
Blood Pressure: 106/69    · Blood pressure reviewed and acceptable  · Continue midodrine  Albumin infusions    · Continue to monitor blood pressure

## 2021-07-03 NOTE — ASSESSMENT & PLAN NOTE
Lab Results   Component Value Date    HGBA1C 6 4 (H) 06/16/2021       Recent Labs     07/02/21  1514 07/02/21  2105 07/03/21  0758 07/03/21  1058   POCGLU 78 77 84 84       Blood Sugar Average: Last 72 hrs:  · (P) 86 hold home Amaryl  · Hold off on further interventions for glucose management

## 2021-07-03 NOTE — ASSESSMENT & PLAN NOTE
· On chart review, patient has a history of non-small cell lung cancer diagnosed in December 2019 - Follow with Oncology, Dr Saundra Morgan  · Underwent VATS with pleural decortication and pleurodesis 7/20  · PET scan 5/4 concerning for disease progression  Notable for left intraperitoneal metastasis  · Last chemotherapy was about a month ago    · Discussed with patient that his overall prognosis is poor given that he has stage IV cancer; on admission, he remained optimistic that he can be this and is looking for to getting a new chemotherapy medication from [address] [place], [place] [address]way:  · We had extensive discussion with patient, wife, and sister-in-law regarding advanced care planning  · Patient is now agreeable to being discharged to home with home hospice and understands the severity of lung cancer stage IV  · Inpatient consult to hospice-cm notified  · Geriatric Pain management  · IR consult for Tenckhoff catheterization placement

## 2021-07-03 NOTE — ASSESSMENT & PLAN NOTE
· Patient presenting with abdominal discomfort and abdominal distention with associated shortness of breath  · Recently underwent a paracentesis about 2 days ago during his last admission that yielded about 5000 mL of fluid that was positive for malignancy    · IR performed paracentesis-unclear as to how much fluid was removed  · Goals of care discussion and palliative care on board-patient previously wish to be treatment focus but now is understanding of severity of situation and of opting for hospice  · IR consult for Tenckhoff catheter placement

## 2021-07-03 NOTE — CASE MANAGEMENT
In response to a CM hospice consult, CM met with Pt and spouse Dalton  Pt confirmed his request for hospice care, denied having a hospice agency of preference and gave permission for a referral to [facility]  CM discussed freedom of choice and made the requested referral     LYNNE was also in contact with Pt's brother Jaquan Vargas anf [id number] Rico Junior [phone] and reviewed the above information that was discussed with the Pt and Dalton  Both Jaquan Vargas and Rico Junior reported being agreeable with the referral made to [facility]  LYNNE will continue to follow

## 2021-07-03 NOTE — PROGRESS NOTES
Vancomycin IV Pharmacy-to-Dose Consultation    Silva Alcantara is a 77 y o  male who is currently receiving Vancomycin IV with management by the Pharmacy Consult service  Assessment/Plan:  The patient was reviewed  Renal function is stable and no signs or symptoms of nephrotoxicity and/or infusion reactions were documented in the chart  Based on todays assessment, continue current vancomycin (day # 3) dosing of 1750 mg daily as needed for random level < 20, with a plan for random level to be drawn at 2345 on 7/3  We will continue to follow the patients culture results and clinical progress daily      Soha Tinsley, Pharmacist

## 2021-07-03 NOTE — PROGRESS NOTES
Vancomycin Assessment    Jason Curran is a 77 y o  male who is currently receiving vancomycin  for Pneumonia 1750    Relevant clinical data and objective history reviewed:  Creatinine   Date Value Ref Range Status   07/02/2021 2 82 (H) 0 60 - 1 30 mg/dL Final     Comment:     Standardized to IDMS reference method   07/01/2021 2 97 (H) 0 60 - 1 30 mg/dL Final     Comment:     Standardized to IDMS reference method   06/28/2021 2 25 (H) 0 60 - 1 30 mg/dL Final     Comment:     Standardized to IDMS reference method     Vancomycin Rm   Date Value Ref Range Status   07/02/2021 13 4 ug/mL Final     BP (!) 88/54 (BP Location: Left arm)   Pulse 85   Temp 97 5 °F (36 4 °C) (Axillary)   Resp 20   Wt 105 kg (231 lb 4 2 oz)   SpO2 97%   BMI 36 22 kg/m²   I/O last 3 completed shifts:  In: -   Out: 6450 [Urine:450; Other:6000]  Lab Results   Component Value Date/Time    BUN 74 (H) 07/02/2021 05:52 AM    WBC 8 51 07/02/2021 05:52 AM    HGB 9 7 (L) 07/02/2021 05:52 AM    HCT 30 8 (L) 07/02/2021 05:52 AM    MCV 91 07/02/2021 05:52 AM    PLT 95 (L) 07/02/2021 05:52 AM     Temp Readings from Last 3 Encounters:   07/02/21 97 5 °F (36 4 °C) (Axillary)   06/27/21 97 5 °F (36 4 °C) (Oral)   06/14/21 99 6 °F (37 6 °C) (Tympanic)     Vancomycin Days of Therapy: 2    Assessment/Plan  The patient is currently on vancomycin utilizing pulse dosing  Baseline risks associated with therapy include: pre-existing renal impairment, concomitant nephrotoxic medications, advanced age, and dehydration  The patient is receiving 1500mg IV q12 hours with the most recent vancomycin level being not at steady-state and sub-therapeutic (13 4) based on a goal of 15-20 (appropriate for most indications) ; therefore, is clinically appropriate and dose will be continued   Pharmacy will continue to follow closely for s/sx of nephrotoxicity, infusion reactions, and appropriateness of therapy  BMP and CBC will be ordered per protocol    Plan for random level at approximately 0600  Pharmacy will continue to follow the patients culture results and clinical progress daily      Dominick Lindsey

## 2021-07-03 NOTE — HOSPICE NOTE
Liaison spoke with pt via phone call and declined an in person meeting at this time and felt that the doctors explained hospice services to him   Pt is approved for home hospice with a soc for 7 7 21  Pt states he is to have a tenckhoff catheter placed prior to discharge    Liaison  will continue to follow

## 2021-07-03 NOTE — ASSESSMENT & PLAN NOTE
· Chest x-ray demonstrating persistent loculated right pleural effusion and right base atelectasis unchanged  · Reports that he has been getting weekly pleurocentesis  · Was noted during prior admission in evaluated by IR, at the time was deemed insufficient to warrant pleurocentesis    · IR evaluate patient possible pleurocentesis, but IR felt that chronic pleural effusion does not need to be drained at this time

## 2021-07-03 NOTE — ASSESSMENT & PLAN NOTE
Likely multifactorial in the setting of lung cancer, pleural effusion, ascites, deconditioning    Monitor oxygen saturation  O2 nasal cannula as needed

## 2021-07-03 NOTE — CONSULTS
Vancomycin IV Pharmacy-to-Dose Consultation  Autumn Kim is a 77 y o  male who was receiving Vancomycin IV with management by the Pharmacy Consult service for treatment of Pneumonia    The patients Vancomycin therapy has been completed / discontinued  Thank you for allowing us to take part in this patient's care  Pharmacy will sign-off now; please call or re-consult if there are any questions       Salomon Byrne, PharmD  Pharmacist

## 2021-07-03 NOTE — PROGRESS NOTES
[facility]  Progress Note Jenna Camara 1945, 77 y o  male MRN: [id number]  Unit/Bed#: S -01 Encounter: [id number]  Primary Care Provider: Hai Cooper MD   Date and time admitted to hospital: 7/1/2021  3:42 PM    * Metastatic lung cancer (metastasis from lung to other site) [facility])  Assessment & Plan  · On chart review, patient has a history of non-small cell lung cancer diagnosed in December 2019 - Follow with Oncology, Dr Citlalli Royal  · Underwent VATS with pleural decortication and pleurodesis 7/20  · PET scan 5/4 concerning for disease progression  Notable for left intraperitoneal metastasis  · Last chemotherapy was about a month ago  · Discussed with patient that his overall prognosis is poor given that he has stage IV cancer; on admission, he remained optimistic that he can be this and is looking for to getting a new chemotherapy medication from [address] [place], [place] [address]way:  · We had extensive discussion with patient, wife, and sister-in-law regarding advanced care planning  · Patient is now agreeable to being discharged to home with home hospice and understands the severity of lung cancer stage IV  · Inpatient consult to hospice-cm notified  · Geriatric Pain management  · IR consult for Tenckhoff catheterization placement    Shortness of breath  Assessment & Plan  Likely multifactorial in the setting of lung cancer, pleural effusion, ascites, deconditioning  Monitor oxygen saturation  O2 nasal cannula as needed    Hypotension  Assessment & Plan  Blood Pressure: 106/69    · Blood pressure reviewed and acceptable  · Continue midodrine  Albumin infusions  · Continue to monitor blood pressure    Malignant ascites  Assessment & Plan  · Patient presenting with abdominal discomfort and abdominal distention with associated shortness of breath    · Recently underwent a paracentesis about 2 days ago during his last admission that yielded about 5000 mL of fluid that was positive for malignancy  · IR performed paracentesis-unclear as to how much fluid was removed  · Goals of care discussion and palliative care on board-patient previously wish to be treatment focus but now is understanding of severity of situation and of opting for hospice  · IR consult for Tenckhoff catheter placement    Acute renal failure superimposed on stage 3 chronic kidney disease [facility])  Assessment & Plan  Recent Labs     07/01/21  1556 07/02/21  0552 07/03/21  0518   CREATININE 2 97* 2 82* 2 50*   EGFR 20 21 24     Estimated Creatinine Clearance: 28 5 mL/min (A) (by C-G formula based on SCr of 2 5 mg/dL (H))  · Baseline 1 5-2 2  · Likely prerenal versus ATN given ascites; will monitor now that patient underwent paracentesis  · Continue to hold home Lasix  · Continue midodrine  · Albumin for intravascular support in the setting of 3rd spacing    Type 2 diabetes mellitus [facility])  Assessment & Plan  Lab Results   Component Value Date    HGBA1C 6 4 (H) 06/16/2021       Recent Labs     07/02/21  1514 07/02/21  2105 07/03/21  0758 07/03/21  1058   POCGLU 78 77 84 84       Blood Sugar Average: Last 72 hrs:  · (P) 86 hold home Amaryl  · Hold off on further interventions for glucose management      History of pulmonary embolism  Assessment & Plan  Heparin until renal function normalized  Hold Eliquis home dose    Pleural effusion  Assessment & Plan  · Chest x-ray demonstrating persistent loculated right pleural effusion and right base atelectasis unchanged  · Reports that he has been getting weekly pleurocentesis  · Was noted during prior admission in evaluated by IR, at the time was deemed insufficient to warrant pleurocentesis  · IR evaluate patient possible pleurocentesis, but IR felt that chronic pleural effusion does not need to be drained at this time        VTE Pharmacologic Prophylaxis: VTE Score: 8 High Risk (Score >/= 5) - Pharmacological DVT Prophylaxis Ordered: heparin   Sequential Compression Devices Ordered  Patient Centered Rounds: I performed bedside rounds with nursing staff today  Discussions with Specialists or Other Care Team Provider:  Primary care team, case management    Education and Discussions with Family / Patient: Updated  (wife) at bedside  Current Length of Stay: 2 day(s)  Current Patient Status: Inpatient   Discharge Plan: Anticipate discharge in 48-72 hrs to home with home services  Code Status: Level 4 - Comfort Care    Subjective:   Had an extensive discussion with patient and patient's family regarding goals of care discussion  Patient was not aware that he had stage IV lung cancer  Discussed advance care planning  Patient repeatedly emphasized that he did not want to be a burden to others especially his wife  Discussed the importance of hospice on discharge to improve quality of life along with Tenckhoff catheter placement while inpatient  Patient and family understanding of this plan and are fully agreeable  All questions were answered  Objective:     Vitals:   Temp (24hrs), Av 3 °F (36 3 °C), Min:97 °F (36 1 °C), Max:97 5 °F (36 4 °C)    Temp:  [97 °F (36 1 °C)-97 5 °F (36 4 °C)] 97 °F (36 1 °C)  HR:  [83-89] 89  Resp:  [16-20] 16  BP: ()/(54-69) 106/69  SpO2:  [95 %-97 %] 97 %  Body mass index is 32 91 kg/m²  Input and Output Summary (last 24 hours): Intake/Output Summary (Last 24 hours) at 7/3/2021 1414  Last data filed at 7/3/2021 0523  Gross per 24 hour   Intake --   Output 500 ml   Net -500 ml       Physical Exam:   Physical Exam  Vitals and nursing note reviewed  Constitutional:       General: He is not in acute distress (Mild)  Appearance: He is well-developed  He is not diaphoretic  HENT:      Head: Normocephalic and atraumatic  Mouth/Throat:      Mouth: Mucous membranes are dry  Eyes:      Extraocular Movements: Extraocular movements intact        Conjunctiva/sclera: Conjunctivae normal    Cardiovascular: Heart sounds: Normal heart sounds  No murmur heard  Pulmonary:      Effort: Pulmonary effort is normal       Breath sounds: Rales present  No wheezing  Comments: Persistent cough  Abdominal:      General: Bowel sounds are normal  There is distension  Tenderness: There is no abdominal tenderness  There is no guarding or rebound  Musculoskeletal:      Right lower leg: Edema present  Left lower leg: Edema present  Skin:     General: Skin is warm and dry  Neurological:      Mental Status: He is alert and oriented to person, place, and time  Psychiatric:         Mood and Affect: Mood normal          Behavior: Behavior normal          Thought Content:  Thought content normal          Judgment: Judgment normal           Additional Data:     Labs:  Results from last 7 days   Lab Units 07/03/21  0518   WBC Thousand/uL 8 18   HEMOGLOBIN g/dL 9 6*   HEMATOCRIT % 30 1*   PLATELETS Thousands/uL 88*   NEUTROS PCT % 70   LYMPHS PCT % 10*   MONOS PCT % 15*   EOS PCT % 3     Results from last 7 days   Lab Units 07/03/21  0518   SODIUM mmol/L 131*   POTASSIUM mmol/L 4 6   CHLORIDE mmol/L 98*   CO2 mmol/L 25   BUN mg/dL 65*   CREATININE mg/dL 2 50*   ANION GAP mmol/L 8   CALCIUM mg/dL 7 8*   ALBUMIN g/dL 3 0*   TOTAL BILIRUBIN mg/dL 1 13*   ALK PHOS U/L 51   ALT U/L 7*   AST U/L 12   GLUCOSE RANDOM mg/dL 76     Results from last 7 days   Lab Units 07/02/21  0552   INR  1 71*     Results from last 7 days   Lab Units 07/03/21  1058 07/03/21  0758 07/02/21  2105 07/02/21  1514 07/02/21  1119 07/02/21  0823 07/01/21  2059 06/28/21  1116 06/28/21  0717 06/27/21  2147 06/27/21  1610 06/27/21  1136   POC GLUCOSE mg/dl 84 84 77 78 94 95 90 184* 126 130 184* 159*         Results from last 7 days   Lab Units 07/03/21  0518 07/02/21  0552 07/01/21  2234 07/01/21 2003 07/01/21 2002 07/01/21  1556   LACTIC ACID mmol/L  --   --  1 5  --  2 2* 2 2*   PROCALCITONIN ng/ml 0 63* 0 70*  --  0 67*  --   -- Lines/Drains:  Invasive Devices     Central Venous Catheter Line            Port A Cath Right Subclavian -- days          Peripheral Intravenous Line            Peripheral IV 07/01/21 Left Antecubital 1 day                Central Line:  Goal for removal: N/A - Chronic PICC             Imaging: Reviewed radiology reports from this admission including: chest CT scan    Recent Cultures (last 7 days):   Results from last 7 days   Lab Units 07/02/21  0950 07/01/21  2150 07/01/21 2003   GRAM STAIN RESULT  Rare Polys  No bacteria seen  --   --    LEGIONELLA URINARY ANTIGEN   --   --  Negative   C DIFF TOXIN B BY PCR   --  Negative  --        Last 24 Hours Medication List:   Current Facility-Administered Medications   Medication Dose Route Frequency Provider Last Rate    acetaminophen  650 mg Oral Q6H PRN Nasrin Meeks MD      apixaban  5 mg Oral BID Emiliano Ugalde MD      cholecalciferol  1,000 Units Oral Daily César Olsen MD      cyanocobalamin  1,000 mcg Oral Daily César Olsen MD      folic acid  9,836 mcg Oral Daily MD Jeanine [facility] HYDROmorphone  0 2 mg Intravenous Q4H PRN Yesenia Patiño MD      lidocaine  1 patch Topical Daily Nasrin Meeks MD      metoprolol tartrate  12 5 mg Oral Q12H [address] César Olsen MD      midodrine  10 mg Oral BID AC César Olsen MD      mirtazapine  15 mg Oral Daily César Olsen MD      oxyCODONE  2 5 mg Oral Q4H PRN Michael Diallo DO      oxyCODONE  5 mg Oral Q4H PRN Michael Diallo DO      pantoprazole  40 mg Oral QAM César Olsen MD      pravastatin  20 mg Oral Daily With Pattie Estrada MD      senna-docusate sodium  1 tablet Oral BID Serjuan Barkley DO          Today, Patient Was Seen By: Angela Barkley DO    **Please Note: This note may have been constructed using a voice recognition system  **

## 2021-07-03 NOTE — ACP (ADVANCE CARE PLANNING)
Serious Illness Conversation    1  What is your understanding now of where you are with your illness? Prognostic Understanding: appropriate understanding of prognosis  Was not aware that it is  metastatic lung Cancer until now Pt is made aware of malignant ascites     2  How much information about what is likely to be ahead with your illness would you like to have? Information: patient wants to be fully informed     3  What did you (clinician) communicate to the patient? Prognostic Communication: Uncertain - It can be difficult to predict what will happen with your illness  I hope you will continue to live well for a long time but Im worried that you could get sick quickly, and I think it is important to prepare for that possibility  4  If your health situation worsens, what are your most important goals? Goals: not be a burden, be at home     5  What are the biggest fears and worries about the future and your health? Fears/Worries: burdening others  Being a burden to his wife     6  What abilities are so critical to your life that you cannot imagine living without them? Unacceptable Function: being unable to interact with others  Being unable to interact with my wife     7  What gives you strength as you think about the future with your illness? Wife's companionship     8  If you become sicker, how much are you willing to go through for the possibility of gaining more time? "None of these"  No agressive measures/No CPR/intubation/artificial tube etc     9  How much does your proxy and family know about your priorities and wishes? Discussion: extensive discussion with family about goals and wishes     Toan Neries heard you say that  is really important to you  Keeping that in mind, and what we know about your illness, I recommend that you and your family make decision about the goal of care  This will help us make sure that your treatment plans reflect whats important to you       How does this plan sound to you? I will do everything I can to help you through this    Patient verbalized understanding of the plan     I have spent 30 minutes speaking with my patient on advanced care planning today or during this visit     Advanced directives  Five Wishes: Patient does not have Five Wishes- would like information

## 2021-07-03 NOTE — ASSESSMENT & PLAN NOTE
Recent Labs     07/01/21  1556 07/02/21  0552 07/03/21  0518   CREATININE 2 97* 2 82* 2 50*   EGFR 20 21 24     Estimated Creatinine Clearance: 28 5 mL/min (A) (by C-G formula based on SCr of 2 5 mg/dL (H))  · Baseline 1 5-2 2  · Likely prerenal versus ATN given ascites; will monitor now that patient underwent paracentesis  · Continue to hold home Lasix  · Continue midodrine    · Albumin for intravascular support in the setting of 3rd spacing

## 2021-07-04 PROBLEM — Z71.89 GOALS OF CARE, COUNSELING/DISCUSSION: Status: ACTIVE | Noted: 2021-07-04

## 2021-07-04 PROCEDURE — 99232 SBSQ HOSP IP/OBS MODERATE 35: CPT | Performed by: INTERNAL MEDICINE

## 2021-07-04 RX ADMIN — Medication 1000 UNITS: at 08:20

## 2021-07-04 RX ADMIN — APIXABAN 5 MG: 5 TABLET, FILM COATED ORAL at 08:20

## 2021-07-04 RX ADMIN — MIDODRINE HYDROCHLORIDE 10 MG: 5 TABLET ORAL at 15:55

## 2021-07-04 RX ADMIN — ACETAMINOPHEN 650 MG: 325 TABLET, FILM COATED ORAL at 12:49

## 2021-07-04 RX ADMIN — MIRTAZAPINE 15 MG: 15 TABLET, FILM COATED ORAL at 08:20

## 2021-07-04 RX ADMIN — PANTOPRAZOLE SODIUM 40 MG: 40 TABLET, DELAYED RELEASE ORAL at 08:20

## 2021-07-04 RX ADMIN — FOLIC ACID 1000 MCG: 1 TABLET ORAL at 08:20

## 2021-07-04 RX ADMIN — OXYCODONE HYDROCHLORIDE 5 MG: 5 TABLET ORAL at 04:02

## 2021-07-04 RX ADMIN — MIDODRINE HYDROCHLORIDE 10 MG: 5 TABLET ORAL at 06:33

## 2021-07-04 RX ADMIN — CYANOCOBALAMIN TAB 500 MCG 1000 MCG: 500 TAB at 08:20

## 2021-07-04 RX ADMIN — OXYCODONE HYDROCHLORIDE 5 MG: 5 TABLET ORAL at 08:19

## 2021-07-04 NOTE — ASSESSMENT & PLAN NOTE
Pt and family opted for home hospice  Waiting for Tenckoff catheter placement for malignant ascites   Hospice team following

## 2021-07-04 NOTE — PROGRESS NOTES
[facility]  Progress Note Patricia Nicolas 1945, 77 y o  male MRN: [id number]  Unit/Bed#: S -01 Encounter: [id number]  Primary Care Provider: Suleiman Su MD   Date and time admitted to hospital: 7/1/2021  3:42 PM    * Metastatic lung cancer (metastasis from lung to other site) [facility])  Assessment & Plan  · On chart review, patient has a history of non-small cell lung cancer diagnosed in December 2019 - Follow with Oncology, Dr Jesus Bhandari  · Underwent VATS with pleural decortication and pleurodesis 7/20  · PET scan 5/4 concerning for disease progression  Notable for left intraperitoneal metastasis  · Last chemotherapy was about a month ago  · Discussed with patient that his overall prognosis is poor given that he has stage IV cancer; on admission, he remained optimistic that he can be this and is looking for to getting a new chemotherapy medication from [address] [place], [place] [address]way:  · We had extensive discussion with patient, wife, and sister-in-law regarding advanced care planning  · Patient is now agreeable to being discharged to home with home hospice and understands the severity of lung cancer stage IV  · Inpatient consult to hospice-cm notified  · Geriatric Pain management  · IR consult for Tenckhoff catheterization placement    Malignant ascites  Assessment & Plan  · Patient presenting with abdominal discomfort and abdominal distention with associated shortness of breath  · Recently underwent a paracentesis about 2 days ago during his last admission that yielded about 5000 mL of fluid that was positive for malignancy  · IR performed paracentesis-unclear as to how much fluid was removed  · Goals of care discussion and palliative care on board-patient previously wish to be treatment focus but now is understanding of severity of situation and of opting for hospice  · IR consulted for Tenckhoff catheter placement       Pleural effusion  Assessment & Plan  · Chest x-ray demonstrating persistent loculated right pleural effusion and right base atelectasis unchanged  · Reports that he has been getting weekly pleurocentesis  · Was noted during prior admission in evaluated by IR, at the time was deemed insufficient to warrant pleurocentesis  · IR evaluate patient possible pleurocentesis, but IR felt that chronic pleural effusion does not need to be drained at this time    Acute renal failure superimposed on stage 3 chronic kidney disease [facility])  Assessment & Plan  Recent Labs     07/01/21  1556 07/02/21  0552 07/03/21  0518   CREATININE 2 97* 2 82* 2 50*   EGFR 20 21 24     Estimated Creatinine Clearance: 28 5 mL/min (A) (by C-G formula based on SCr of 2 5 mg/dL (H))  · Baseline 1 5-2 2  · Likely prerenal versus ATN given ascites; will monitor now that patient underwent paracentesis  · Continue to hold home Lasix  Poor intake  · Continue midodrine  · Albumin for intravascular support in the setting of 3rd spacing    History of pulmonary embolism  Assessment & Plan    Hold Eliquis today for Tenckoff catheter     Shortness of breath  Assessment & Plan  Likely multifactorial in the setting of lung cancer, pleural effusion, ascites, deconditioning  Monitor oxygen saturation  O2 nasal cannula as needed    Goals of care, counseling/discussion  Assessment & Plan  Pt and family opted for home hospice  Waiting for Tenckoff catheter placement for malignant ascites  Hospice team following      VTE Pharmacologic Prophylaxis:   Pharmacologic: Apixaban (Eliquis) on hold for procedure  Mechanical: Mechanical VTE prophylaxis in place  Patient Centered Rounds:   Discussions with Specialists or Other Care Team Provider:   Education and Discussions with Family / Patient: Spoke with Pt and pt's sister in law  Time Spent for Care: 20 minutes  More than 50% of total time spent on counseling and coordination of care as described above      Current Length of Stay: 3 day(s)  Current Patient Status: Inpatient Certification Statement: The patient will continue to require additional inpatient hospital stay due to waiting for Tenckoff catheter    Discharge Plan: See above  Code Status: Level 4 - Comfort Care    Subjective:   Pt seen and examined  He states that pain meds helped but remains concerned about taking too much pain pills  Poor appetite  Only drank several sips of broth  Dyspnea about the same  Objective:   Vitals:   No data recorded  Body mass index is 32 91 kg/m²  Input and Output Summary (last 24 hours): Intake/Output Summary (Last 24 hours) at 7/4/2021 1232  Last data filed at 7/3/2021 1614  Gross per 24 hour   Intake --   Output 175 ml   Net -175 ml       Physical Exam:     Physical Exam   Limited exam ad pt's comfort care  Distended abd NT    Additional Data:   Labs:  Results from last 7 days   Lab Units 07/03/21  0518   WBC Thousand/uL 8 18   HEMOGLOBIN g/dL 9 6*   HEMATOCRIT % 30 1*   PLATELETS Thousands/uL 88*   NEUTROS PCT % 70   LYMPHS PCT % 10*   MONOS PCT % 15*   EOS PCT % 3     Results from last 7 days   Lab Units 07/03/21  0518   POTASSIUM mmol/L 4 6   CHLORIDE mmol/L 98*   CO2 mmol/L 25   BUN mg/dL 65*   CREATININE mg/dL 2 50*   CALCIUM mg/dL 7 8*   ALK PHOS U/L 51   ALT U/L 7*   AST U/L 12     Results from last 7 days   Lab Units 07/02/21  0552   INR  1 71*         Cultures:   Blood Culture:   Lab Results   Component Value Date    BLOODCX No Growth After 5 Days  06/13/2021    BLOODCX No Growth After 5 Days   06/13/2021     Urine Culture: No results found for: URINECX  Sputum Culture: No components found for: SPUTUMCX  Wound Culture: No results found for: WOUNDCULT    Last 24 Hours Medication List:   Current Facility-Administered Medications   Medication Dose Route Frequency Provider Last Rate    acetaminophen  650 mg Oral Q6H PRN Laurent MD Ace      cholecalciferol  1,000 Units Oral Daily Alisha Sahu MD      cyanocobalamin  1,000 mcg Oral Daily Alisha Sahu MD  folic acid  8,450 mcg Oral Daily Brenden Peralta MD      HYDROmorphone  0 2 mg Intravenous Q4H PRN Jl Chauhan MD      lidocaine  1 patch Topical Daily Sorin Bolanos MD      metoprolol tartrate  12 5 mg Oral Q12H [address] Brenden Peralta MD      midodrine  10 mg Oral BID AC Brenden Peralta MD      mirtazapine  15 mg Oral Daily Brenden Peralta MD      oxyCODONE  2 5 mg Oral Q4H PRN Michael Diallo DO      oxyCODONE  5 mg Oral Q4H PRN Michael Diallo DO      pantoprazole  40 mg Oral QAM Brenden Peralta MD      senna-docusate sodium  1 tablet Oral BID Sherren Moose, DO          Today, Patient Was Seen By: Jim Fish MD    ** Please Note: Dragon 360 Dictation voice to text software may have been used in the creation of this document   **

## 2021-07-04 NOTE — ASSESSMENT & PLAN NOTE
Recent Labs     07/01/21  1556 07/02/21  0552 07/03/21  0518   CREATININE 2 97* 2 82* 2 50*   EGFR 20 21 24     Estimated Creatinine Clearance: 28 5 mL/min (A) (by C-G formula based on SCr of 2 5 mg/dL (H))  · Baseline 1 5-2 2  · Likely prerenal versus ATN given ascites; will monitor now that patient underwent paracentesis  · Continue to hold home Lasix  Poor intake  · Continue midodrine    · Albumin for intravascular support in the setting of 3rd spacing

## 2021-07-04 NOTE — ASSESSMENT & PLAN NOTE
· Patient presenting with abdominal discomfort and abdominal distention with associated shortness of breath  · Recently underwent a paracentesis about 2 days ago during his last admission that yielded about 5000 mL of fluid that was positive for malignancy  · IR performed paracentesis-unclear as to how much fluid was removed  · Goals of care discussion and palliative care on board-patient previously wish to be treatment focus but now is understanding of severity of situation and of opting for hospice  · IR consulted for Tenckhoff catheter placement

## 2021-07-05 LAB
BACTERIA SPEC BFLD CULT: NO GROWTH
GRAM STN SPEC: NORMAL
GRAM STN SPEC: NORMAL

## 2021-07-05 PROCEDURE — 99232 SBSQ HOSP IP/OBS MODERATE 35: CPT | Performed by: INTERNAL MEDICINE

## 2021-07-05 RX ORDER — ONDANSETRON 4 MG/1
4 TABLET, ORALLY DISINTEGRATING ORAL EVERY 6 HOURS PRN
Status: DISCONTINUED | OUTPATIENT
Start: 2021-07-05 | End: 2021-07-08 | Stop reason: HOSPADM

## 2021-07-05 RX ADMIN — MIRTAZAPINE 15 MG: 15 TABLET, FILM COATED ORAL at 09:11

## 2021-07-05 RX ADMIN — FOLIC ACID 1000 MCG: 1 TABLET ORAL at 09:11

## 2021-07-05 RX ADMIN — DOCUSATE SODIUM AND SENNOSIDES 1 TABLET: 8.6; 5 TABLET, FILM COATED ORAL at 09:11

## 2021-07-05 RX ADMIN — Medication 1000 UNITS: at 09:12

## 2021-07-05 RX ADMIN — OXYCODONE HYDROCHLORIDE 5 MG: 5 TABLET ORAL at 14:47

## 2021-07-05 RX ADMIN — ONDANSETRON 4 MG: 4 TABLET, ORALLY DISINTEGRATING ORAL at 10:36

## 2021-07-05 RX ADMIN — MIDODRINE HYDROCHLORIDE 10 MG: 5 TABLET ORAL at 06:45

## 2021-07-05 RX ADMIN — MIDODRINE HYDROCHLORIDE 10 MG: 5 TABLET ORAL at 16:22

## 2021-07-05 RX ADMIN — CYANOCOBALAMIN TAB 500 MCG 1000 MCG: 500 TAB at 09:12

## 2021-07-05 RX ADMIN — PANTOPRAZOLE SODIUM 40 MG: 40 TABLET, DELAYED RELEASE ORAL at 09:12

## 2021-07-05 RX ADMIN — OXYCODONE HYDROCHLORIDE 5 MG: 5 TABLET ORAL at 04:17

## 2021-07-05 NOTE — ASSESSMENT & PLAN NOTE
· Was noted during prior admission in evaluated by IR, at the time was deemed insufficient to warrant pleurocentesis    · IR evaluate patient possible pleurocentesis, but IR felt that chronic pleural effusion does not need to be drained at this time

## 2021-07-05 NOTE — ASSESSMENT & PLAN NOTE
Lab Results   Component Value Date    HGBA1C 6 4 (H) 06/16/2021       Recent Labs     07/02/21  2105 07/03/21  0758 07/03/21  1058   POCGLU 77 84 84       Blood Sugar Average: Last 72 hrs:  · (P) 80 [id number] hold home Amaryl  · Hold off on further interventions for glucose management

## 2021-07-05 NOTE — PROGRESS NOTES
[facility]  Progress Note Edjarod  1945, 77 y o  male MRN: [id number]  Unit/Bed#: S -01 Encounter: [id number]  Primary Care Provider: Miranda Houston MD   Date and time admitted to hospital: 7/1/2021  3:42 PM    * Metastatic lung cancer (metastasis from lung to other site) [facility])  Assessment & Plan  · On chart review, patient has a history of non-small cell lung cancer diagnosed in December 2019 - Follow with Oncology, Dr Nova Lainez  · Underwent VATS with pleural decortication and pleurodesis 7/20  · PET scan 5/4 concerning for disease progression  Notable for left intraperitoneal metastasis  · Last chemotherapy was about a month ago  · Discussed with patient that his overall prognosis is poor given that he has stage IV cancer; on admission, he remained optimistic that he can be this and is looking for to getting a new chemotherapy medication from [id number]  · We had extensive discussion with patient, wife, and sister-in-law regarding advanced care planning  · Patient is now agreeable to being discharged to home with home hospice and understands the severity of lung cancer stage IV    Plan:  · Inpatient consult to hospice-cm notified  · Geriatric Pain management  · IR consulted for Tenckhoff catheterization placement    Goals of care, counseling/discussion  Assessment & Plan  Pt and family opted for home hospice  Waiting for Tenckoff catheter placement for malignant ascites  Hospice team following    Shortness of breath  Assessment & Plan  Likely multifactorial in the setting of lung cancer, pleural effusion, ascites, deconditioning  Monitor oxygen saturation  O2 nasal cannula as needed    Malignant ascites  Assessment & Plan  · Patient presenting with abdominal discomfort and abdominal distention with associated shortness of breath    · Recently underwent a paracentesis about 2 days ago during his last admission that yielded about 5000 mL of fluid that was positive for malignancy  · IR performed paracentesis-unclear as to how much fluid was removed  · Goals of care discussion and palliative care on board-patient previously wish to be treatment focus but now is understanding of severity of situation and of opting for hospice  · Appears to be reaccumulation of fluid in abdomen  · IR consulted for Tenckhoff catheter placement  Acute renal failure superimposed on stage 3 chronic kidney disease [facility])  Assessment & Plan  Recent Labs     07/03/21  0518   CREATININE 2 50*   EGFR 24     Estimated Creatinine Clearance: 28 5 mL/min (A) (by C-G formula based on SCr of 2 5 mg/dL (H))  · Baseline 1 5-2 2  · Likely prerenal versus ATN given ascites; will monitor now that patient underwent paracentesis  · Continue to hold home Lasix  Poor intake  · Continue midodrine  · Albumin for intravascular support in the setting of 3rd spacing    Type 2 diabetes mellitus [facility])  Assessment & Plan  Lab Results   Component Value Date    HGBA1C 6 4 (H) 06/16/2021       Recent Labs     07/02/21  2105 07/03/21  0758 07/03/21  1058   POCGLU 77 84 84       Blood Sugar Average: Last 72 hrs:  · (P) 95 [id number] hold home Amaryl  · Hold off on further interventions for glucose management      History of pulmonary embolism  Assessment & Plan    Hold Eliquis for Tenckoff catheter placement    Pleural effusion  Assessment & Plan  · Was noted during prior admission in evaluated by IR, at the time was deemed insufficient to warrant pleurocentesis  · IR evaluate patient possible pleurocentesis, but IR felt that chronic pleural effusion does not need to be drained at this time        VTE Pharmacologic Prophylaxis: VTE Score: 8 High Risk (Score >/= 5) - Pharmacological DVT Prophylaxis Contraindicated  Sequential Compression Devices Ordered  Patient Centered Rounds: I performed bedside rounds with nursing staff today    Discussions with Specialists or Other Care Team Provider:  Hospice    Education and Discussions with Family / Patient: Attempted to update  (wife) via phone  Unable to contact  Current Length of Stay: 4 day(s)  Current Patient Status: Inpatient   Discharge Plan: Anticipate discharge in 24-48 hrs to Hospice    Code Status: Level 4 - Comfort Care    Subjective:   Patient has no complaints today-pending Tenckhoff catheter placement    Objective:     Vitals:   No data recorded  Body mass index is 32 91 kg/m²  Input and Output Summary (last 24 hours):   No intake or output data in the 24 hours ending 07/05/21 1641    Physical Exam:   Physical Exam  Vitals and nursing note reviewed  Constitutional:       General: He is not in acute distress  Appearance: He is well-developed  He is ill-appearing  He is not diaphoretic  HENT:      Head: Normocephalic and atraumatic  Mouth/Throat:      Mouth: Mucous membranes are dry  Eyes:      Extraocular Movements: Extraocular movements intact  Conjunctiva/sclera: Conjunctivae normal    Cardiovascular:      Heart sounds: Normal heart sounds  No murmur heard  Pulmonary:      Effort: Pulmonary effort is normal       Breath sounds: Rales present  No wheezing  Comments: Persistent cough  Abdominal:      General: Bowel sounds are normal  There is distension  Tenderness: There is no abdominal tenderness  There is no guarding or rebound  Musculoskeletal:      Right lower leg: Edema present  Left lower leg: Edema present  Skin:     General: Skin is warm and dry  Neurological:      Mental Status: He is alert and oriented to person, place, and time  Psychiatric:         Mood and Affect: Mood normal          Behavior: Behavior normal          Thought Content:  Thought content normal          Judgment: Judgment normal           Additional Data:     Labs:  Results from last 7 days   Lab Units 07/03/21  0518   WBC Thousand/uL 8 18   HEMOGLOBIN g/dL 9 6*   HEMATOCRIT % 30 1*   PLATELETS Thousands/uL 88*   NEUTROS PCT % 70   LYMPHS PCT % 10*   MONOS PCT % 15*   EOS PCT % 3     Results from last 7 days   Lab Units 07/03/21  0518   SODIUM mmol/L 131*   POTASSIUM mmol/L 4 6   CHLORIDE mmol/L 98*   CO2 mmol/L 25   BUN mg/dL 65*   CREATININE mg/dL 2 50*   ANION GAP mmol/L 8   CALCIUM mg/dL 7 8*   ALBUMIN g/dL 3 0*   TOTAL BILIRUBIN mg/dL 1 13*   ALK PHOS U/L 51   ALT U/L 7*   AST U/L 12   GLUCOSE RANDOM mg/dL 76     Results from last 7 days   Lab Units 07/02/21  0552   INR  1 71*     Results from last 7 days   Lab Units 07/03/21  1058 07/03/21  0758 07/02/21  2105 07/02/21  1514 07/02/21  1119 07/02/21  0823 07/01/21  2059   POC GLUCOSE mg/dl 84 84 77 78 94 95 90         Results from last 7 days   Lab Units 07/03/21  0518 07/02/21  0552 07/01/21  2234 07/01/21 2003 07/01/21 2002 07/01/21  1556   LACTIC ACID mmol/L  --   --  1 5  --  2 2* 2 2*   PROCALCITONIN ng/ml 0 63* 0 70*  --  0 67*  --   --        Lines/Drains:  Invasive Devices     Central Venous Catheter Line            Port A Cath Right Subclavian -- days                Central Line:  Goal for removal: N/A - Chronic PICC             Imaging: No pertinent imaging reviewed      Recent Cultures (last 7 days):   Results from last 7 days   Lab Units 07/02/21  0950 07/01/21  2150 07/01/21 2003   GRAM STAIN RESULT  Rare Polys  No bacteria seen  --   --    BODY FLUID CULTURE, STERILE  No growth  --   --    LEGIONELLA URINARY ANTIGEN   --   --  Negative   C DIFF TOXIN B BY PCR   --  Negative  --        Last 24 Hours Medication List:   Current Facility-Administered Medications   Medication Dose Route Frequency Provider Last Rate    acetaminophen  650 mg Oral Q6H PRN Daiana Raymond MD      cholecalciferol  1,000 Units Oral Daily Zoey Downs MD      cyanocobalamin  1,000 mcg Oral Daily Zoey Downs MD      folic acid  3,614 mcg Oral Daily Zoey Downs MD     Alvaro Hale HYDROmorphone  0 2 mg Intravenous Q4H PRN Sadia Dennison MD      lidocaine  1 patch Topical Daily Dennie Cranker Hira Sampson MD      metoprolol tartrate  12 5 mg Oral Q12H [address] Maximino Guillory MD      midodrine  10 mg Oral BID AC Maximino Guillory MD      mirtazapine  15 mg Oral Daily Maximino Guillory MD      ondansetron  4 mg Oral Q6H PRN Michael Parammaurilio, DO      oxyCODONE  2 5 mg Oral Q4H PRN Michael Sebastianan, DO      oxyCODONE  5 mg Oral Q4H PRN Michael Diallo, DO      pantoprazole  40 mg Oral QAM Maximino Guillory MD      senna-docusate sodium  1 tablet Oral BID Gael Garcia DO          Today, Patient Was Seen By: Gael Garcia DO    **Please Note: This note may have been constructed using a voice recognition system  **

## 2021-07-05 NOTE — ASSESSMENT & PLAN NOTE
· Patient presenting with abdominal discomfort and abdominal distention with associated shortness of breath  · Recently underwent a paracentesis about 2 days ago during his last admission that yielded about 5000 mL of fluid that was positive for malignancy  · IR performed paracentesis-unclear as to how much fluid was removed  · Goals of care discussion and palliative care on board-patient previously wish to be treatment focus but now is understanding of severity of situation and of opting for hospice  · Appears to be reaccumulation of fluid in abdomen  · IR consulted for Tenckhoff catheter placement

## 2021-07-05 NOTE — ASSESSMENT & PLAN NOTE
Recent Labs     07/03/21  0518   CREATININE 2 50*   EGFR 24     Estimated Creatinine Clearance: 28 5 mL/min (A) (by C-G formula based on SCr of 2 5 mg/dL (H))  · Baseline 1 5-2 2  · Likely prerenal versus ATN given ascites; will monitor now that patient underwent paracentesis  · Continue to hold home Lasix  Poor intake  · Continue midodrine    · Albumin for intravascular support in the setting of 3rd spacing

## 2021-07-05 NOTE — ASSESSMENT & PLAN NOTE
· On chart review, patient has a history of non-small cell lung cancer diagnosed in December 2019 - Follow with Oncology, Dr Tyree Abreu  · Underwent VATS with pleural decortication and pleurodesis 7/20  · PET scan 5/4 concerning for disease progression  Notable for left intraperitoneal metastasis  · Last chemotherapy was about a month ago    · Discussed with patient that his overall prognosis is poor given that he has stage IV cancer; on admission, he remained optimistic that he can be this and is looking for to getting a new chemotherapy medication from [place] [id number]  · We had extensive discussion with patient, wife, and sister-in-law regarding advanced care planning  · Patient is now agreeable to being discharged to home with home hospice and understands the severity of lung cancer stage IV    Plan:  · Inpatient consult to hospice-cm notified  · Geriatric Pain management  · IR consulted for Tenckhoff catheterization placement

## 2021-07-06 LAB — BACTERIA SPEC ANAEROBE CULT: NO GROWTH

## 2021-07-06 PROCEDURE — NC001 PR NO CHARGE: Performed by: RADIOLOGY

## 2021-07-06 PROCEDURE — 99232 SBSQ HOSP IP/OBS MODERATE 35: CPT | Performed by: INTERNAL MEDICINE

## 2021-07-06 RX ADMIN — DOCUSATE SODIUM AND SENNOSIDES 1 TABLET: 8.6; 5 TABLET, FILM COATED ORAL at 17:05

## 2021-07-06 RX ADMIN — OXYCODONE HYDROCHLORIDE 5 MG: 5 TABLET ORAL at 10:20

## 2021-07-06 RX ADMIN — OXYCODONE HYDROCHLORIDE 5 MG: 5 TABLET ORAL at 06:41

## 2021-07-06 RX ADMIN — FOLIC ACID 1000 MCG: 1 TABLET ORAL at 09:05

## 2021-07-06 RX ADMIN — MIDODRINE HYDROCHLORIDE 10 MG: 5 TABLET ORAL at 17:05

## 2021-07-06 RX ADMIN — Medication 1000 UNITS: at 09:04

## 2021-07-06 RX ADMIN — MIRTAZAPINE 15 MG: 15 TABLET, FILM COATED ORAL at 09:04

## 2021-07-06 RX ADMIN — MIDODRINE HYDROCHLORIDE 10 MG: 5 TABLET ORAL at 06:41

## 2021-07-06 RX ADMIN — DOCUSATE SODIUM AND SENNOSIDES 1 TABLET: 8.6; 5 TABLET, FILM COATED ORAL at 09:04

## 2021-07-06 RX ADMIN — OXYCODONE HYDROCHLORIDE 2.5 MG: 5 TABLET ORAL at 19:01

## 2021-07-06 RX ADMIN — CYANOCOBALAMIN TAB 500 MCG 1000 MCG: 500 TAB at 09:05

## 2021-07-06 RX ADMIN — ONDANSETRON 4 MG: 4 TABLET, ORALLY DISINTEGRATING ORAL at 12:56

## 2021-07-06 RX ADMIN — PANTOPRAZOLE SODIUM 40 MG: 40 TABLET, DELAYED RELEASE ORAL at 09:04

## 2021-07-06 NOTE — CASE MANAGEMENT
CM informed pt is for d/c home with [facility]  pending Tenckhoff drain placement  CM met with pt and his wife who confirmed dc plan  Requested CM stay in contact with his SHAYY Morrison Minor [phone] regarding dc plans  TC to Vitaliy Rater who confirmed she will be accepting DME delivery tomorrow 7/7  Vitaliy Rater aware d/c is pending Tenckhoff drain placement  Vitaliy Rater confirmed address for dc:   [address]  She is also requesting transportation for pt  CM spoke with [facility] Liaison who informed CM pt would need to be home by 1 pm tomorrow  DME is to be delivered in the morning prior to 12 pm      TC to IR to confirm drain placement - CM spoke with Elizabeth - As per [facility] pt is on the schedule for tomorrow 7/7 for 1:30 pm  Unfortunately no earlier time is available as of right now  TC to Becka  Informed her of above   A[place]on will check availability and inform CM  TC received from [facility] Nurse will not be available for tomorrow late afternoon/early pm d/c  Plan for Thursday d/c when nurse is available  Requesting pt is transported home in the morning  TC to Vitaily Rater - CM informed her of change for d/c for Thursday  Vitaliy Rater aware CM will contact her tomorrow 7/7 with transport time for 7/8  CM informed resident - Dr Guille Ling of same

## 2021-07-06 NOTE — ASSESSMENT & PLAN NOTE
Likely multifactorial in the setting of lung cancer, pleural effusion, ascites, deconditioning    Monitor oxygen saturation  O2 nasal cannula as needed - not currently on supplemental O2

## 2021-07-06 NOTE — SOCIAL WORK
Palliative LSW saw patient at the bedside today  LSW appreciates the opportunity to provider patient/family with inpatient emotional support and guidance while patient continues to receive medical attention from the medical team     Topics discussed: LSW met with pt at bedside  Pt reported having aches/pain "all over " LSW advised she will f/u with bedside RN to see if pt could have pain meds at this time  Pt appreciative of same  LSW confirmed with pt that d/c plan will be to home with hospice services once Tenckhoff catheter is placed  Pt expressed he did not wish to talk further about Tenckhoff placement  Per note from IR, tentative plan for Tenckhoff to be placed tomorrow 7/7  No other needs identified for pt at this time      Areas that need follow-up: None  Resources given: None  Others present: None    LSW will continue to follow as requested by the medical team, patient, or family

## 2021-07-06 NOTE — PLAN OF CARE
Problem: Potential for Falls  Goal: Patient will remain free of falls  Description: INTERVENTIONS:  - Educate patient/family on patient safety including physical limitations  - Instruct patient to call for assistance with activity   - Consult OT/PT to assist with strengthening/mobility   - Keep Call bell within reach  - Keep bed low and locked with side rails adjusted as appropriate  - Keep care items and personal belongings within reach  - Initiate and maintain comfort rounds  - Make Fall Risk Sign visible to staff  - Initiate/Maintain bed alarm  - Obtain necessary fall risk management equipment:   - Apply yellow socks and bracelet for high fall risk patients  - Consider moving patient to room near nurses station  Outcome: Progressing     Problem: MOBILITY - ADULT  Goal: Maintain or return to baseline ADL function  Description: INTERVENTIONS:  -  Assess patient's ability to carry out ADLs; assess patient's baseline for ADL function and identify physical deficits which impact ability to perform ADLs (bathing, care of mouth/teeth, toileting, grooming, dressing, etc )  - Assess/evaluate cause of self-care deficits   - Assess range of motion  - Assess patient's mobility; develop plan if impaired  - Assess patient's need for assistive devices and provide as appropriate  - Encourage maximum independence but intervene and supervise when necessary  - Involve family in performance of ADLs  - Assess for home care needs following discharge   - Consider OT consult to assist with ADL evaluation and planning for discharge  - Provide patient education as appropriate  Outcome: Progressing  Goal: Maintains/Returns to pre admission functional level  Description: INTERVENTIONS:  - Perform BMAT or MOVE assessment daily    - Set and communicate daily mobility goal to care team and patient/family/caregiver  - Collaborate with rehabilitation services on mobility goals if consulted  - Perform Range of Motion 3 times a day    - Reposition patient every 2 hours  -  Record patient progress and toleration of activity level   Outcome: Progressing     Problem: MOBILITY - ADULT  Goal: Maintains/Returns to pre admission functional level  Description: INTERVENTIONS:  - Perform BMAT or MOVE assessment daily    - Set and communicate daily mobility goal to care team and patient/family/caregiver  - Collaborate with rehabilitation services on mobility goals if consulted  - Perform Range of Motion  3 times a day  - Reposition patient every 2 hours    - Record patient progress and toleration of activity level   Outcome: Progressing     Problem: Prexisting or High Potential for Compromised Skin Integrity  Goal: Skin integrity is maintained or improved  Description: INTERVENTIONS:  - Identify patients at risk for skin breakdown  - Assess and monitor skin integrity  - Assess and monitor nutrition and hydration status  - Monitor labs   - Assess for incontinence   - Turn and reposition patient  - Assist with mobility/ambulation  - Relieve pressure over bony prominences  - Avoid friction and shearing  - Provide appropriate hygiene as needed including keeping skin clean and dry  - Evaluate need for skin moisturizer/barrier cream  - Collaborate with interdisciplinary team   - Patient/family teaching  - Consider wound care consult   Outcome: Progressing

## 2021-07-06 NOTE — ASSESSMENT & PLAN NOTE
Pt and family opted for home hospice  Waiting for Tenckoff catheter placement for malignant ascites   Hospice team following on board

## 2021-07-06 NOTE — PROGRESS NOTES
[facility]  Progress Note Ghanshyam Lindsay 1945, 77 y o  male MRN: [id number]  Unit/Bed#: S -01 Encounter: [id number]  Primary Care Provider: Bernie Lawler MD   Date and time admitted to hospital: 7/1/2021  3:42 PM    * Metastatic lung cancer (metastasis from lung to other site) [facility])  Assessment & Plan  · On chart review, patient has a history of non-small cell lung cancer diagnosed in December 2019 - Follow with Oncology, Dr Tyree Abreu  · Underwent VATS with pleural decortication and pleurodesis 7/20  · PET scan 5/4 concerning for disease progression  Notable for left intraperitoneal metastasis  · Last chemotherapy was about a month ago  · Discussed with patient that his overall prognosis is poor given that he has stage IV cancer; on admission, he remained optimistic that he can be this and is looking for to getting a new chemotherapy medication from [facility]  · We had extensive discussion with patient, wife, and sister-in-law regarding advanced care planning  · Patient is now agreeable to being discharged to home with home hospice and understands the severity of lung cancer stage IV    Plan:  · Inpatient consult to hospice-cm notified  · Geriatric Pain management  · IR consulted for Tenckhoff catheterization placement  · Planning for Tenckhoff cath placement tomorrow    Goals of care, counseling/discussion  Assessment & Plan  Pt and family opted for home hospice  Waiting for Tenckoff catheter placement for malignant ascites  Hospice team following on board    Shortness of breath  Assessment & Plan  Likely multifactorial in the setting of lung cancer, pleural effusion, ascites, deconditioning  Monitor oxygen saturation  O2 nasal cannula as needed - not currently on supplemental O2    Malignant ascites  Assessment & Plan  · Patient presenting with abdominal discomfort and abdominal distention with associated shortness of breath    · Recently underwent a paracentesis about 2 days ago during his last admission that yielded about 5000 mL of fluid that was positive for malignancy  · IR performed paracentesis-unclear as to how much fluid was removed  · Goals of care discussion and palliative care on board-patient previously wish to be treatment focus but now is understanding of severity of situation and of opting for hospice  · Appears to be reaccumulation of fluid in abdomen  · IR consulted for Tenckhoff catheter placement  · Drain placement tomorrow 1:30    Acute renal failure superimposed on stage 3 chronic kidney disease (HCC)  Assessment & Plan    · Baseline 1 5-2 2    History of pulmonary embolism  Assessment & Plan  Hold Eliquis for Tenckoff catheter placement    Pleural effusion  Assessment & Plan  · Was noted during prior admission in evaluated by IR, at the time was deemed insufficient to warrant pleurocentesis  · IR evaluate patient possible pleurocentesis, but IR felt that chronic pleural effusion does not need to be drained at this time        VTE Pharmacologic Prophylaxis: VTE Score: 8 High Risk (Score >/= 5) - Pharmacological DVT Prophylaxis Contraindicated  Sequential Compression Devices Ordered  Patient Centered Rounds: I performed bedside rounds with nursing staff today  Discussions with Specialists or Other Care Team Provider: Hospice team, primary care team    Education and Discussions with Family / Patient: Updated  (wife) at bedside  Current Length of Stay: 5 day(s)  Current Patient Status: Inpatient   Discharge Plan: Anticipate discharge in 48-72 hrs to home with home services  Code Status: Level 4 - Comfort Care    Subjective: Today, patient in mild distress-encouraged that he utilize pain medication to improve comfort  Understanding and agreeable    Wife present at bedside    Objective:     Vitals:   Temp (24hrs), Av 9 °F (36 6 °C), Min:97 9 °F (36 6 °C), Max:97 9 °F (36 6 °C)    Temp:  [97 9 °F (36 6 °C)] 97 9 °F (36 6 °C)  HR: [106] 106  Resp:  [26] 26  BP: (108)/(76) 108/76  SpO2:  [94 %] 94 %  Body mass index is 32 91 kg/m²  Input and Output Summary (last 24 hours): Intake/Output Summary (Last 24 hours) at 7/6/2021 1402  Last data filed at 7/6/2021 0646  Gross per 24 hour   Intake --   Output 350 ml   Net -350 ml       Physical Exam:   Physical Exam  Vitals and nursing note reviewed  Constitutional:       General: He is not in acute distress  Appearance: He is well-developed  He is ill-appearing  He is not diaphoretic  HENT:      Head: Normocephalic and atraumatic  Mouth/Throat:      Mouth: Mucous membranes are dry  Eyes:      Extraocular Movements: Extraocular movements intact  Conjunctiva/sclera: Conjunctivae normal    Cardiovascular:      Heart sounds: Normal heart sounds  No murmur heard  Pulmonary:      Effort: Pulmonary effort is normal       Breath sounds: Rales present  No wheezing  Abdominal:      General: Bowel sounds are normal  There is distension  Tenderness: There is no abdominal tenderness  There is no guarding or rebound  Musculoskeletal:      Right lower leg: Edema present  Left lower leg: Edema present  Skin:     General: Skin is warm and dry  Neurological:      Mental Status: He is alert and oriented to person, place, and time  Psychiatric:         Mood and Affect: Mood normal          Behavior: Behavior normal          Thought Content:  Thought content normal          Judgment: Judgment normal           Additional Data:     Labs:  Results from last 7 days   Lab Units 07/03/21  0518   WBC Thousand/uL 8 18   HEMOGLOBIN g/dL 9 6*   HEMATOCRIT % 30 1*   PLATELETS Thousands/uL 88*   NEUTROS PCT % 70   LYMPHS PCT % 10*   MONOS PCT % 15*   EOS PCT % 3     Results from last 7 days   Lab Units 07/03/21  0518   SODIUM mmol/L 131*   POTASSIUM mmol/L 4 6   CHLORIDE mmol/L 98*   CO2 mmol/L 25   BUN mg/dL 65*   CREATININE mg/dL 2 50*   ANION GAP mmol/L 8   CALCIUM mg/dL 7 8* ALBUMIN g/dL 3 0*   TOTAL BILIRUBIN mg/dL 1 13*   ALK PHOS U/L 51   ALT U/L 7*   AST U/L 12   GLUCOSE RANDOM mg/dL 76     Results from last 7 days   Lab Units 07/02/21  0552   INR  1 71*     Results from last 7 days   Lab Units 07/03/21  1058 07/03/21  0758 07/02/21  2105 07/02/21  1514 07/02/21  1119 07/02/21  0823 07/01/21  2059   POC GLUCOSE mg/dl 84 84 77 78 94 95 90         Results from last 7 days   Lab Units 07/03/21  0518 07/02/21  0552 07/01/21  2234 07/01/21 2003 07/01/21 2002 07/01/21  1556   LACTIC ACID mmol/L  --   --  1 5  --  2 2* 2 2*   PROCALCITONIN ng/ml 0 63* 0 70*  --  0 67*  --   --        Lines/Drains:  Invasive Devices     Central Venous Catheter Line            Port A Cath Right Subclavian -- days                Central Line:  Goal for removal: N/A - Chronic PICC             Imaging: No pertinent imaging reviewed      Recent Cultures (last 7 days):   Results from last 7 days   Lab Units 07/02/21  0950 07/01/21  2150 07/01/21 2003   GRAM STAIN RESULT  Rare Polys  No bacteria seen  --   --    BODY FLUID CULTURE, STERILE  No growth  --   --    LEGIONELLA URINARY ANTIGEN   --   --  Negative   C DIFF TOXIN B BY PCR   --  Negative  --        Last 24 Hours Medication List:   Current Facility-Administered Medications   Medication Dose Route Frequency Provider Last Rate    acetaminophen  650 mg Oral Q6H PRN Tristan Hayden MD      cholecalciferol  1,000 Units Oral Daily Fabian Newsome MD      cyanocobalamin  1,000 mcg Oral Daily Fabian Newsome MD      folic acid  6,872 mcg Oral Daily MD Ravi Reyes HYDROmorphone  0 2 mg Intravenous Q4H PRN Popeye Salcedo MD      lidocaine  1 patch Topical Daily Tristan Hayden MD      metoprolol tartrate  12 5 mg Oral Q12H [address] Fabian Newsome MD      midodrine  10 mg Oral BID AC Fabian Newsome MD      mirtazapine  15 mg Oral Daily Fabian Newsome MD      ondansetron  4 mg Oral Q6H PRN Michael Diallo DO      oxyCODONE  2 5 mg Oral Q4H PRN Michael Diallo,       oxyCODONE  5 mg Oral Q4H PRN Michael Diallo,       pantoprazole  40 mg Oral QAM Kwame Wilson MD      senna-docusate sodium  1 tablet Oral BID Art Sauceda DO          Today, Patient Was Seen By: Art Sauceda DO    **Please Note: This note may have been constructed using a voice recognition system  **

## 2021-07-06 NOTE — ASSESSMENT & PLAN NOTE
· Patient presenting with abdominal discomfort and abdominal distention with associated shortness of breath  · Recently underwent a paracentesis about 2 days ago during his last admission that yielded about 5000 mL of fluid that was positive for malignancy  · IR performed paracentesis-unclear as to how much fluid was removed  · Goals of care discussion and palliative care on board-patient previously wish to be treatment focus but now is understanding of severity of situation and of opting for hospice  · Appears to be reaccumulation of fluid in abdomen  · IR consulted for Tenckhoff catheter placement     · Drain placement tomorrow 1:30

## 2021-07-06 NOTE — ASSESSMENT & PLAN NOTE
· On chart review, patient has a history of non-small cell lung cancer diagnosed in December 2019 - Follow with Oncology, Dr Nova Lainez  · Underwent VATS with pleural decortication and pleurodesis 7/20  · PET scan 5/4 concerning for disease progression  Notable for left intraperitoneal metastasis  · Last chemotherapy was about a month ago    · Discussed with patient that his overall prognosis is poor given that he has stage IV cancer; on admission, he remained optimistic that he can be this and is looking for to getting a new chemotherapy medication from [place] [id number]  · We had extensive discussion with patient, wife, and sister-in-law regarding advanced care planning  · Patient is now agreeable to being discharged to home with home hospice and understands the severity of lung cancer stage IV    Plan:  · Inpatient consult to hospice-cm notified  · Geriatric Pain management  · IR consulted for Tenckhoff catheterization placement  · Planning for Tenckhoff cath placement tomorrow

## 2021-07-06 NOTE — TELEMEDICINE
e-Consult (IPC)  - Interventional Radiology  Brianna Denson 77 y o  male MRN: [id number]  Unit/Bed#: S -01 Encounter: [id number]    IR has been consulted to evaluate the patient, determine the appropriate procedure, and whether or not a procedure can and should be performed regarding the care of Brianna Denson  We were consulted by the hospitalist service concerning recurrent large volume ascites, and to possibly perform a Tenckhoff catheter placement if medically appropriate for the patient  IP Consult to IR  Consult performed by: Lisa Rivera MD  Consult ordered by: Nuvia Lerma MD        07/06/21      Assessment/Recommendation:   Patient has a history of non-small cell lung cancer, stage IV  Recently, the patient developed malignant ascites which has been recurrent in short interval time  Patient has had multiple paracentesis within several days of each other, each yielding 5 L or more  Discussions have been had by the hospitalist service as documented in the chart concerning hospice services  Since the patient is not going to pursue chemotherapy, and is going to hospice, a Tenckhoff catheter is very appropriate to help manage this ascites  Plan Tenckhoff catheter placement tomorrow  Total time spent in review of data, discussion with requesting provider and rendering advice was 20 minutes  Patient or appropriate family member was verbally informed by slim of this consultative service on their behalf to provide more timely access to specialty care in lieu of an in person consultation  Verbal consent was obtained  Thank you for allowing Interventional Radiology to participate in the care of Brianna Denson  Please don't hesitate to call or TigerText us with any questions       Lisa Rivera MD

## 2021-07-07 ENCOUNTER — APPOINTMENT (INPATIENT)
Dept: RADIOLOGY | Facility: HOSPITAL | Age: 76
DRG: 853 | End: 2021-07-07
Attending: RADIOLOGY
Payer: COMMERCIAL

## 2021-07-07 ENCOUNTER — TELEPHONE (OUTPATIENT)
Dept: OTHER | Facility: OTHER | Age: 76
End: 2021-07-07

## 2021-07-07 VITALS
BODY MASS INDEX: 32.91 KG/M2 | TEMPERATURE: 97.9 F | WEIGHT: 210.1 LBS | HEART RATE: 99 BPM | RESPIRATION RATE: 26 BRPM | SYSTOLIC BLOOD PRESSURE: 99 MMHG | OXYGEN SATURATION: 98 % | DIASTOLIC BLOOD PRESSURE: 58 MMHG

## 2021-07-07 PROCEDURE — 99152 MOD SED SAME PHYS/QHP 5/>YRS: CPT | Performed by: RADIOLOGY

## 2021-07-07 PROCEDURE — 0WHG03Z INSERTION OF INFUSION DEVICE INTO PERITONEAL CAVITY, OPEN APPROACH: ICD-10-PCS | Performed by: RADIOLOGY

## 2021-07-07 PROCEDURE — 0W9G3ZZ DRAINAGE OF PERITONEAL CAVITY, PERCUTANEOUS APPROACH: ICD-10-PCS | Performed by: RADIOLOGY

## 2021-07-07 PROCEDURE — NC001 PR NO CHARGE: Performed by: RADIOLOGY

## 2021-07-07 PROCEDURE — 99232 SBSQ HOSP IP/OBS MODERATE 35: CPT | Performed by: INTERNAL MEDICINE

## 2021-07-07 PROCEDURE — 49418 INSERT TUN IP CATH PERC: CPT | Performed by: RADIOLOGY

## 2021-07-07 PROCEDURE — C1729 CATH, DRAINAGE: HCPCS

## 2021-07-07 PROCEDURE — 49418 INSERT TUN IP CATH PERC: CPT

## 2021-07-07 PROCEDURE — 99153 MOD SED SAME PHYS/QHP EA: CPT

## 2021-07-07 PROCEDURE — 99152 MOD SED SAME PHYS/QHP 5/>YRS: CPT

## 2021-07-07 RX ORDER — DIPHENHYDRAMINE HYDROCHLORIDE 50 MG/ML
25 INJECTION INTRAMUSCULAR; INTRAVENOUS EVERY 6 HOURS PRN
Status: DISCONTINUED | OUTPATIENT
Start: 2021-07-07 | End: 2021-07-08 | Stop reason: HOSPADM

## 2021-07-07 RX ORDER — OXYCODONE HYDROCHLORIDE 5 MG/1
2.5 TABLET ORAL EVERY 4 HOURS PRN
Qty: 10 TABLET | Refills: 0 | Status: SHIPPED | OUTPATIENT
Start: 2021-07-07

## 2021-07-07 RX ORDER — MIDAZOLAM HYDROCHLORIDE 2 MG/2ML
INJECTION, SOLUTION INTRAMUSCULAR; INTRAVENOUS CODE/TRAUMA/SEDATION MEDICATION
Status: COMPLETED | OUTPATIENT
Start: 2021-07-07 | End: 2021-07-07

## 2021-07-07 RX ORDER — FENTANYL CITRATE 50 UG/ML
INJECTION, SOLUTION INTRAMUSCULAR; INTRAVENOUS CODE/TRAUMA/SEDATION MEDICATION
Status: COMPLETED | OUTPATIENT
Start: 2021-07-07 | End: 2021-07-07

## 2021-07-07 RX ORDER — LORAZEPAM 0.5 MG/1
0.25 TABLET ORAL EVERY 6 HOURS PRN
Qty: 10 TABLET | Refills: 0 | Status: SHIPPED | OUTPATIENT
Start: 2021-07-07

## 2021-07-07 RX ORDER — LORAZEPAM 0.5 MG/1
0.25 TABLET ORAL EVERY 6 HOURS PRN
Status: DISCONTINUED | OUTPATIENT
Start: 2021-07-07 | End: 2021-07-08 | Stop reason: HOSPADM

## 2021-07-07 RX ORDER — ALBUMIN (HUMAN) 12.5 G/50ML
25 SOLUTION INTRAVENOUS ONCE
Status: COMPLETED | OUTPATIENT
Start: 2021-07-07 | End: 2021-07-07

## 2021-07-07 RX ADMIN — MIRTAZAPINE 15 MG: 15 TABLET, FILM COATED ORAL at 08:41

## 2021-07-07 RX ADMIN — MIDODRINE HYDROCHLORIDE 10 MG: 5 TABLET ORAL at 06:00

## 2021-07-07 RX ADMIN — FENTANYL CITRATE 25 MCG: 50 INJECTION, SOLUTION INTRAMUSCULAR; INTRAVENOUS at 15:33

## 2021-07-07 RX ADMIN — MIDODRINE HYDROCHLORIDE 10 MG: 5 TABLET ORAL at 16:24

## 2021-07-07 RX ADMIN — LORAZEPAM 0.25 MG: 0.5 TABLET ORAL at 22:31

## 2021-07-07 RX ADMIN — HYDROMORPHONE HYDROCHLORIDE 0.2 MG: 1 INJECTION, SOLUTION INTRAMUSCULAR; INTRAVENOUS; SUBCUTANEOUS at 05:29

## 2021-07-07 RX ADMIN — OXYCODONE HYDROCHLORIDE 5 MG: 5 TABLET ORAL at 13:40

## 2021-07-07 RX ADMIN — FENTANYL CITRATE 25 MCG: 50 INJECTION, SOLUTION INTRAMUSCULAR; INTRAVENOUS at 15:18

## 2021-07-07 RX ADMIN — OXYCODONE HYDROCHLORIDE 5 MG: 5 TABLET ORAL at 19:22

## 2021-07-07 RX ADMIN — DOCUSATE SODIUM AND SENNOSIDES 1 TABLET: 8.6; 5 TABLET, FILM COATED ORAL at 08:41

## 2021-07-07 RX ADMIN — OXYCODONE HYDROCHLORIDE 5 MG: 5 TABLET ORAL at 04:20

## 2021-07-07 RX ADMIN — CYANOCOBALAMIN TAB 500 MCG 1000 MCG: 500 TAB at 08:40

## 2021-07-07 RX ADMIN — DIPHENHYDRAMINE HYDROCHLORIDE 25 MG: 50 INJECTION, SOLUTION INTRAMUSCULAR; INTRAVENOUS at 20:35

## 2021-07-07 RX ADMIN — Medication 1000 UNITS: at 08:40

## 2021-07-07 RX ADMIN — MIDAZOLAM HYDROCHLORIDE 0.5 MG: 1 INJECTION, SOLUTION INTRAMUSCULAR; INTRAVENOUS at 15:18

## 2021-07-07 RX ADMIN — FOLIC ACID 1000 MCG: 1 TABLET ORAL at 08:41

## 2021-07-07 RX ADMIN — ALBUMIN (HUMAN) 25 G: 0.25 INJECTION, SOLUTION INTRAVENOUS at 16:24

## 2021-07-07 RX ADMIN — PANTOPRAZOLE SODIUM 40 MG: 40 TABLET, DELAYED RELEASE ORAL at 08:41

## 2021-07-07 RX ADMIN — ONDANSETRON 4 MG: 4 TABLET, ORALLY DISINTEGRATING ORAL at 10:51

## 2021-07-07 NOTE — CASE MANAGEMENT
Anticipate d/c home tomorrow 7/8 with [address] pending Tenckhoff placement today with IR  ETS request made to Ashley Bryant for BLS for tomorrow morning  CMN completed  Hospice Liaison requesting 5 day supply of PO meds for pt comfort at d/c  Palliative CRNP - Nola Ochoa aware and will send scripts to home pharmacy  [facility] DNR signed and placed in pt chart

## 2021-07-07 NOTE — PLAN OF CARE
Problem: Potential for Falls  Goal: Patient will remain free of falls  Description: INTERVENTIONS:  - Educate patient/family on patient safety including physical limitations  - Instruct patient to call for assistance with activity   - Consult OT/PT to assist with strengthening/mobility   - Keep Call bell within reach  - Keep bed low and locked with side rails adjusted as appropriate  - Keep care items and personal belongings within reach  - Initiate and maintain comfort rounds  - Make Fall Risk Sign visible to staff  - - Initiate/Maintain bed alarm  - Obtain necessary fall risk management equipment:   - Apply yellow socks and bracelet for high fall risk patients  - Consider moving patient to room near nurses station  Outcome: Progressing     Problem: MOBILITY - ADULT  Goal: Maintain or return to baseline ADL function  Description: INTERVENTIONS:  -  Assess patient's ability to carry out ADLs; assess patient's baseline for ADL function and identify physical deficits which impact ability to perform ADLs (bathing, care of mouth/teeth, toileting, grooming, dressing, etc )  - Assess/evaluate cause of self-care deficits   - Assess range of motion  - Assess patient's mobility; develop plan if impaired  - Assess patient's need for assistive devices and provide as appropriate  - Encourage maximum independence but intervene and supervise when necessary  - Involve family in performance of ADLs  - Assess for home care needs following discharge   - Consider OT consult to assist with ADL evaluation and planning for discharge  - Provide patient education as appropriate  Outcome: Progressing  Goal: Maintains/Returns to pre admission functional level  Description: INTERVENTIONS:  - Perform BMAT or MOVE assessment daily    - Set and communicate daily mobility goal to care team and patient/family/caregiver  - Collaborate with rehabilitation services on mobility goals if consulted  -- Reposition patient every 2 hours    - - Record patient progress and toleration of activity level   Outcome: Progressing     Problem: Prexisting or High Potential for Compromised Skin Integrity  Goal: Skin integrity is maintained or improved  Description: INTERVENTIONS:  - Identify patients at risk for skin breakdown  - Assess and monitor skin integrity  - Assess and monitor nutrition and hydration status  - Monitor labs   - Assess for incontinence   - Turn and reposition patient  - Assist with mobility/ambulation  - Relieve pressure over bony prominences  - Avoid friction and shearing  - Provide appropriate hygiene as needed including keeping skin clean and dry  - Evaluate need for skin moisturizer/barrier cream  - Collaborate with interdisciplinary team   - Patient/family teaching  - Consider wound care consult   Outcome: Progressing

## 2021-07-07 NOTE — TELEPHONE ENCOUNTER
Patient's sister Jacque Back and his wife Og Covert called to let the practice know that he will be discharged home from the hospital on hospice care so he will no longer need a follow-up appointment

## 2021-07-07 NOTE — CASE MANAGEMENT
CM informed by DARRELL that pt is set up for 10 am tomorrow 7/8 to transport home  CMN completed and placed in pt chart  CM met with pt, his wife, and sister in law, Eugene Butcher at bedside  CM informed them of d/c time for tomorrow  Eugene Butcher confirmed all DME was delivered including O2  All agree to d/c time  CM informed Eugene Butcher that Noni Luna sent medication to [facility] that would need to be picked up by family prior to d/c  Eugene Butcher confirmed there are 4 steps to enter pt home      CM notified nurse, Hospice liaison, and Dr Kusum Cruz of d/c for tomorrow at 10 am

## 2021-07-07 NOTE — PROGRESS NOTES
The history and physical were reviewed, along with progress notes, and the patient was examined  There are no changes since it was written      /76 (BP Location: Right arm)   Pulse (!) 106   Temp 97 9 °F (36 6 °C) (Oral)   Resp (!) 26   Wt 95 3 kg (210 lb 1 6 oz)   SpO2 94%   BMI 32 91 kg/m²

## 2021-07-07 NOTE — DISCHARGE INSTRUCTIONS
How to Care for [facility]  Abdominal Catheter                                    Post  Catheter Placement                 WHAT YOU NEED TO KNOW:                 A chest catheter helps remove fluid from your abdomen  One end of the catheter sits inside your abdomen, the other end sits outside of your body  Your healthcare provider will show you or your caregiver how to drain fluid through the catheter  DISCHARGE INSTRUCTIONS:   How often you should drain fluid:   After the initial insertion drain every  day x 3-5 days then for comfort)  Resume your normal diet  Small sips of flat soda will help with mild nausea  Resume taking your medications  You may have some initial  discomfort at the insertion site  You can take your normal pain medication  Pura Johnson  Patients Contact Interventional Radiology at [phone] PATIENTS: Contact Interventional Radiology at [phone])       KATERIN PATIENTS: Contact Interventional Radiology at [phone]) if any of the following occur:                              Contact your healthcare provider if:   · Your symptoms, such as pain or shortness of breath, do not get better after you drain fluid  · You have redness, pain, or pus where the catheter is located  · You have a fever  · You have persistent nausea or vomiting  · You cannot drain fluid  · You see a change in the color of fluid that you drain  · You see fluid leaking from around your catheter  · You drain foul-smelling fluid or bloody fluid from your catheter  · You have questions or concerns about your condition or care  ·   How often you should drain fluid:  Your healthcare provider will tell you how often to drain fluid  He may tell you to follow a schedule, such as draining once a day or once every other day   He may instead tell you to drain fluid when you have symptoms such as  pain  Before you drain fluid from your catheter:   Wash your hands  Remove all rings  Use soap and water or an alcohol-based hand rub  This will help prevent an infection  · Gently remove the old bandage  Do not pull on the drain when you remove the bandage  Throw the bandage away  Wash your hands a second time  Use soap and water or an alcohol-based hand rub  · Clean the end of the catheter  Use 1 alcohol pad  Wipe around the end of the catheter in circles  Do not put anything into the end of the catheter to clean it  This could damage the catheter  How to drain fluid from your catheter:   Twist off the cap and drain into a container  Do not loose the cap  Keep the cap in a clean container while draining your tube  When you are finished draining replace the cap  Other important information:   · If your catheter comes out, cover the opening in your skin with sterile gauze and a bandage  Do not take a bath or swim in hot tubs or pools  This can cause an infection  · You can shower or take a sponge bath  Make sure your bandage covers your catheter before you bathe  The edges of the bandage should make contact with your skin on all sides  This will prevent water from getting into your drain  If your bandage gets wet, remove the bandage  Clean your skin around the catheter and replace the bandage as directed  Follow up with your healthcare provider as directed:  Write down your questions so you remember to ask them during your visits  © 2017 [employer] is for End User's use only and may not be sold, redistributed or otherwise used for commercial purposes  All illustrations and images included in CareNotes® are the copyrighted property of A D A M [employer]  or Russell Lira  The above information is an  only  It is not intended as medical advice for individual conditions or treatments   Talk to your doctor, nurse or pharmacist before following any medical regimen to see if it is safe and effective for you  Abdominal Paracentesis     WHAT YOU NEED TO KNOW:   Abdominal paracentesis is a procedure to remove abnormal fluid buildup in your abdomen  Fluid builds up because of liver problems, such as swelling and scarring  Heart failure, kidney disease, a mass, or problems with your pancreas may also cause fluid buildup  DISCHARGE INSTRUCTIONS:     Follow up with your healthcare provider as directed: Write down your questions so you remember to ask them during your visits  Wound care: Remove dressing after 24 hours  Leave glue in place  Return to your normal activities    Contact Interventional Radiology at [phone] Abh[place] PATIENTS: Contact Interventional Radiology at [phone]) [place] PATIENTS: Contact Interventional Radiology at [phone]) if:  · You have a fever and your wound is red and swollen  · You have yellow, green, or bad-smelling discharge coming from your wound  · You have pain or swelling in your abdomen  · You have an upset stomach or you vomit  · You have sudden, sharp pain in your abdomen  · You urinate very little or not at all  · You feel confused and more tired than usual    · Your arm or leg feels warm, tender, and painful  It may look swollen and red  · You suddenly feel lightheaded and have trouble breathing  Thoracentesis   WHAT YOU NEED TO KNOW:   A thoracentesis is a procedure to remove extra fluid or air from between your lungs and your inner chest wall  Air or fluid buildup may make it hard for you to breathe  A thoracentesis allows your lungs to expand fully so you can breathe more easily  DISCHARGE INSTRUCTIONS:   Medicines:   · Pain medicine: You may be given a prescription medicine to decrease pain  Do not wait until the pain is severe before you take your medicine  · Antibiotics: This medicine helps fight or prevent an infection      · Take your medicine as directed  Call your healthcare provider if you think your medicine is not helping or if you have side effects  Tell him if you are allergic to any medicine  Keep a list of the medicines, vitamins, and herbs you take  Include the amounts, and when and why you take them  Bring the list or the pill bottles to follow-up visits  Carry your medicine list with you in case of an emergency  Follow up with your healthcare provider as directed:  Write down your questions so you remember to ask them during your visits  Rest:  Rest when you feel it is needed  Slowly start to do more each day  Return to your daily activities as directed  Do not smoke: If you smoke, it is never too late to quit  Ask for information about how to stop smoking if you need help  Contact your healthcare provider if:   · You have a fever  · Your puncture site is red, warm, swollen, or draining pus  · You have questions or concerns about your procedure, medicine, or care  · If you have any questions regarding, call the IR department @ [phone]  Seek care immediately or call 911 if:   · Blood soaks through your bandage  · There is blood in your spit

## 2021-07-07 NOTE — ASSESSMENT & PLAN NOTE
· Patient presenting with abdominal discomfort and abdominal distention with associated shortness of breath  · Recently underwent a paracentesis about 2 days ago during his last admission that yielded about 5000 mL of fluid that was positive for malignancy    · IR performed paracentesis-unclear as to how much fluid was removed  · Goals of care discussion and palliative care on board-patient previously wish to be treatment focus but now is understanding of severity of situation and of opting for hospice  · Appears to be reaccumulation of fluid in abdomen  · Status post Tenckhoff catheter placement

## 2021-07-07 NOTE — PROGRESS NOTES
[facility]  Progress Note Jeremy Bryan 1945, 77 y o  male MRN: [id number]  Unit/Bed#: S -01 Encounter: [id number]  Primary Care Provider: Anna Velasquez MD   Date and time admitted to hospital: 7/1/2021  3:42 PM    * Metastatic lung cancer (metastasis from lung to other site) [facility])  Assessment & Plan  · On chart review, patient has a history of non-small cell lung cancer diagnosed in December 2019 - Follow with Oncology, Dr Ralf Torres  · Underwent VATS with pleural decortication and pleurodesis 7/20  · PET scan 5/4 concerning for disease progression  Notable for left intraperitoneal metastasis  · Last chemotherapy was about a month ago  · Discussed with patient that his overall prognosis is poor given that he has stage IV cancer; on admission, he remained optimistic that he can be this and is looking for to getting a new chemotherapy medication from [address]  · We had extensive discussion with patient, wife, and sister-in-law regarding advanced care planning  · Patient is now agreeable to being discharged to home with home hospice and understands the severity of lung cancer stage IV    Plan:  · Inpatient consult to hospice- notified  · Geriatric Pain management  · Status post Tenckhoff catheter placement-6 L fluid was removed  · Continue  To drain p r n  Goals of care, counseling/discussion  Assessment & Plan  Pt and family opted for home hospice  Likely discharge tomorrow on home hospice    Malignant ascites  Assessment & Plan  · Patient presenting with abdominal discomfort and abdominal distention with associated shortness of breath  · Recently underwent a paracentesis about 2 days ago during his last admission that yielded about 5000 mL of fluid that was positive for malignancy    · IR performed paracentesis-unclear as to how much fluid was removed  · Goals of care discussion and palliative care on board-patient previously wish to be treatment focus but now is understanding of severity of situation and of opting for hospice  · Appears to be reaccumulation of fluid in abdomen  · Status post Tenckhoff catheter placement    Acute renal failure superimposed on stage 3 chronic kidney disease (HCC)  Assessment & Plan    · Baseline 1 5-2 2    History of pulmonary embolism  Assessment & Plan  Can likely discontinue anticoagulation since patient will be going on hospice    Pleural effusion  Assessment & Plan  · Was noted during prior admission in evaluated by IR, at the time was deemed insufficient to warrant pleurocentesis  · IR evaluate patient possible pleurocentesis, but IR felt that chronic pleural effusion does not need to be drained at this time       VTE Pharmacologic Prophylaxis: VTE Score: 8 High Risk (Score >/= 5) - Pharmacological DVT Prophylaxis Ordered: apixaban (Eliquis)  Sequential Compression Devices Ordered  Patient Centered Rounds: I performed bedside rounds with nursing staff today  Discussions with Specialists or Other Care Team Provider:      Education and Discussions with Family / Patient: Updated  (wife) at bedside  Time Spent for Care: 30 minutes  More than 50% of total time spent on counseling and coordination of care as described above  Current Length of Stay: 6 day(s)  Current Patient Status: Inpatient   Certification Statement: The patient will continue to require additional inpatient hospital stay due to Hospice  Discharge Plan: Anticipate discharge tomorrow to home  Code Status: Level 4 - Comfort Care    Subjective:   Patient resting comfortably  Offers no specific complaints    Objective:     Vitals:   No data recorded  HR:  [101-108] 101  BP: ()/(50-67) 87/50  SpO2:  [90 %-100 %] 98 %  Body mass index is 32 91 kg/m²  Input and Output Summary (last 24 hours):      Intake/Output Summary (Last 24 hours) at 7/7/2021 1616  Last data filed at 7/7/2021 1601  Gross per 24 hour   Intake --   Output 6350 ml   Net -6350 ml       Physical Exam:   Physical Exam   Ill-appearing  Icteric and pallor  No acute distress  Abdomen distended  Edema noted bilaterally    Additional Data:     Labs:  Results from last 7 days   Lab Units 07/03/21  0518   WBC Thousand/uL 8 18   HEMOGLOBIN g/dL 9 6*   HEMATOCRIT % 30 1*   PLATELETS Thousands/uL 88*   NEUTROS PCT % 70   LYMPHS PCT % 10*   MONOS PCT % 15*   EOS PCT % 3     Results from last 7 days   Lab Units 07/03/21  0518   SODIUM mmol/L 131*   POTASSIUM mmol/L 4 6   CHLORIDE mmol/L 98*   CO2 mmol/L 25   BUN mg/dL 65*   CREATININE mg/dL 2 50*   ANION GAP mmol/L 8   CALCIUM mg/dL 7 8*   ALBUMIN g/dL 3 0*   TOTAL BILIRUBIN mg/dL 1 13*   ALK PHOS U/L 51   ALT U/L 7*   AST U/L 12   GLUCOSE RANDOM mg/dL 76     Results from last 7 days   Lab Units 07/02/21  0552   INR  1 71*     Results from last 7 days   Lab Units 07/03/21  1058 07/03/21  0758 07/02/21  2105 07/02/21  1514 07/02/21  1119 07/02/21  0823 07/01/21  2059   POC GLUCOSE mg/dl 84 84 77 78 94 95 90         Results from last 7 days   Lab Units 07/03/21  0518 07/02/21  0552 07/01/21  2234 07/01/21 2003 07/01/21 2002 07/01/21  1556   LACTIC ACID mmol/L  --   --  1 5  --  2 2* 2 2*   PROCALCITONIN ng/ml 0 63* 0 70*  --  0 67*  --   --        Lines/Drains:  Invasive Devices     Central Venous Catheter Line            Port A Cath Right Subclavian -- days          Peripheral Intravenous Line            Peripheral IV 07/07/21 Left Antecubital <1 day          Drain            Closed/Suction Drain Left;Lateral Abdomen Other (Comment) 15 Fr  <1 day                Central Line:  Goal for removal:              Imaging: Reviewed radiology reports from this admission including: chest xray    Recent Cultures (last 7 days):   Results from last 7 days   Lab Units 07/02/21  0950 07/01/21  2150 07/01/21 2003   GRAM STAIN RESULT  Rare Polys  No bacteria seen  --   --    BODY FLUID CULTURE, STERILE  No growth  --   --    LEGIONELLA URINARY ANTIGEN   -- --  Negative   C DIFF TOXIN B BY PCR   --  Negative  --        Last 24 Hours Medication List:   Current Facility-Administered Medications   Medication Dose Route Frequency Provider Last Rate    acetaminophen  650 mg Oral Q6H PRN Delmis Cooley MD      albumin human  25 g Intravenous Once Zeeshan Saleh MD      cholecalciferol  1,000 Units Oral Daily Estela Bone MD      cyanocobalamin  1,000 mcg Oral Daily Estela Bone MD      folic acid  7,046 mcg Oral Daily MD Maggie Quinn HYDROmorphone  0 2 mg Intravenous Q4H PRN Krystian Dover MD      lidocaine  1 patch Topical Daily Delmis Cooley MD      LORazepam  0 25 mg Oral Q6H PRN Moshe ENRISSA Griffin      metoprolol tartrate  12 5 mg Oral Q12H [address] Estela Bone MD      midodrine  10 mg Oral BID AC Estela Bone MD      mirtazapine  15 mg Oral Daily Estela Bone MD      ondansetron  4 mg Oral Q6H PRN Michael Parameswaran, DO      oxyCODONE  2 5 mg Oral Q4H PRN Michael Parameswaran, DO      oxyCODONE  5 mg Oral Q4H PRN Michael Parameswaran, DO      pantoprazole  40 mg Oral QAM Estela Bone MD      senna-docusate sodium  1 tablet Oral BID Teresa Mcghee DO          Today, Patient Was Seen By: Pierce Westbrook MD    **Please Note: This note may have been constructed using a voice recognition system  **

## 2021-07-07 NOTE — ASSESSMENT & PLAN NOTE
· On chart review, patient has a history of non-small cell lung cancer diagnosed in December 2019 - Follow with Oncology, Dr Aneesh Lambert  · Underwent VATS with pleural decortication and pleurodesis 7/20  · PET scan 5/4 concerning for disease progression  Notable for left intraperitoneal metastasis  · Last chemotherapy was about a month ago  · Discussed with patient that his overall prognosis is poor given that he has stage IV cancer; on admission, he remained optimistic that he can be this and is looking for to getting a new chemotherapy medication from [place] [id number]  · We had extensive discussion with patient, wife, and sister-in-law regarding advanced care planning  · Patient is now agreeable to being discharged to home with home hospice and understands the severity of lung cancer stage IV    Plan:  · Inpatient consult to hospice- notified  · Geriatric Pain management  · Status post Tenckhoff catheter placement-6 L fluid was removed  · Continue  To drain p r n

## 2021-07-07 NOTE — BRIEF OP NOTE (RAD/CATH)
INTERVENTIONAL RADIOLOGY PROCEDURE NOTE    Date: 7/7/2021    Procedure: IR PERITONEAL ASCITES LONG-TERM CATHETER PLACEMENT     Preoperative diagnosis:   1  SOB (shortness of breath) on exertion    2  Other ascites    3  Carcinoma of lung, right ([address] )    4  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 ([facility] [address] )    5  Shortness of breath    6  Cancer related pain         Postoperative diagnosis: Same  Surgeon: Kelle Bolaños MD     Assistant: None  No qualified resident was available  Blood loss:  1 mL    Specimens:  None    Findings:  Successful placement of a Tenckhoff catheter into the left side of the abdomen with subsequent removal of 6 L of straw-colored ascites  Complications: None immediate      Anesthesia: conscious sedation

## 2021-07-08 PROCEDURE — 99239 HOSP IP/OBS DSCHRG MGMT >30: CPT | Performed by: INTERNAL MEDICINE

## 2021-07-08 RX ORDER — ONDANSETRON 4 MG/1
4 TABLET, FILM COATED ORAL EVERY 8 HOURS PRN
Qty: 20 TABLET | Refills: 0 | Status: SHIPPED | OUTPATIENT
Start: 2021-07-08

## 2021-07-08 RX ADMIN — LORAZEPAM 0.25 MG: 0.5 TABLET ORAL at 07:19

## 2021-07-08 RX ADMIN — OXYCODONE HYDROCHLORIDE 5 MG: 5 TABLET ORAL at 06:17

## 2021-07-08 RX ADMIN — ONDANSETRON 4 MG: 4 TABLET, ORALLY DISINTEGRATING ORAL at 09:59

## 2021-07-08 RX ADMIN — MIDODRINE HYDROCHLORIDE 10 MG: 5 TABLET ORAL at 06:17

## 2021-07-08 RX ADMIN — OXYCODONE HYDROCHLORIDE 5 MG: 5 TABLET ORAL at 09:59

## 2021-07-08 RX ADMIN — ONDANSETRON 4 MG: 4 TABLET, ORALLY DISINTEGRATING ORAL at 10:11

## 2021-07-08 RX ADMIN — OXYCODONE HYDROCHLORIDE 5 MG: 5 TABLET ORAL at 00:16

## 2021-07-08 NOTE — DISCHARGE SUMMARY
[facility]  Discharge- Isidra Gunderson 1945, 77 y o  male MRN: [id number]  Unit/Bed#: S -01 Encounter: [id number]  Primary Care Provider: Miranda Houston MD   Date and time admitted to hospital: 7/1/2021  3:42 PM    * Metastatic lung cancer (metastasis from lung to other site) [facility])  Assessment & Plan  · On chart review, patient has a history of non-small cell lung cancer diagnosed in December 2019 - Follow with Oncology, Dr Nova Lainez  · Underwent VATS with pleural decortication and pleurodesis 7/20  · PET scan 5/4 concerning for disease progression  Notable for left intraperitoneal metastasis  · Last chemotherapy was about a month ago  · Discussed with patient that his overall prognosis is poor given that he has stage IV cancer; on admission, he remained optimistic that he can be this and is looking for to getting a new chemotherapy medication from [address]  · We had extensive discussion with patient, wife, and sister-in-law regarding advanced care planning  · Patient is now agreeable to being discharged to home with home hospice and understands the severity of lung cancer stage IV    Plan:  · Geriatric Pain management  · Status post Tenckhoff catheter placement-6 L fluid was removed  · Continue  To drain p r n  · Safe for discharge to home with home hospice  · Follow-up with palliative care     Goals of care, counseling/discussion  Assessment & Plan  Pt and family opted for home hospice  Discharged on home hospice    Malignant ascites  Assessment & Plan  · Patient presenting with abdominal discomfort and abdominal distention with associated shortness of breath  · Recently underwent a paracentesis about 2 days ago during his last admission that yielded about 5000 mL of fluid that was positive for malignancy    · IR performed paracentesis-unclear as to how much fluid was removed  · Goals of care discussion and palliative care on board-patient previously wish to be treatment focus but now is understanding of severity of situation and of opting for hospice  · Appears to be reaccumulation of fluid in abdomen  · Status post Tenckhoff catheter placement-discharged home with home hospice    History of pulmonary embolism  Assessment & Plan  Discontinue anticoagulation since patient will be going on hospice        Medical Problems     Resolved Problems  Date Reviewed: 7/8/2021        Resolved    Sepsis ([address] ) 7/2/2021     Resolved by  Brianna Spencer DO              Discharging Resident: Brianna Spencer DO  Discharging Attending: No att  providers found  PCP: Chris Wilson MD  Admission Date:   Admission Orders (From admission, onward)     Ordered        07/01/21 1657  INPATIENT ADMISSION  Once                   Discharge Date: 07/08/21    Consultations During Hospital Stay:  · Palliative Care, Interventional Radiology, pharmacy    Procedures Performed:   · Tenckhoff catheter placement    Significant Findings / Test Results:   XR chest 1 view portable    Result Date: 7/1/2021  Impression: Stable chest  Persistent loculated right pleural effusion, right basilar atelectasis and central vascular crowding  Workstation performed: [id number]     CT chest wo contrast    Result Date: 7/3/2021  Impression: 1  Increasing right lower lobe consolidation, may be related to atelectasis, pneumonia or malignancy given the patient's history  2   Stable chronic loculated effusions on the right  Persistent extensive pleural thickening in the right hemithorax suspected to represent malignant pleural disease  3   Small left pleural effusion, increased  4   Decreased upper abdominal ascites  Increased infiltration of the left upper quadrant intra-abdominal fat, malignant peritoneal disease is not excluded given prior PET/CT findings  The study was marked in EPIC for significant notification   Workstation performed: [id number]     IR INPATIENT Paracentesis    Result Date: 7/2/2021  Impression: Impression: Successful ultrasound-guided paracentesis  Workstation performed: [id number]       Incidental Findings:   · None     Test Results Pending at Discharge (will require follow up): · None     Outpatient Tests Requested:  · None    Complications:  None    Reason for Admission:  Significant ascites    Hospital Course: Estelle Villalta is a 77 y o  male patient who originally presented to the hospital on 7/1/2021 due to dyspnea and generalized weakness  PMHx significant for CAD, type 2 DM, HTN, av block status post pacemaker, DVT/PE on Eliquis, metastatic lung cancer, and CHF  Recently was hospitalized for respiratory failure and hypoxia and underwent paracentesis where 5 L of cloudy yellow ascitic fluid was removed at that time  Fluid cytology was indicative for malignancy at that time  Patient has had chronic pleural effusions requiring repeat thoracentesis ease as well as recurrent ascites requiring paracenteses continuously  Patient had been following with outpatient Hematology Oncology and was hopeful that he could benefit from additional chemotherapy  However, it appeared that patient and family were not made aware that patient's cancer had become stage for malignancy  Throughout the duration of the admission, primary team had advance care planning discussion with patient and family who was present at bedside and after extensive discussion, patient and family were all agreeable to progress the patient to level for comfort care  In addition, patient was to be discharged to home with home hospice  However, prior to discharge, patient's family requested if anything could be performed for the repeat ascites as patient was having multiple admissions requiring paracentesis  We discussed the option of placing a Tenckhoff catheter that could be continuously drained as needed  And family was agreeable      After Tenckhoff catheter was placed, patient was deemed stable for discharge to home with home hospice  All questions were answered for patient and family throughout the duration of this admission  Please see above list of diagnoses and related plan for additional information  Condition at Discharge: terminal    Discharge Day Visit / Exam:   Subjective:  Patient evaluated earlier in the morning and patient was slightly disoriented as he was waking up  Discussed with him that we are planning to discharge him home with home hospice and he was understanding of this plan and agreeable  Vitals: Blood Pressure: 99/58 (07/07/21 2039)  Pulse: 99 (07/07/21 2039)  Temperature: 97 9 °F (36 6 °C) (07/06/21 0641)  Temp Source: Oral (07/06/21 0641)  Respirations: (!) 26 (07/06/21 0641)  Weight - Scale: 95 3 kg (210 lb 1 6 oz) (07/03/21 0600)  SpO2: 98 % (07/07/21 1559)  Exam:   Physical Exam  Vitals and nursing note reviewed  Constitutional:       General: He is not in acute distress  Appearance: He is well-developed  He is ill-appearing  He is not diaphoretic  HENT:      Head: Normocephalic and atraumatic  Mouth/Throat:      Mouth: Mucous membranes are dry  Eyes:      Extraocular Movements: Extraocular movements intact  Conjunctiva/sclera: Conjunctivae normal    Cardiovascular:      Heart sounds: Normal heart sounds  No murmur heard  Pulmonary:      Effort: Pulmonary effort is normal       Breath sounds: Rales present  No wheezing  Abdominal:      General: Bowel sounds are normal  There is no distension  Tenderness: There is no abdominal tenderness  There is no guarding or rebound  Comments: Tenckhoff catheter in place   Musculoskeletal:      Right lower leg: Edema present  Left lower leg: Edema present  Skin:     General: Skin is warm and dry  Neurological:      Mental Status: He is oriented to person, place, and time  Discussion with Family: Updated  (significant other) via phone      Discharge instructions/Information to patient and family:   See after visit summary for information provided to patient and family  Provisions for Follow-Up Care:  See after visit summary for information related to follow-up care and any pertinent home health orders  Disposition:   Home with VNA Services (Reminder: Complete face to face encounter)    Planned Readmission:  None    Discharge Medications:  See after visit summary for reconciled discharge medications provided to patient and/or family        **Please Note: This note may have been constructed using a voice recognition system**

## 2021-07-08 NOTE — ASSESSMENT & PLAN NOTE
· On chart review, patient has a history of non-small cell lung cancer diagnosed in December 2019 - Follow with Oncology, Dr Ryann Dykes  · Underwent VATS with pleural decortication and pleurodesis 7/20  · PET scan 5/4 concerning for disease progression  Notable for left intraperitoneal metastasis  · Last chemotherapy was about a month ago  · Discussed with patient that his overall prognosis is poor given that he has stage IV cancer; on admission, he remained optimistic that he can be this and is looking for to getting a new chemotherapy medication from [place] [id number]  · We had extensive discussion with patient, wife, and sister-in-law regarding advanced care planning  · Patient is now agreeable to being discharged to home with home hospice and understands the severity of lung cancer stage IV    Plan:  · Geriatric Pain management  · Status post Tenckhoff catheter placement-6 L fluid was removed  · Continue  To drain p r n    · Safe for discharge to home with home hospice  · Follow-up with palliative care

## 2021-07-08 NOTE — ASSESSMENT & PLAN NOTE
· Patient presenting with abdominal discomfort and abdominal distention with associated shortness of breath  · Recently underwent a paracentesis about 2 days ago during his last admission that yielded about 5000 mL of fluid that was positive for malignancy    · IR performed paracentesis-unclear as to how much fluid was removed  · Goals of care discussion and palliative care on board-patient previously wish to be treatment focus but now is understanding of severity of situation and of opting for hospice  · Appears to be reaccumulation of fluid in abdomen  · Status post Tenckhoff catheter placement-discharged home with home hospice

## 2021-07-09 ENCOUNTER — TELEPHONE (OUTPATIENT)
Dept: PALLIATIVE MEDICINE | Facility: CLINIC | Age: 76
End: 2021-07-09

## 2021-07-09 DIAGNOSIS — C34.90 PRIMARY MALIGNANT NEOPLASM OF LUNG METASTATIC TO OTHER SITE, UNSPECIFIED LATERALITY (HCC): ICD-10-CM

## 2021-07-09 DIAGNOSIS — Z51.5 HOSPICE CARE PATIENT: Primary | ICD-10-CM

## 2021-07-09 RX ORDER — HALOPERIDOL 2 MG/ML
1 SOLUTION ORAL EVERY 2 HOUR PRN
Qty: 120 ML | Refills: 0 | Status: SHIPPED | OUTPATIENT
Start: 2021-07-09

## 2021-07-09 RX ORDER — LORAZEPAM 2 MG/ML
0.5 CONCENTRATE ORAL
Qty: 30 ML | Refills: 0 | Status: SHIPPED | OUTPATIENT
Start: 2021-07-09

## 2021-07-09 RX ORDER — MORPHINE SULFATE 100 MG/5ML
10 SOLUTION, CONCENTRATE ORAL
Qty: 30 ML | Refills: 0 | Status: SHIPPED | OUTPATIENT
Start: 2021-07-09

## 2021-07-09 NOTE — UTILIZATION REVIEW
Notification of Discharge   This is a Notification of Discharge from our facility [address]  Please be advised that this patient has been discharge from our facility  Below you will find the admission and discharge date and time including the patients disposition  UTILIZATION REVIEW CONTACT:  Chucky Branham  Utilization   Network Utilization Review Department  Phone: [phone] x carefully listen to the prompts  All voicemails are confidential   Email: [email or web address]     PHYSICIAN ADVISORY SERVICES:  FOR [id number] REVIEW - MEDICAL NECESSITY DENIAL  Phone: [phone]  Fax: [phone]  Email: [email or web address]     PRESENTATION DATE: 7/1/2021  3:42 PM  OBERVATION ADMISSION DATE:   INPATIENT ADMISSION DATE: 7/1/21  4:57 PM   DISCHARGE DATE: 7/8/2021 10:33 AM  DISPOSITION: Home with [address] with Hospice Care      IMPORTANT INFORMATION:  Send all requests for admission clinical reviews, approved or denied determinations and any other requests to dedicated fax number below belonging to the campus where the patient is receiving treatment   List of dedicated fax numbers:  [address] [place] [address] DENIALS (Administrative/Medical Necessity) [phone]   [address] (Maternity/NICU/Pediatrics) [phone]   [address] [phone]   [place] [phone]   [place] [phone]   [facility] [address] [facility] [phone]   [facility]  [phone]   [address] [facility] [address]  [address] [facility] [address] [facility] [phone]

## 2021-07-09 NOTE — PROGRESS NOTES
7/9/2021 12:38 PM  [facility] home patient requests emergency fill until [facility] order arrives  Filled electronically via Epic as per [place] State Law  Requested Prescriptions     Signed Prescriptions Disp Refills    Morphine Sulfate, Concentrate, 20 mg/mL concentrated solution 30 mL 0     Sig: Take 0 5 mL (10 mg total) by mouth every 4 (four) hours And every 1 hour as needed for pain or shortness of breathMax Daily Amount: 60 mg    LORazepam (ATIVAN) 2 mg/mL concentrated solution 30 mL 0     Sig: Take 0 25 mL (0 5 mg total) by mouth every 4 (four) hours And every 1 hour as needed for anxiety or shortness of breath    haloperidol (HALDOL) 1 mg/0 5 mL oral concentrated solution 120 mL 0     Sig: Take 0 5 mL (1 mg total) by mouth every 2 (two) hours as needed for agitation (restlessness)       Jeannette April, [address] Answering Service: [phone]  You can find me on [employer]!

## 2021-07-09 NOTE — PLAN OF CARE
Problem: Potential for Falls  Goal: Patient will remain free of falls  Description: INTERVENTIONS:  - Educate patient/family on patient safety including physical limitations  - Instruct patient to call for assistance with activity   - Consult OT/PT to assist with strengthening/mobility   - Keep Call bell within reach  - Keep bed low and locked with side rails adjusted as appropriate  - Keep care items and personal belongings within reach  - Initiate and maintain comfort rounds  - Make Fall Risk Sign visible to staff  - Offer Toileting every 2 Hours, in advance of need  - Initiate/Maintain bed alarm  - Obtain necessary fall risk management equipment: bracelet & socks  - Apply yellow socks and bracelet for high fall risk patients  - Consider moving patient to room near nurses station  Outcome: Progressing Dr. Alejandro Kirby Alejandro Kirby MD

## 2021-07-12 ENCOUNTER — HOSPITAL ENCOUNTER (OUTPATIENT)
Dept: INFUSION CENTER | Facility: HOSPITAL | Age: 76
Discharge: HOME | End: 2021-07-12

## 2021-07-29 NOTE — TELEPHONE ENCOUNTER
LMOM for pt to call back to schedule a f/u appt with Dr Renata Gallagher  If he calls back please get him scheduled  I will try calling again in 2 weeks

## 2021-09-27 NOTE — ANESTHESIA POSTPROCEDURE EVALUATION
Post-Op Assessment Note    CV Status:  Stable  Pain Score: 0    Pain management: adequate     Mental Status:  Sleepy   Hydration Status:  Euvolemic   PONV Controlled:  Controlled   Airway Patency:  Patent      Post Op Vitals Reviewed: Yes      Staff: CRNA         No complications documented      BP (!) 82/43 (06/18/21 1451)    Temp     Pulse 98 (06/18/21 1451)   Resp 16 (06/18/21 1451)    SpO2 100 % (06/18/21 1451) None

## 2022-11-21 NOTE — CASE MANAGEMENT
CM messaged PT and requested patient be seen asap due to needing insurance auth for SNF over the weekend  [employer]

## 2022-12-05 NOTE — ASSESSMENT & PLAN NOTE
History of non-small-cell lung cancer diagnosed in December 2019  Patient underwent VATS with pleural decortication and pleurodesis in 7/2020  Patient follows Dr Stef Noonan  Not sure exactly of treatment plan  CT suggested possible advancement of metastatic disease with involvement of the lingula  Paracentesis cytology (+) malignancy with lung primary - patient and family made aware    Consulted oncology, appreciate input  Per oncology, "Recent paracentesis demonstrated TTF1 positive adenocarcinoma in the ascitic fluid  If the ascitic fluid reaccumulates persistently/significantly, patient will need to be evaluated for catheter placement  Additional information is needed  Progressive ascites would be consistent with disease progression - possibly patient's present regimen needs to be amended  Medical oncology will reach out to Dr Stef Noonan for additional information including the most recent treatment plan  There are other options "  At this time, patient's prognosis is very guarded  His hypotension and CONCEPCIÓN are an issue  Multiple providers have discussed goals of care with patient and his wife  Multiple providers have suggested hospice at this time, however, patient remains treatment focused    · Plan for outpatient Oncology follow-up Partial Purse String (Simple) Text: Given the location of the defect and the characteristics of the surrounding skin a simple purse string closure was deemed most appropriate.  Undermining was performed circumfirentially around the surgical defect.  A purse string suture was then placed and tightened. Wound tension only allowed a partial closure of the circular defect.

## 2023-03-24 NOTE — BRIEF OP NOTE (RAD/CATH)
INTERVENTIONAL RADIOLOGY PROCEDURE NOTE    Date: 6/14/2021    Procedure: IR PARACENTESIS     Preoperative diagnosis:  New onset ascites    Postoperative diagnosis: Same  Surgeon: Jesu Darnell MD     Assistant: None  No qualified resident was available  Blood loss:  Trace    Specimens:  Sent to the lab as requested    Findings:  2000 mL clear yellow ascites removed from left lower quadrant under ultrasound guidance  Complications: None immediate      Anesthesia: local Alert-The patient is alert, awake and responds to voice. The patient is oriented to time, place, and person. The triage nurse is able to obtain subjective information.

## 2023-04-27 NOTE — SPEECH THERAPY NOTE
Speech-Language Pathology Bedside Swallow Evaluation      Patient Name: Suzanne Hutton    YZQAF'B Date: 6/17/2021     Problem List  Principal Problem:    Acute respiratory failure with hypoxia ([facility] )  Active Problems:    Carcinoma of lung, right (HCC)    Pleural effusion    History of pulmonary embolism    AV block    Type 2 diabetes mellitus (HCC)    Hyponatremia    Acute renal failure superimposed on stage 3 chronic kidney disease (HCC)    Ascites    Sepsis ([facility] [address] )    Ambulatory dysfunction    Dysphagia      Past Medical History  Past Medical History:   Diagnosis Date    [facility]     RT Lung    Diabetes mellitus [facility]     Hypertension        Past Surgical History  Past Surgical History:   Procedure Laterality Date    CARDIAC PACEMAKER PLACEMENT  09/2019    CHOLECYSTECTOMY      kidney damage from gallbladder removal     FOOT SURGERY      tendon    IR PARACENTESIS  6/14/2021    PORTACATH PLACEMENT      PROSTATE SURGERY         Summary   LIMITED evaluation completed  He  presented with no s/s oral motor weakness and functional appearing pharyngeal stage swallowing skills with limited sips of water/thin liquids  Unfortunately, this was followed by prolonged retching and pt declined any additional food or liquid    He requested "no more food or drink today" but agreed to 'tray some food and a drink in the morning "    Recommended Diet: I spoke c pt re: available diet levels and he selected Level 3 dysphagia (soft cooked food),  Recommended Form of Meds: as tolerated (took them whole with water earlier with no overt s/s dysphagia)  Precautions:  ?reflux precautions  Other Recommendations: Continue Swallowing Evaluation process in am        Current Medical Status  Suzanne Hutton is a 78 yo M c PMH of NSCL cancer with history of recurrent malignant pleural effusion, status post VATS and right-sided decortication and pleurodesis, diabetes mellitus type 2, COPD, complete heart block status post ppm who presented to [facility] with 1 week history of shortness of breath  DX: Acute respiratory failure with hypoxia ([address] )  He was transferred to [facility]  06/14/2021  Initial workup at [facility] included a CT a PE scan which revealed right-sided loculated pleural effusion with right lung opacification concerning for atelectasis versus pneumonia, no PE, and large amount of ascites in upper abdomen  Patient was initially placed on O2 4-5 L in wean down to 3 L nasal cannula  Patient experience an episode of hypotension requiring IV fluid bolus and was transferred to [facility] as level 1 step down for IR evaluation of pleural effusion and ascites  He is s/p paracentesis 6/14 (for 2000 cc)  GI was consulted for dysphagia  Patient reports he can swallow liquids and take his pills without difficulty patient reports that when he chews food it goes to the back of his throat and he cannot swallow it  Patient denies any feeling that food is stuck in his esophagus  Patient denies pain with swallowing  Per patient no unintentional weight loss  Patient denies blood in stool, blood from rectal area, or black tarry stool  Patient has never had an EGD  Patient does not overuse NSAIDs  Patient is on anticoagulation therapy Eliquis 5 mg 2 times daily  Patient is currently taking pantoprazole 40 mg daily  SLP Swallowing Evaluation ordered at this time to r/o oral/pharygneal dysphagia with food  Allergies:  No known food allergies    Past medical history:  Please see H&P for details    Special Studies:  6/17 US of abd: Moderate quantity of diffuse abdominal ascites  CTA chest /PE study:   1  No evidence of acute pulmonary embolism  Several thin linear defects within the pulmonary arterial system, may represent fibrin strands from a previous episode of pulmonary embolism  2  Increasing opacification in the right lung base suggesting worsening atelectasis or pneumonia    3  Complex right-sided loculated pleural effusion, the lateral inferior component containing bubbles of air as on numerous prior studies  4  New the lingula nodule suspicious for metastatic disease  5  Development of a large amount of ascites within the upper abdomen  This is concerning for malignant ascites  6  In conjunction with prior PET/CT, evidence of malignant mediastinal adenopathy, and right scapular metastatic lesion  Social/Education/Vocational Hx:  Pt lives with family    Swallow Information   Current Diet: regular diet and thin liquids   Baseline Diet: regular diet and thin liquids      Baseline Assessment   Behavior/Cognition: alert  Speech/Language Status: able to participate in conversation and able to follow commands  Patient Positioning: upright in bed     Swallow Mechanism Exam  Facial: symmetrical  Labial: WFL  Lingual: WFL  Mandible: adequate ROM  Dentition: adequate  Vocal quality:adeqaute/some missing teeth  Volitional Cough: strong/productive   Respiratory Status: on RA       Consistencies Assessed and Performance   Materials administered included water (had taken pills with RN)  Pt report & staff confirm no o intake at breakfast   He took only a fabiola tsps of broth/soup for wife at lunch (then experienced prolonged "retching"    Oral Stage:   Adequate retrieval by tsp and adequate appearing transfer and oral control    Pharyngeal Stage:   Swallow Mechanics:  Swallowing initiation appeared prompt  Laryngeal rise was palpated and judged to be within functional limits  No immediate coughing, throat clearing, change in vocal quality or respiratory status noted with the limited sips of water taken today  Esophageal Concerns: protracted "retching" (intermittently for 10 mins)    Summary and Recommendations (see above)    Results Reviewed with: patient, RN and PA     Treatment Recommended: plan to f/u Fri to continue swallowing evaluation process as able & indicated  Biochemical

## 2024-11-05 NOTE — LETTER
To: [address] of [place]  From:  Mario Gomez, [address]    AVS/F2F for [facility] Instructions: This plan will send the code FBSD to the PM system.  DO NOT or CHANGE the price. Detail Level: Simple Price (Do Not Change): 0.00